# Patient Record
Sex: MALE | Race: WHITE | NOT HISPANIC OR LATINO | Employment: UNEMPLOYED | URBAN - METROPOLITAN AREA
[De-identification: names, ages, dates, MRNs, and addresses within clinical notes are randomized per-mention and may not be internally consistent; named-entity substitution may affect disease eponyms.]

---

## 2017-01-25 ENCOUNTER — HOSPITAL ENCOUNTER (OUTPATIENT)
Dept: ONCOLOGY | Facility: CLINIC | Age: 82
Discharge: HOME | End: 2017-01-25
Attending: INTERNAL MEDICINE | Admitting: INTERNAL MEDICINE

## 2017-02-01 ENCOUNTER — HOSPITAL ENCOUNTER (OUTPATIENT)
Dept: LAB | Facility: HOSPITAL | Age: 82
Setting detail: SPECIMEN
Discharge: HOME | End: 2017-02-01
Attending: INTERNAL MEDICINE | Admitting: INTERNAL MEDICINE

## 2017-02-01 ENCOUNTER — HOSPITAL ENCOUNTER (OUTPATIENT)
Dept: MRI IMAGING | Facility: HOSPITAL | Age: 82
Discharge: HOME | End: 2017-02-01
Attending: OTOLARYNGOLOGY | Admitting: OTOLARYNGOLOGY

## 2017-02-01 LAB
ALBUMIN SERPL-MCNC: 3.7 G/DL (ref 3.5–4.8)
ALBUMIN/GLOB SERPL: 1.4 {RATIO} (ref 1–1.7)
ALP SERPL-CCNC: 49 IU/L (ref 32–91)
ALT SERPL-CCNC: 20 IU/L (ref 17–63)
ANION GAP SERPL CALC-SCNC: 13.1 MMOL/L (ref 10–20)
AST SERPL-CCNC: 27 IU/L (ref 15–41)
BILIRUB SERPL-MCNC: 0.5 MG/DL (ref 0.3–1.2)
BUN SERPL-MCNC: 18 MG/DL (ref 8–20)
BUN/CREAT SERPL: 18 (ref 6.2–20.3)
CALCIUM SERPL-MCNC: 9 MG/DL (ref 8.9–10.3)
CHLORIDE SERPL-SCNC: 105 MMOL/L (ref 101–111)
CONV CO2: 28 MMOL/L (ref 22–32)
CONV MICROALBUM.,U,RANDOM: 7 MG/L
CONV TOTAL PROTEIN: 6.3 G/DL (ref 6.1–7.9)
CREAT 24H UR-MCNC: 78.2 MG/DL
CREAT UR-MCNC: 1 MG/DL (ref 0.7–1.2)
GLOBULIN UR ELPH-MCNC: 2.6 G/DL (ref 2.5–3.8)
GLUCOSE SERPL-MCNC: 102 MG/DL (ref 65–99)
MICROALBUMIN/CREAT UR: 9 UG/MG
POTASSIUM SERPL-SCNC: 4.1 MMOL/L (ref 3.6–5.1)
SODIUM SERPL-SCNC: 142 MMOL/L (ref 136–144)
TSH SERPL-ACNC: 1.87 UIU/ML (ref 0.34–5.6)

## 2017-02-10 ENCOUNTER — HOSPITAL ENCOUNTER (OUTPATIENT)
Dept: MRI IMAGING | Facility: HOSPITAL | Age: 82
Discharge: HOME | End: 2017-02-10
Attending: OTOLARYNGOLOGY | Admitting: OTOLARYNGOLOGY

## 2017-02-27 ENCOUNTER — HOSPITAL ENCOUNTER (OUTPATIENT)
Dept: ONCOLOGY | Facility: CLINIC | Age: 82
Discharge: HOME | End: 2017-02-27
Attending: INTERNAL MEDICINE | Admitting: INTERNAL MEDICINE

## 2017-03-13 ENCOUNTER — HOSPITAL ENCOUNTER (OUTPATIENT)
Dept: ONCOLOGY | Facility: CLINIC | Age: 82
Discharge: HOME | End: 2017-03-13
Attending: INTERNAL MEDICINE | Admitting: INTERNAL MEDICINE

## 2017-03-20 ENCOUNTER — HOSPITAL ENCOUNTER (OUTPATIENT)
Dept: ONCOLOGY | Facility: CLINIC | Age: 82
Discharge: HOME | End: 2017-03-20
Attending: INTERNAL MEDICINE | Admitting: INTERNAL MEDICINE

## 2017-03-20 LAB
ALBUMIN SERPL-MCNC: 3.8 G/DL (ref 3.5–4.8)
ALBUMIN/GLOB SERPL: 1.3 {RATIO} (ref 1–1.7)
ALP SERPL-CCNC: 42 IU/L (ref 32–91)
ALT SERPL-CCNC: 15 IU/L (ref 17–63)
ANION GAP SERPL CALC-SCNC: 17.9 MMOL/L (ref 10–20)
AST SERPL-CCNC: 20 IU/L (ref 15–41)
BILIRUB SERPL-MCNC: 0.5 MG/DL (ref 0.3–1.2)
BUN SERPL-MCNC: 25 MG/DL (ref 8–20)
BUN/CREAT SERPL: 22.7 (ref 6.2–20.3)
CALCIUM SERPL-MCNC: 9.6 MG/DL (ref 8.9–10.3)
CHLORIDE SERPL-SCNC: 102 MMOL/L (ref 101–111)
CONV CO2: 25 MMOL/L (ref 22–32)
CONV TOTAL PROTEIN: 6.7 G/DL (ref 6.1–7.9)
CREAT UR-MCNC: 1.1 MG/DL (ref 0.7–1.2)
GLOBULIN UR ELPH-MCNC: 2.9 G/DL (ref 2.5–3.8)
GLUCOSE SERPL-MCNC: 198 MG/DL (ref 65–99)
POTASSIUM SERPL-SCNC: 3.9 MMOL/L (ref 3.6–5.1)
SODIUM SERPL-SCNC: 141 MMOL/L (ref 136–144)

## 2017-03-24 ENCOUNTER — HOSPITAL ENCOUNTER (OUTPATIENT)
Dept: PAIN MEDICINE | Facility: HOSPITAL | Age: 82
Discharge: HOME | End: 2017-03-24
Attending: PAIN MEDICINE | Admitting: PAIN MEDICINE

## 2017-04-05 ENCOUNTER — HOSPITAL ENCOUNTER (OUTPATIENT)
Dept: PHYSICAL THERAPY | Facility: HOSPITAL | Age: 82
Setting detail: RECURRING SERIES
Discharge: HOME | End: 2017-05-31
Attending: ORTHOPAEDIC SURGERY | Admitting: ORTHOPAEDIC SURGERY

## 2017-04-07 ENCOUNTER — HOSPITAL ENCOUNTER (OUTPATIENT)
Dept: PAIN MEDICINE | Facility: HOSPITAL | Age: 82
Discharge: HOME | End: 2017-04-07
Attending: PAIN MEDICINE | Admitting: PAIN MEDICINE

## 2017-04-10 ENCOUNTER — HOSPITAL ENCOUNTER (OUTPATIENT)
Dept: ONCOLOGY | Facility: CLINIC | Age: 82
Discharge: HOME | End: 2017-04-10
Attending: INTERNAL MEDICINE | Admitting: INTERNAL MEDICINE

## 2017-04-10 LAB
ALBUMIN SERPL-MCNC: 3.6 G/DL (ref 3.5–4.8)
ALBUMIN/GLOB SERPL: 1.2 {RATIO} (ref 1–1.7)
ALP SERPL-CCNC: 41 IU/L (ref 32–91)
ALT SERPL-CCNC: 20 IU/L (ref 17–63)
ANION GAP SERPL CALC-SCNC: 13.8 MMOL/L (ref 10–20)
AST SERPL-CCNC: 23 IU/L (ref 15–41)
BILIRUB SERPL-MCNC: 0.6 MG/DL (ref 0.3–1.2)
BUN SERPL-MCNC: 28 MG/DL (ref 8–20)
BUN/CREAT SERPL: 23.3 (ref 6.2–20.3)
CALCIUM SERPL-MCNC: 8.9 MG/DL (ref 8.9–10.3)
CHLORIDE SERPL-SCNC: 108 MMOL/L (ref 101–111)
CONV CO2: 26 MMOL/L (ref 22–32)
CONV TOTAL PROTEIN: 6.5 G/DL (ref 6.1–7.9)
CREAT UR-MCNC: 1.2 MG/DL (ref 0.7–1.2)
GLOBULIN UR ELPH-MCNC: 2.9 G/DL (ref 2.5–3.8)
GLUCOSE SERPL-MCNC: 315 MG/DL (ref 65–99)
POTASSIUM SERPL-SCNC: 4.8 MMOL/L (ref 3.6–5.1)
SODIUM SERPL-SCNC: 143 MMOL/L (ref 136–144)

## 2017-04-24 ENCOUNTER — HOSPITAL ENCOUNTER (OUTPATIENT)
Dept: PAIN MEDICINE | Facility: HOSPITAL | Age: 82
Discharge: HOME | End: 2017-04-24
Attending: PAIN MEDICINE | Admitting: PAIN MEDICINE

## 2017-05-01 ENCOUNTER — HOSPITAL ENCOUNTER (OUTPATIENT)
Dept: ONCOLOGY | Facility: CLINIC | Age: 82
Discharge: HOME | End: 2017-05-01
Attending: INTERNAL MEDICINE | Admitting: INTERNAL MEDICINE

## 2017-05-10 ENCOUNTER — HOSPITAL ENCOUNTER (OUTPATIENT)
Dept: ONCOLOGY | Facility: CLINIC | Age: 82
Discharge: HOME | End: 2017-05-10
Attending: INTERNAL MEDICINE | Admitting: INTERNAL MEDICINE

## 2017-05-15 ENCOUNTER — HOSPITAL ENCOUNTER (OUTPATIENT)
Dept: ONCOLOGY | Facility: CLINIC | Age: 82
Discharge: HOME | End: 2017-05-15
Attending: INTERNAL MEDICINE | Admitting: INTERNAL MEDICINE

## 2017-05-18 ENCOUNTER — HOSPITAL ENCOUNTER (OUTPATIENT)
Dept: INTERVENTIONAL RADIOLOGY/VASCULAR | Facility: HOSPITAL | Age: 82
Discharge: HOME | End: 2017-05-18
Attending: INTERNAL MEDICINE | Admitting: INTERNAL MEDICINE

## 2017-05-22 ENCOUNTER — HOSPITAL ENCOUNTER (OUTPATIENT)
Dept: ONCOLOGY | Facility: CLINIC | Age: 82
Discharge: HOME | End: 2017-05-22
Attending: INTERNAL MEDICINE | Admitting: INTERNAL MEDICINE

## 2017-05-22 LAB
ALBUMIN SERPL-MCNC: 3.7 G/DL (ref 3.5–4.8)
ALBUMIN/GLOB SERPL: 1.4 {RATIO} (ref 1–1.7)
ALP SERPL-CCNC: 51 IU/L (ref 32–91)
ALT SERPL-CCNC: 14 IU/L (ref 17–63)
ANION GAP SERPL CALC-SCNC: 14.2 MMOL/L (ref 10–20)
AST SERPL-CCNC: 19 IU/L (ref 15–41)
BILIRUB SERPL-MCNC: 0.7 MG/DL (ref 0.3–1.2)
BUN SERPL-MCNC: 18 MG/DL (ref 8–20)
BUN/CREAT SERPL: 18 (ref 6.2–20.3)
CALCIUM SERPL-MCNC: 8.6 MG/DL (ref 8.9–10.3)
CHLORIDE SERPL-SCNC: 105 MMOL/L (ref 101–111)
CONV CO2: 24 MMOL/L (ref 22–32)
CONV TOTAL PROTEIN: 6.4 G/DL (ref 6.1–7.9)
CREAT UR-MCNC: 1 MG/DL (ref 0.7–1.2)
GLOBULIN UR ELPH-MCNC: 2.7 G/DL (ref 2.5–3.8)
GLUCOSE SERPL-MCNC: 287 MG/DL (ref 65–99)
MAGNESIUM SERPL-MCNC: 1.9 MG/DL (ref 1.8–2.5)
PHOSPHATE SERPL-MCNC: 3.6 MG/DL (ref 2.4–4.7)
POTASSIUM SERPL-SCNC: 4.2 MMOL/L (ref 3.6–5.1)
SODIUM SERPL-SCNC: 139 MMOL/L (ref 136–144)

## 2017-06-09 ENCOUNTER — HOSPITAL ENCOUNTER (OUTPATIENT)
Dept: LAB | Facility: HOSPITAL | Age: 82
Setting detail: SPECIMEN
Discharge: HOME | End: 2017-06-09
Attending: INTERNAL MEDICINE | Admitting: INTERNAL MEDICINE

## 2017-06-09 LAB
ALBUMIN SERPL-MCNC: 3.5 G/DL (ref 3.5–4.8)
ALBUMIN/GLOB SERPL: 1.2 {RATIO} (ref 1–1.7)
ALP SERPL-CCNC: 53 IU/L (ref 32–91)
ALT SERPL-CCNC: 17 IU/L (ref 17–63)
ANION GAP SERPL CALC-SCNC: 12 MMOL/L (ref 10–20)
AST SERPL-CCNC: 16 IU/L (ref 15–41)
BILIRUB SERPL-MCNC: 0.6 MG/DL (ref 0.3–1.2)
BUN SERPL-MCNC: 16 MG/DL (ref 8–20)
BUN/CREAT SERPL: 16 (ref 6.2–20.3)
CALCIUM SERPL-MCNC: 8.8 MG/DL (ref 8.9–10.3)
CHLORIDE SERPL-SCNC: 104 MMOL/L (ref 101–111)
CHOLEST SERPL-MCNC: 124 MG/DL
CHOLEST/HDLC SERPL: 3.3 {RATIO}
CONV CO2: 29 MMOL/L (ref 22–32)
CONV LDL CHOLESTEROL DIRECT: 66 MG/DL (ref 0–100)
CONV TOTAL PROTEIN: 6.4 G/DL (ref 6.1–7.9)
CREAT UR-MCNC: 1 MG/DL (ref 0.7–1.2)
GLOBULIN UR ELPH-MCNC: 2.9 G/DL (ref 2.5–3.8)
GLUCOSE SERPL-MCNC: 223 MG/DL (ref 65–99)
HDLC SERPL-MCNC: 38 MG/DL
LDLC/HDLC SERPL: 1.8 {RATIO}
LIPID INTERPRETATION: ABNORMAL
POTASSIUM SERPL-SCNC: 4 MMOL/L (ref 3.6–5.1)
SODIUM SERPL-SCNC: 141 MMOL/L (ref 136–144)
TRIGL SERPL-MCNC: 103 MG/DL
VLDLC SERPL CALC-MCNC: 20.2 MG/DL

## 2017-06-12 ENCOUNTER — HOSPITAL ENCOUNTER (OUTPATIENT)
Dept: PAIN MEDICINE | Facility: HOSPITAL | Age: 82
Discharge: HOME | End: 2017-06-12
Attending: PAIN MEDICINE | Admitting: PAIN MEDICINE

## 2017-06-12 ENCOUNTER — HOSPITAL ENCOUNTER (OUTPATIENT)
Dept: ONCOLOGY | Facility: CLINIC | Age: 82
Discharge: HOME | End: 2017-06-12
Attending: INTERNAL MEDICINE | Admitting: INTERNAL MEDICINE

## 2017-06-12 LAB
ALBUMIN SERPL-MCNC: 3.8 G/DL (ref 3.5–4.8)
ALBUMIN/GLOB SERPL: 1.3 {RATIO} (ref 1–1.7)
ALP SERPL-CCNC: 52 IU/L (ref 32–91)
ALT SERPL-CCNC: 16 IU/L (ref 17–63)
ANION GAP SERPL CALC-SCNC: 17.5 MMOL/L (ref 10–20)
AST SERPL-CCNC: 18 IU/L (ref 15–41)
BILIRUB SERPL-MCNC: 0.6 MG/DL (ref 0.3–1.2)
BUN SERPL-MCNC: 14 MG/DL (ref 8–20)
BUN/CREAT SERPL: 14 (ref 6.2–20.3)
CALCIUM SERPL-MCNC: 9.3 MG/DL (ref 8.9–10.3)
CHLORIDE SERPL-SCNC: 105 MMOL/L (ref 101–111)
CONV CO2: 24 MMOL/L (ref 22–32)
CONV TOTAL PROTEIN: 6.7 G/DL (ref 6.1–7.9)
CREAT UR-MCNC: 1 MG/DL (ref 0.7–1.2)
GLOBULIN UR ELPH-MCNC: 2.9 G/DL (ref 2.5–3.8)
GLUCOSE SERPL-MCNC: 159 MG/DL (ref 65–99)
POTASSIUM SERPL-SCNC: 4.5 MMOL/L (ref 3.6–5.1)
SODIUM SERPL-SCNC: 142 MMOL/L (ref 136–144)

## 2017-06-26 ENCOUNTER — HOSPITAL ENCOUNTER (OUTPATIENT)
Dept: PAIN MEDICINE | Facility: HOSPITAL | Age: 82
Discharge: HOME | End: 2017-06-26
Attending: PAIN MEDICINE | Admitting: PAIN MEDICINE

## 2017-07-10 ENCOUNTER — HOSPITAL ENCOUNTER (OUTPATIENT)
Dept: ONCOLOGY | Facility: CLINIC | Age: 82
Discharge: HOME | End: 2017-07-10
Attending: INTERNAL MEDICINE | Admitting: INTERNAL MEDICINE

## 2017-07-10 LAB
ALBUMIN SERPL-MCNC: 4 G/DL (ref 3.5–4.8)
ALBUMIN/GLOB SERPL: 1.4 {RATIO} (ref 1–1.7)
ALP SERPL-CCNC: 51 IU/L (ref 32–91)
ALT SERPL-CCNC: 15 IU/L (ref 17–63)
ANION GAP SERPL CALC-SCNC: 17 MMOL/L (ref 10–20)
AST SERPL-CCNC: 21 IU/L (ref 15–41)
BILIRUB SERPL-MCNC: 0.6 MG/DL (ref 0.3–1.2)
BUN SERPL-MCNC: 18 MG/DL (ref 8–20)
BUN/CREAT SERPL: 15 (ref 6.2–20.3)
CALCIUM SERPL-MCNC: 9.8 MG/DL (ref 8.9–10.3)
CHLORIDE SERPL-SCNC: 100 MMOL/L (ref 101–111)
CONV CO2: 27 MMOL/L (ref 22–32)
CONV TOTAL PROTEIN: 6.9 G/DL (ref 6.1–7.9)
CREAT UR-MCNC: 1.2 MG/DL (ref 0.7–1.2)
GLOBULIN UR ELPH-MCNC: 2.9 G/DL (ref 2.5–3.8)
GLUCOSE SERPL-MCNC: 302 MG/DL (ref 65–99)
POTASSIUM SERPL-SCNC: 5 MMOL/L (ref 3.6–5.1)
SODIUM SERPL-SCNC: 139 MMOL/L (ref 136–144)

## 2017-07-11 ENCOUNTER — HOSPITAL ENCOUNTER (OUTPATIENT)
Dept: ONCOLOGY | Facility: CLINIC | Age: 82
Discharge: HOME | End: 2017-07-11
Attending: INTERNAL MEDICINE | Admitting: INTERNAL MEDICINE

## 2017-07-25 ENCOUNTER — HOSPITAL ENCOUNTER (OUTPATIENT)
Dept: ONCOLOGY | Facility: CLINIC | Age: 82
Discharge: HOME | End: 2017-07-25
Attending: INTERNAL MEDICINE | Admitting: INTERNAL MEDICINE

## 2017-08-01 ENCOUNTER — HOSPITAL ENCOUNTER (OUTPATIENT)
Dept: ONCOLOGY | Facility: CLINIC | Age: 82
Discharge: HOME | End: 2017-08-01
Attending: INTERNAL MEDICINE | Admitting: INTERNAL MEDICINE

## 2017-08-01 LAB
ANION GAP SERPL CALC-SCNC: 16.3 MMOL/L (ref 10–20)
BUN SERPL-MCNC: 17 MG/DL (ref 8–20)
BUN/CREAT SERPL: 14.2 (ref 6.2–20.3)
CALCIUM SERPL-MCNC: 8.5 MG/DL (ref 8.9–10.3)
CHLORIDE SERPL-SCNC: 105 MMOL/L (ref 101–111)
CONV CO2: 25 MMOL/L (ref 22–32)
CREAT UR-MCNC: 1.2 MG/DL (ref 0.7–1.2)
GLUCOSE SERPL-MCNC: 282 MG/DL (ref 65–99)
MAGNESIUM SERPL-MCNC: 2 MG/DL (ref 1.8–2.5)
POTASSIUM SERPL-SCNC: 4.3 MMOL/L (ref 3.6–5.1)
SODIUM SERPL-SCNC: 142 MMOL/L (ref 136–144)

## 2017-08-21 ENCOUNTER — HOSPITAL ENCOUNTER (OUTPATIENT)
Dept: ONCOLOGY | Facility: CLINIC | Age: 82
Discharge: HOME | End: 2017-08-21
Attending: INTERNAL MEDICINE | Admitting: INTERNAL MEDICINE

## 2017-08-21 LAB
ALBUMIN SERPL-MCNC: 3.9 G/DL (ref 3.5–4.8)
ALBUMIN/GLOB SERPL: 1.3 {RATIO} (ref 1–1.7)
ALP SERPL-CCNC: 54 IU/L (ref 32–91)
ALT SERPL-CCNC: 16 IU/L (ref 17–63)
ANION GAP SERPL CALC-SCNC: 14.7 MMOL/L (ref 10–20)
AST SERPL-CCNC: 19 IU/L (ref 15–41)
BILIRUB SERPL-MCNC: 0.7 MG/DL (ref 0.3–1.2)
BUN SERPL-MCNC: 17 MG/DL (ref 8–20)
BUN/CREAT SERPL: 15.5 (ref 6.2–20.3)
CALCIUM SERPL-MCNC: 9.1 MG/DL (ref 8.9–10.3)
CHLORIDE SERPL-SCNC: 106 MMOL/L (ref 101–111)
CONV CO2: 27 MMOL/L (ref 22–32)
CONV TOTAL PROTEIN: 6.9 G/DL (ref 6.1–7.9)
CREAT UR-MCNC: 1.1 MG/DL (ref 0.7–1.2)
GLOBULIN UR ELPH-MCNC: 3 G/DL (ref 2.5–3.8)
GLUCOSE SERPL-MCNC: 215 MG/DL (ref 65–99)
POTASSIUM SERPL-SCNC: 4.7 MMOL/L (ref 3.6–5.1)
SODIUM SERPL-SCNC: 143 MMOL/L (ref 136–144)

## 2017-08-22 ENCOUNTER — HOSPITAL ENCOUNTER (OUTPATIENT)
Dept: ONCOLOGY | Facility: CLINIC | Age: 82
Discharge: HOME | End: 2017-08-22
Attending: INTERNAL MEDICINE | Admitting: INTERNAL MEDICINE

## 2017-09-05 ENCOUNTER — HOSPITAL ENCOUNTER (OUTPATIENT)
Dept: ONCOLOGY | Facility: HOSPITAL | Age: 82
Discharge: HOME | End: 2017-09-05
Attending: INTERNAL MEDICINE | Admitting: INTERNAL MEDICINE

## 2017-09-05 ENCOUNTER — CLINICAL SUPPORT (OUTPATIENT)
Dept: ONCOLOGY | Facility: HOSPITAL | Age: 82
End: 2017-09-05

## 2017-09-05 ENCOUNTER — HOSPITAL ENCOUNTER (OUTPATIENT)
Dept: ONCOLOGY | Facility: CLINIC | Age: 82
Setting detail: INFUSION SERIES
Discharge: HOME | End: 2017-09-05
Attending: INTERNAL MEDICINE | Admitting: INTERNAL MEDICINE

## 2017-09-07 ENCOUNTER — HOSPITAL ENCOUNTER (OUTPATIENT)
Dept: LAB | Facility: HOSPITAL | Age: 82
Setting detail: SPECIMEN
Discharge: HOME | End: 2017-09-07
Attending: INTERNAL MEDICINE | Admitting: INTERNAL MEDICINE

## 2017-09-12 ENCOUNTER — HOSPITAL ENCOUNTER (OUTPATIENT)
Dept: ONCOLOGY | Facility: CLINIC | Age: 82
Setting detail: INFUSION SERIES
Discharge: HOME | End: 2017-09-12
Attending: INTERNAL MEDICINE | Admitting: INTERNAL MEDICINE

## 2017-09-12 ENCOUNTER — CLINICAL SUPPORT (OUTPATIENT)
Dept: ONCOLOGY | Facility: HOSPITAL | Age: 82
End: 2017-09-12

## 2017-09-12 NOTE — PROGRESS NOTES
PATIENTS ONCOLOGY RECORD LOCATED IN [facility]      Subjective     Name:  MARTINA PAEZ     Date:  2017  Address:  [address] [place] [address]  Home: [phone]  :  1927 AGE:  89 y.o.        RECORDS OBTAINED:  Patients Oncology Record is located in [facility]

## 2017-09-18 ENCOUNTER — HOSPITAL ENCOUNTER (OUTPATIENT)
Dept: ONCOLOGY | Facility: CLINIC | Age: 82
Setting detail: INFUSION SERIES
Discharge: HOME | End: 2017-09-18
Attending: INTERNAL MEDICINE | Admitting: INTERNAL MEDICINE

## 2017-09-18 ENCOUNTER — HOSPITAL ENCOUNTER (OUTPATIENT)
Dept: ONCOLOGY | Facility: HOSPITAL | Age: 82
Discharge: HOME | End: 2017-09-18
Attending: INTERNAL MEDICINE | Admitting: INTERNAL MEDICINE

## 2017-09-18 ENCOUNTER — CLINICAL SUPPORT (OUTPATIENT)
Dept: ONCOLOGY | Facility: HOSPITAL | Age: 82
End: 2017-09-18

## 2017-09-18 NOTE — PROGRESS NOTES
PATIENTS ONCOLOGY RECORD LOCATED IN [facility]      Subjective     Name:  MATRINA PAEZ     Date:  2017  Address:  [address] [place] [address]  Home: [phone]  :  1927 AGE:  89 y.o.        RECORDS OBTAINED:  Patients Oncology Record is located in [facility]

## 2017-10-03 ENCOUNTER — HOSPITAL ENCOUNTER (OUTPATIENT)
Dept: ONCOLOGY | Facility: CLINIC | Age: 82
Setting detail: INFUSION SERIES
Discharge: HOME | End: 2017-10-03
Attending: INTERNAL MEDICINE | Admitting: INTERNAL MEDICINE

## 2017-10-03 ENCOUNTER — HOSPITAL ENCOUNTER (OUTPATIENT)
Dept: ONCOLOGY | Facility: HOSPITAL | Age: 82
Discharge: HOME | End: 2017-10-03
Attending: INTERNAL MEDICINE | Admitting: INTERNAL MEDICINE

## 2017-10-03 ENCOUNTER — CLINICAL SUPPORT (OUTPATIENT)
Dept: ONCOLOGY | Facility: HOSPITAL | Age: 82
End: 2017-10-03

## 2017-10-03 LAB
ALBUMIN SERPL-MCNC: 3.5 G/DL (ref 3.5–4.8)
ALBUMIN/GLOB SERPL: 1.3 {RATIO} (ref 1–1.7)
ALP SERPL-CCNC: 55 IU/L (ref 32–91)
ALT SERPL-CCNC: 12 IU/L (ref 17–63)
ANION GAP SERPL CALC-SCNC: 12 MMOL/L (ref 10–20)
AST SERPL-CCNC: 17 IU/L (ref 15–41)
BILIRUB SERPL-MCNC: 0.6 MG/DL (ref 0.3–1.2)
BUN SERPL-MCNC: 18 MG/DL (ref 8–20)
BUN/CREAT SERPL: 16.4 (ref 6.2–20.3)
CALCIUM SERPL-MCNC: 9 MG/DL (ref 8.9–10.3)
CHLORIDE SERPL-SCNC: 106 MMOL/L (ref 101–111)
CONV CO2: 29 MMOL/L (ref 22–32)
CONV TOTAL PROTEIN: 6.2 G/DL (ref 6.1–7.9)
CREAT UR-MCNC: 1.1 MG/DL (ref 0.7–1.2)
GLOBULIN UR ELPH-MCNC: 2.7 G/DL (ref 2.5–3.8)
GLUCOSE SERPL-MCNC: 229 MG/DL (ref 65–99)
POTASSIUM SERPL-SCNC: 4 MMOL/L (ref 3.6–5.1)
SODIUM SERPL-SCNC: 143 MMOL/L (ref 136–144)

## 2017-10-03 NOTE — PROGRESS NOTES
PATIENTS ONCOLOGY RECORD LOCATED IN [facility]      Subjective     Name:  MARTINA PAEZ     Date:  10/03/2017  Address:  [address] [place] [address]  Home: [phone]  :  1927 AGE:  89 y.o.        RECORDS OBTAINED:  Patients Oncology Record is located in [facility]

## 2017-10-10 ENCOUNTER — CLINICAL SUPPORT (OUTPATIENT)
Dept: ONCOLOGY | Facility: HOSPITAL | Age: 82
End: 2017-10-10

## 2017-10-10 ENCOUNTER — HOSPITAL ENCOUNTER (OUTPATIENT)
Dept: ONCOLOGY | Facility: HOSPITAL | Age: 82
Discharge: HOME | End: 2017-10-10
Attending: INTERNAL MEDICINE | Admitting: INTERNAL MEDICINE

## 2017-10-10 ENCOUNTER — HOSPITAL ENCOUNTER (OUTPATIENT)
Dept: ONCOLOGY | Facility: CLINIC | Age: 82
Setting detail: INFUSION SERIES
Discharge: HOME | End: 2017-10-10
Attending: INTERNAL MEDICINE | Admitting: INTERNAL MEDICINE

## 2017-10-10 NOTE — PROGRESS NOTES
PATIENTS ONCOLOGY RECORD LOCATED IN [facility]      Subjective     Name:  MARTINA PAEZ     Date:  10/10/2017  Address:  [address] [place] [address]  Home: [phone]  :  1927 AGE:  89 y.o.        RECORDS OBTAINED:  Patients Oncology Record is located in [facility]

## 2017-10-16 ENCOUNTER — HOSPITAL ENCOUNTER (OUTPATIENT)
Dept: ONCOLOGY | Facility: CLINIC | Age: 82
Setting detail: INFUSION SERIES
Discharge: HOME | End: 2017-10-16
Attending: INTERNAL MEDICINE | Admitting: INTERNAL MEDICINE

## 2017-10-16 ENCOUNTER — CLINICAL SUPPORT (OUTPATIENT)
Dept: ONCOLOGY | Facility: HOSPITAL | Age: 82
End: 2017-10-16

## 2017-10-16 ENCOUNTER — HOSPITAL ENCOUNTER (OUTPATIENT)
Dept: ONCOLOGY | Facility: HOSPITAL | Age: 82
Discharge: HOME | End: 2017-10-16
Attending: INTERNAL MEDICINE | Admitting: INTERNAL MEDICINE

## 2017-10-16 LAB
ALBUMIN SERPL-MCNC: 3.9 G/DL (ref 3.5–4.8)
ALBUMIN/GLOB SERPL: 1.4 {RATIO} (ref 1–1.7)
ALP SERPL-CCNC: 62 IU/L (ref 32–91)
ALT SERPL-CCNC: 15 IU/L (ref 17–63)
ANION GAP SERPL CALC-SCNC: 14.6 MMOL/L (ref 10–20)
AST SERPL-CCNC: 25 IU/L (ref 15–41)
BILIRUB SERPL-MCNC: 0.6 MG/DL (ref 0.3–1.2)
BUN SERPL-MCNC: 22 MG/DL (ref 8–20)
BUN/CREAT SERPL: 18.3 (ref 6.2–20.3)
CALCIUM SERPL-MCNC: 9.6 MG/DL (ref 8.9–10.3)
CHLORIDE SERPL-SCNC: 102 MMOL/L (ref 101–111)
CONV CO2: 29 MMOL/L (ref 22–32)
CONV TOTAL PROTEIN: 6.6 G/DL (ref 6.1–7.9)
CREAT UR-MCNC: 1.2 MG/DL (ref 0.7–1.2)
GLOBULIN UR ELPH-MCNC: 2.7 G/DL (ref 2.5–3.8)
GLUCOSE SERPL-MCNC: 135 MG/DL (ref 65–99)
POTASSIUM SERPL-SCNC: 3.6 MMOL/L (ref 3.6–5.1)
SODIUM SERPL-SCNC: 142 MMOL/L (ref 136–144)

## 2017-10-16 NOTE — PROGRESS NOTES
PATIENTS ONCOLOGY RECORD LOCATED IN [facility]      Subjective     Name:  MARTINA PAEZ     Date:  10/16/2017  Address:  [address] [place] [address]  Home: [phone]  :  1927 AGE:  89 y.o.        RECORDS OBTAINED:  Patients Oncology Record is located in [facility]

## 2017-10-24 ENCOUNTER — HOSPITAL ENCOUNTER (OUTPATIENT)
Dept: ONCOLOGY | Facility: CLINIC | Age: 82
Setting detail: INFUSION SERIES
Discharge: HOME | End: 2017-10-24
Attending: INTERNAL MEDICINE | Admitting: INTERNAL MEDICINE

## 2017-10-24 ENCOUNTER — HOSPITAL ENCOUNTER (OUTPATIENT)
Dept: ONCOLOGY | Facility: HOSPITAL | Age: 82
Discharge: HOME | End: 2017-10-24
Attending: INTERNAL MEDICINE | Admitting: INTERNAL MEDICINE

## 2017-10-24 ENCOUNTER — CLINICAL SUPPORT (OUTPATIENT)
Dept: ONCOLOGY | Facility: HOSPITAL | Age: 82
End: 2017-10-24

## 2017-10-24 LAB
ALBUMIN SERPL-MCNC: 3.6 G/DL (ref 3.5–4.8)
ALBUMIN/GLOB SERPL: 1.3 {RATIO} (ref 1–1.7)
ALP SERPL-CCNC: 58 IU/L (ref 32–91)
ALT SERPL-CCNC: 14 IU/L (ref 17–63)
ANION GAP SERPL CALC-SCNC: 13.9 MMOL/L (ref 10–20)
AST SERPL-CCNC: 27 IU/L (ref 15–41)
BILIRUB SERPL-MCNC: 0.6 MG/DL (ref 0.3–1.2)
BUN SERPL-MCNC: 16 MG/DL (ref 8–20)
BUN/CREAT SERPL: 14.5 (ref 6.2–20.3)
CALCIUM SERPL-MCNC: 9 MG/DL (ref 8.9–10.3)
CHLORIDE SERPL-SCNC: 106 MMOL/L (ref 101–111)
CONV CO2: 27 MMOL/L (ref 22–32)
CONV TOTAL PROTEIN: 6.3 G/DL (ref 6.1–7.9)
CREAT UR-MCNC: 1.1 MG/DL (ref 0.7–1.2)
GLOBULIN UR ELPH-MCNC: 2.7 G/DL (ref 2.5–3.8)
GLUCOSE SERPL-MCNC: 197 MG/DL (ref 65–99)
POTASSIUM SERPL-SCNC: 3.9 MMOL/L (ref 3.6–5.1)
SODIUM SERPL-SCNC: 143 MMOL/L (ref 136–144)

## 2017-10-24 NOTE — PROGRESS NOTES
PATIENTS ONCOLOGY RECORD LOCATED IN [facility]      Subjective     Name:  MARTINA PAEZ     Date:  10/24/2017  Address:  [address] [place] [address]  Home: [phone]  :  1927 AGE:  89 y.o.        RECORDS OBTAINED:  Patients Oncology Record is located in [facility]

## 2017-11-13 ENCOUNTER — CLINICAL SUPPORT (OUTPATIENT)
Dept: ONCOLOGY | Facility: HOSPITAL | Age: 82
End: 2017-11-13

## 2017-11-13 ENCOUNTER — HOSPITAL ENCOUNTER (OUTPATIENT)
Dept: ONCOLOGY | Facility: HOSPITAL | Age: 82
Discharge: HOME | End: 2017-11-13
Attending: INTERNAL MEDICINE | Admitting: INTERNAL MEDICINE

## 2017-11-13 ENCOUNTER — HOSPITAL ENCOUNTER (OUTPATIENT)
Dept: ONCOLOGY | Facility: CLINIC | Age: 82
Setting detail: INFUSION SERIES
Discharge: HOME | End: 2017-11-13
Attending: INTERNAL MEDICINE | Admitting: INTERNAL MEDICINE

## 2017-11-13 LAB
ALBUMIN SERPL-MCNC: 3.7 G/DL (ref 3.5–4.8)
ALBUMIN/GLOB SERPL: 1.2 {RATIO} (ref 1–1.7)
ALP SERPL-CCNC: 62 IU/L (ref 32–91)
ALT SERPL-CCNC: 14 IU/L (ref 17–63)
ANION GAP SERPL CALC-SCNC: 12.1 MMOL/L (ref 10–20)
AST SERPL-CCNC: 23 IU/L (ref 15–41)
BILIRUB SERPL-MCNC: 1.1 MG/DL (ref 0.3–1.2)
BUN SERPL-MCNC: 14 MG/DL (ref 8–20)
BUN/CREAT SERPL: 11.7 (ref 6.2–20.3)
CALCIUM SERPL-MCNC: 8.7 MG/DL (ref 8.9–10.3)
CHLORIDE SERPL-SCNC: 101 MMOL/L (ref 101–111)
CONV CO2: 28 MMOL/L (ref 22–32)
CONV TOTAL PROTEIN: 6.7 G/DL (ref 6.1–7.9)
CREAT UR-MCNC: 1.2 MG/DL (ref 0.7–1.2)
GLOBULIN UR ELPH-MCNC: 3 G/DL (ref 2.5–3.8)
GLUCOSE SERPL-MCNC: 302 MG/DL (ref 65–99)
POTASSIUM SERPL-SCNC: 4.1 MMOL/L (ref 3.6–5.1)
SODIUM SERPL-SCNC: 137 MMOL/L (ref 136–144)

## 2017-11-13 NOTE — PROGRESS NOTES
PATIENTS ONCOLOGY RECORD LOCATED IN [facility]      Subjective     Name:  MARTINA PAEZ     Date:  2017  Address:  [address] [place] [address]  Home: [phone]  :  1927 AGE:  89 y.o.        RECORDS OBTAINED:  Patients Oncology Record is located in [facility]

## 2017-11-17 ENCOUNTER — HOSPITAL ENCOUNTER (OUTPATIENT)
Dept: ONCOLOGY | Facility: HOSPITAL | Age: 82
Discharge: HOME | End: 2017-11-17
Attending: INTERNAL MEDICINE | Admitting: INTERNAL MEDICINE

## 2017-11-17 ENCOUNTER — CLINICAL SUPPORT (OUTPATIENT)
Dept: ONCOLOGY | Facility: HOSPITAL | Age: 82
End: 2017-11-17

## 2017-11-17 ENCOUNTER — HOSPITAL ENCOUNTER (OUTPATIENT)
Dept: ONCOLOGY | Facility: CLINIC | Age: 82
Setting detail: INFUSION SERIES
Discharge: HOME | End: 2017-11-17
Attending: INTERNAL MEDICINE | Admitting: INTERNAL MEDICINE

## 2017-11-17 LAB
ALBUMIN SERPL-MCNC: 3.4 G/DL (ref 3.5–4.8)
ALBUMIN/GLOB SERPL: 1.2 {RATIO} (ref 1–1.7)
ALP SERPL-CCNC: 63 IU/L (ref 32–91)
ALT SERPL-CCNC: 14 IU/L (ref 17–63)
ANION GAP SERPL CALC-SCNC: 13.1 MMOL/L (ref 10–20)
AST SERPL-CCNC: 23 IU/L (ref 15–41)
BILIRUB SERPL-MCNC: 0.8 MG/DL (ref 0.3–1.2)
BUN SERPL-MCNC: 11 MG/DL (ref 8–20)
BUN/CREAT SERPL: 9.2 (ref 6.2–20.3)
CALCIUM SERPL-MCNC: 8.9 MG/DL (ref 8.9–10.3)
CHLORIDE SERPL-SCNC: 96 MMOL/L (ref 101–111)
CONV CO2: 28 MMOL/L (ref 22–32)
CONV TOTAL PROTEIN: 6.2 G/DL (ref 6.1–7.9)
CREAT UR-MCNC: 1.2 MG/DL (ref 0.7–1.2)
GLOBULIN UR ELPH-MCNC: 2.8 G/DL (ref 2.5–3.8)
GLUCOSE SERPL-MCNC: 298 MG/DL (ref 65–99)
POTASSIUM SERPL-SCNC: 4.1 MMOL/L (ref 3.6–5.1)
SODIUM SERPL-SCNC: 133 MMOL/L (ref 136–144)

## 2017-11-17 NOTE — PROGRESS NOTES
PATIENTS ONCOLOGY RECORD LOCATED IN [facility]      Subjective     Name:  MARTINA PAEZ     Date:  2017  Address:  [address] [place] [address]  Home: [phone]  :  1927 AGE:  89 y.o.        RECORDS OBTAINED:  Patients Oncology Record is located in [facility]

## 2017-12-04 ENCOUNTER — HOSPITAL ENCOUNTER (OUTPATIENT)
Dept: ONCOLOGY | Facility: HOSPITAL | Age: 82
Discharge: HOME | End: 2017-12-04
Attending: INTERNAL MEDICINE | Admitting: INTERNAL MEDICINE

## 2017-12-04 ENCOUNTER — CLINICAL SUPPORT (OUTPATIENT)
Dept: ONCOLOGY | Facility: HOSPITAL | Age: 82
End: 2017-12-04

## 2017-12-04 ENCOUNTER — HOSPITAL ENCOUNTER (OUTPATIENT)
Dept: ONCOLOGY | Facility: CLINIC | Age: 82
Setting detail: INFUSION SERIES
Discharge: HOME | End: 2017-12-04
Attending: INTERNAL MEDICINE | Admitting: INTERNAL MEDICINE

## 2017-12-06 ENCOUNTER — HOSPITAL ENCOUNTER (OUTPATIENT)
Dept: LAB | Facility: HOSPITAL | Age: 82
Setting detail: SPECIMEN
Discharge: HOME | End: 2017-12-06
Attending: INTERNAL MEDICINE | Admitting: INTERNAL MEDICINE

## 2017-12-06 LAB
ALBUMIN SERPL-MCNC: 3.6 G/DL (ref 3.5–4.8)
ALBUMIN/GLOB SERPL: 1.2 {RATIO} (ref 1–1.7)
ALP SERPL-CCNC: 63 IU/L (ref 32–91)
ALT SERPL-CCNC: 14 IU/L (ref 17–63)
ANION GAP SERPL CALC-SCNC: 13.2 MMOL/L (ref 10–20)
AST SERPL-CCNC: 24 IU/L (ref 15–41)
BILIRUB SERPL-MCNC: 0.6 MG/DL (ref 0.3–1.2)
BUN SERPL-MCNC: 16 MG/DL (ref 8–20)
BUN/CREAT SERPL: 14.5 (ref 6.2–20.3)
CALCIUM SERPL-MCNC: 9.6 MG/DL (ref 8.9–10.3)
CHLORIDE SERPL-SCNC: 102 MMOL/L (ref 101–111)
CONV CO2: 30 MMOL/L (ref 22–32)
CONV TOTAL PROTEIN: 6.5 G/DL (ref 6.1–7.9)
CREAT UR-MCNC: 1.1 MG/DL (ref 0.7–1.2)
GLOBULIN UR ELPH-MCNC: 2.9 G/DL (ref 2.5–3.8)
GLUCOSE SERPL-MCNC: 138 MG/DL (ref 65–99)
POTASSIUM SERPL-SCNC: 4.2 MMOL/L (ref 3.6–5.1)
SODIUM SERPL-SCNC: 141 MMOL/L (ref 136–144)

## 2017-12-08 ENCOUNTER — HOSPITAL ENCOUNTER (OUTPATIENT)
Dept: ONCOLOGY | Facility: HOSPITAL | Age: 82
Discharge: HOME | End: 2017-12-08
Attending: INTERNAL MEDICINE | Admitting: INTERNAL MEDICINE

## 2017-12-08 ENCOUNTER — HOSPITAL ENCOUNTER (OUTPATIENT)
Dept: ONCOLOGY | Facility: CLINIC | Age: 82
Setting detail: INFUSION SERIES
Discharge: HOME | End: 2017-12-08
Attending: INTERNAL MEDICINE | Admitting: INTERNAL MEDICINE

## 2017-12-08 ENCOUNTER — CLINICAL SUPPORT (OUTPATIENT)
Dept: ONCOLOGY | Facility: HOSPITAL | Age: 82
End: 2017-12-08

## 2017-12-08 NOTE — PROGRESS NOTES
PATIENTS ONCOLOGY RECORD LOCATED IN [facility] [[email or web address]      Subjective     Name:  MARTINA PAEZ     Date:  2017  Address:  [address] [place] [address]  Home: [phone]  :  1927 AGE:  89 y.o.        RECORDS OBTAINED:  Patients Oncology Record is located in [facility]

## 2017-12-11 ENCOUNTER — HOSPITAL ENCOUNTER (OUTPATIENT)
Dept: ONCOLOGY | Facility: HOSPITAL | Age: 82
Discharge: HOME | End: 2017-12-11
Attending: INTERNAL MEDICINE | Admitting: INTERNAL MEDICINE

## 2017-12-11 LAB — GLUCOSE BLD-MCNC: 176 MG/DL (ref 70–105)

## 2017-12-26 ENCOUNTER — CLINICAL SUPPORT (OUTPATIENT)
Dept: ONCOLOGY | Facility: HOSPITAL | Age: 82
End: 2017-12-26

## 2017-12-26 ENCOUNTER — HOSPITAL ENCOUNTER (OUTPATIENT)
Dept: ONCOLOGY | Facility: CLINIC | Age: 82
Setting detail: INFUSION SERIES
Discharge: HOME | End: 2017-12-26
Attending: INTERNAL MEDICINE | Admitting: INTERNAL MEDICINE

## 2017-12-26 ENCOUNTER — HOSPITAL ENCOUNTER (OUTPATIENT)
Dept: ONCOLOGY | Facility: HOSPITAL | Age: 82
Discharge: HOME | End: 2017-12-26
Attending: INTERNAL MEDICINE | Admitting: INTERNAL MEDICINE

## 2017-12-26 LAB
ALBUMIN SERPL-MCNC: 4 G/DL (ref 3.5–4.8)
ALBUMIN/GLOB SERPL: 1.4 {RATIO} (ref 1–1.7)
ALP SERPL-CCNC: 56 IU/L (ref 32–91)
ALT SERPL-CCNC: 17 IU/L (ref 17–63)
ANION GAP SERPL CALC-SCNC: 14 MMOL/L (ref 10–20)
AST SERPL-CCNC: 26 IU/L (ref 15–41)
BILIRUB SERPL-MCNC: 0.3 MG/DL (ref 0.3–1.2)
BUN SERPL-MCNC: 15 MG/DL (ref 8–20)
BUN/CREAT SERPL: 15 (ref 6.2–20.3)
CALCIUM SERPL-MCNC: 9.8 MG/DL (ref 8.9–10.3)
CHLORIDE SERPL-SCNC: 104 MMOL/L (ref 101–111)
CONV CO2: 30 MMOL/L (ref 22–32)
CONV TOTAL PROTEIN: 6.9 G/DL (ref 6.1–7.9)
CREAT UR-MCNC: 1 MG/DL (ref 0.7–1.2)
GLOBULIN UR ELPH-MCNC: 2.9 G/DL (ref 2.5–3.8)
GLUCOSE SERPL-MCNC: ABNORMAL MG/DL (ref 65–99)
POTASSIUM SERPL-SCNC: 4 MMOL/L (ref 3.6–5.1)
SODIUM SERPL-SCNC: 144 MMOL/L (ref 136–144)

## 2017-12-26 NOTE — PROGRESS NOTES
PATIENTS ONCOLOGY RECORD LOCATED IN [facility]      Subjective     Name:  MARTINA PAEZ     Date:  2017  Address:  [address] [place] [address]  Home: [phone]  :  1927 AGE:  89 y.o.        RECORDS OBTAINED:  Patients Oncology Record is located in [facility]

## 2017-12-29 ENCOUNTER — CLINICAL SUPPORT (OUTPATIENT)
Dept: ONCOLOGY | Facility: HOSPITAL | Age: 82
End: 2017-12-29

## 2017-12-29 ENCOUNTER — HOSPITAL ENCOUNTER (OUTPATIENT)
Dept: ONCOLOGY | Facility: HOSPITAL | Age: 82
Discharge: HOME | End: 2017-12-29
Attending: INTERNAL MEDICINE | Admitting: INTERNAL MEDICINE

## 2017-12-29 ENCOUNTER — HOSPITAL ENCOUNTER (OUTPATIENT)
Dept: ONCOLOGY | Facility: CLINIC | Age: 82
Setting detail: INFUSION SERIES
Discharge: HOME | End: 2017-12-29
Attending: INTERNAL MEDICINE | Admitting: INTERNAL MEDICINE

## 2017-12-29 LAB
ALBUMIN SERPL-MCNC: 3.5 G/DL (ref 3.5–4.8)
ALBUMIN/GLOB SERPL: 1.3 {RATIO} (ref 1–1.7)
ALP SERPL-CCNC: 53 IU/L (ref 32–91)
ALT SERPL-CCNC: 14 IU/L (ref 17–63)
ANION GAP SERPL CALC-SCNC: 10.2 MMOL/L (ref 10–20)
AST SERPL-CCNC: 22 IU/L (ref 15–41)
BILIRUB SERPL-MCNC: 0.6 MG/DL (ref 0.3–1.2)
BUN SERPL-MCNC: 15 MG/DL (ref 8–20)
BUN/CREAT SERPL: 16.7 (ref 6.2–20.3)
CALCIUM SERPL-MCNC: 8.8 MG/DL (ref 8.9–10.3)
CHLORIDE SERPL-SCNC: 104 MMOL/L (ref 101–111)
CONV CO2: 27 MMOL/L (ref 22–32)
CONV TOTAL PROTEIN: 6.3 G/DL (ref 6.1–7.9)
CREAT UR-MCNC: 0.9 MG/DL (ref 0.7–1.2)
GLOBULIN UR ELPH-MCNC: 2.8 G/DL (ref 2.5–3.8)
GLUCOSE SERPL-MCNC: 245 MG/DL (ref 65–99)
POTASSIUM SERPL-SCNC: 4.2 MMOL/L (ref 3.6–5.1)
SODIUM SERPL-SCNC: 137 MMOL/L (ref 136–144)

## 2017-12-29 NOTE — PROGRESS NOTES
PATIENTS ONCOLOGY RECORD LOCATED IN [facility]      Subjective     Name:  MARTINA PAEZ     Date:  2017  Address:  [address] [address]  Home: [phone]  :  1927 AGE:  89 y.o.        RECORDS OBTAINED:  Patients Oncology Record is located in [facility]

## 2018-01-05 ENCOUNTER — CLINICAL SUPPORT (OUTPATIENT)
Dept: ONCOLOGY | Facility: HOSPITAL | Age: 83
End: 2018-01-05

## 2018-01-05 ENCOUNTER — HOSPITAL ENCOUNTER (OUTPATIENT)
Dept: ONCOLOGY | Facility: CLINIC | Age: 83
Setting detail: INFUSION SERIES
Discharge: HOME | End: 2018-01-05
Attending: INTERNAL MEDICINE | Admitting: INTERNAL MEDICINE

## 2018-01-05 NOTE — PROGRESS NOTES
PATIENTS ONCOLOGY RECORD LOCATED IN [facility]      Subjective     Name:  MARTINA PAEZ     Date:  2018  Address:  [address] [place] [address]  Home: [phone]  :  1927 AGE:  89 y.o.        RECORDS OBTAINED:  Patients Oncology Record is located in [facility]

## 2018-02-13 ENCOUNTER — HOSPITAL ENCOUNTER (OUTPATIENT)
Dept: GENERAL RADIOLOGY | Facility: HOSPITAL | Age: 83
Discharge: HOME | End: 2018-02-13

## 2018-02-13 ENCOUNTER — CLINICAL SUPPORT (OUTPATIENT)
Dept: ONCOLOGY | Facility: HOSPITAL | Age: 83
End: 2018-02-13

## 2018-02-13 ENCOUNTER — HOSPITAL ENCOUNTER (OUTPATIENT)
Dept: ONCOLOGY | Facility: CLINIC | Age: 83
Setting detail: INFUSION SERIES
Discharge: HOME | End: 2018-02-13
Attending: INTERNAL MEDICINE | Admitting: INTERNAL MEDICINE

## 2018-02-13 ENCOUNTER — HOSPITAL ENCOUNTER (OUTPATIENT)
Dept: ONCOLOGY | Facility: HOSPITAL | Age: 83
Discharge: HOME | End: 2018-02-13
Attending: INTERNAL MEDICINE | Admitting: INTERNAL MEDICINE

## 2018-02-13 LAB
ALBUMIN SERPL-MCNC: 3.6 G/DL (ref 3.5–4.8)
ALBUMIN/GLOB SERPL: 1.4 {RATIO} (ref 1–1.7)
ALP SERPL-CCNC: 56 IU/L (ref 32–91)
ALT SERPL-CCNC: 12 IU/L (ref 17–63)
ANION GAP SERPL CALC-SCNC: 13.6 MMOL/L (ref 10–20)
AST SERPL-CCNC: 21 IU/L (ref 15–41)
BILIRUB SERPL-MCNC: 0.5 MG/DL (ref 0.3–1.2)
BUN SERPL-MCNC: 9 MG/DL (ref 8–20)
BUN/CREAT SERPL: 8.2 (ref 6.2–20.3)
CALCIUM SERPL-MCNC: 8.7 MG/DL (ref 8.9–10.3)
CHLORIDE SERPL-SCNC: 103 MMOL/L (ref 101–111)
CONV CO2: 27 MMOL/L (ref 22–32)
CONV TOTAL PROTEIN: 6.1 G/DL (ref 6.1–7.9)
CREAT UR-MCNC: 1.1 MG/DL (ref 0.7–1.2)
GLOBULIN UR ELPH-MCNC: 2.5 G/DL (ref 2.5–3.8)
GLUCOSE SERPL-MCNC: 123 MG/DL (ref 65–99)
POTASSIUM SERPL-SCNC: 3.6 MMOL/L (ref 3.6–5.1)
SODIUM SERPL-SCNC: 140 MMOL/L (ref 136–144)

## 2018-02-13 NOTE — PROGRESS NOTES
PATIENTS ONCOLOGY RECORD LOCATED IN [facility]      Subjective     Name:  MARTINA PAEZ     Date:  2018  Address:  [address] [place] [address]  Home: [phone]  :  1927 AGE:  89 y.o.        RECORDS OBTAINED:  Patients Oncology Record is located in [facility]

## 2018-02-23 ENCOUNTER — CLINICAL SUPPORT (OUTPATIENT)
Dept: ONCOLOGY | Facility: HOSPITAL | Age: 83
End: 2018-02-23

## 2018-02-23 ENCOUNTER — HOSPITAL ENCOUNTER (OUTPATIENT)
Dept: ONCOLOGY | Facility: HOSPITAL | Age: 83
Discharge: HOME | End: 2018-02-23
Attending: INTERNAL MEDICINE | Admitting: INTERNAL MEDICINE

## 2018-02-23 ENCOUNTER — HOSPITAL ENCOUNTER (OUTPATIENT)
Dept: ONCOLOGY | Facility: CLINIC | Age: 83
Setting detail: INFUSION SERIES
Discharge: HOME | End: 2018-02-23
Attending: INTERNAL MEDICINE | Admitting: INTERNAL MEDICINE

## 2018-02-23 LAB
ANION GAP SERPL CALC-SCNC: 12.4 MMOL/L (ref 10–20)
BUN SERPL-MCNC: 9 MG/DL (ref 8–20)
BUN/CREAT SERPL: 11.3 (ref 6.2–20.3)
CALCIUM SERPL-MCNC: 9 MG/DL (ref 8.9–10.3)
CHLORIDE SERPL-SCNC: 102 MMOL/L (ref 101–111)
CONV CO2: 29 MMOL/L (ref 22–32)
CREAT UR-MCNC: 0.8 MG/DL (ref 0.7–1.2)
GLUCOSE SERPL-MCNC: 128 MG/DL (ref 65–99)
POTASSIUM SERPL-SCNC: 3.4 MMOL/L (ref 3.6–5.1)
SODIUM SERPL-SCNC: 140 MMOL/L (ref 136–144)

## 2018-02-26 LAB
IGA1 MFR SER: 141 MG/DL (ref 50–400)
IGG1 SER-MCNC: 646 MG/DL (ref 600–1500)
IGM SERPL-MCNC: 41 MG/DL (ref 40–300)

## 2018-03-09 ENCOUNTER — HOSPITAL ENCOUNTER (OUTPATIENT)
Dept: ONCOLOGY | Facility: CLINIC | Age: 83
Setting detail: INFUSION SERIES
Discharge: HOME | End: 2018-03-09
Attending: INTERNAL MEDICINE | Admitting: INTERNAL MEDICINE

## 2018-03-09 ENCOUNTER — CLINICAL SUPPORT (OUTPATIENT)
Dept: ONCOLOGY | Facility: HOSPITAL | Age: 83
End: 2018-03-09

## 2018-03-09 ENCOUNTER — TRANSCRIBE ORDERS (OUTPATIENT)
Dept: ADMINISTRATIVE | Facility: HOSPITAL | Age: 83
End: 2018-03-09

## 2018-03-09 DIAGNOSIS — R13.10 DYSPHAGIA, UNSPECIFIED TYPE: Primary | ICD-10-CM

## 2018-03-09 NOTE — PROGRESS NOTES
PATIENTS ONCOLOGY RECORD LOCATED IN [facility]      Subjective     Name:  MARTINA PAEZ     Date:  2018  Address:  [address] [place] [address]  Home: [phone]  :  1927 AGE:  89 y.o.        RECORDS OBTAINED:  Patients Oncology Record is located in [facility]

## 2018-03-13 ENCOUNTER — CLINICAL SUPPORT (OUTPATIENT)
Dept: ONCOLOGY | Facility: HOSPITAL | Age: 83
End: 2018-03-13

## 2018-03-13 ENCOUNTER — HOSPITAL ENCOUNTER (OUTPATIENT)
Dept: ONCOLOGY | Facility: CLINIC | Age: 83
Setting detail: INFUSION SERIES
Discharge: HOME | End: 2018-03-13
Attending: INTERNAL MEDICINE | Admitting: INTERNAL MEDICINE

## 2018-03-13 ENCOUNTER — HOSPITAL ENCOUNTER (OUTPATIENT)
Dept: ONCOLOGY | Facility: HOSPITAL | Age: 83
Discharge: HOME | End: 2018-03-13
Attending: INTERNAL MEDICINE | Admitting: INTERNAL MEDICINE

## 2018-03-13 LAB
ALBUMIN SERPL-MCNC: 3.1 G/DL (ref 3.5–4.8)
ALBUMIN/GLOB SERPL: 1.1 {RATIO} (ref 1–1.7)
ALP SERPL-CCNC: 40 IU/L (ref 32–91)
ALT SERPL-CCNC: 11 IU/L (ref 17–63)
ANION GAP SERPL CALC-SCNC: 12.2 MMOL/L (ref 10–20)
AST SERPL-CCNC: 19 IU/L (ref 15–41)
BILIRUB SERPL-MCNC: 0.3 MG/DL (ref 0.3–1.2)
BUN SERPL-MCNC: 15 MG/DL (ref 8–20)
BUN/CREAT SERPL: 13.6 (ref 6.2–20.3)
CALCIUM SERPL-MCNC: 8.2 MG/DL (ref 8.9–10.3)
CHLORIDE SERPL-SCNC: 101 MMOL/L (ref 101–111)
CONV CO2: 28 MMOL/L (ref 22–32)
CONV TOTAL PROTEIN: 5.8 G/DL (ref 6.1–7.9)
CREAT UR-MCNC: 1.1 MG/DL (ref 0.7–1.2)
GLOBULIN UR ELPH-MCNC: 2.7 G/DL (ref 2.5–3.8)
GLUCOSE SERPL-MCNC: 135 MG/DL (ref 65–99)
POTASSIUM SERPL-SCNC: 3.2 MMOL/L (ref 3.6–5.1)
SODIUM SERPL-SCNC: 138 MMOL/L (ref 136–144)

## 2018-03-13 NOTE — PROGRESS NOTES
PATIENTS ONCOLOGY RECORD LOCATED IN [facility]      Subjective     Name:  MARTINA PAEZ     Date:  2018  Address:  [address] [place] [address]  Home: [phone]  :  1927 AGE:  89 y.o.        RECORDS OBTAINED:  Patients Oncology Record is located in [facility]

## 2018-03-14 ENCOUNTER — HOSPITAL ENCOUNTER (OUTPATIENT)
Dept: GENERAL RADIOLOGY | Facility: HOSPITAL | Age: 83
Discharge: HOME | End: 2018-03-14
Admitting: NURSE PRACTITIONER

## 2018-03-14 DIAGNOSIS — R13.10 DYSPHAGIA, UNSPECIFIED TYPE: ICD-10-CM

## 2018-03-14 PROCEDURE — 74230 X-RAY XM SWLNG FUNCJ C+: CPT

## 2018-03-14 PROCEDURE — G8998 SWALLOW D/C STATUS: HCPCS

## 2018-03-14 PROCEDURE — 63710000001 BARIUM SULFATE 98 % RECONSTITUTED SUSPENSION: Performed by: NURSE PRACTITIONER

## 2018-03-14 PROCEDURE — 63710000001 BARIUM SULFATE 40 % SUSPENSION: Performed by: NURSE PRACTITIONER

## 2018-03-14 PROCEDURE — A9270 NON-COVERED ITEM OR SERVICE: HCPCS | Performed by: NURSE PRACTITIONER

## 2018-03-14 PROCEDURE — 63710000001 BARIUM SULFATE 96 % RECONSTITUTED SUSPENSION: Performed by: NURSE PRACTITIONER

## 2018-03-14 PROCEDURE — 92611 MOTION FLUOROSCOPY/SWALLOW: CPT

## 2018-03-14 PROCEDURE — G8997 SWALLOW GOAL STATUS: HCPCS

## 2018-03-14 PROCEDURE — G8996 SWALLOW CURRENT STATUS: HCPCS

## 2018-03-14 RX ADMIN — BARIUM SULFATE 50 ML: 400 SUSPENSION ORAL at 10:25

## 2018-03-14 RX ADMIN — BARIUM SULFATE 65 ML: 960 POWDER, FOR SUSPENSION ORAL at 10:25

## 2018-03-14 RX ADMIN — BARIUM SULFATE 4 ML: 980 POWDER, FOR SUSPENSION ORAL at 10:25

## 2018-03-14 NOTE — MBS/VFSS/FEES
Outpatient Speech Language Pathology   Adult Swallow Initial Evaluation VFSS  [facility]     Patient Name: Se Dang  : 1927  MRN: [id number]  Today's Date: 3/14/2018         Visit Date: 2018     SPEECH-LANGUAGE PATHOLOGY EVALUTION - VFSS  Subjective: The patient was seen on this date for a VFSS(Videofluoroscopic Swallowing Study).  Patient was alert and cooperative.    Significant history: Pt referred by GI for Dysphagia. Pt reports difficulty swallowing for several years mostly with liquids. Recently hospitalized with Influenza and PNA, but no information in charts. Pt reported hx of reflux, takes OTC medications.   Objective: Risks/benefits were reviewed with the patient, and consent was obtained. The study was completed with SLP present and Radiologist review. The patient was seen in lateral view(s). Textures given included thin liquid, nectar thick liquid, honey thick liquid, puree consistency and mechanical soft consistency.  Assessment: Difficulties were noted with all consistencies trialed. Pt with generalized weakness, weak/reduced laryngeal elevation and anterior hyoid movement. Decreased tongue base function resulted in premature spillage to vallecula/pyriforms with all liquid consistencies. Prolonged/weak mastication noted with mechanical soft resulting in fatigue and delayed second swallow. Cricopharyngeal dysfunction noted with mild impact on swallow.     The patient demonstrated silent penetration and aspiration Noted with, thin liquid, nectar thick liquid, honey thick liquid and puree consistency. Aspiration with thin, nectar and puree noted after swallow from residue. Aspiration during swallow with honey and puree consistencies. Deep silent penetration noted with mixed consistency, but aspiration was not witnessed. Cued cough after aspiration was not productive in expelling materials.    Residue was mild-mod with thin, nectar, and honey thick liquids. Severe residue throughout  pharynx noted with pudding and mechanical soft consistency, with limited ability to clear or reduce. Chin tuck attempted with mixed which only mildly reduced residue, but also caused deep penetration.     Esophageal screen was performed and demonstrated reduced esophageal motility, retrograde movement and delayed esophageal clearance. Recommend follow up with GI.     SLP Findings: Patient presents with severe oropharyngeal dysphagia with esophageal component.   Comments: Limited pt history available at this time. Per pt, recently hospitalized with pna and has not has follow up CXR.     Recommendations: Diet Textures: NPO with alternate nutrition SAFEST; Safest PO diet: Fork mashed, no mixed consistencies, and Nectar thick liquids. Medications should be taken crushed with puree or by alternate means. May have water and Ice between meals after oral care,   Recommended Strategies: Upright for PO, small bites and sips, double swallow with all PO, no straw and alternate liquids and solids. Oral care before breakfast, after all meals and PRN. Spoon feed liquids.  Other Recommended Evaluations: VFSS and Referral to GI, outpatient/ Speech therapy    Dysphagia therapy is recommended. Rationale: improve swallowing strength/safety and continue education regarding diet modifications/strategies for safe swallowing.          There is no problem list on file for this patient.       Past Medical History:   Diagnosis Date   • GERD (gastroesophageal reflux disease)     per patient   • Influenza 2017   • Pneumonia 2017    per patient        History reviewed. No pertinent surgical history.      Visit Dx:     ICD-10-CM ICD-9-CM   1. Dysphagia, unspecified type R13.10 787.20                 SLP Adult Swallow Evaluation - 03/14/18 1100        Rehab Evaluation    Document Type evaluation  -JT    Subjective Information complains of   difficulty swallowing  -JT    Patient Observations alert;cooperative  -JT    Patient/Family Observations  pt drove himself to exam, no family present  -JT    Patient Effort good  -JT    Symptoms Noted During/After Treatment fatigue  -JT       General Information    Patient Profile Reviewed yes  -JT    Pertinent History Of Current Problem Pt reports difficulty swallow with liquids for several years. Recent hospitalization at [facility] for Flu and Pna. HX of reflux. Take OTC meds. Recently saw GI who referred to this eval.   -JT    Current Method of Nutrition soft textures;thin liquids  -JT    Precautions/Limitations, Vision WFL with corrective lenses  -JT    Precautions/Limitations, Hearing hearing impairment, bilaterally;for purposes of eval  -JT    Prior Level of Function-Communication WFL  -JT    Prior Level of Function-Swallowing mechanical soft textures;concerns regarding dehydration;concerns regarding malnutrition;other (see comments)   pt eats soft due to missing dentition  -JT    Plans/Goals Discussed with patient and family;agreed upon   daughter via phone  -JT    Barriers to Rehab hearing deficit  -JT    Patient's Goals for Discharge eat/drink without coughing/choking  -JT    Family Goals for Discharge patient able to return to PO diet  -JT       Pain Assessment    Additional Documentation Pain Scale: Numbers Pre/Post-Treatment (Group)  -JT       Pain Scale: Numbers Pre/Post-Treatment    Pain Scale: Numbers, Pretreatment 0/10 - no pain  -JT    Pain Scale: Numbers, Post-Treatment 0/10 - no pain  -JT       Oral Motor and Function    Dentition Assessment missing teeth;teeth are in poor condition   pt states in process of having teeth pulled, getting denture  -JT    Secretion Management WNL/WFL  -JT    Mucosal Quality moist, healthy  -JT    Volitional Swallow delayed  -JT    Volitional Cough weak;non-productive  -JT       Oral Musculature and Cranial Nerve Assessment    Oral Motor General Assessment generalized oral motor weakness  -JT    Mandibular Impairment Detail, Cranial Nerve V (Trigeminal) reduced strength  bilaterally  -JT    Oral Labial or Buccal Impairment, Detail, Cranial Nerve VII (Facial): reduced strength bilaterally  -JT    Vocal Impairment, Detail. Cranial Nerve X (Vagus) impaired throat clear/cough (see comments)  -JT       Respiratory    Respiratory Status room air;WFL  -JT       MBS/VFSS    Utensils Used spoon;cup;straw  -JT    Consistencies Trialed soft textures;chopped;pureed;thin liquids;nectar/syrup-thick liquids;honey-thick liquids  -JT       MBS/VFSS Interpretation    Oral Prep Phase impaired oral phase of swallowing  -JT    Oral Transit Phase impaired  -JT    Oral Residue impaired  -JT       Oral Preparatory Phase    Oral Preparatory Phase prolonged manipulation;inadequate manipulation;poor rotary chew  -JT    Prolonged Manipulation mechanical soft  -JT    Inadequate Manipulation mixed consistency  -JT    Poor Rotary Chew mixed consistency  -JT       Oral Transit Phase    Impaired Oral Transit Phase piecemeal oral transit;premature spillage of liquids into pharynx  -JT    Piecemeal Oral Transit mixed consistency;discoordination of lingual movement;secondary to reduced lingual control  -JT    Premature Spillage of Liquids into Pharynx thin liquids;nectar-thick liquids;honey-thick liquids;pudding/puree;mixed consistency;secondary to reduced lingual control;discoordination of lingual movement  -JT       Oral Residue    Impaired Oral Residue diffuse residue throughout oral cavity  -JT    Diffuse Residue throughout Oral Cavity pudding/puree;secondary to reduced lingual strength;secondary to reduced lingual range of motion  -JT    Response to Oral Residue cleared residue;with cued swallow  -JT       Initiation of Pharyngeal Swallow    Initiation of Pharyngeal Swallow bolus in valleculae;bolus in pyriform sinuses  -JT    Pharyngeal Phase impaired pharyngeal phase of swallowing  -JT    Penetration During the Swallow thin liquids;nectar-thick liquids;pudding/puree;honey-thick liquids;mixed  consistency;secondary to reduced laryngeal elevation;secondary to reduced vestibular closure;secondary to delayed swallow initiation or mistiming  -JT    Aspiration During the Swallow pudding/puree;mixed consistency;secondary to reduced laryngeal elevation;secondary to reduced vestibular closure;secondary to reduced laryngeal (VF) closure  -JT    Aspiration After the Swallow thin liquids;nectar-thick liquids;honey-thick liquids;pudding/puree;secondary to residue;in valleculae;in pyriform sinuses;in laryngeal vestibule  -JT    Response to Penetration no response;response with cue only;other (see comments)   nonproductive cued cough  -JT    Response to Aspiration no response, silent aspiration;response with cue only;could not clear subglottic material  -JT    Pharyngeal Residue thin liquids;nectar-thick liquids;honey-thick liquids;pudding/puree;mechanical soft;mixed consistency;base of tongue;valleculae;pyriform sinuses;posterior pharyngeal wall;laryngeal vestibule;diffuse within pharynx;secondary to reduced base of tongue retraction;secondary to reduced posterior pharyngeal wall stripping;secondary to reduced laryngeal elevation;secondary to reduced hyolaryngeal excursion  -JT    Response to Residue unable to clear residue;other (see comments)   residue reduced with chin tuck, but also caused penetration  -JT    Attempted Compensatory Maneuvers chin tuck;bolus size;multiple swallows;breath hold  -JT    Response to Attempted Compensatory Maneuvers did not prevent penetration;reduced residue;did not reduce residue  -JT       Esophageal Phase    Esophageal Phase esophageal retention;esophageal retention with retrograde flow below PES;see radiology report for further details  -JT       SLP Communication to Radiology    Severity Level of Dysphagia severe dysphagia;oral dysfunction;pharyngeal dysfunction;suspected esophageal dysfunction  -JT    Consistencies Aspirated/Penetrated penetrated and aspirated;thin  liquids;nectar-thick liquids;honey-thick liquids;pudding/puree;penetrated;mixed consistency  -JT    Summary Statement Pt w/severe oropharyngeal dysphagia characterized by generalized weakness, incoordination, mistiming, reduced airway protection, reduced esophageal clearance, cricopharyngeal dysunction, and silent aspiration of most consistencies. Pt at high risk of aspiration with all consistencies.  -JT       Health Promotion    Psychosocial Eating History food important aspect of social life  -JT       Recommendations    Therapy Frequency (SLP) evaluation only;other (see comments)   HOME HEALTH RECOMMENDED  -JT    Predicted Duration Therapy Intervention (Days) other (see comments)   per  ST  -JT    SLP Diet Recommendation NPO;temporary alternate methods of nutrition/hydration;long term alternate methods of nutrition/hydration;water between meals after oral care, with supervision;ice chips between meals after oral care, with supervision;puree with some mashed;nectar thick liquids   safest PO diet fork mashed, NTL no straw  -JT    Recommended Diagnostics reassess via VFSS (MBS)   when appropriate  -JT    Recommended Precautions and Strategies upright posture during/after eating;small bites of food and sips of liquid;no straw;multiple swallows per bite of food;multiple swallows per sip of liquid;alternate between small bites of food and sips of liquid  -JT    SLP Rec. for Method of Medication Administration meds crushed;with pudding or applesauce  -JT    Monitor for Signs of Aspiration none - silent aspiration present  -JT    Anticipated Dischage Disposition home with home health  -JT    Demonstrates Need for Referral to Another Service gastroenterology  -JT       Swallow Goals (SLP)    Pharyngeal Strengthening Exercise Goal Selection (SLP) --  -JT    Additional Documentation --  -JT       Dysphagia Treatment Objectives and Progress    Dysphagia Treatment Objectives Improve oral skills;Improve timing of pharyngeal  response;Improve laryngeal closure;Improve closure at entrance to airway;Improve laryngeal elevation;Improve hyolaryngeal excursion;Improve tongue base & pharyngeal wall squeeze;Other 1  -JT       Improve oral skills    To Improve Oral Skills, patient will: Increase tongue lateralization;Increase tongue A-P movement;Increase back of tongue control;80%;without cues  -JT       Improve timing of pharyngeal response    To improve timing of pharyngeal response, patient will: Swallow in timely way using super-supraglottic swallow;Swallow in timely way using Mendelsohn maneuver;Swallow in timely way using three second prep;80%;without cues  -JT       Improve laryngeal closure    To improve laryngeal closure, patient will: Complete supraglottic swallow;Complete Valsalva/breath hold;90%;without cues  -JT       Improve closure at entrance to airway    To improve closure at entrance to airway, patient will: Complete super-supraglottic swallow;80%;without cues  -JT       Improve laryngeal elevation    To improve laryngeal elevation, patient will: Complete Mendelsohn maneuver;Complete pitch-glide;80%;Complete super-supraglottic swallow;without cues  -JT       Improve hyolaryngeal excursion    To improve hyolaryngeal excursion, patient will: Complete head lift sustained (comment number of seconds);Complete chin tuck against resistance (comment number of repetitions);Complete head lift repetitive (comment number of lifts);80%;without cues  -JT       Improve tongue base & pharyngeal wall squeeze    To improve tongue base & pharyngeal wall squeeze, patient will: Complete gargle/hold retraction;Complete yawn/hold retractions;Complete tongue base retraction;Complete effortful swallow;Complete tongue hold swallow;80%;without cues  -JT       Dysphagia Other 1    Dysphagia Other 1 Objective Tolerate safest PO diet w/no overt s/s aspiration.  -JT      User Key  (r) = Recorded By, (t) = Taken By, (c) = Cosigned By    Initials Name Provider  Type    JT Jaye Moss Jim Speech and Language Pathologist                              OP SLP Education     Row Name 03/14/18 1300       Education    Barriers to Learning Hearing deficit  -JT    Action Taken to Address Barriers Educated daughter via telephone, written info for patient.  -JT    Education Provided Described results of evaluation;Patient expressed understanding of evaluation;Family/caregivers expressed understanding of evaluation;Patient requires further education on strategies, risks;Family/caregivers require further education on strategies, risks  -JT    Assessed Learning needs;Learning motivation;Learning readiness;Learning preferences  -JT    Learning Motivation Strong  -JT    Learning Method Explanation;Demonstration;Teach back;Written materials  -JT    Teaching Response Verbalized understanding;Demonstrated understanding;Reinforcement needed  -JT      User Key  (r) = Recorded By, (t) = Taken By, (c) = Cosigned By    Initials Name Effective Dates    JT Jaye Moss Jim 03/07/18 -                     OP SLP Assessment/Plan - 03/14/18 1258        SLP Assessment    Functional Problems Swallowing  -JT    Impact on Function: Swallowing Risk of malnourishment;Risk of dehydration;Risk of aspiration;Risk of pneumonia;Impact on social aspects of eating  -JT    Clinical Impression: Swallowing Severe:;oropharyngeal phase dysphagia;esophageal dysphagia  -JT    Functional Problems Comment generalized weakness, incoordination, poor airway protection. Aspiration with most consistencies. Severe residue increasing risk of aspiration with solids.  -JT    Prognosis Fair (comment)  -JT    Patient/caregiver participated in establishment of treatment plan and goals Yes  -JT    Patient would benefit from skilled therapy intervention Yes  -JT       SLP Plan    Frequency TBD  -JT    Duration TBD  -JT    Plan Comments per Home Health ST  -JT      User Key  (r) = Recorded By, (t) = Taken By, (c) = Cosigned By     Initials Name Provider Type    JT Jaye Ajay Fernandez Speech and Language Pathologist              SLP Outcome Measures (last 72 hours)      SLP Outcome Measures     Row Name 03/14/18 1300             SLP Outcome Measures    Outcome Measure Used? Adult NOMS  -JT         FCM Scores    FCM Chosen Swallowing  -JT      Swallowing FCM Score 1  -JT        User Key  (r) = Recorded By, (t) = Taken By, (c) = Cosigned By    Initials Name Effective Dates    JT Jaye Moss Jim 03/07/18 -                Time Calculation:   SLP Start Time: 1015  SLP Stop Time: 1115  SLP Time Calculation (min): 60 min    Therapy Charges for Today     Code Description Service Date Service Provider Modifiers Qty    03449228939 HC ST SWALLOWING CURRENT STATUS 3/14/2018 Jaye Fernandez , CN 1    34350058754 HC ST SWALLOWING PROJECTED 3/14/2018 Jaye Fernandez , CN 1    00124499244 HC ST SWALLOWING DISCHARGE 3/14/2018 Jaye Fernandez , CN 1    23781604185 HC ST MOTION FLUORO EVAL SWALLOW 4 3/14/2018 Jaye Fernandez  1          SLP G-Codes  SLP NOMS Used?: Yes  Functional Limitations: Swallowing  Swallow Current Status (): 100 percent impaired, limited or restricted  Swallow Goal Status (): 100 percent impaired, limited or restricted  Swallow Discharge Status (): 100 percent impaired, limited or restricted        Jaye Fernandez  3/14/2018

## 2018-03-23 ENCOUNTER — CLINICAL SUPPORT (OUTPATIENT)
Dept: ONCOLOGY | Facility: HOSPITAL | Age: 83
End: 2018-03-23

## 2018-03-23 ENCOUNTER — HOSPITAL ENCOUNTER (OUTPATIENT)
Dept: ONCOLOGY | Facility: CLINIC | Age: 83
Setting detail: INFUSION SERIES
Discharge: HOME | End: 2018-03-23
Attending: INTERNAL MEDICINE | Admitting: INTERNAL MEDICINE

## 2018-03-23 NOTE — PROGRESS NOTES
PATIENTS ONCOLOGY RECORD LOCATED IN [facility]      Subjective     Name:  MARTINA PAEZ     Date:  2018  Address:  [address] [place] [address]  Home: [phone]  :  1927 AGE:  89 y.o.        RECORDS OBTAINED:  Patients Oncology Record is located in [facility]

## 2018-03-27 ENCOUNTER — HOSPITAL ENCOUNTER (OUTPATIENT)
Dept: LAB | Facility: HOSPITAL | Age: 83
Setting detail: SPECIMEN
Discharge: HOME | End: 2018-03-27
Attending: INTERNAL MEDICINE | Admitting: INTERNAL MEDICINE

## 2018-03-30 ENCOUNTER — CLINICAL SUPPORT (OUTPATIENT)
Dept: ONCOLOGY | Facility: HOSPITAL | Age: 83
End: 2018-03-30

## 2018-03-30 ENCOUNTER — HOSPITAL ENCOUNTER (OUTPATIENT)
Dept: ONCOLOGY | Facility: CLINIC | Age: 83
Setting detail: INFUSION SERIES
Discharge: HOME | End: 2018-03-30
Attending: INTERNAL MEDICINE | Admitting: INTERNAL MEDICINE

## 2018-03-30 ENCOUNTER — HOSPITAL ENCOUNTER (OUTPATIENT)
Dept: ONCOLOGY | Facility: HOSPITAL | Age: 83
Discharge: HOME | End: 2018-03-30
Attending: INTERNAL MEDICINE | Admitting: INTERNAL MEDICINE

## 2018-03-30 LAB
ALBUMIN SERPL-MCNC: 3.4 G/DL (ref 3.5–4.8)
ALBUMIN/GLOB SERPL: 1.2 {RATIO} (ref 1–1.7)
ALP SERPL-CCNC: 43 IU/L (ref 32–91)
ALT SERPL-CCNC: 13 IU/L (ref 17–63)
ANION GAP SERPL CALC-SCNC: 14.5 MMOL/L (ref 10–20)
AST SERPL-CCNC: 22 IU/L (ref 15–41)
BILIRUB SERPL-MCNC: 0.6 MG/DL (ref 0.3–1.2)
BUN SERPL-MCNC: 13 MG/DL (ref 8–20)
BUN/CREAT SERPL: 14.4 (ref 6.2–20.3)
CALCIUM SERPL-MCNC: 8.8 MG/DL (ref 8.9–10.3)
CHLORIDE SERPL-SCNC: 104 MMOL/L (ref 101–111)
CONV CO2: 24 MMOL/L (ref 22–32)
CONV TOTAL PROTEIN: 6.3 G/DL (ref 6.1–7.9)
CREAT UR-MCNC: 0.9 MG/DL (ref 0.7–1.2)
GLOBULIN UR ELPH-MCNC: 2.9 G/DL (ref 2.5–3.8)
GLUCOSE SERPL-MCNC: 212 MG/DL (ref 65–99)
MAGNESIUM SERPL-MCNC: 2 MG/DL (ref 1.8–2.5)
PHOSPHATE SERPL-MCNC: 3.4 MG/DL (ref 2.4–4.7)
POTASSIUM SERPL-SCNC: 4.5 MMOL/L (ref 3.6–5.1)
SODIUM SERPL-SCNC: 138 MMOL/L (ref 136–144)

## 2018-03-30 NOTE — PROGRESS NOTES
PATIENTS ONCOLOGY RECORD LOCATED IN [facility]      Subjective     Name:  MARTINA PAEZ     Date:  2018  Address:  [address] [place] [address]  Home: [phone]  :  1927 AGE:  89 y.o.        RECORDS OBTAINED:  Patients Oncology Record is located in [facility]

## 2018-04-11 ENCOUNTER — TRANSCRIBE ORDERS (OUTPATIENT)
Dept: SPEECH THERAPY | Facility: HOSPITAL | Age: 83
End: 2018-04-11

## 2018-04-11 DIAGNOSIS — R13.10 DYSPHAGIA, UNSPECIFIED TYPE: Primary | ICD-10-CM

## 2018-04-13 ENCOUNTER — APPOINTMENT (OUTPATIENT)
Dept: SPEECH THERAPY | Facility: HOSPITAL | Age: 83
End: 2018-04-13

## 2018-04-16 ENCOUNTER — HOSPITAL ENCOUNTER (OUTPATIENT)
Dept: ONCOLOGY | Facility: CLINIC | Age: 83
Setting detail: INFUSION SERIES
Discharge: HOME | End: 2018-04-16
Attending: INTERNAL MEDICINE | Admitting: INTERNAL MEDICINE

## 2018-04-16 ENCOUNTER — HOSPITAL ENCOUNTER (OUTPATIENT)
Dept: ONCOLOGY | Facility: HOSPITAL | Age: 83
Discharge: HOME | End: 2018-04-16
Attending: INTERNAL MEDICINE | Admitting: INTERNAL MEDICINE

## 2018-04-16 ENCOUNTER — CLINICAL SUPPORT (OUTPATIENT)
Dept: ONCOLOGY | Facility: HOSPITAL | Age: 83
End: 2018-04-16

## 2018-04-16 LAB
ALBUMIN SERPL-MCNC: 3.9 G/DL (ref 3.5–4.8)
ALBUMIN/GLOB SERPL: 1.4 {RATIO} (ref 1–1.7)
ALP SERPL-CCNC: 55 IU/L (ref 32–91)
ALT SERPL-CCNC: 14 IU/L (ref 17–63)
ANION GAP SERPL CALC-SCNC: 13.2 MMOL/L (ref 10–20)
AST SERPL-CCNC: 21 IU/L (ref 15–41)
BILIRUB SERPL-MCNC: 0.5 MG/DL (ref 0.3–1.2)
BUN SERPL-MCNC: 12 MG/DL (ref 8–20)
BUN/CREAT SERPL: 12 (ref 6.2–20.3)
CALCIUM SERPL-MCNC: 9.9 MG/DL (ref 8.9–10.3)
CHLORIDE SERPL-SCNC: 100 MMOL/L (ref 101–111)
CONV CO2: 29 MMOL/L (ref 22–32)
CONV TOTAL PROTEIN: 6.7 G/DL (ref 6.1–7.9)
CREAT UR-MCNC: 1 MG/DL (ref 0.7–1.2)
GLOBULIN UR ELPH-MCNC: 2.8 G/DL (ref 2.5–3.8)
GLUCOSE SERPL-MCNC: 304 MG/DL (ref 65–99)
POTASSIUM SERPL-SCNC: 4.2 MMOL/L (ref 3.6–5.1)
SODIUM SERPL-SCNC: 138 MMOL/L (ref 136–144)

## 2018-04-16 NOTE — PROGRESS NOTES
PATIENTS ONCOLOGY RECORD LOCATED IN [facility]      Subjective     Name:  MARTINA PAEZ     Date:  2018  Address:  [address] [place] [address]  Home: [phone]  :  1927 AGE:  89 y.o.        RECORDS OBTAINED:  Patients Oncology Record is located in [facility]

## 2018-04-19 ENCOUNTER — HOSPITAL ENCOUNTER (OUTPATIENT)
Dept: LAB | Facility: HOSPITAL | Age: 83
Setting detail: SPECIMEN
Discharge: HOME | End: 2018-04-19
Attending: INTERNAL MEDICINE | Admitting: INTERNAL MEDICINE

## 2018-04-19 LAB
ALBUMIN SERPL-MCNC: 3.6 G/DL (ref 3.5–4.8)
ALBUMIN/GLOB SERPL: 1.3 {RATIO} (ref 1–1.7)
ALP SERPL-CCNC: 49 IU/L (ref 32–91)
ALT SERPL-CCNC: 11 IU/L (ref 17–63)
ANION GAP SERPL CALC-SCNC: 12.8 MMOL/L (ref 10–20)
AST SERPL-CCNC: 19 IU/L (ref 15–41)
BILIRUB SERPL-MCNC: 0.5 MG/DL (ref 0.3–1.2)
BUN SERPL-MCNC: 16 MG/DL (ref 8–20)
BUN/CREAT SERPL: 16 (ref 6.2–20.3)
CALCIUM SERPL-MCNC: 8.7 MG/DL (ref 8.9–10.3)
CHLORIDE SERPL-SCNC: 103 MMOL/L (ref 101–111)
CHOLEST SERPL-MCNC: 120 MG/DL
CHOLEST/HDLC SERPL: 3.4 {RATIO}
CONV CO2: 30 MMOL/L (ref 22–32)
CONV LDL CHOLESTEROL DIRECT: 58 MG/DL (ref 0–100)
CONV MICROALBUM.,U,RANDOM: 11 MG/L
CONV TOTAL PROTEIN: 6.4 G/DL (ref 6.1–7.9)
CREAT 24H UR-MCNC: 122.8 MG/DL
CREAT UR-MCNC: 1 MG/DL (ref 0.7–1.2)
GLOBULIN UR ELPH-MCNC: 2.8 G/DL (ref 2.5–3.8)
GLUCOSE SERPL-MCNC: 101 MG/DL (ref 65–99)
HDLC SERPL-MCNC: 36 MG/DL
LDLC/HDLC SERPL: 1.6 {RATIO}
LIPID INTERPRETATION: ABNORMAL
MICROALBUMIN/CREAT UR: 9 UG/MG
POTASSIUM SERPL-SCNC: 3.8 MMOL/L (ref 3.6–5.1)
SODIUM SERPL-SCNC: 142 MMOL/L (ref 136–144)
TRIGL SERPL-MCNC: 117 MG/DL
VLDLC SERPL CALC-MCNC: 26.4 MG/DL

## 2018-04-20 ENCOUNTER — APPOINTMENT (OUTPATIENT)
Dept: SPEECH THERAPY | Facility: HOSPITAL | Age: 83
End: 2018-04-20

## 2018-04-24 ENCOUNTER — HOSPITAL ENCOUNTER (OUTPATIENT)
Dept: FAMILY MEDICINE CLINIC | Facility: CLINIC | Age: 83
Setting detail: SPECIMEN
Discharge: HOME | End: 2018-04-24

## 2018-04-24 LAB
ANION GAP SERPL CALC-SCNC: 12.8 MMOL/L (ref 10–20)
BUN SERPL-MCNC: 17 MG/DL (ref 8–20)
BUN/CREAT SERPL: 13.1 (ref 6.2–20.3)
CALCIUM SERPL-MCNC: 7.2 MG/DL (ref 8.9–10.3)
CHLORIDE SERPL-SCNC: 103 MMOL/L (ref 101–111)
CONV ABS BANDS: 0.7 10*3/UL
CONV ABS IMM GRAN: 0.1 10*3/UL
CONV CO2: 24 MMOL/L (ref 22–32)
CONV MICROCYTES IN BLOOD BY LIGHT MICROSCOPY: SLIGHT
CREAT UR-MCNC: 1.3 MG/DL (ref 0.7–1.2)
DIFFERENTIAL METHOD BLD: (no result)
ERYTHROCYTE [DISTWIDTH] IN BLOOD BY AUTOMATED COUNT: 16.9 % (ref 11.5–14.5)
GLUCOSE SERPL-MCNC: 187 MG/DL (ref 65–99)
HCT VFR BLD AUTO: 32.3 % (ref 40–54)
HGB BLD-MCNC: 10.3 G/DL (ref 14–18)
LYMPHOCYTES # BLD AUTO: 1.4 10*3/UL (ref 0.8–4.8)
LYMPHOCYTES NFR BLD AUTO: 14 % (ref 18–42)
MCH RBC QN AUTO: 25.8 PG (ref 26–32)
MCHC RBC AUTO-ENTMCNC: 31.9 G/DL (ref 32–36)
MCV RBC AUTO: 81 FL (ref 80–94)
METAMYELOCYTES NFR BLD: 1 %
MONOCYTES # BLD AUTO: 1 10*3/UL (ref 0.1–1.3)
MONOCYTES NFR BLD AUTO: 10 % (ref 2–11)
NEUTROPHILS # BLD AUTO: 6.8 10*3/UL (ref 2.3–8.6)
NEUTROPHILS NFR BLD AUTO: 68 % (ref 50–75)
NEUTS BAND NFR BLD MANUAL: 7 % (ref 0–5)
PLATELET # BLD AUTO: 151 10*3/UL (ref 150–450)
PMV BLD AUTO: 10.2 FL (ref 7.4–10.4)
POTASSIUM SERPL-SCNC: 2.8 MMOL/L (ref 3.6–5.1)
RBC # BLD AUTO: 3.99 10*6/UL (ref 4.6–6)
SODIUM SERPL-SCNC: 137 MMOL/L (ref 136–144)
WBC # BLD AUTO: 10 10*3/UL (ref 4.5–11.5)
WBC COMMENT: (no result)

## 2018-04-27 ENCOUNTER — APPOINTMENT (OUTPATIENT)
Dept: SPEECH THERAPY | Facility: HOSPITAL | Age: 83
End: 2018-04-27

## 2018-05-04 ENCOUNTER — APPOINTMENT (OUTPATIENT)
Dept: SPEECH THERAPY | Facility: HOSPITAL | Age: 83
End: 2018-05-04

## 2018-05-11 ENCOUNTER — APPOINTMENT (OUTPATIENT)
Dept: SPEECH THERAPY | Facility: HOSPITAL | Age: 83
End: 2018-05-11

## 2018-05-18 ENCOUNTER — APPOINTMENT (OUTPATIENT)
Dept: SPEECH THERAPY | Facility: HOSPITAL | Age: 83
End: 2018-05-18

## 2018-05-25 ENCOUNTER — APPOINTMENT (OUTPATIENT)
Dept: SPEECH THERAPY | Facility: HOSPITAL | Age: 83
End: 2018-05-25

## 2018-05-29 ENCOUNTER — HOSPITAL ENCOUNTER (OUTPATIENT)
Dept: ONCOLOGY | Facility: HOSPITAL | Age: 83
Discharge: HOME | End: 2018-05-29
Attending: INTERNAL MEDICINE | Admitting: INTERNAL MEDICINE

## 2018-05-29 ENCOUNTER — HOSPITAL ENCOUNTER (OUTPATIENT)
Dept: ONCOLOGY | Facility: CLINIC | Age: 83
Setting detail: INFUSION SERIES
Discharge: HOME | End: 2018-05-29
Attending: INTERNAL MEDICINE | Admitting: INTERNAL MEDICINE

## 2018-05-29 ENCOUNTER — CLINICAL SUPPORT (OUTPATIENT)
Dept: ONCOLOGY | Facility: HOSPITAL | Age: 83
End: 2018-05-29

## 2018-05-29 LAB
ALBUMIN SERPL-MCNC: 3.7 G/DL (ref 3.5–4.8)
ALBUMIN/GLOB SERPL: 1.3 {RATIO} (ref 1–1.7)
ALP SERPL-CCNC: 60 IU/L (ref 32–91)
ALT SERPL-CCNC: 17 IU/L (ref 17–63)
ANION GAP SERPL CALC-SCNC: 13.6 MMOL/L (ref 10–20)
AST SERPL-CCNC: 24 IU/L (ref 15–41)
BILIRUB SERPL-MCNC: 0.4 MG/DL (ref 0.3–1.2)
BUN SERPL-MCNC: 12 MG/DL (ref 8–20)
BUN/CREAT SERPL: 12 (ref 6.2–20.3)
CALCIUM SERPL-MCNC: 9.1 MG/DL (ref 8.9–10.3)
CHLORIDE SERPL-SCNC: 100 MMOL/L (ref 101–111)
CONV CO2: 28 MMOL/L (ref 22–32)
CONV TOTAL PROTEIN: 6.6 G/DL (ref 6.1–7.9)
CREAT UR-MCNC: 1 MG/DL (ref 0.7–1.2)
GLOBULIN UR ELPH-MCNC: 2.9 G/DL (ref 2.5–3.8)
GLUCOSE SERPL-MCNC: 287 MG/DL (ref 65–99)
POTASSIUM SERPL-SCNC: 4.6 MMOL/L (ref 3.6–5.1)
SODIUM SERPL-SCNC: 137 MMOL/L (ref 136–144)

## 2018-05-29 NOTE — PROGRESS NOTES
PATIENTS ONCOLOGY RECORD LOCATED IN [facility]      Subjective     Name:  MARTINA PAEZ     Date:  2018  Address:  [address] [place] [address] [place] [facility]  Home: [phone]  :  1927 AGE:  89 y.o.        RECORDS OBTAINED:  Patients Oncology Record is located in [facility]

## 2018-06-01 ENCOUNTER — APPOINTMENT (OUTPATIENT)
Dept: SPEECH THERAPY | Facility: HOSPITAL | Age: 83
End: 2018-06-01

## 2018-06-11 ENCOUNTER — CLINICAL SUPPORT (OUTPATIENT)
Dept: ONCOLOGY | Facility: HOSPITAL | Age: 83
End: 2018-06-11

## 2018-06-11 ENCOUNTER — HOSPITAL ENCOUNTER (OUTPATIENT)
Dept: ONCOLOGY | Facility: CLINIC | Age: 83
Setting detail: INFUSION SERIES
Discharge: HOME | End: 2018-06-11
Attending: INTERNAL MEDICINE | Admitting: INTERNAL MEDICINE

## 2018-06-11 NOTE — PROGRESS NOTES
PATIENTS ONCOLOGY RECORD LOCATED IN [facility]      Subjective     Name:  MARTINA PAEZ     Date:  2018  Address:  [address] [place] [address] [place] [facility]  Home: [phone]  :  1927 AGE:  89 y.o.        RECORDS OBTAINED:  Patients Oncology Record is located in [facility]

## 2018-06-18 ENCOUNTER — CLINICAL SUPPORT (OUTPATIENT)
Dept: ONCOLOGY | Facility: HOSPITAL | Age: 83
End: 2018-06-18

## 2018-06-18 ENCOUNTER — HOSPITAL ENCOUNTER (OUTPATIENT)
Dept: ONCOLOGY | Facility: CLINIC | Age: 83
Setting detail: INFUSION SERIES
Discharge: HOME | End: 2018-06-18
Attending: INTERNAL MEDICINE | Admitting: INTERNAL MEDICINE

## 2018-06-18 NOTE — PROGRESS NOTES
PATIENTS ONCOLOGY RECORD LOCATED IN [facility]      Subjective     Name:  MARTINA PEAZ     Date:  2018  Address:  [address] [place], [place] [address]  Home: [phone]  :  1927 AGE:  89 y.o.        RECORDS OBTAINED:  Patients Oncology Record is located in [facility]

## 2018-06-25 ENCOUNTER — HOSPITAL ENCOUNTER (OUTPATIENT)
Dept: ONCOLOGY | Facility: CLINIC | Age: 83
Setting detail: INFUSION SERIES
Discharge: HOME | End: 2018-06-25
Attending: INTERNAL MEDICINE | Admitting: INTERNAL MEDICINE

## 2018-06-25 ENCOUNTER — CLINICAL SUPPORT (OUTPATIENT)
Dept: ONCOLOGY | Facility: HOSPITAL | Age: 83
End: 2018-06-25

## 2018-06-25 NOTE — PROGRESS NOTES
PATIENTS ONCOLOGY RECORD LOCATED IN [facility]      Subjective     Name:  MARTINA PAEZ     Date:  2018  Address:  [address] [place], [place] [address]  Home: [phone]  :  1927 AGE:  89 y.o.        RECORDS OBTAINED:  Patients Oncology Record is located in [facility]

## 2018-07-11 ENCOUNTER — HOSPITAL ENCOUNTER (OUTPATIENT)
Dept: FAMILY MEDICINE CLINIC | Facility: CLINIC | Age: 83
Setting detail: SPECIMEN
Discharge: HOME | End: 2018-07-11
Attending: FAMILY MEDICINE | Admitting: FAMILY MEDICINE

## 2018-07-11 LAB
ALBUMIN SERPL-MCNC: 3.8 G/DL (ref 3.5–4.8)
ALBUMIN/GLOB SERPL: 1.4 {RATIO} (ref 1–1.7)
ALP SERPL-CCNC: 63 IU/L (ref 32–91)
ALT SERPL-CCNC: 15 IU/L (ref 17–63)
ANION GAP SERPL CALC-SCNC: 12 MMOL/L (ref 10–20)
AST SERPL-CCNC: 25 IU/L (ref 15–41)
BILIRUB SERPL-MCNC: 0.5 MG/DL (ref 0.3–1.2)
BUN SERPL-MCNC: 18 MG/DL (ref 8–20)
BUN/CREAT SERPL: 18 (ref 6.2–20.3)
CALCIUM SERPL-MCNC: 10 MG/DL (ref 8.9–10.3)
CHLORIDE SERPL-SCNC: 102 MMOL/L (ref 101–111)
CONV ABS BANDS: 0.5 10*3/UL
CONV ABS IMM GRAN: 0.4 10*3/UL
CONV ANISOCYTES: SLIGHT
CONV CO2: 30 MMOL/L (ref 22–32)
CONV POLYCHROMASIA IN BLOOD BY LIGHT MICROSCOPY: SLIGHT
CONV TOTAL PROTEIN: 6.6 G/DL (ref 6.1–7.9)
CONV TOXIC GRANULES IN BLOOD BY LIGHT MICROSCOPY: SLIGHT
CREAT UR-MCNC: 1 MG/DL (ref 0.7–1.2)
DIFFERENTIAL METHOD BLD: (no result)
EOSINOPHIL # BLD AUTO: 0.1 10*3/UL (ref 0–0.3)
EOSINOPHIL # BLD AUTO: 1 % (ref 0–3)
ERYTHROCYTE [DISTWIDTH] IN BLOOD BY AUTOMATED COUNT: 18.7 % (ref 11.5–14.5)
GLOBULIN UR ELPH-MCNC: 2.8 G/DL (ref 2.5–3.8)
GLUCOSE SERPL-MCNC: ABNORMAL MG/DL
GLUCOSE SERPL-MCNC: ABNORMAL MG/DL (ref 65–99)
HCT VFR BLD AUTO: 36.9 % (ref 40–54)
HGB BLD-MCNC: 11.4 G/DL (ref 14–18)
LYMPHOCYTES # BLD AUTO: 3.3 10*3/UL (ref 0.8–4.8)
LYMPHOCYTES NFR BLD AUTO: 25 % (ref 18–42)
MCH RBC QN AUTO: 25.5 PG (ref 26–32)
MCHC RBC AUTO-ENTMCNC: 30.9 G/DL (ref 32–36)
MCV RBC AUTO: 82.6 FL (ref 80–94)
METAMYELOCYTES NFR BLD: 2 %
MONOCYTES # BLD AUTO: 0.1 10*3/UL (ref 0.1–1.3)
MONOCYTES NFR BLD AUTO: 1 % (ref 2–11)
MYELOCYTES NFR BLD MANUAL: 1 %
NEUTROPHILS # BLD AUTO: 8.9 10*3/UL (ref 2.3–8.6)
NEUTROPHILS NFR BLD AUTO: 66 % (ref 50–75)
NEUTS BAND NFR BLD MANUAL: 4 % (ref 0–5)
PATHOLOGIST REVIEW: (no result)
PLATELET # BLD AUTO: 227 10*3/UL (ref 150–450)
PMV BLD AUTO: 9.8 FL (ref 7.4–10.4)
POTASSIUM SERPL-SCNC: 4 MMOL/L (ref 3.6–5.1)
RBC # BLD AUTO: 4.46 10*6/UL (ref 4.6–6)
SODIUM SERPL-SCNC: 140 MMOL/L (ref 136–144)
WBC # BLD AUTO: 13.3 10*3/UL (ref 4.5–11.5)

## 2018-11-05 ENCOUNTER — CLINICAL SUPPORT (OUTPATIENT)
Dept: ONCOLOGY | Facility: HOSPITAL | Age: 83
End: 2018-11-05

## 2018-11-05 ENCOUNTER — HOSPITAL ENCOUNTER (OUTPATIENT)
Dept: ONCOLOGY | Facility: CLINIC | Age: 83
Setting detail: INFUSION SERIES
Discharge: HOME | End: 2018-11-05
Attending: INTERNAL MEDICINE | Admitting: INTERNAL MEDICINE

## 2018-11-05 NOTE — PROGRESS NOTES
PATIENTS ONCOLOGY RECORD LOCATED IN [facility]      Subjective     Name:  MARTINA PAEZ     Date:  2018  Address:  [address] [place], [place] [address]  Home: [phone]  :  1927 AGE:  89 y.o.        RECORDS OBTAINED:  Patients Oncology Record is located in [facility]

## 2018-11-10 ENCOUNTER — HOSPITAL ENCOUNTER (INPATIENT)
Age: 83
LOS: 2 days | Discharge: HOME | DRG: 392 | End: 2018-11-12
Attending: EMERGENCY MEDICINE | Admitting: INTERNAL MEDICINE
Payer: MEDICARE

## 2018-11-10 DIAGNOSIS — C85.90 NON-HODGKIN'S LYMPHOMA, UNSPECIFIED BODY REGION, UNSPECIFIED NON-HODGKIN LYMPHOMA TYPE (HCC): ICD-10-CM

## 2018-11-10 DIAGNOSIS — E87.20 LACTIC ACIDOSIS: ICD-10-CM

## 2018-11-10 DIAGNOSIS — R19.7 DIARRHEA, UNSPECIFIED TYPE: Primary | ICD-10-CM

## 2018-11-10 PROBLEM — E11.9 DM (DIABETES MELLITUS) (HCC): Status: ACTIVE | Noted: 2018-11-10

## 2018-11-10 PROBLEM — D72.825 BANDEMIA: Status: ACTIVE | Noted: 2018-11-10

## 2018-11-10 PROBLEM — Z85.79 H/O LYMPHOMA: Status: ACTIVE | Noted: 2018-11-10

## 2018-11-10 PROBLEM — I48.91 ATRIAL FIBRILLATION (HCC): Status: ACTIVE | Noted: 2018-11-10

## 2018-11-10 PROBLEM — Z86.19 HISTORY OF CLOSTRIDIUM DIFFICILE COLITIS: Status: ACTIVE | Noted: 2018-11-10

## 2018-11-10 LAB
ALBUMIN SERPL-MCNC: 3.2 G/DL (ref 3.5–5)
ALBUMIN/GLOB SERPL: 0.9 {RATIO} (ref 1.1–2.2)
ALP SERPL-CCNC: 69 U/L (ref 45–117)
ALT SERPL-CCNC: 16 U/L (ref 12–78)
ANION GAP SERPL CALC-SCNC: 11 MMOL/L (ref 5–15)
APPEARANCE UR: CLEAR
AST SERPL-CCNC: 16 U/L (ref 15–37)
BACTERIA URNS QL MICRO: NEGATIVE /HPF
BASOPHILS # BLD: 0 K/UL (ref 0–0.1)
BASOPHILS # BLD: 0 K/UL (ref 0–0.1)
BASOPHILS NFR BLD: 0 % (ref 0–1)
BASOPHILS NFR BLD: 0 % (ref 0–1)
BILIRUB SERPL-MCNC: 0.3 MG/DL (ref 0.2–1)
BILIRUB UR QL: NEGATIVE
BUN SERPL-MCNC: 23 MG/DL (ref 6–20)
BUN/CREAT SERPL: 20 (ref 12–20)
CALCIUM SERPL-MCNC: 8.8 MG/DL (ref 8.5–10.1)
CHLORIDE SERPL-SCNC: 103 MMOL/L (ref 97–108)
CO2 SERPL-SCNC: 26 MMOL/L (ref 21–32)
COLOR UR: NORMAL
COMMENT, HOLDF: NORMAL
CREAT SERPL-MCNC: 1.16 MG/DL (ref 0.7–1.3)
DIFFERENTIAL METHOD BLD: ABNORMAL
DIFFERENTIAL METHOD BLD: ABNORMAL
EOSINOPHIL # BLD: 0 K/UL (ref 0–0.4)
EOSINOPHIL # BLD: 0 K/UL (ref 0–0.4)
EOSINOPHIL NFR BLD: 0 % (ref 0–7)
EOSINOPHIL NFR BLD: 0 % (ref 0–7)
EPITH CASTS URNS QL MICRO: NORMAL /LPF
ERYTHROCYTE [DISTWIDTH] IN BLOOD BY AUTOMATED COUNT: 18.3 % (ref 11.5–14.5)
ERYTHROCYTE [DISTWIDTH] IN BLOOD BY AUTOMATED COUNT: 18.4 % (ref 11.5–14.5)
GLOBULIN SER CALC-MCNC: 3.6 G/DL (ref 2–4)
GLUCOSE SERPL-MCNC: 187 MG/DL (ref 65–100)
GLUCOSE UR STRIP.AUTO-MCNC: NEGATIVE MG/DL
HCT VFR BLD AUTO: 33.1 % (ref 36.6–50.3)
HCT VFR BLD AUTO: 35 % (ref 36.6–50.3)
HGB BLD-MCNC: 10.4 G/DL (ref 12.1–17)
HGB BLD-MCNC: 9.8 G/DL (ref 12.1–17)
HGB UR QL STRIP: NEGATIVE
HYALINE CASTS URNS QL MICRO: NORMAL /LPF (ref 0–5)
IMM GRANULOCYTES # BLD: 0 K/UL (ref 0–0.04)
IMM GRANULOCYTES # BLD: 0 K/UL (ref 0–0.04)
IMM GRANULOCYTES NFR BLD AUTO: 0 % (ref 0–0.5)
IMM GRANULOCYTES NFR BLD AUTO: 0 % (ref 0–0.5)
INR PPP: 1.1 (ref 0.9–1.1)
KETONES UR QL STRIP.AUTO: NEGATIVE MG/DL
LACTATE BLD-SCNC: 2.52 MMOL/L (ref 0.4–2)
LACTATE BLD-SCNC: 3.23 MMOL/L (ref 0.4–2)
LEUKOCYTE ESTERASE UR QL STRIP.AUTO: NEGATIVE
LYMPHOCYTES # BLD: 0.8 K/UL (ref 0.8–3.5)
LYMPHOCYTES # BLD: 1.1 K/UL (ref 0.8–3.5)
LYMPHOCYTES NFR BLD: 15 % (ref 12–49)
LYMPHOCYTES NFR BLD: 19 % (ref 12–49)
MCH RBC QN AUTO: 22.8 PG (ref 26–34)
MCH RBC QN AUTO: 22.9 PG (ref 26–34)
MCHC RBC AUTO-ENTMCNC: 29.6 G/DL (ref 30–36.5)
MCHC RBC AUTO-ENTMCNC: 29.7 G/DL (ref 30–36.5)
MCV RBC AUTO: 76.9 FL (ref 80–99)
MCV RBC AUTO: 77 FL (ref 80–99)
METAMYELOCYTES NFR BLD MANUAL: 2 %
METAMYELOCYTES NFR BLD MANUAL: 4 %
MONOCYTES # BLD: 0.5 K/UL (ref 0–1)
MONOCYTES # BLD: 0.8 K/UL (ref 0–1)
MONOCYTES NFR BLD: 14 % (ref 5–13)
MONOCYTES NFR BLD: 8 % (ref 5–13)
NEUTS BAND NFR BLD MANUAL: 19 %
NEUTS BAND NFR BLD MANUAL: 29 %
NEUTS SEG # BLD: 3.7 K/UL (ref 1.8–8)
NEUTS SEG # BLD: 4.2 K/UL (ref 1.8–8)
NEUTS SEG NFR BLD: 42 % (ref 32–75)
NEUTS SEG NFR BLD: 48 % (ref 32–75)
NITRITE UR QL STRIP.AUTO: NEGATIVE
PH UR STRIP: 5 [PH] (ref 5–8)
PLATELET # BLD AUTO: 148 K/UL (ref 150–400)
PLATELET # BLD AUTO: 153 K/UL (ref 150–400)
PMV BLD AUTO: 12.3 FL (ref 8.9–12.9)
PMV BLD AUTO: 12.4 FL (ref 8.9–12.9)
POTASSIUM SERPL-SCNC: 5 MMOL/L (ref 3.5–5.1)
PROT SERPL-MCNC: 6.8 G/DL (ref 6.4–8.2)
PROT UR STRIP-MCNC: NEGATIVE MG/DL
PROTHROMBIN TIME: 10.7 SEC (ref 9–11.1)
RBC # BLD AUTO: 4.3 M/UL (ref 4.1–5.7)
RBC # BLD AUTO: 4.55 M/UL (ref 4.1–5.7)
RBC #/AREA URNS HPF: NORMAL /HPF (ref 0–5)
RBC MORPH BLD: ABNORMAL
SAMPLES BEING HELD,HOLD: NORMAL
SODIUM SERPL-SCNC: 140 MMOL/L (ref 136–145)
SP GR UR REFRACTOMETRY: 1.01 (ref 1–1.03)
UA: UC IF INDICATED,UAUC: NORMAL
UROBILINOGEN UR QL STRIP.AUTO: 0.2 EU/DL (ref 0.2–1)
WBC # BLD AUTO: 5.5 K/UL (ref 4.1–11.1)
WBC # BLD AUTO: 5.9 K/UL (ref 4.1–11.1)
WBC URNS QL MICRO: NORMAL /HPF (ref 0–4)
XXWBCSUS: 1
XXWBCSUS: 1

## 2018-11-10 PROCEDURE — 74011250636 HC RX REV CODE- 250/636: Performed by: INTERNAL MEDICINE

## 2018-11-10 PROCEDURE — 74011250637 HC RX REV CODE- 250/637: Performed by: EMERGENCY MEDICINE

## 2018-11-10 PROCEDURE — 77030032490 HC SLV COMPR SCD KNE COVD -B

## 2018-11-10 PROCEDURE — 85025 COMPLETE CBC W/AUTO DIFF WBC: CPT

## 2018-11-10 PROCEDURE — 36415 COLL VENOUS BLD VENIPUNCTURE: CPT

## 2018-11-10 PROCEDURE — 65660000000 HC RM CCU STEPDOWN

## 2018-11-10 PROCEDURE — 96374 THER/PROPH/DIAG INJ IV PUSH: CPT

## 2018-11-10 PROCEDURE — 87449 NOS EACH ORGANISM AG IA: CPT

## 2018-11-10 PROCEDURE — 80053 COMPREHEN METABOLIC PANEL: CPT

## 2018-11-10 PROCEDURE — 83605 ASSAY OF LACTIC ACID: CPT

## 2018-11-10 PROCEDURE — 74011250637 HC RX REV CODE- 250/637: Performed by: INTERNAL MEDICINE

## 2018-11-10 PROCEDURE — 74011250636 HC RX REV CODE- 250/636: Performed by: EMERGENCY MEDICINE

## 2018-11-10 PROCEDURE — 83036 HEMOGLOBIN GLYCOSYLATED A1C: CPT

## 2018-11-10 PROCEDURE — 99283 EMERGENCY DEPT VISIT LOW MDM: CPT

## 2018-11-10 PROCEDURE — 74011636637 HC RX REV CODE- 636/637: Performed by: INTERNAL MEDICINE

## 2018-11-10 PROCEDURE — 87040 BLOOD CULTURE FOR BACTERIA: CPT

## 2018-11-10 PROCEDURE — 81001 URINALYSIS AUTO W/SCOPE: CPT

## 2018-11-10 PROCEDURE — 85610 PROTHROMBIN TIME: CPT

## 2018-11-10 RX ORDER — INSULIN DEGLUDEC 100 U/ML
15 INJECTION, SOLUTION SUBCUTANEOUS
Status: ON HOLD | COMMUNITY
End: 2018-11-12 | Stop reason: SDUPTHER

## 2018-11-10 RX ORDER — CHLORHEXIDINE GLUCONATE 1.2 MG/ML
15 RINSE ORAL 3 TIMES DAILY
Status: DISCONTINUED | OUTPATIENT
Start: 2018-11-10 | End: 2018-11-12 | Stop reason: HOSPADM

## 2018-11-10 RX ORDER — MELATONIN
1000 DAILY
Status: DISCONTINUED | OUTPATIENT
Start: 2018-11-11 | End: 2018-11-12 | Stop reason: HOSPADM

## 2018-11-10 RX ORDER — PSEUDOEPH/DM/GUAIFEN/ACETAMIN 30-10-324
20 EXPECTORANT ORAL DAILY
COMMUNITY
End: 2020-01-01

## 2018-11-10 RX ORDER — SODIUM CHLORIDE 0.9 % (FLUSH) 0.9 %
5-10 SYRINGE (ML) INJECTION AS NEEDED
Status: DISCONTINUED | OUTPATIENT
Start: 2018-11-10 | End: 2018-11-12 | Stop reason: HOSPADM

## 2018-11-10 RX ORDER — WARFARIN 1 MG/1
0.5 TABLET ORAL DAILY
COMMUNITY
End: 2018-11-26

## 2018-11-10 RX ORDER — METOPROLOL TARTRATE 25 MG/1
25 TABLET, FILM COATED ORAL 2 TIMES DAILY
Status: ON HOLD | COMMUNITY
End: 2018-11-10

## 2018-11-10 RX ORDER — SIMVASTATIN 20 MG/1
40 TABLET, FILM COATED ORAL
Status: DISCONTINUED | OUTPATIENT
Start: 2018-11-10 | End: 2018-11-12 | Stop reason: HOSPADM

## 2018-11-10 RX ORDER — METRONIDAZOLE 500 MG/100ML
500 INJECTION, SOLUTION INTRAVENOUS
Status: COMPLETED | OUTPATIENT
Start: 2018-11-10 | End: 2018-11-10

## 2018-11-10 RX ORDER — CHLORHEXIDINE GLUCONATE 1.2 MG/ML
15 RINSE ORAL 3 TIMES DAILY
COMMUNITY
End: 2018-11-26 | Stop reason: SDUPTHER

## 2018-11-10 RX ORDER — SODIUM CHLORIDE 9 MG/ML
75 INJECTION, SOLUTION INTRAVENOUS CONTINUOUS
Status: DISPENSED | OUTPATIENT
Start: 2018-11-10 | End: 2018-11-11

## 2018-11-10 RX ORDER — MELATONIN
1000 DAILY
COMMUNITY
End: 2020-01-01

## 2018-11-10 RX ORDER — L. ACIDOPHILUS/L.BULGARICUS 100MM CELL
1 GRANULES IN PACKET (EA) ORAL 2 TIMES DAILY
Status: DISCONTINUED | OUTPATIENT
Start: 2018-11-10 | End: 2018-11-12 | Stop reason: HOSPADM

## 2018-11-10 RX ORDER — INSULIN GLARGINE 100 [IU]/ML
5 INJECTION, SOLUTION SUBCUTANEOUS
Status: DISCONTINUED | OUTPATIENT
Start: 2018-11-10 | End: 2018-11-12 | Stop reason: HOSPADM

## 2018-11-10 RX ORDER — SIMVASTATIN 80 MG/1
40 TABLET, FILM COATED ORAL
COMMUNITY
End: 2019-04-12 | Stop reason: ALTCHOICE

## 2018-11-10 RX ORDER — SODIUM CHLORIDE 0.9 % (FLUSH) 0.9 %
5-10 SYRINGE (ML) INJECTION EVERY 8 HOURS
Status: DISCONTINUED | OUTPATIENT
Start: 2018-11-10 | End: 2018-11-12 | Stop reason: HOSPADM

## 2018-11-10 RX ORDER — VANCOMYCIN HYDROCHLORIDE 250 MG/1
250 CAPSULE ORAL 4 TIMES DAILY
Qty: 40 CAP | Refills: 0 | Status: SHIPPED | OUTPATIENT
Start: 2018-11-10 | End: 2018-11-12

## 2018-11-10 RX ORDER — OMEPRAZOLE 20 MG/1
20 CAPSULE, DELAYED RELEASE ORAL DAILY
Status: ON HOLD | COMMUNITY
End: 2018-11-10

## 2018-11-10 RX ORDER — METOPROLOL TARTRATE 50 MG/1
25 TABLET ORAL 2 TIMES DAILY
COMMUNITY
End: 2018-12-22

## 2018-11-10 RX ORDER — MULTIVITAMIN WITH IRON
1 TABLET ORAL DAILY
Status: DISCONTINUED | OUTPATIENT
Start: 2018-11-11 | End: 2018-11-12 | Stop reason: HOSPADM

## 2018-11-10 RX ORDER — VANCOMYCIN HYDROCHLORIDE 250 MG/5ML
500 POWDER, FOR SOLUTION ORAL EVERY 6 HOURS
Status: DISCONTINUED | OUTPATIENT
Start: 2018-11-10 | End: 2018-11-12 | Stop reason: HOSPADM

## 2018-11-10 RX ORDER — ACETAMINOPHEN 500 MG
1000 TABLET ORAL
Status: ON HOLD | COMMUNITY
End: 2018-11-10

## 2018-11-10 RX ORDER — ACETAMINOPHEN 325 MG/1
650 TABLET ORAL
Status: DISCONTINUED | OUTPATIENT
Start: 2018-11-10 | End: 2018-11-12 | Stop reason: HOSPADM

## 2018-11-10 RX ORDER — MAGNESIUM SULFATE 100 %
4 CRYSTALS MISCELLANEOUS AS NEEDED
Status: DISCONTINUED | OUTPATIENT
Start: 2018-11-10 | End: 2018-11-12 | Stop reason: HOSPADM

## 2018-11-10 RX ORDER — METOPROLOL TARTRATE 25 MG/1
25 TABLET, FILM COATED ORAL 2 TIMES DAILY
Status: DISCONTINUED | OUTPATIENT
Start: 2018-11-10 | End: 2018-11-12 | Stop reason: HOSPADM

## 2018-11-10 RX ORDER — GUAIFENESIN 100 MG/5ML
81 LIQUID (ML) ORAL DAILY
Status: ON HOLD | COMMUNITY
End: 2018-11-10

## 2018-11-10 RX ORDER — FUROSEMIDE 20 MG/1
20 TABLET ORAL DAILY
COMMUNITY
End: 2018-11-12

## 2018-11-10 RX ORDER — METFORMIN HYDROCHLORIDE 500 MG/1
1000 TABLET, FILM COATED, EXTENDED RELEASE ORAL 2 TIMES DAILY
COMMUNITY
End: 2018-12-12

## 2018-11-10 RX ORDER — DEXTROSE 50 % IN WATER (D50W) INTRAVENOUS SYRINGE
25-50 AS NEEDED
Status: DISCONTINUED | OUTPATIENT
Start: 2018-11-10 | End: 2018-11-12 | Stop reason: HOSPADM

## 2018-11-10 RX ORDER — NALOXONE HYDROCHLORIDE 0.4 MG/ML
0.4 INJECTION, SOLUTION INTRAMUSCULAR; INTRAVENOUS; SUBCUTANEOUS AS NEEDED
Status: DISCONTINUED | OUTPATIENT
Start: 2018-11-10 | End: 2018-11-12 | Stop reason: HOSPADM

## 2018-11-10 RX ORDER — ONDANSETRON 2 MG/ML
4 INJECTION INTRAMUSCULAR; INTRAVENOUS
Status: DISCONTINUED | OUTPATIENT
Start: 2018-11-10 | End: 2018-11-12 | Stop reason: HOSPADM

## 2018-11-10 RX ORDER — SIMVASTATIN 40 MG/1
40 TABLET, FILM COATED ORAL
Status: ON HOLD | COMMUNITY
End: 2018-11-10

## 2018-11-10 RX ORDER — INSULIN LISPRO 100 [IU]/ML
INJECTION, SOLUTION INTRAVENOUS; SUBCUTANEOUS
Status: DISCONTINUED | OUTPATIENT
Start: 2018-11-10 | End: 2018-11-12 | Stop reason: HOSPADM

## 2018-11-10 RX ADMIN — LACTOBACILLUS ACIDOPHILUS / LACTOBACILLUS BULGARICUS 1 PACKET: 100 MILLION CFU STRENGTH GRANULES at 20:57

## 2018-11-10 RX ADMIN — VANCOMYCIN HYDROCHLORIDE 500 MG: KIT at 22:11

## 2018-11-10 RX ADMIN — INSULIN GLARGINE 5 UNITS: 100 INJECTION, SOLUTION SUBCUTANEOUS at 21:56

## 2018-11-10 RX ADMIN — SODIUM CHLORIDE 75 ML/HR: 900 INJECTION, SOLUTION INTRAVENOUS at 15:38

## 2018-11-10 RX ADMIN — SODIUM CHLORIDE 1000 ML: 900 INJECTION, SOLUTION INTRAVENOUS at 14:19

## 2018-11-10 RX ADMIN — SODIUM CHLORIDE 1000 ML: 900 INJECTION, SOLUTION INTRAVENOUS at 13:13

## 2018-11-10 RX ADMIN — SIMVASTATIN 40 MG: 20 TABLET, FILM COATED ORAL at 22:01

## 2018-11-10 RX ADMIN — ACETAMINOPHEN 650 MG: 325 TABLET, FILM COATED ORAL at 22:00

## 2018-11-10 RX ADMIN — VANCOMYCIN HYDROCHLORIDE 500 MG: KIT at 18:00

## 2018-11-10 RX ADMIN — CHLORHEXIDINE GLUCONATE 15 ML: 1.2 RINSE ORAL at 21:59

## 2018-11-10 RX ADMIN — METOPROLOL TARTRATE 25 MG: 25 TABLET ORAL at 22:01

## 2018-11-10 RX ADMIN — METRONIDAZOLE 500 MG: 500 INJECTION, SOLUTION INTRAVENOUS at 13:23

## 2018-11-10 NOTE — H&P
[facility] [address] [facility], [facility] 19 
[phone] Admission History and Physical 
 
 
NAME:  Stephen Morton :   1927 MRN:  [id number] PCP:  Seng Isaacs, Not On File  
 
Date/Time:  11/10/2018 Subjective: CHIEF COMPLAINT: \"I think I have c.diff again\" HISTORY OF PRESENT ILLNESS:    
Mr. Heri Perkins is a 80 y.o.  male with PMH of DM, HTN, h/o NHL and recurrent C.diff (has has two episodes now most recently in May for which he completed 6 weeks of PO vanco and fidoxamicin) admitted for bandemia, lactic acidosis and recurrent loose BM's. Per pt's family, was recently on Amoxicillin for a severe dental infection. He has had 2-3 loose BM's today. No ab pain. Past Medical History:  
Diagnosis Date  Arthritis  Cancer ([facility]  Diabetes ([facility]  Gastrointestinal disorder  Hypertension Past Surgical History:  
Procedure Laterality Date  HX ORTHOPAEDIC    
 HX PROSTATECTOMY Social History Tobacco Use  Smoking status: Former Smoker  Smokeless tobacco: Never Used Substance Use Topics  Alcohol use: No  
  Frequency: Never FH:  
HTN No Known Allergies Prior to Admission medications Medication Sig Start Date End Date Taking? Authorizing Provider  
[facility] ER) 500 mg TG24 24 hour tablet Take 1,000 mg by mouth two (2) times a day. Yes Valerie, MD Garrison  
lutein 20 mg cap Take 20 mg by mouth daily. Yes Garrison Padilla MD  
warfarin (COUMADIN) 1 mg tablet Take 0.5 mg by mouth daily. Yes Garrison Padilla MD  
cholecalciferol (VITAMIN D3) 1,000 unit tablet Take 1,000 Units by mouth daily. Yes Valerie, MD Garrison  
furosemide (LASIX) 20 mg tablet Take 20 mg by mouth daily. Yes Garrison Padilla MD  
mirabegron ER (MYRBETRIQ) 50 mg ER tablet Take 50 mg by mouth every fourty-eight (48) hours.    Yes Valerie, MD Garrison  
insulin degludec (TRESIBA FLEXTOUCH U-100) 100 unit/mL (3 mL) inpn 15 Units by SubCUTAneous route nightly. Yes Other, MD Garrison  
vancomycin (VANCOCIN) 250 mg capsule Take 1 Cap by mouth four (4) times daily for 10 days. 11/10/18 11/20/18 Yes Elsa Pascal MD  
metoprolol tartrate (LOPRESSOR) 50 mg tablet Take 25 mg by mouth two (2) times a day. Yes Provider, Historical  
simvastatin (ZOCOR) 80 mg tablet Take 40 mg by mouth nightly. Yes Provider, Historical  
ferrous fumarate/vit Bcomp,C (SUPER B COMPLEX PO) Take 1 Cap by mouth daily. Yes Provider, Historical  
chlorhexidine (PERIDEX) 0.12 % solution 15 mL by Swish and Spit route three (3) times daily. Yes Provider, Historical  
lactobacillus combo no.6 (PROBIOTIC COMPLEX PO) Take 1 Cap by mouth daily. Yes Provider, Historical  
OTHER,NON-FORMULARY, Unknown OTC antacid: take one by mouth every morning   Yes Provider, Historical  
 
 
 
Review of Systems: 
(bold if positive, if negative) Gen:  Eyes:  ENT:  CVS:  Pulm:  GI:   
:   
MS:  Skin:  Psych:  Endo:   
Hem:  Renal:   
Neuro:    
Diarrhea Objective: VITALS:   
Vital signs reviewed; most recent are: 
 
Visit Vitals /66 (BP 1 Location: Right arm, BP Patient Position: At rest) Pulse 83 Temp 98.2 °F (36.8 °C) Resp 16 Ht 5' 8\" (1.727 m) Wt 63.9 kg (140 lb 14 oz) SpO2 96% BMI 21.42 kg/m² SpO2 Readings from Last 6 Encounters:  
11/10/18 96% Intake/Output Summary (Last 24 hours) at 11/10/2018 1752 Last data filed at 11/10/2018 9335 Gross per 24 hour Intake 950 ml Output  Net 950 ml Exam:  
 
Physical Exam: 
 
Gen:  Thin male in NAD HEENT:  Pink conjunctivae, PERRL, hard of hearing, moist mucous membranes Neck:  Supple, without masses, thyroid non-tender Resp:  No accessory muscle use, clear breath sounds without wheezes rales or rhonchi 
Card:  No murmurs, normal S1, S2 without thrills, bruits or peripheral edema Abd:  Soft, non-tender, non-distended, normoactive bowel sounds are present, no palpable organomegaly Lymph:  No cervical adenopathy Musc:  No cyanosis or clubbing Skin:  No rashes or ulcers, skin turgor is good Neuro:  Cranial nerves 3-12 are grossly intact. Diffusely weak Psych:  Alert with good insight. Oriented to person, place, and time Labs: 
 
Recent Labs 11/10/18 
1240 WBC 5.5 HGB 9.8* HCT 33.1*  
 Recent Labs 11/10/18 [address] [facility]   
K 5.0  
 CO2 26 * BUN 23* CREA 1.16  
CA 8.8 ALB 3.2* SGOT 16 ALT 16 No components found for: [place] No results for input(s): PH, PCO2, PO2, HCO3, FIO2 in the last 72 hours. Recent Labs 11/10/18 [address] [facility] INR 1.1 Assessment/Plan:   
 Lactic acidosis (11/10/2018) - possibly 2/2 metformin v. Mild IVVD  
-improving with IVF's  
-continue to trend until normalizes  
-hold metformin Bandemia (11/10/2018) - fairly impressive at 29%. ?2/2 C.diff  
-monitor with antibiotics 
-check UA History of Clostridium difficile colitis (11/10/2018) - with possible recurrence. No ab pain.  
-C.diff pending  
-continue Probiotic  
-IVF's  
-start PO vanco; if (+) will likely need ID eval as this is his third recurrence and a more aggressive regimen DM (diabetes mellitus) ([address] (11/10/2018) -check A1c 
-ISS  
-reduce Lantus as has been having hypoglycemia at home; may be able to stop 
-hold metformin due to lactic acidosis Atrial fibrillation ([address] (11/10/2018)  
-per family, they believe he is on coumadin for this. INR subtherapeutic 
-continue metoprolol for now  
-holding coumadin for now as INR subtherapeutic and will need to further discuss risks of coumadin with family. CHADS2 vasc score => 3 
 
  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 
-will need outpt follow-up; will need an oncologist in this area Surrogate decision maker: Daughter Total time spent with patient: 70 Minutes Care Plan discussed with: Patient, Family, Nursing Staff and >50% of time spent in counseling and coordination of care Discussed:  Code Status, Care Plan and D/C Planning Prophylaxis:  Lovenox Probable Disposition:  Home w/Family 
        
___________________________________________________ Attending Physician: Shaista Cuenca MD

## 2018-11-10 NOTE — ED NOTES
Clarified with lab that patient will only have one set of blood cultures. Dr. Margaret Ford is aware.

## 2018-11-10 NOTE — ED NOTES
Report received from GARRY, RN for shift relief. Waiting for [facility] to transport to [facility].

## 2018-11-10 NOTE — ED NOTES
Resting quietly in NAD awaiting room assignment. NSR per monitor. IV fluids infusing w/o difficulty.

## 2018-11-10 NOTE — PROGRESS NOTES
SHIFT REPORT: 
1900-Bedside shift change report given to Lg Carver RN (oncoming nurse) by Maricarmen Judge RN (offgoing nurse). Report included the following information SBAR, Kardex, Procedure Summary, Intake/Output, Recent Results and Cardiac Rhythm. SHIFT SUMMARY: 
2000-STAND done, pt able to [facility]; on [facility] soft diet, nector fluids; has been on dyphagia diet \"since last year\" when at hospital in [place]; daughter does not know why. .. Pt does not use straw; pt able to take po med whole rather than \"crushed\" 2200-Tylenol po for mild gen discomfort; accepting po med whole with nector \"tea\"; small amt brown smear from rectum, barrier cream to sacrum 
2200-pt placed HA in clear cup, placed on dressor; daughter remains at bedside 2230-Dr. Natalie Sales notified of urine lab results 0130-venous blood drawn for AM labs. END OF SHIFT REPORT: 
0700-Bedside shift change report given to Todd Garcia RN (oncoming nurse) by Lg Carver RN (offgoing nurse). Report included the following information SBAR, Kardex, Procedure Summary, Intake/Output, Recent Results and Cardiac Rhythm .

## 2018-11-10 NOTE — ED PROVIDER NOTES
HPI The patient has a history of several episodes of C. difficile, and now has had 2 episodes of diarrhea since last night, nonbloody, and he is concerned it is a recurrence. He recently was treated with amoxicillin for an intraoral infection. He denies fever, vomiting, abdominal pain or other complaints. No past medical history on file. No past surgical history on file. No family history on file. Social History Socioeconomic History  Marital status: Not on file Spouse name: Not on file  Number of children: Not on file  Years of education: Not on file  Highest education level: Not on file Social Needs  Financial resource strain: Not on file  Food insecurity - worry: Not on file  Food insecurity - inability: Not on file  Transportation needs - medical: Not on file  Transportation needs - non-medical: Not on file Occupational History  Not on file Tobacco Use  Smoking status: Not on file Substance and Sexual Activity  Alcohol use: Not on file  Drug use: Not on file  Sexual activity: Not on file Other Topics Concern  Not on file Social History Narrative  Not on file ALLERGIES: Patient has no known allergies. Review of Systems Constitutional: Negative for fever. Eyes: Negative for visual disturbance. Respiratory: Negative for shortness of breath. Cardiovascular: Negative for chest pain. Gastrointestinal: Positive for diarrhea. Negative for abdominal pain, blood in stool, nausea and vomiting. Genitourinary: Negative for dysuria. Musculoskeletal: Negative. Negative for back pain and neck stiffness. Skin: Negative for rash. Neurological: Negative. Psychiatric/Behavioral: Negative for confusion. All other systems reviewed and are negative. Vitals:  
 11/10/18 1051 BP: 98/55 Pulse: 93 Resp: 16 Temp: 97.9 °F (36.6 °C) SpO2: 96% Weight: 63.9 kg (140 lb 14 oz) Height: 5' 8\" (1.727 m) Physical Exam  
Constitutional: He appears well-developed and well-nourished. No distress. HENT:  
Head: Normocephalic. Neck: Normal range of motion. Cardiovascular: Normal rate. No murmur heard. Pulmonary/Chest: Effort normal and breath sounds normal.  
Abdominal: Soft. There is no tenderness. Musculoskeletal: Normal range of motion. Neurological: He is alert. Skin: Skin is warm and dry. Capillary refill takes less than 2 seconds. Psychiatric: He has a normal mood and affect. His behavior is normal.  
  
 
MDM Procedures Spoke c dr. Nataliya Pyle - she will adm.

## 2018-11-10 NOTE — ED NOTES
Discharge or Transfer Assessment: Patient A&O x4 and in no distress. Physical re-examination reveals some improvement in pts condition with reassessment of vital signs completed at the time of admission transfer and/or discharge. Calm and no c/o pain at this time. IV NS running at maintenance rate now. Family will accompany to the hospital too.

## 2018-11-10 NOTE — PROGRESS NOTES
[facility]: MED RECONCILIATION Comments/Recommendations: ? Medication reconciliation completed by family who had updated medication list from November 2018 to review. Patient has been in [place] until this week so family unclear about some details of medications/history. ? Patient took AM medications today. ? Family unsure why patient is on warfarin or if he has had his INR checked recently. Patient states he saw a cardiologist due to rapid heart rate, possible indication of afib. Unsure when warfarin was started but family states that when patient started warfarin he was told to stop ASA. ? Patient had oral surgery ~3 weeks ago and is on Peridex three times daily. ? Patient has a port for chemo per family but has not received any treatment recently. ? Patient prescribed Tresiba 20 units nightly but has only been using 15 units nightly for the past 4 days due to low BG in the mornings (some in the 40s). Patient recently started metformin in November and his insulin regimen was adjusted at that time. Medications added: · Probiotic · Super B vitamin · Peridex Medications removed: · ASA · APAP 
· Humalog Star Alexandra · Prilosec Medications adjusted: · Tresiba 15 units QHS adjusted from 20 units QHS-see above Information obtained from: family, patient, medication list  
 
Allergies: Patient has no known allergies. Prior to Admission Medications:  
 
Medication Documentation Review Audit Reviewed by Fabienne Richardson (Pharmacist) on 11/10/18 at 9148 Medication Sig Documenting Provider Last Dose Status Taking?  
chlorhexidine (PERIDEX) 0.12 % solution 15 mL by Swish and Spit route three (3) times daily. Provider, Miko 11/10/2018 AM Active Yes  
cholecalciferol (VITAMIN D3) 1,000 unit tablet Take 1,000 Units by mouth daily.  Other, MD Garrison 11/10/2018 AM Active Yes  
ferrous fumarate/vit Bcomp,C (SUPER B COMPLEX PO) Take 1 Cap by mouth daily. Provider, Historical 11/10/2018 AM Active Yes  
furosemide (LASIX) 20 mg tablet Take 20 mg by mouth daily. Garrison Padilla MD 11/10/2018 AM Active Yes  
insulin degludec (TRESIBA FLEXTOUCH U-100) 100 unit/mL (3 mL) inpn 15 Units by SubCUTAneous route nightly. Garrison Padilla MD 11/9/2018 PM Active Yes Med Note (ZHANNA VIRGEN   Sat Nov 10, 2018  5:22 PM) Prescribed 20 units every night but due to low BGs patient has only been using 15 units at night. lactobacillus combo no.6 (PROBIOTIC COMPLEX PO) Take 1 Cap by mouth daily. Provider, Historical 11/10/2018 AM Active Yes  
lutein 20 mg cap Take 20 mg by mouth daily. Garrison Padilla MD 11/10/2018 AM Active Yes  
metFORMIN (GLUMETZA ER) 500 mg TG24 24 hour tablet Take 1,000 mg by mouth two (2) times a day. Garrison Padilla MD 11/10/2018 AM Active Yes  
metoprolol tartrate (LOPRESSOR) 50 mg tablet Take 25 mg by mouth two (2) times a day. Provider, Historical 11/10/2018 AM Active Yes  
mirabegron ER (MYRBETRIQ) 50 mg ER tablet Take 50 mg by mouth every fourty-eight (48) hours. Garrison Padilla MD 11/9/2018 PM Active Yes OTHER,NON-FORMULARY, Unknown OTC antacid: take one by mouth every morning Provider, Historical 11/10/2018 AM Active Yes  
simvastatin (ZOCOR) 80 mg tablet Take 40 mg by mouth nightly. Provider, Historical 11/9/2018 PM Active Yes  
warfarin (COUMADIN) 1 mg tablet Take 0.5 mg by mouth daily. Garrison Padilla MD 11/9/2018 PM Active Yes Thank Trevor Nation, PharmD   Contact: [phone]

## 2018-11-10 NOTE — PROGRESS NOTES
Advance Care Planning Note Name: Zara Castro YOB: 1927 MRN: [id number] Admission Date: 11/10/2018 10:40 AM  
 
Date of discussion:  11/10/2018 Active Diagnoses:  
 
Principal Problem: 
  Lactic acidosis (11/10/2018) Active Problems: 
  Bandemia (11/10/2018) History of Clostridium difficile colitis (11/10/2018) DM (diabetes mellitus) ([facility] (11/10/2018) Atrial fibrillation ([facility] (11/10/2018) H/O lymphoma (11/10/2018) Overview: B-cell NHL These active diagnoses are of sufficient risk that focused discussion on advance care planning is indicated in order to allow the patient to thoughtfully consider personal goals of care; and, if situations arise that prevent the ability to personally give input, to ensure appropriate representation of their personal desires for different levels and aggressiveness of care. Discussion:  
 
Persons present and participating in discussion: Pt's daughter, son in law, patient and RN Discussion: Spoke with patient. His daughter is POA. She states he has a living will and is DNR Time Spent:  
 
Total time spent face-to-face in education and discussion: 16 minutes.   
  
 
  
 
Cristiane Barrett MD

## 2018-11-10 NOTE — ED NOTES
TRANSFER - OUT REPORT: 
 
Verbal report given to TAWANA Paramedic (name) on Marlise Erb  being transferred to [address] [place] [address] (unit) for routine progression of care Report consisted of patients Situation, Background, Assessment and  
Recommendations(SBAR). Information from the following report(s) SBAR was reviewed with the receiving nurse. Lines:  
Peripheral IV 11/10/18 Left Antecubital (Active) Site Assessment Clean, dry, & intact 11/10/2018 12:34 PM  
Phlebitis Assessment 0 11/10/2018 12:34 PM  
Infiltration Assessment 0 11/10/2018 12:34 PM  
Dressing Status Clean, dry, & intact 11/10/2018 12:34 PM  
Dressing Type Tape;Transparent 11/10/2018 12:34 PM  
Hub Color/Line Status Pink;Flushed 11/10/2018 12:34 PM  
Action Taken Blood drawn 11/10/2018 12:34 PM  
  
 
Opportunity for questions and clarification was provided. Patient transported with: 
 Monitor

## 2018-11-10 NOTE — ED NOTES
TRANSFER - OUT REPORT: 
 
Verbal report given to Cheryl Hernandez on Naga Garcia  being transferred to [facility] room 326(unit) for routine progression of care Report consisted of patients Situation, Background, Assessment and  
Recommendations(SBAR). Information from the following report(s) SBAR, ED Summary, MAR, Recent Results and Cardiac Rhythm NSR was reviewed with the receiving nurse. Lines:  
Peripheral IV 11/10/18 Left Antecubital (Active) Site Assessment Clean, dry, & intact 11/10/2018 12:34 PM  
Phlebitis Assessment 0 11/10/2018 12:34 PM  
Infiltration Assessment 0 11/10/2018 12:34 PM  
Dressing Status Clean, dry, & intact 11/10/2018 12:34 PM  
Dressing Type Tape;Transparent 11/10/2018 12:34 PM  
Hub Color/Line Status Pink;Flushed 11/10/2018 12:34 PM  
Action Taken Blood drawn 11/10/2018 12:34 PM  
  
 
Opportunity for questions and clarification was provided. Patient transported with: 
 Monitor

## 2018-11-11 LAB
ANION GAP SERPL CALC-SCNC: 8 MMOL/L (ref 5–15)
BASOPHILS # BLD: 0 K/UL (ref 0–0.1)
BASOPHILS NFR BLD: 0 % (ref 0–1)
BUN SERPL-MCNC: 19 MG/DL (ref 6–20)
BUN/CREAT SERPL: 19 (ref 12–20)
C DIFF GDH STL QL: NEGATIVE
C DIFF TOX A+B STL QL IA: NEGATIVE
CALCIUM SERPL-MCNC: 7.6 MG/DL (ref 8.5–10.1)
CHLORIDE SERPL-SCNC: 108 MMOL/L (ref 97–108)
CO2 SERPL-SCNC: 26 MMOL/L (ref 21–32)
COMMENT, HOLDF: NORMAL
CREAT SERPL-MCNC: 1.01 MG/DL (ref 0.7–1.3)
DIFFERENTIAL METHOD BLD: ABNORMAL
EOSINOPHIL # BLD: 0 K/UL (ref 0–0.4)
EOSINOPHIL NFR BLD: 1 % (ref 0–7)
ERYTHROCYTE [DISTWIDTH] IN BLOOD BY AUTOMATED COUNT: 18.2 % (ref 11.5–14.5)
EST. AVERAGE GLUCOSE BLD GHB EST-MCNC: 229 MG/DL
FERRITIN SERPL-MCNC: 19 NG/ML (ref 26–388)
GLUCOSE SERPL-MCNC: 129 MG/DL (ref 65–100)
HBA1C MFR BLD: 9.6 % (ref 4.2–6.3)
HCT VFR BLD AUTO: 28.1 % (ref 36.6–50.3)
HGB BLD-MCNC: 8.1 G/DL (ref 12.1–17)
IMM GRANULOCYTES # BLD: 0 K/UL
IMM GRANULOCYTES NFR BLD AUTO: 0 %
INTERPRETATION: NORMAL
IRON SATN MFR SERPL: 6 % (ref 20–50)
IRON SERPL-MCNC: 17 UG/DL (ref 35–150)
LACTATE SERPL-SCNC: 1.3 MMOL/L (ref 0.4–2)
LYMPHOCYTES # BLD: 1.2 K/UL (ref 0.8–3.5)
LYMPHOCYTES NFR BLD: 29 % (ref 12–49)
MAGNESIUM SERPL-MCNC: 1 MG/DL (ref 1.6–2.4)
MCH RBC QN AUTO: 22.1 PG (ref 26–34)
MCHC RBC AUTO-ENTMCNC: 28.8 G/DL (ref 30–36.5)
MCV RBC AUTO: 76.6 FL (ref 80–99)
MONOCYTES # BLD: 0.4 K/UL (ref 0–1)
MONOCYTES NFR BLD: 9 % (ref 5–13)
MYELOCYTES NFR BLD MANUAL: 1 %
NEUTS BAND NFR BLD MANUAL: 5 % (ref 0–6)
NEUTS SEG # BLD: 2.6 K/UL (ref 1.8–8)
NEUTS SEG NFR BLD: 55 % (ref 32–75)
NRBC # BLD: 0 K/UL (ref 0–0.01)
NRBC BLD-RTO: 0 PER 100 WBC
PLATELET # BLD AUTO: 115 K/UL (ref 150–400)
PMV BLD AUTO: 12 FL (ref 8.9–12.9)
POTASSIUM SERPL-SCNC: 3.8 MMOL/L (ref 3.5–5.1)
RBC # BLD AUTO: 3.67 M/UL (ref 4.1–5.7)
RBC MORPH BLD: ABNORMAL
SAMPLES BEING HELD,HOLD: NORMAL
SODIUM SERPL-SCNC: 142 MMOL/L (ref 136–145)
TIBC SERPL-MCNC: 281 UG/DL (ref 250–450)
WBC # BLD AUTO: 4.3 K/UL (ref 4.1–11.1)

## 2018-11-11 PROCEDURE — 74011636637 HC RX REV CODE- 636/637: Performed by: INTERNAL MEDICINE

## 2018-11-11 PROCEDURE — 83735 ASSAY OF MAGNESIUM: CPT

## 2018-11-11 PROCEDURE — 74011250637 HC RX REV CODE- 250/637: Performed by: INTERNAL MEDICINE

## 2018-11-11 PROCEDURE — 83540 ASSAY OF IRON: CPT

## 2018-11-11 PROCEDURE — 80048 BASIC METABOLIC PNL TOTAL CA: CPT

## 2018-11-11 PROCEDURE — 36415 COLL VENOUS BLD VENIPUNCTURE: CPT

## 2018-11-11 PROCEDURE — 82728 ASSAY OF FERRITIN: CPT

## 2018-11-11 PROCEDURE — 85025 COMPLETE CBC W/AUTO DIFF WBC: CPT

## 2018-11-11 PROCEDURE — 83605 ASSAY OF LACTIC ACID: CPT

## 2018-11-11 PROCEDURE — 74011250636 HC RX REV CODE- 250/636: Performed by: INTERNAL MEDICINE

## 2018-11-11 PROCEDURE — 65660000000 HC RM CCU STEPDOWN

## 2018-11-11 PROCEDURE — 74011250637 HC RX REV CODE- 250/637: Performed by: EMERGENCY MEDICINE

## 2018-11-11 RX ORDER — WARFARIN 1 MG/1
0.5 TABLET ORAL EVERY EVENING
Status: COMPLETED | OUTPATIENT
Start: 2018-11-11 | End: 2018-11-11

## 2018-11-11 RX ADMIN — CHLORHEXIDINE GLUCONATE 15 ML: 1.2 RINSE ORAL at 15:21

## 2018-11-11 RX ADMIN — VANCOMYCIN HYDROCHLORIDE 500 MG: KIT at 09:52

## 2018-11-11 RX ADMIN — Medication 1 TABLET: at 08:59

## 2018-11-11 RX ADMIN — METOPROLOL TARTRATE 25 MG: 25 TABLET ORAL at 08:59

## 2018-11-11 RX ADMIN — SODIUM CHLORIDE 75 ML/HR: 900 INJECTION, SOLUTION INTRAVENOUS at 01:40

## 2018-11-11 RX ADMIN — Medication 10 ML: at 22:10

## 2018-11-11 RX ADMIN — ACETAMINOPHEN 650 MG: 325 TABLET, FILM COATED ORAL at 22:16

## 2018-11-11 RX ADMIN — VITAMIN D, TAB 1000IU (100/BT) 1000 UNITS: 25 TAB at 08:59

## 2018-11-11 RX ADMIN — INSULIN GLARGINE 5 UNITS: 100 INJECTION, SOLUTION SUBCUTANEOUS at 22:09

## 2018-11-11 RX ADMIN — VANCOMYCIN HYDROCHLORIDE 500 MG: KIT at 16:50

## 2018-11-11 RX ADMIN — MIRABEGRON 50 MG: 25 TABLET, FILM COATED, EXTENDED RELEASE ORAL at 17:38

## 2018-11-11 RX ADMIN — LACTOBACILLUS ACIDOPHILUS / LACTOBACILLUS BULGARICUS 1 PACKET: 100 MILLION CFU STRENGTH GRANULES at 11:13

## 2018-11-11 RX ADMIN — LACTOBACILLUS ACIDOPHILUS / LACTOBACILLUS BULGARICUS 1 PACKET: 100 MILLION CFU STRENGTH GRANULES at 17:43

## 2018-11-11 RX ADMIN — SIMVASTATIN 40 MG: 20 TABLET, FILM COATED ORAL at 22:10

## 2018-11-11 RX ADMIN — CHLORHEXIDINE GLUCONATE 15 ML: 1.2 RINSE ORAL at 09:52

## 2018-11-11 RX ADMIN — WARFARIN SODIUM 0.5 MG: 1 TABLET ORAL at 17:38

## 2018-11-11 RX ADMIN — CHLORHEXIDINE GLUCONATE 15 ML: 1.2 RINSE ORAL at 22:10

## 2018-11-11 RX ADMIN — Medication 10 ML: at 15:21

## 2018-11-11 RX ADMIN — INSULIN LISPRO 2 UNITS: 100 INJECTION, SOLUTION INTRAVENOUS; SUBCUTANEOUS at 17:35

## 2018-11-11 RX ADMIN — VANCOMYCIN HYDROCHLORIDE 500 MG: KIT at 04:11

## 2018-11-11 RX ADMIN — VANCOMYCIN HYDROCHLORIDE 500 MG: KIT at 22:09

## 2018-11-11 NOTE — PROGRESS NOTES
[facility] Dosing Services: 11/11/18Consult for Warfarin Dosing by Pharmacy by Dr. Neha Johnson Consult provided for this 80 y.o. male for indication of Atrial Fibrillation. Day of Therapy: resumed from home (PTA: Warfarin 0.5 mg po dialy per med rec) Dose to achieve an INR goal of 2-3 Past Medical History:  
Diagnosis Date  Arthritis  Cancer ([facility]  Diabetes ([facility]  Gastrointestinal disorder  Hypertension Order entered for  Warfarin  0.5 (mg) ordered to be given today at 18:00. Significant drug interactions: Flagyl, Vancomycin, Cdiff Previous dose given 0.5 mg last taken at home on 11/9 PT/INR Lab Results Component Value Date/Time INR 1.1 11/10/2018 11:19 AM  
  
Platelets Lab Results Component Value Date/Time PLATELET 548 (L) 37/66/3767 01:33 AM  
  
H/H Lab Results Component Value Date/Time HGB 8.1 (L) 11/11/2018 01:33 AM  
  
 
Pharmacy to follow daily and will provide subsequent Warfarin dosing based on clinical status. Ysabel Gil  Contact information [phone]

## 2018-11-11 NOTE — PROGRESS NOTES
Problem: Falls - Risk of 
Goal: *Absence of Falls Document Colleen Garcia Fall Risk and appropriate interventions in the flowsheet. Outcome: Progressing Towards Goal 
Fall Risk Interventions: 
Mobility Interventions: Bed/chair exit alarm, Patient to call before getting OOB Medication Interventions: Bed/chair exit alarm, Patient to call before getting OOB Elimination Interventions: Call light in reach, Bed/chair exit alarm, Patient to call for help with toileting needs, Urinal in reach History of Falls Interventions: Bed/chair exit alarm

## 2018-11-11 NOTE — PROGRESS NOTES
Primary Nurse Michaela Hurley RN and Candice Avery RN performed a dual skin assessment on this patient Impairment noted- see wound doc flow sheet Bryan score is 19

## 2018-11-11 NOTE — PROGRESS NOTES
Aleksandar Araya Acton 79 
380 [facility], [address] [place] [address] 
[phone] Medical Progress Note NAME:         Chad Priest :        1927 MRM:        [id number] Date:          2018 Subjective: Patient has been seen and examined as a follow up for diarrhea. Chart, labs, diagnostics reviewed. Has much less diarrhea now. Denies any chest discomfort, SOB, nausea or vomiting Objective: 
 
Vital Signs: 
 
Visit Vitals BP 99/52 (BP 1 Location: Right arm, BP Patient Position: At rest) Pulse 62 Temp 98.3 °F (36.8 °C) Resp 16 Ht 5' 8\" (1.727 m) Wt 63.9 kg (140 lb 14 oz) SpO2 92% BMI 21.42 kg/m² Intake/Output Summary (Last 24 hours) at 2018 1052 Last data filed at 2018 4661 Gross per 24 hour Intake 2175 ml Output 451 ml Net 1724 ml Physical Examination: 
General:   Weak looking elderly male, not in any acute distress Eyes:   pink conjunctivae, PERRLA with no discharge. ENT:   no ottorrhea or rhinorrhea with moist mucous membranes Neck: no masses, thyroid non-tender and trachea central. 
Pulm:  no accessory muscle use, clear breath sounds without crackles or wheezes Card:  no JVD or murmurs, has regular and normal S1, S2 without thrills, bruits or peripheral edema Abd:  Soft, non-tender, non-distended, normoactive bowel sounds Musc:  No cyanosis, clubbing, atrophy or deformities. Skin:  No rashes, bruising or ulcers. Neuro: Awake and alert. Generally a non focal exam.  
Psych:  Has a good insight and is oriented x 3 Current Facility-Administered Medications Medication Dose Route Frequency  vancomycin (FIRVANQ) 50 mg/mL oral solution 500 mg  500 mg Oral Q6H  
 sodium chloride (NS) flush 5-10 mL  5-10 mL IntraVENous Q8H  
 sodium chloride (NS) flush 5-10 mL  5-10 mL IntraVENous PRN  
  acetaminophen (TYLENOL) tablet 650 mg  650 mg Oral Q4H PRN  
 naloxone (NARCAN) injection 0.4 mg  0.4 mg IntraVENous PRN  
 ondansetron (ZOFRAN) injection 4 mg  4 mg IntraVENous Q4H PRN  
 0.9% sodium chloride infusion  75 mL/hr IntraVENous CONTINUOUS  
 glucose chewable tablet 16 g  4 Tab Oral PRN  
 dextrose (D50W) injection syrg 12.5-25 g  25-50 mL IntraVENous PRN  
 glucagon (GLUCAGEN) injection 1 mg  1 mg IntraMUSCular PRN  
 insulin lispro (HUMALOG) injection   SubCUTAneous AC&HS  chlorhexidine (PERIDEX) 0.12 % mouthwash 15 mL  15 mL Swish and Spit TID  cholecalciferol (VITAMIN D3) tablet 1,000 Units  1,000 Units Oral DAILY  multivitamin with iron tablet 1 Tab  1 Tab Oral DAILY  lactobacillus-acidophilus (LACTINEX) 1 Packet  1 Packet Oral BID  metoprolol tartrate (LOPRESSOR) tablet 25 mg  25 mg Oral BID  mirabegron ER (MYRBETRIQ) tablet 50 mg  50 mg Oral Q48H  simvastatin (ZOCOR) tablet 40 mg  40 mg Oral QHS  insulin glargine (LANTUS) injection 5 Units  5 Units SubCUTAneous QHS Laboratory data and review: 
 
Recent Labs 11/11/18 
0133 11/10/18 
1240 11/10/18 [address] [facility] WBC 4.3 5.5 5.9 HGB 8.1* 9.8* 10.4* HCT 28.1* 33.1* 35.0*  
* 153 148* Recent Labs 11/11/18 
0133 11/10/18 [address] [facility]  140  
K 3.8 5.0  
 103 CO2 26 26 * 187* BUN 19 23* CREA 1.01 1.16  
CA 7.6* 8.8 MG 1.0*  --   
ALB  --  3.2* SGOT  --  16 ALT  --  16 INR  --  1.1 No components found for: [place] Assessment and Plan: 
 
Diarrhea/  History of Clostridium difficile colitis (11/10/2018): suspected recurrence. Much less diarrhea now. Continue empiric Vancomycin, Lactinex and follow pending C diff studies . DM (diabetes mellitus) ([facility] (11/10/2018): type 2. A1c 9.6. BG stable. Continue Lantus, SSI per protocol, DM diet. Holding Metformin for now. Atrial fibrillation ([facility] (11/10/2018): chronic. Rate controlled.  Continue metoprolol. Family OK with resuming Warfarin. Hx lymphoma (11/10/2018): Overview: B-cell NHL. Will need a hematology review. Family state he had a likely clot on his Port. Will resume Coumadin and follow clinically. He just moved here from [place] and was following up with Dr Marco Antonio Gaspar on tel [phone] at [facility] in [place], [place] Lactic acidosis (11/10/2018): this has now resolved. Was likely from volume depletion vs metformin. Continue gentle hydration. Bandemia (11/10/2018): this has resolved. Presumed to be from diarrhea. Continue empiric Vancomycin and follow Total time spent for the patient's care: 35 Minutes Care Plan discussed with: Patient, Family and Nursing Staff Discussed:  Care Plan Prophylaxis:  Coumadin Anticipated Disposition:  Home w/Family 
        
___________________________________________________ Attending Physician:   Essence Leung MD

## 2018-11-11 NOTE — PROGRESS NOTES
Blood glucose at approximately 0800 measure with personal unit was 64mg/dl. After 240ml of orange juice and breakfast blood glucose was 151mg/dl.

## 2018-11-11 NOTE — PROGRESS NOTES
Problem: Pressure Injury - Risk of 
Goal: *Prevention of pressure injury Document Bryan Scale and appropriate interventions in the flowsheet. Outcome: Progressing Towards Goal 
Pressure Injury Interventions: 
  
 
Moisture Interventions: Absorbent underpads, Apply protective barrier, creams and emollients Activity Interventions: Increase time out of bed, Pressure redistribution bed/mattress(bed type), PT/OT evaluation Mobility Interventions: PT/OT evaluation, Pressure redistribution bed/mattress (bed type), HOB 30 degrees or less Nutrition Interventions: Document food/fluid/supplement intake

## 2018-11-12 VITALS
WEIGHT: 140.87 LBS | RESPIRATION RATE: 18 BRPM | BODY MASS INDEX: 21.35 KG/M2 | OXYGEN SATURATION: 96 % | HEART RATE: 64 BPM | TEMPERATURE: 97.1 F | DIASTOLIC BLOOD PRESSURE: 52 MMHG | SYSTOLIC BLOOD PRESSURE: 120 MMHG | HEIGHT: 68 IN

## 2018-11-12 PROBLEM — D72.825 BANDEMIA: Status: RESOLVED | Noted: 2018-11-10 | Resolved: 2018-11-12

## 2018-11-12 PROBLEM — E87.20 LACTIC ACIDOSIS: Status: RESOLVED | Noted: 2018-11-10 | Resolved: 2018-11-12

## 2018-11-12 PROBLEM — C85.90 NON HODGKIN'S LYMPHOMA (HCC): Status: ACTIVE | Noted: 2018-11-12

## 2018-11-12 LAB
ERYTHROCYTE [DISTWIDTH] IN BLOOD BY AUTOMATED COUNT: 18.2 % (ref 11.5–14.5)
HCT VFR BLD AUTO: 28 % (ref 36.6–50.3)
HGB BLD-MCNC: 8.2 G/DL (ref 12.1–17)
INR PPP: 1.2 (ref 0.9–1.1)
LACTATE SERPL-SCNC: 0.8 MMOL/L (ref 0.4–2)
MCH RBC QN AUTO: 22.5 PG (ref 26–34)
MCHC RBC AUTO-ENTMCNC: 29.3 G/DL (ref 30–36.5)
MCV RBC AUTO: 76.7 FL (ref 80–99)
NRBC # BLD: 0 K/UL (ref 0–0.01)
NRBC BLD-RTO: 0 PER 100 WBC
PLATELET # BLD AUTO: 129 K/UL (ref 150–400)
PROTHROMBIN TIME: 11.6 SEC (ref 9–11.1)
RBC # BLD AUTO: 3.65 M/UL (ref 4.1–5.7)
WBC # BLD AUTO: 4.2 K/UL (ref 4.1–11.1)

## 2018-11-12 PROCEDURE — 97161 PT EVAL LOW COMPLEX 20 MIN: CPT

## 2018-11-12 PROCEDURE — 85027 COMPLETE CBC AUTOMATED: CPT

## 2018-11-12 PROCEDURE — 74011250637 HC RX REV CODE- 250/637: Performed by: INTERNAL MEDICINE

## 2018-11-12 PROCEDURE — 36415 COLL VENOUS BLD VENIPUNCTURE: CPT

## 2018-11-12 PROCEDURE — 85610 PROTHROMBIN TIME: CPT

## 2018-11-12 PROCEDURE — 83605 ASSAY OF LACTIC ACID: CPT

## 2018-11-12 PROCEDURE — 97165 OT EVAL LOW COMPLEX 30 MIN: CPT

## 2018-11-12 PROCEDURE — 97116 GAIT TRAINING THERAPY: CPT

## 2018-11-12 PROCEDURE — 97535 SELF CARE MNGMENT TRAINING: CPT

## 2018-11-12 PROCEDURE — 74011250637 HC RX REV CODE- 250/637: Performed by: EMERGENCY MEDICINE

## 2018-11-12 RX ORDER — WARFARIN 1 MG/1
0.5 TABLET ORAL EVERY EVENING
Status: DISCONTINUED | OUTPATIENT
Start: 2018-11-12 | End: 2018-11-12 | Stop reason: HOSPADM

## 2018-11-12 RX ORDER — INSULIN DEGLUDEC 100 U/ML
10 INJECTION, SOLUTION SUBCUTANEOUS
Qty: 1 PEN | Refills: 0 | Status: SHIPPED
Start: 2018-11-12 | End: 2019-03-19 | Stop reason: ALTCHOICE

## 2018-11-12 RX ADMIN — Medication 1 TABLET: at 08:40

## 2018-11-12 RX ADMIN — LACTOBACILLUS ACIDOPHILUS / LACTOBACILLUS BULGARICUS 1 PACKET: 100 MILLION CFU STRENGTH GRANULES at 09:00

## 2018-11-12 RX ADMIN — VITAMIN D, TAB 1000IU (100/BT) 1000 UNITS: 25 TAB at 08:40

## 2018-11-12 RX ADMIN — VANCOMYCIN HYDROCHLORIDE 500 MG: KIT at 04:34

## 2018-11-12 RX ADMIN — CHLORHEXIDINE GLUCONATE 15 ML: 1.2 RINSE ORAL at 08:42

## 2018-11-12 RX ADMIN — METOPROLOL TARTRATE 25 MG: 25 TABLET ORAL at 08:40

## 2018-11-12 RX ADMIN — VANCOMYCIN HYDROCHLORIDE 500 MG: KIT at 10:00

## 2018-11-12 RX ADMIN — Medication 10 ML: at 04:34

## 2018-11-12 NOTE — PROGRESS NOTES
Pt did not receive diabetic lunch tray; now 1341. Pt is expecting discharge. Notified Dietary and amended order to expedite food tray.

## 2018-11-12 NOTE — CONSULTS
[facility] [place] [facility] [place] [address] [address] [facility], [address] [facility] 1900 [facility] W: [phone]  F: [phone] Reason for Visit:  
Barrie Roca is a 80 y.o. male who is seen in consultation at the request of Dr. Orlando Clayton  for evaluation of NHL, ? Port a cath thrombus. Hematology / Oncology Treatment History:  
Followed with Oncologist in [place]; Dr Marco Antonio Gaspar on tel [phone] at [facility] in [place], [place] History of Present Illness: Mr Travis Pinto was admitted on 11/10/2018 from the ED with c/o concerns for recurrent c-diff. Recent dental work with abx following. Having diarrhea 2-3 loose BMs. Admitted for further eval and management. Hx of DM, HTN, NHL Mr May Ferguson reports feeling better this am; last stool yesterday; no as watery as it had been at home. Tolerating diet. Denies N/V. Recently located here from [place] [address]. Currently living with daughter until he can get stronger; would like to find a place so he could live independently. 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 Saw oncologist in last week prior to leaving [place]. 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 Has follow up established at [place] with Dr Sammy Bynum on 11/27. Daughter reports someone had mentioned possible clot around the port here but she had not heard that previously. Past Medical History:  
Diagnosis Date  Arthritis  Cancer ([address]  Diabetes ([address]  Gastrointestinal disorder  Hypertension Past Surgical History:  
Procedure Laterality Date  HX ORTHOPAEDIC    
 HX PROSTATECTOMY Social History Tobacco Use  
  Smoking status: Former Smoker  Smokeless tobacco: Never Used Substance Use Topics  Alcohol use: No  
  Frequency: Never History reviewed. No pertinent family history. Current Facility-Administered Medications Medication Dose Route Frequency  Warfarin pharmacy to dose daily   Other Rx Dosing/Monitoring  vancomycin (FIRVANQ) 50 mg/mL oral solution 500 mg  500 mg Oral Q6H  
 sodium chloride (NS) flush 5-10 mL  5-10 mL IntraVENous Q8H  
 sodium chloride (NS) flush 5-10 mL  5-10 mL IntraVENous PRN  
 acetaminophen (TYLENOL) tablet 650 mg  650 mg Oral Q4H PRN  
 naloxone (NARCAN) injection 0.4 mg  0.4 mg IntraVENous PRN  
 ondansetron (ZOFRAN) injection 4 mg  4 mg IntraVENous Q4H PRN  
 glucose chewable tablet 16 g  4 Tab Oral PRN  
 dextrose (D50W) injection syrg 12.5-25 g  25-50 mL IntraVENous PRN  
 glucagon (GLUCAGEN) injection 1 mg  1 mg IntraMUSCular PRN  
 insulin lispro (HUMALOG) injection   SubCUTAneous AC&HS  chlorhexidine (PERIDEX) 0.12 % mouthwash 15 mL  15 mL Swish and Spit TID  cholecalciferol (VITAMIN D3) tablet 1,000 Units  1,000 Units Oral DAILY  multivitamin with iron tablet 1 Tab  1 Tab Oral DAILY  lactobacillus-acidophilus (LACTINEX) 1 Packet  1 Packet Oral BID  metoprolol tartrate (LOPRESSOR) tablet 25 mg  25 mg Oral BID  mirabegron ER (MYRBETRIQ) tablet 50 mg  50 mg Oral Q48H  simvastatin (ZOCOR) tablet 40 mg  40 mg Oral QHS  insulin glargine (LANTUS) injection 5 Units  5 Units SubCUTAneous QHS No Known Allergies Review of Systems: A complete review of systems was obtained, negative except as described above. Physical Exam:  
 
Visit Vitals /63 (BP 1 Location: Right arm, BP Patient Position: At rest) Pulse 92 Temp 97.9 °F (36.6 °C) Resp 18 Ht 5' 8\" (1.727 m) Wt 63.9 kg (140 lb 14 oz) SpO2 95% BMI 21.42 kg/m² ECOG PS: 2 General: No distress Eyes: PERRLA, anicteric sclerae HENT: Atraumatic with normal appearance of ears and nose; OP clear Neck: Supple; no thyromegaly Lymphatic: No cervical, supraclavicular, or axillary adenopathy Respiratory: CTAB, normal respiratory effort CV: Normal rate, regular rhythm, no murmurs, no peripheral edema GI: Soft, nontender, nondistended, no masses, no hepatomegaly, no splenomegaly MS: Normal gait and station. Digits without clubbing or cyanosis. Skin: No rashes, ecchymoses, or petechiae. Normal temperature, turgor, and texture. Neuro/Psych: Alert, oriented, appropriate affect, normal judgment/insight Results:  
 
Lab Results Component Value Date/Time WBC 4.2 11/12/2018 01:22 AM  
 HGB 8.2 (L) 11/12/2018 01:22 AM  
 HCT 28.0 (L) 11/12/2018 01:22 AM  
 PLATELET 388 (L) 37/77/4361 01:22 AM  
 MCV 76.7 (L) 11/12/2018 01:22 AM  
 ABS. NEUTROPHILS 2.6 11/11/2018 01:33 AM  
 
Lab Results Component Value Date/Time Sodium 142 11/11/2018 01:33 AM  
 Potassium 3.8 11/11/2018 01:33 AM  
 Chloride 108 11/11/2018 01:33 AM  
 CO2 26 11/11/2018 01:33 AM  
 Glucose 129 (H) 11/11/2018 01:33 AM  
 BUN 19 11/11/2018 01:33 AM  
 Creatinine 1.01 11/11/2018 01:33 AM  
 GFR est AA >60 11/11/2018 01:33 AM  
 GFR est non-AA >60 11/11/2018 01:33 AM  
 Calcium 7.6 (L) 11/11/2018 01:33 AM  
 
Lab Results Component Value Date/Time Bilirubin, total 0.3 11/10/2018 11:19 AM  
 ALT (SGPT) 16 11/10/2018 11:19 AM  
 AST (SGOT) 16 11/10/2018 11:19 AM  
 Alk. phosphatase 69 11/10/2018 11:19 AM  
 Protein, total 6.8 11/10/2018 11:19 AM  
 Albumin 3.2 (L) 11/10/2018 11:19 AM  
 Globulin 3.6 11/10/2018 11:19 AM  
 
Lab Results Component Value Date/Time Iron % saturation 6 (L) 11/11/2018 01:33 AM  
 TIBC 281 11/11/2018 01:33 AM  
 Ferritin 19 (L) 11/11/2018 01:33 AM  
 
Lab Results Component Value Date/Time INR 1.2 (H) 11/12/2018 01:22 AM  
 
No imaging noted on this encounter for review Assessment and Recommendations:  
1. NHL B-cell Recently relocated to [place] from [place]; followed with Dr Kodak Kim on tel [phone] at [facility] in [place], [place] Will obtain records from previous hematologist; have called for records Follow up established with Dr Jocy oMntes for 2 weeks. Yony Donald Empiric Vanc initiated C-diff studies negative 3. Afb Rate controlled with metoprolol Resuming coumadin; ? If on for Afib; pt reports on to thin the blood for port. 4. DM Management per hospitalist. 
 
5. Debility PT/OT eval 
 
 
The above exam and treatment plan were reviewed with Dr Jocy Montes.  
 
Signed By: Helen Lenz NP

## 2018-11-12 NOTE — PROGRESS NOTES
Nutrition Assessment: 
 
RECOMMENDATIONS/INTERVENTION(S):  
Continue with diet order, Ensure TID. Monitor PO intakes, weight. ASSESSMENT:  
79yo male admitted for bandemia, diarrhea. RD assessment secondary to consult. PMhx: recurrent cdiff, DM, HTN, NHL. Underweight per BMI per advanced age. Pt states he has lost 35lbs since Jan 2018 secondary to decreased appetite/PO intakes during episodes of cdiff. Appetite does improve when cdiff resolved but has been unable to gain back any of the weight he has lost.  Does drink 1-2 Ensures/day at home. Family member present at time of visit. Discussed high calorie/high protein foods for him to include at home to promote weight gain, Verbalized understanding. Has mechanical soft diet ordered with nectar thick liquids - this is the same diet he follows at home. Taking in about 25-50% meals per pt. States he is not a big eater. Ensure ordered which he is drinking. BM noted 11/11. Sacral wound noted. Labs: hgba1c 9.6. Meds: vit D, lantus, humalog, probiotic, MVI, statin, coumadin. Diet Order: Mechanical soft 
% Eaten:   
Patient Vitals for the past 72 hrs: 
 % Diet Eaten 11/12/18 1028 75 % Pertinent Medications: [x] Reviewed Labs: [x] Reviewed Anthropometrics: Height: 5' 8\" (172.7 cm) Weight: 63.9 kg (140 lb 14 oz) IBW (%IBW):   ( ) UBW (%UBW): 79.8 kg (175 lb 14.8 oz) (80.08 %) BMI: Body mass index is 21.42 kg/m². This BMI is indicative of: 
 [x] Underweight    [] Normal    [] Overweight    []  Obesity    []  Extreme Obesity (BMI>40) Estimated Nutrition Needs (Based on): 2376 Kcals/day(BMR 1268 x AF 1.3) , 77 g(ABW x 1.2-1.4gm/day (77-90gm)) Protein Carbohydrate: At Least 130 g/day  Fluids: 1650 mL/day (1 ml/kcal) Last BM:    [x]Active - 11/11     []Hyperactive  []Hypoactive       [] Absent   BS Skin:    [] Intact   [] Incision  [x] Breakdown - sacral wound 
  [] DTI   [] Tears/Excoriation/Abrasion  []Edema [] Other: Wt Readings from Last 30 Encounters:  
11/12/18 63.9 kg (140 lb 14 oz) NUTRITION DIAGNOSES:  
Problem:  Inadequate energy intake Etiology: related to decreased ability to consume sufficient energy secondary to diarrhea Signs/Symptoms: as evidenced by PO intakes < EEN's NUTRITION INTERVENTIONS: 
Meals/Snacks: General/healthful diet   Supplements: Commercial supplement GOAL:  
Consume PO > 50% meals, > 75% supplements within next 1-3 days; gain 0.5-1lb/week Cultural, Nondenominational, or Ethnic Dietary Needs: None EDUCATION & DISCHARGE NEEDS:  
 [x] None Identified 
 [x] Identified and Education Provided/Documented - high calorie foods to eat at home 
 [] Identified and Pt declined/was not appropriate [x] Interdisciplinary Care Plan Reviewed/Documented  
 [x] Discharge Needs:  Increase kcals at home to promote weight gain 
 [] No Nutrition Related Discharge Needs NUTRITION RISK:  
Pt Is At Nutrition Risk  [x] No Nutrition Risk Identified  [] PT SEEN FOR:  
 [x]  MD Consult: []Calorie Count []Diabetic Diet Education []Diet Education []Electrolyte Management 
   [x]General Nutrition Management and Supplements []Management of Tube Feeding []TPN Recommendations []  RN Referral:  []MST score >=2 
   []Enteral/Parenteral Nutrition PTA []Pregnant: Gestational DM or Multigestation  
              [] Pressure Ulcer 
 
[]  Low BMI      []  Length of Stay       [] Dysphagia Diet         [] Ventilator 
[]  Follow-up Previous Recommendations: 
 [] Implemented          [] Not Implemented          [x] Not Applicable Previous Goal: 
 [] Met              [] Progressing Towards Goal              [] Not Progressing Towards Goal   [x] Not Applicable Debbie Hart RD Pager [phone] Phone [phone]

## 2018-11-12 NOTE — PROGRESS NOTES
PCP [facility] appt scheduled with Dr. Isabell oStelo on 11/13/2018 at 3:00pm Appt added to AVS. CARMELA Hopper CM Specialist

## 2018-11-12 NOTE — DISCHARGE INSTRUCTIONS
HOSPITALIST DISCHARGE INSTRUCTIONS    NAME:             Garry Agrawal   :  1927   MRN:  [id number]     Date:     2018    ADMIT DATE: 11/10/2018     DISCHARGE DATE: 2018     PRINCIPAL ADMITTING DIAGNOSIS:  Bandemia    DISCHARGE DIAGNOSES:  Principal Problem:    Diarrhea    History of Clostridium difficile colitis (11/10/2018)    [facility] mellitus) [facility] (11/10/2018)    Atrial fibrillation ([facility] (11/10/2018)    H/O lymphoma (11/10/2018): Overview: B-cell NHL    MEDICATIONS:    · It is important that medications are taken exactly as they are prescribed on the discharge medication instructions and keep them your  in the bottles provided by the pharmacist.   · Keep a list of the medication names, dosages, and times to be taken at all times. · Do not take other medications without consulting your doctor. Recommended diet:  Regular Diet    Recommended activity: Activity as tolerated    Post discharge care:    Notify follow up health care provider or return to the emergency department if you cannot get hold of your doctor if you feel worse or experience symptoms similar to those that brought you to hospital    Follow-up Information     Follow up With Specialties Details Why Contact Info    [facility], Not On File  In 2 days  Not On File (62) Patient has a PCP but that physician is not listed in [address] [place] [address]. Mikey Amaya MD Hematology and Oncology Go on 2018 appt at 2:15pm for follow up with hematologist/oncologist [address]  [phone]      Majo Garcia DO Cardiology On 12/3/2018 1030AM [address] [place] [address]  [phone]      Unknown, Provider    Patient not available to ask            Information obtained by :  I understand that if any problems occur once I am at home I am to contact my physician and I understand and acknowledge receipt of the instructions indicated above. Physician's or R.N.'s Signature                                                                  Date/Time                                                                                                                                              Patient or Representative Signature                                                          Date/Time             Diarrhea: Care Instructions  Your Care Instructions    Diarrhea is loose, watery stools (bowel movements). The exact cause is often hard to find. Sometimes diarrhea is your body's way of getting rid of what caused an upset stomach. Viruses, food poisoning, and many medicines can cause diarrhea. Some people get diarrhea in response to emotional stress, anxiety, or certain foods. Almost everyone has diarrhea now and then. It usually isn't serious, and your stools will return to normal soon. The important thing to do is replace the fluids you have lost, so you can prevent dehydration. The doctor has checked you carefully, but problems can develop later. If you notice any problems or new symptoms, get medical treatment right away. Follow-up care is a key part of your treatment and safety. Be sure to make and go to all appointments, and call your doctor if you are having problems. It's also a good idea to know your test results and keep a list of the medicines you take. How can you care for yourself at home? · Watch for signs of dehydration, which means your body has lost too much water. Dehydration is a serious condition and should be treated right away. Signs of dehydration are:  ? Increasing thirst and dry eyes and mouth. ? Feeling faint or lightheaded. ? Darker urine, and a smaller amount of urine than normal.  · To prevent dehydration, drink plenty of fluids, enough so that your urine is light yellow or clear like water.  Choose water and other caffeine-free clear liquids until you feel better. If you have kidney, heart, or liver disease and have to limit fluids, talk with your doctor before you increase the amount of fluids you drink. · Begin eating small amounts of mild foods the next day, if you feel like it. ? Try yogurt that has live cultures of Lactobacillus. (Check the label.)  ? Avoid spicy foods, fruits, alcohol, and caffeine until 48 hours after all symptoms are gone. ? Avoid chewing gum that contains sorbitol. ? Avoid dairy products (except for yogurt with Lactobacillus) while you have diarrhea and for 3 days after symptoms are gone. · The doctor may recommend that you take over-the-counter medicine, such as loperamide (Imodium), if you still have diarrhea after 6 hours. Read and follow all instructions on the label. Do not use this medicine if you have bloody diarrhea, a high fever, or other signs of serious illness. Call your doctor if you think you are having a problem with your medicine. When should you call for help? Call 911 anytime you think you may need emergency care. For example, call if:    · You passed out (lost consciousness).     · Your stools are maroon or very bloody.    Call your doctor now or seek immediate medical care if:    · You are dizzy or lightheaded, or you feel like you may faint.     · Your stools are black and look like tar, or they have streaks of blood.     · You have new or worse belly pain.     · You have symptoms of dehydration, such as:  ? Dry eyes and a dry mouth. ? Passing only a little dark urine. ? Feeling thirstier than usual.     · You have a new or higher fever.    Watch closely for changes in your health, and be sure to contact your doctor if:    · Your diarrhea is getting worse.     · You see pus in the diarrhea.     · You are not getting better after 2 days (48 hours). Where can you learn more? Go to http://chuy-karlee.info/.   Enter A861 in the search box to learn more about \"Diarrhea: Care Instructions. \"  Current as of: November 20, 2017  Content Version: 11.8  © 6846-7683 [employer], [employer]. Care instructions adapted under license by [employer] (which disclaims liability or warranty for this information). If you have questions about a medical condition or this instruction, always ask your healthcare professional. [address] any warranty or liability for your use of this information.

## 2018-11-12 NOTE — DIABETES MGMT
[facility] Elevated A1C Visit Note Recommendations/ Comments: If appropriate, please consider discharging pt home on reduced Lantus dose of 5 units. Pt reported that when taking Tresiba 15 units at bedtime, he was still waking with fasting BG of 60-70mg/dL. Current hospital medications: 
-Lantus 5 units at bedtime 
-Humalog high sensitivity correction Patient is a 80 y.o. male with known history of Type 2 Diabetes on Metformin 1,000mg bid and Tresiba 15 units at bedtime at home who recently moved here from [place]. Met with pt and daughter for consult. Pt reported his appetite is poor and he plans to increase Ensure consumption at home to 3 times per day. Reviewed hypo treatment with pt as he reported the glucose gel he purchases from the pharmacy is too expensive. Discussed option of using 1 tablespoon of honey for treatment of low BG as cheaper alternative. Pt plans to establish with new endo in the area. However, encouraged pt to f/u with endo in [place] if he continues with hypoglycemia. Provided education materials and DTC contact info. A1c:  
Lab Results Component Value Date/Time Hemoglobin A1c 9.6 (H) 11/10/2018 12:40 PM  
 
 
Recent Glucose Results: No results found for: GLU, [address], [facility] Lab Results Component Value Date/Time Creatinine 1.01 11/11/2018 01:33 AM  
 
 
Active Orders Diet DIET MECHANICAL SOFT 1 NECTAR Assessed and instructed patient on the following:  
·  interpretation of lab results, blood sugar goals, hypoglycemia prevention and treatment, SMBG skills, nutrition and referred to Diabetes Educator Provided patient with the following: [x]          Diabetes Self-Care Guide 
             []          Insulin education materials 
             []          Diabetes survival skills handout []          BG guidelines for post-op patients              []          \"Decreasing  the Cost of Diabetes Care\" [x]          Outpatient DTC contact number Patient to follow up with new endo after discharge, provided DTC contact info for any future needs. Will continue to follow as needed. Thank you. Sahra Arriaga RD, CDE [facility] Office: [phone] Pager: [phone]

## 2018-11-12 NOTE — PROGRESS NOTES
Working on patient's discharge. Dona Murphy specialist is working on PCP appointment. Will continue to follow.

## 2018-11-12 NOTE — CDMP QUERY
1.  
Please clarify if this patient is (was) being treated/managed for:  
 
=> Type 2 diabetes mellitus with hyperglycemia as evidenced by A1C this admission of 9.6 with glucose of 229 
=> Other explanation of clinical findings  
=> Clinically Undetermined (no explanation for clinical findings) The medical record reflects the following clinical findings, treatment, and risk factors. Risk Factors:  elderly male, recently relocated to be closer to family assistance, A1C 9.6 Clinical Indicators:  admitted for diarrhea, on admission A1C is 9.6 with average glucose of 229 Treatment: continue Lantus, SSI per protocol, holding Metformin due to acidosis, diabetic diet Please clarify and document your clinical opinion in the progress notes and discharge summary including the definitive and/or presumptive diagnosis, (suspected or probable), related to the above clinical findings. Please include clinical findings supporting your diagnosis. Thanks for your time. Mo Regan RN, BSN 
[phone]

## 2018-11-12 NOTE — PROGRESS NOTES
11- Reason for Admission:   bANDEMIA 
               
RRAT Score:  14 Do you (patient/family) have any concerns for transition/discharge? No  
           
Plan for utilizing home health:    No  
 
Likelihood of readmission? Low Transition of Care Plan:  Home with family I met with the pt's daughter, Forest Zhu ([phone]), as the pt was sleeping to determine potential discharge needs. The pt is currently living with his daughter and son-in-law in their one story house with 3 entry steps. They just moved him here (on 11-7-2018) from [place] where he lived with his ailing wife. He has been independent with his ADL's and just stopped driving. He has a cane, rolling walker and rollator. He has his first appointment with Dr. Carolina Garber on 11-. He has prescription drug coverage and will be using [facility] in [place]. A referral was received for home health and they were agreeable to [facility]. I sent the referral to [facility] thru [employer] and they have accepted. No further discharge needs identified. Care Management Interventions PCP Verified by CM: Yes(Will have his first appointment with Dr. Carolina Garber on 11-) Transition of Care Consult (CM Consult): (Needs a PCP) Discharge Durable Medical Equipment: No(Has a cane, rolling walker and rollator) Physical Therapy Consult: Yes Occupational Therapy Consult: Yes Current Support Network: (Lives with daughter and son-in-law in their one story house) Confirm Follow Up Transport: Family Plan discussed with Pt/Family/Caregiver: Yes Discharge Location Discharge Placement: (Home with family) ADRIANA Gaviria, CM

## 2018-11-12 NOTE — DISCHARGE SUMMARY
[address], [address] [facility] Tel: [phone] Physician Discharge Summary Patient ID:    Fatuma Yu Age:              80 y.o.    : 1927 MRN:             [id number] PCP: Unknown, Provider Admit date: 11/10/2018 Discharge date: 2018 Principal admission Diagnosis: 
 Bandemia Discharge Diagnoses: Active Problems: 
  Diarrhea History of Clostridium difficile colitis (11/10/2018) DM (diabetes mellitus) ([address] (11/10/2018) Atrial fibrillation ([address] (11/10/2018) Non Hodgkin's lymphoma ([facility] (2018) Consults: Cardiology and Hematology/Oncology Hospital Course: Mr. Eva Atkins is a 80 y.o. admitted to [facility] and treated for the following: 
 
Diarrhea/  History of Clostridium difficile colitis (11/10/2018): his diarrhea has resolved. C diff neg. Likely related to the recent antibiotic use. No need for any antibiotics. Diet as tolerated DM (diabetes mellitus) ([address] (11/10/2018): type 2. Uncontrolled with hyperglycemia. A1c 9.6. BG stable. Continue [employer], Metformin and diabetic diet  
  
Atrial fibrillation ([address] (11/10/2018): chronic. Rate controlled. Continue metoprolol. Will continue coumadin. Out patient follow up with cardiology.  
  
Hx lymphoma (11/10/2018): Overview: B-cell NHL. Stable. Seen by Oncology who will arrange outpatient follow up. Family state he had a likely clot on his Port. Will resume Coumadin and follow clinically. He just moved here from [place] and was following up with Dr Elisabeth Shipman on tel [phone] at [facility] [place], [place] 
  
Lactic acidosis (11/10/2018): this has now resolved. Was likely from volume depletion vs metformin. Now well hydrated  
  
Bandemia (11/10/2018): this has resolved. Presumed to be from diarrhea. No need for further antibiotic Discharge Exam:   
Visit Vitals /63 (BP 1 Location: Right arm, BP Patient Position: At rest) Pulse 92 Temp 97.9 °F (36.6 °C) Resp 18 Ht 5' 8\" (1.727 m) Wt 63.9 kg (140 lb 14 oz) SpO2 95% BMI 21.42 kg/m² General: well looking and stable patient in no acute distress Pulm: clear breath sounds without wheezes Card: no murmurs, normal S1, S2 without thrills, bruits Abd:    soft, non-tender, normoactive bowel sounds Skin: no rashes or ulcers, skin turgor is good Neuro: awake, alert and has a non focal  
 
Activity: Activity as tolerated Diet: Regular Diet Current Discharge Medication List  
  
CONTINUE these medications which have CHANGED Details  
insulin degludec (TRESIBA FLEXTOUCH U-100) 100 unit/mL (3 mL) inpn 10 Units by SubCUTAneous route nightly. Qty: 1 Pen, Refills: 0 CONTINUE these medications which have NOT CHANGED Details  
metFORMIN (GLUMETZA ER) 500 mg TG24 24 hour tablet Take 1,000 mg by mouth two (2) times a day. lutein 20 mg cap Take 20 mg by mouth daily. warfarin (COUMADIN) 1 mg tablet Take 0.5 mg by mouth daily. cholecalciferol (VITAMIN D3) 1,000 unit tablet Take 1,000 Units by mouth daily. mirabegron ER (MYRBETRIQ) 50 mg ER tablet Take 50 mg by mouth every fourty-eight (48) hours. metoprolol tartrate (LOPRESSOR) 50 mg tablet Take 25 mg by mouth two (2) times a day. simvastatin (ZOCOR) 80 mg tablet Take 40 mg by mouth nightly. ferrous fumarate/vit Bcomp,C (SUPER B COMPLEX PO) Take 1 Cap by mouth daily. chlorhexidine (PERIDEX) 0.12 % solution 15 mL by Swish and Spit route three (3) times daily. lactobacillus combo no.6 (PROBIOTIC COMPLEX PO) Take 1 Cap by mouth daily. OTHER,NON-FORMULARY, Unknown OTC antacid: take one by mouth every morning STOP taking these medications  
  
 furosemide (LASIX) 20 mg tablet Comments:  
Reason for Stopping: Follow-up Information Follow up With Specialties Details Why Contact Info Bsi, Not On File  In 2 days  Not On File (62) Patient has a PCP but that physician is not listed in [address]. Ginna Kirkland MD Hematology and Oncology Go on 11/26/2018 appt at 2:15pm for follow up with hematologist/oncologist [address] [facility] [phone] [address] [facility] 
[phone] Luis Cons, DO Cardiology On 12/3/2018 1030AM [address] [facility] 600 [address] [facility] 
[phone] Unknown, Provider    Patient not available to ask Follow-up tests or labs: None Discharge Condition: Stable Disposition: home Time taken to arrange discharge:  35 minutes. Signed: 
Essence Leung MD     [facility] 11/12/2018   11:34 AM

## 2018-11-12 NOTE — PROGRESS NOTES
Problem: Mobility Impaired (Adult and Pediatric) Goal: *Acute Goals and Plan of Care (Insert Text) Physical Therapy Goals Initiated 11/12/2018 1. Patient will move from supine to sit and sit to supine , scoot up and down and roll side to side in bed with independence within 7 day(s). 2.  Patient will transfer from bed to chair and chair to bed with independence using the least restrictive device within 7 day(s). 3.  Patient will perform sit to stand with modified independence within 7 day(s). 4.  Patient will ambulate with modified independence for 200 feet with the least restrictive device within 7 day(s). 5.  Patient will ascend/descend 3 stairs with  handrail(s) with supervision/set-up within 7 day(s). physical Therapy EVALUATION Patient: Jennifer Dalton (88 y.o. male) Date: 11/12/2018 Primary Diagnosis: Bandemia Precautions: fall ASSESSMENT : 
Based on the objective data described below, the patient presents with generalized weakness and debility. Sit on the edge of bed supervision, sit to stand supervision, ambulate with single point cane out on the [facility] hallway SBA, patient from [facility] and planning to move here to be close with daughter who will be the care giver. Patient use single point cane but has rolling walker at home if needed. Sitting balance good ,standing balance fair. OOB to chair as tolerated performed some active on both LE all planes. Daughter in the room and agreed with all goals set for the patient. Recommend HHPT when medically stable for discharge. Notified nurse who agreed to monitor patient. Patient will benefit from skilled intervention to address the above impairments. Patients rehabilitation potential is considered to be Excellent Factors which may influence rehabilitation potential include:  
[]         None noted 
[]         Mental ability/status []         Medical condition 
[]         Home/family situation and support systems [x]         Safety awareness 
[]         Pain tolerance/management 
[]         Other: PLAN : 
Recommendations and Planned Interventions: 
[x]           Bed Mobility Training             []    Neuromuscular Re-Education 
[x]           Transfer Training                   []    Orthotic/Prosthetic Training 
[x]           Gait Training                         []    Modalities [x]           Therapeutic Exercises           []    Edema Management/Control 
[x]           Therapeutic Activities            []    Patient and Family Training/Education 
[]           Other (comment): Frequency/Duration: Patient will be followed by physical therapy  5 times a week to address goals. Discharge Recommendations: Home Health Further Equipment Recommendations for Discharge: already has DME's SUBJECTIVE:  
Patient stated ok.  OBJECTIVE DATA SUMMARY:  
HISTORY:   
Past Medical History:  
Diagnosis Date  Arthritis  Cancer ([facility]  Diabetes ([facility]  Gastrointestinal disorder  Hypertension Past Surgical History:  
Procedure Laterality Date  HX ORTHOPAEDIC    
 HX PROSTATECTOMY Prior Level of Function/Home Situation: will be relocating here in [facility] to be close with daughter who will be the care giver. Personal factors and/or comorbidities impacting plan of care:  
 
Home Situation Home Environment: Private residence One/Two Story Residence: One story Living Alone: No 
Support Systems: Family member(s) Patient Expects to be Discharged to[de-identified] Private residence Current DME Used/Available at Home: Cane, straight EXAMINATION/PRESENTATION/DECISION MAKING: Critical Behavior: 
Neurologic State: Alert Orientation Level: Oriented X4 Cognition: Appropriate decision making Hearing: Auditory Auditory Impairment: Hard of hearing, bilateral, Hearing aid(s) Hearing Aids/Status: With patient Range Of Motion: 
AROM: Within functional limits PROM: Within functional limits Strength:   
Strength: Generally decreased, functional 
  
  
  
  
  
  
Tone & Sensation:  
  
  
  
  
  
  
  
  
  
   
Coordination: 
Coordination: Within functional limits Vision:  
  
Functional Mobility: 
Bed Mobility: 
Rolling: Supervision Supine to Sit: Supervision Sit to Supine: Supervision Scooting: Supervision Transfers: 
Sit to Stand: Supervision Stand to Sit: Supervision Stand Pivot Transfers: Supervision Bed to Chair: Supervision Balance:  
Sitting: Intact Standing: Impaired; With support Standing - Static: Fair Standing - Dynamic : FairAmbulation/Gait Training:Distance (ft): 100 Feet (ft) Assistive Device: Cane, straight Ambulation - Level of Assistance: Stand-by assistance Gait Description (WDL): Exceptions to [facility] Gait Abnormalities: Path deviations Base of Support: Widened Speed/Shannan: Fluctuations; Pace decreased (<100 feet/min) Step Length: Right shortened;Left shortened Therapeutic Exercises:  
 Instructed patient to continue active range of motion exercise on both legs while up on chair or on bed. Functional Measure: 
Barthel Index: 
 
Bathin Bladder: 10 Bowels: 10 
Groomin Dressin Feeding: 10 Mobility: 10 Stairs: 0 Toilet Use: 5 Transfer (Bed to Chair and Back): 10 Total: 65 Barthel and G-code impairment scale: 
Percentage of impairment CH 
0% CI 
1-19% CJ 
20-39% CK 
40-59% CL 
60-79% CM 
80-99% CN 
100% Barthel Score 0-100 100 99-80 79-60 59-40 20-39 1-19 
 0 Barthel Score 0-20 20 17-19 13-16 9-12 5-8 1-4 0 The Barthel ADL Index: Guidelines 1. The index should be used as a record of what a patient does, not as a record of what a patient could do. 2. The main aim is to establish degree of independence from any help, physical or verbal, however minor and for whatever reason. 3. The need for supervision renders the patient not independent. 4. A patient's performance should be established using the best available evidence. Asking the patient, friends/relatives and nurses are the usual sources, but direct observation and common sense are also important. However direct testing is not needed. 5. Usually the patient's performance over the preceding 24-48 hours is important, but occasionally longer periods will be relevant. 6. Middle categories imply that the patient supplies over 50 per cent of the effort. 7. Use of aids to be independent is allowed. Gloria Anand., Barthel, DLucíaW. ([id number]). Functional evaluation: the Barthel Index. [address] [facility] (14)2. Nela Heath brigitte SINDY KatF, Krista Crawford., Faustina Colón., [place], [address] [place] [address] Ave (1999). Measuring the change indisability after inpatient rehabilitation; comparison of the responsiveness of the Barthel Index and Functional Forrest Measure. Journal of Neurology, Neurosurgery, and Psychiatry, 66(4), 786-026. Krishan Rubin, N.J.A, YAMILE Sanderson, & Cristofer Pickens, M.A. (2004.) Assessment of post-stroke quality of life in cost-effectiveness studies: The usefulness of the Barthel Index and the EuroQoL-5D. [facility], 13, 573-31 G codes: In compliance with CMSs Claims Based Outcome Reporting, the following G-code set was chosen for this patient based on their primary functional limitation being treated: The outcome measure chosen to determine the severity of the functional limitation was the barthel with a score of 65/100 which was correlated with the impairment scale. ? Mobility - Walking and Moving Around:  
  - CURRENT STATUS: CJ - 20%-39% impaired, limited or restricted  - GOAL STATUS: CI - 1%-19% impaired, limited or restricted  - D/C STATUS:  ---------------To be determined--------------- Physical Therapy Evaluation Charge Determination History Examination Presentation Decision-Making LOW Complexity : Zero comorbidities / personal factors that will impact the outcome / POC LOW Complexity : 1-2 Standardized tests and measures addressing body structure, function, activity limitation and / or participation in recreation  LOW Complexity : Stable, uncomplicated  Other outcome measures barthel  LOW Based on the above components, the patient evaluation is determined to be of the following complexity level: LOW Pain: 
Pain Scale 1: Numeric (0 - 10) Pain Intensity 1: 0 Activity Tolerance:  
Good. Please refer to the flowsheet for vital signs taken during this treatment. After treatment:  
[x]         Patient left in no apparent distress sitting up in chair 
[]         Patient left in no apparent distress in bed 
[x]         Call bell left within reach [x]         Nursing notified 
[x]         Caregiver present 
[]         Bed alarm activated COMMUNICATION/EDUCATION:  
The patients plan of care was discussed with: Occupational Therapist, Registered Nurse,  and patient. [x]         Fall prevention education was provided and the patient/caregiver indicated understanding. [x]         Patient/family have participated as able in goal setting and plan of care. [x]         Patient/family agree to work toward stated goals and plan of care. []         Patient understands intent and goals of therapy, but is neutral about his/her participation. []         Patient is unable to participate in goal setting and plan of care. Thank you for this referral. 
Thang Brooks, PT,WCC. Time Calculation: 26 mins

## 2018-11-12 NOTE — PROGRESS NOTES
[facility] Dosing Services: 11/12/18 Consult for Warfarin Dosing by Pharmacy by Dr. Mike Neal Consult provided for this 80 y.o. male for indication of Atrial Fibrillation. Day of Therapy: resumed from home (PTA: Warfarin 0.5 mg po daily per med rec) Dose to achieve an INR goal of 2-3 Past Medical History:  
Diagnosis Date  Arthritis  Cancer ([facility]  Diabetes ([facility]  Gastrointestinal disorder  Hypertension Order entered for  Warfarin  0.5 (mg) ordered to be given today at 18:00. Significant drug interactions: Flagyl, Vancomycin, Cdiff Previous dose given 11/11 warfarin 0.5 mg  
PT/INR Lab Results Component Value Date/Time INR 1.2 (H) 11/12/2018 01:22 AM  
  
Platelets Lab Results Component Value Date/Time PLATELET 775 (L) 54/64/6231 01:22 AM  
  
H/H Lab Results Component Value Date/Time HGB 8.2 (L) 11/12/2018 01:22 AM  
  
 
Pharmacy to follow daily and will provide subsequent Warfarin dosing based on clinical status. Thank you for the consult, 
Humberto Schneider D, [address]

## 2018-11-12 NOTE — PROGRESS NOTES
Clovis Rashid OT said that she is recommending home health for this patient. Her note is to follow. Dr. Radha Ledezma notified and order placed for home health PT and OT. Case Management notified.

## 2018-11-12 NOTE — PROGRESS NOTES
Occupational Therapy EVALUATION/discharge Patient: Enoch Herman (55 y.o. male) Date: 11/12/2018 Primary Diagnosis: Bandemia Precautions: fall ASSESSMENT:  
Based on the objective data described below, the patient presents near baseline in regards to functional abilities following admission on 11/10 for diarrhea. Patient has recently moved to [place] to live with his daughter. She was present today and confirms patient will have near 24/7 assistance at home. Patient educated on adaptive ADL techniques, safe transfer techniques, and energy conservation techniques. Patient required supervision for both bed mobility and OOB transfers using SPC. Patient was independent/mod I level for completion of UB ADLs and required min A for LB ADLs. Patient is expected to discharge home today, will have close supervision/assistance provided by daughter, and home health recommended at discharge. Discharge Recommendations: Home Health Further Equipment Recommendations for Discharge: none at this time SUBJECTIVE:  
Patient stated I am hoping to go home today.  OBJECTIVE DATA SUMMARY:  
HISTORY:  
Past Medical History:  
Diagnosis Date  Arthritis  Cancer ([facility] [address]  Diabetes ([facility] [address]  Gastrointestinal disorder  Hypertension Past Surgical History:  
Procedure Laterality Date  HX ORTHOPAEDIC    
 HX PROSTATECTOMY Prior Level of Function/Environment/Context: Patient lives with his daughter. Patient requires some assistance with LB ADLs, unable to degree of assist needed, and uses a [facility] for transfers/mobility PTA. Home Situation Home Environment: Private residence One/Two Story Residence: One story Living Alone: No 
Support Systems: Family member(s) Patient Expects to be Discharged to[de-identified] Private residence Current DME Used/Available at Home: Cane, straight Hand dominance: Right EXAMINATION OF PERFORMANCE DEFICITS: 
Cognitive/Behavioral Status: Neurologic State: Alert Orientation Level: Oriented X4 Cognition: Appropriate for age attention/concentration; Appropriate decision making Perception: Appears intact Perseveration: No perseveration noted Safety/Judgement: Awareness of environment Skin: Intact in the uppers Edema: None noted in the uppers Hearing: Auditory Auditory Impairment: Hard of hearing, bilateral, Hearing aid(s) Hearing Aids/Status: With patient Vision/Perceptual:   
Tracking: Able to track stimulus in all quadrants w/o difficulty Diplopia: No   
Acuity: Within Defined Limits Range of Motion: 
AROM: Within functional limits in the uppers PROM: Within functional limits in the uppers Strength: 
Decreased but functional in the uppers Coordination: 
Fine Motor Skills-Upper: Left Intact; Right Intact Gross Motor Skills-Upper: Left Intact; Right Intact Tone & Sensation: 
Tone: normal 
Sensation: Intact Balance: 
Sitting: Intact Standing: Impaired; With support Standing - Static: Fair Standing - Dynamic : Fair Functional Mobility and Transfers for ADLs:Bed Mobility: 
Rolling: Supervision Supine to Sit: Supervision Sit to Supine: Supervision Scooting: Supervision Transfers: 
Sit to Stand: Supervision Stand to Sit: Supervision Bed to Chair: Supervision Toilet Transfer : Supervision ADL Assessment: 
Feeding: Independent Oral Facial Hygiene/Grooming: Modified Independent Bathing: Minimum assistance Upper Body Dressing: Modified independent Lower Body Dressing: Minimum assistance Toileting: Minimum assistance Cognitive Retraining Safety/Judgement: Awareness of environment Functional Measure: 
Barthel Index: 
 
Bathin Bladder: 5 Bowels: 5 Groomin Dressin Feeding: 10 Mobility: 10 Stairs: 0 Toilet Use: 5 Transfer (Bed to Chair and Back): 10 Total: 55 Barthel and G-code impairment scale: 
Percentage of impairment CH 
0% CI 
1-19% CJ 
20-39% CK 
40-59% CL 
60-79% CM 
 80-99% CN 
100% Barthel Score 0-100 100 99-80 79-60 59-40 20-39 1-19 
 0 Barthel Score 0-20 20 17-19 13-16 9-12 5-8 1-4 0 The Barthel ADL Index: Guidelines 1. The index should be used as a record of what a patient does, not as a record of what a patient could do. 2. The main aim is to establish degree of independence from any help, physical or verbal, however minor and for whatever reason. 3. The need for supervision renders the patient not independent. 4. A patient's performance should be established using the best available evidence. Asking the patient, friends/relatives and nurses are the usual sources, but direct observation and common sense are also important. However direct testing is not needed. 5. Usually the patient's performance over the preceding 24-48 hours is important, but occasionally longer periods will be relevant. 6. Middle categories imply that the patient supplies over 50 per cent of the effort. 7. Use of aids to be independent is allowed. Gloria Anand., Barthel, D.W. ([id number]). Functional evaluation: the Barthel Index. [facility] (14)2. Nela Heath brigitte Shey, ELISABETH.F, Krista Crawford., Faustina Hernandez., [place], [address] [place] [address] (1999). Measuring the change indisability after inpatient rehabilitation; comparison of the responsiveness of the Barthel Index and Functional [place] Measure. Journal of Neurology, Neurosurgery, and Psychiatry, 66(4), 316-869. Krishan Rubin, N.J.A, YAMILE Sanderson, & Cristofer Pickens, M.A. (2004.) Assessment of post-stroke quality of life in cost-effectiveness studies: The usefulness of the Barthel Index and the EuroQoL-5D. [facility], 13, 232-53 G codes: In compliance with CMSs Claims Based Outcome Reporting, the following G-code set was chosen for this patient based on their primary functional limitation being treated:  
 
The outcome measure chosen to determine the severity of the functional limitation was the Barthel Index with a score of 55/100 which was correlated with the impairment scale. ? Self Care:  
  - CURRENT STATUS: CK - 40%-59% impaired, limited or restricted  - GOAL STATUS: CK - 40%-59% impaired, limited or restricted  - D/C STATUS:  CK - 40%-59% impaired, limited or restricted Occupational Therapy Evaluation Charge Determination History Examination Decision-Making LOW Complexity : Brief history review  LOW Complexity : 1-3 performance deficits relating to physical, cognitive , or psychosocial skils that result in activity limitations and / or participation restrictions  LOW Complexity : No comorbidities that affect functional and no verbal or physical assistance needed to complete eval tasks Based on the above components, the patient evaluation is determined to be of the following complexity level: LOW Pain: 
Pain Scale 1: Numeric (0 - 10) Pain Intensity 1: 0 Activity Tolerance:  
Patient tolerated eval well. Please refer to the flowsheet for vital signs taken during this treatment. After treatment:  
[x]  Patient left in no apparent distress sitting up in chair 
[]  Patient left in no apparent distress in bed 
[x]  Call bell left within reach [x]  Nursing notified 
[x]  Caregiver present 
[]  Bed alarm activated COMMUNICATION/EDUCATION:  
Communication/Collaboration: 
[x]      Home safety education was provided and the patient/caregiver indicated understanding. [x]      Patient/family have participated as able and agree with findings and recommendations. []      Patient is unable to participate in plan of care at this time. Findings and recommendations were discussed with: Physical Therapist, Registered Nurse and patient. Anuradha Alexandra, OTR/L Time Calculation: 39 mins

## 2018-11-12 NOTE — PROGRESS NOTES
Discharge instructions were reviewed with patient and his daughter. All questions were answered. IV and heart monitor were removed. Patient received a copy of his discharge papers and will be discharged home with his daughter.

## 2018-11-12 NOTE — PROGRESS NOTES
Problem: Falls - Risk of 
Goal: *Absence of Falls Document Joanna Berumen Fall Risk and appropriate interventions in the flowsheet. Outcome: Progressing Towards Goal 
Fall Risk Interventions: 
Mobility Interventions: Bed/chair exit alarm Medication Interventions: Bed/chair exit alarm Elimination Interventions: Call light in reach History of Falls Interventions: Door open when patient unattended Problem: Risk for Spread of Infection Goal: Prevent transmission of infectious organism to others Prevent the transmission of infectious organisms to other patients, staff members, and visitors. Outcome: Progressing Towards Goal 
Pt and daughter advised of PPE and proper handwashing with enteric precautions.

## 2018-11-13 ENCOUNTER — OFFICE VISIT (OUTPATIENT)
Dept: FAMILY MEDICINE CLINIC | Age: 83
End: 2018-11-13

## 2018-11-13 ENCOUNTER — HOSPITAL ENCOUNTER (OUTPATIENT)
Dept: LAB | Age: 83
Discharge: HOME | End: 2018-11-13
Payer: MEDICARE

## 2018-11-13 VITALS
BODY MASS INDEX: 21.31 KG/M2 | WEIGHT: 140.6 LBS | SYSTOLIC BLOOD PRESSURE: 90 MMHG | TEMPERATURE: 97.8 F | HEIGHT: 68 IN | HEART RATE: 70 BPM | DIASTOLIC BLOOD PRESSURE: 46 MMHG | RESPIRATION RATE: 18 BRPM

## 2018-11-13 DIAGNOSIS — R19.7 DIARRHEA OF PRESUMED INFECTIOUS ORIGIN: Primary | ICD-10-CM

## 2018-11-13 PROCEDURE — 87209 SMEAR COMPLEX STAIN: CPT

## 2018-11-13 PROCEDURE — 87427 SHIGA-LIKE TOXIN AG IA: CPT

## 2018-11-13 RX ORDER — MELATONIN
DAILY
COMMUNITY
End: 2018-11-26 | Stop reason: SDUPTHER

## 2018-11-13 RX ORDER — METOPROLOL TARTRATE 50 MG/1
TABLET ORAL 2 TIMES DAILY
COMMUNITY
End: 2018-11-26 | Stop reason: ALTCHOICE

## 2018-11-13 RX ORDER — WARFARIN 1 MG/1
1 TABLET ORAL DAILY
COMMUNITY
End: 2018-11-26 | Stop reason: SDUPTHER

## 2018-11-13 RX ORDER — SIMVASTATIN 40 MG/1
TABLET, FILM COATED ORAL
COMMUNITY
End: 2018-11-26 | Stop reason: DRUGHIGH

## 2018-11-13 RX ORDER — METFORMIN HYDROCHLORIDE 500 MG/1
1000 TABLET ORAL 2 TIMES DAILY WITH MEALS
COMMUNITY
End: 2018-11-26 | Stop reason: SDUPTHER

## 2018-11-13 RX ORDER — CHLORHEXIDINE GLUCONATE 1.2 MG/ML
15 RINSE ORAL 2 TIMES DAILY
COMMUNITY
End: 2020-01-01

## 2018-11-13 RX ORDER — INSULIN GLARGINE 100 [IU]/ML
10 INJECTION, SOLUTION SUBCUTANEOUS
COMMUNITY
End: 2018-11-26

## 2018-11-13 RX ORDER — PSEUDOEPH/DM/GUAIFEN/ACETAMIN 30-10-324
1 EXPECTORANT ORAL DAILY
COMMUNITY
End: 2018-11-26 | Stop reason: SDUPTHER

## 2018-11-13 NOTE — PROGRESS NOTES
Identified pt with two pt identifiers(name and ). Chief Complaint Patient presents with  
[facility] Follow Up S/P hospital stay at [facility]. Discharge 18. Diarrhea, Bandemia  New Patient Establish new PCP Health Maintenance Due Topic  DTaP/Tdap/Td series (1 - Tdap)  Shingrix Vaccine Age 50> (1 of 2)  GLAUCOMA SCREENING Q2Y  Pneumococcal 65+ Low/Medium Risk (1 of 2 - PCV13)  Influenza Age 5 to Adult  MEDICARE YEARLY EXAM   
 
 
Wt Readings from Last 3 Encounters:  
No data found for [employer] Temp Readings from Last 3 Encounters:  
No data found for Temp BP Readings from Last 3 Encounters:  
No data found for BP Pulse Readings from Last 3 Encounters:  
No data found for Pulse Learning Assessment: 
:  
 
No flowsheet data found. Depression Screening: 
:  
 
PHQ over the last two weeks 2018 Little interest or pleasure in doing things Not at all Feeling down, depressed, irritable, or hopeless Not at all Total Score PHQ 2 0 Fall Risk Assessment: 
:  
 
Fall Risk Assessment, last 12 mths 2018 Able to walk? Yes Fall in past 12 months? Yes Fall with injury? No  
Number of falls in past 12 months 4 Fall Risk Score 4 Abuse Screening: 
:  
 
No flowsheet data found. Coordination of Care Questionnaire: 
:  
 
1) Have you been to an emergency room, urgent care clinic since your last visit? yes Hospitalized since your last visit? yes 2) Have you seen or consulted any other health care providers outside of [address] [place] [address] since your last visit? no  (Include any pap smears or colon screenings in this section.) 3) Do you have an Advance Directive on file? no 
Are you interested in receiving information about Advance Directives? no 
 
Patient is accompanied by daughter I have received verbal consent from Javier Booth to discuss any/all medical information while they are present in the room. Reviewed record in preparation for visit and have obtained necessary documentation. Medication reconciliation up to date and corrected with patient at this time.

## 2018-11-13 NOTE — PROGRESS NOTES
CHIEF COMPLAINT:  
Chief Complaint Patient presents with  
[facility] Follow Up S/P hospital stay at [facility]. Discharge 11/12/18. Diarrhea, Bandemia  New Patient Establish new PCP HISTORY OF PRESENT ILLNESS:  
89[facility] He presents today with his daughter. He recently was admitted to [facility] for onset of diarrhea, Lactic acidosis and Bandemia. He also has a baseline h/o Type II DM (uncontrolled), A. Fib, Lymphoma (B-cell NHL) and has a pending appointment with Oncology. He also was noted to have a bandemia that resolved prior to his discharge. He is not on any current antibiotics. HOSPITAL COURSE: 
Mr. Norbert Pickett is a 80 y.o. admitted to [facility] and treated for the following: 
  
Diarrhea/  History of Clostridium difficile colitis (11/10/2018): his diarrhea has resolved. C diff neg. Likely related to the recent antibiotic use. No need for any antibiotics. Diet as tolerated  
  
DM (diabetes mellitus) ([address] (11/10/2018): type 2. Uncontrolled with hyperglycemia. A1c 9.6. BG stable. Continue Maria E Bone, Metformin and diabetic diet  
  
Atrial fibrillation ([address] (11/10/2018): chronic. Rate controlled. Continue metoprolol. Will continue coumadin. Out patient follow up with cardiology.  
  
Hx lymphoma (11/10/2018): Overview: B-cell NHL. Stable. Seen by Oncology who will arrange outpatient follow up. Family state he had a likely clot on his Port. Will resume Coumadin and follow clinically. He just moved here from [place] and was following up with Dr Kelli Britt on tel [phone] at [facility] in [place], [place] 
  
Lactic acidosis (11/10/2018): this has now resolved. Was likely from volume depletion vs metformin. Now well hydrated  
  
Bandemia (11/10/2018): this has resolved. Presumed to be from diarrhea. No need for further antibiotic 
  
 
 
PAST MEDICAL HISTORY: 
Past Medical History:  
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 
Past Surgical History:  
Procedure Laterality Date  HX HEENT    
 HX ORTHOPAEDIC    
 HX UROLOGICAL    
 NEUROLOGICAL PROCEDURE UNLISTED MEDICATIONS: 
Current Outpatient Medications Medication Sig Dispense Refill  insulin glargine (LANTUS SOLOSTAR U-100 INSULIN) 100 unit/mL (3 mL) inpn 10 Units by SubCUTAneous route nightly.  cholecalciferol (VITAMIN D3) 1,000 unit tablet Take  by mouth daily.  warfarin (COUMADIN) 1 mg tablet Take 1 mg by mouth daily.  lutein 20 mg cap Take 1 Tab by mouth daily.  metFORMIN (GLUCOPHAGE) 500 mg tablet Take 1,000 mg by mouth two (2) times daily (with meals).  metoprolol tartrate (LOPRESSOR) 50 mg tablet Take  by mouth two (2) times a day.  mirabegron ER (MYRBETRIQ) 50 mg ER tablet Take 50 mg by mouth every fourty-eight (48) hours.  chlorhexidine (PERIDEX) 0.12 % solution 15 mL by Swish and Spit route three (3) times daily.  lactobacillus combo no.6 (PROBIOTIC COMPLEX PO) Take 1 Cap by mouth daily.  simvastatin (ZOCOR) 40 mg tablet Take  by mouth nightly.  ferrous fumarate/vit Bcomp,C (SUPER B COMPLEX PO) Take 1 Cap by mouth daily. ALLERGIES: 
Not on File 
 
  
SOCIAL HISTORY:  
Social History Socioeconomic History  Marital status:  Spouse name: Not on file  Number of children: Not on file  Years of education: Not on file  Highest education level: Not on file Social Needs  Financial resource strain: Not on file  Food insecurity - worry: Not on file  Food insecurity - inability: Not on file  Transportation needs - medical: Not on file  Transportation needs - non-medical: Not on file Occupational History  Not on file Tobacco Use  Smoking status: Former Smoker  Smokeless tobacco: Never Used Substance and Sexual Activity  Alcohol use: No  
  Frequency: Never  Drug use: No  
 Sexual activity: No  
 
 
FAMILY HISTORY:  
History reviewed. No pertinent family history. REVIEW OF SYSTEMS: 
Review of Systems - Negative except for documented in the HPI. PHYSICAL EXAMINATION: 
BP 90/46 (BP 1 Location: Right arm, BP Patient Position: Sitting)   Pulse 70   Temp 97.8 °F (36.6 °C) (Oral)   Resp 18   Ht 5' 8\" (1.727 m)   Wt 140 lb 9.6 oz (63.8 kg)   BMI 21.38 kg/m² General:  Alert, cooperative, no distress. Chronically ill appearing. Head:  Normocephalic, without obvious abnormality, atraumatic. Eyes:  Conjunctivae/corneas clear. Pupils equal, round, reactive to light. Extraocular movements intact. Lungs:   Clear to auscultation bilaterally. Chest wall:  No tenderness or deformity. Heart:  Regular rate and rhythm, S1, S2 normal, no murmur, click, rub, or gallop. Abdomen:   Soft, non-tender. Bowel sounds normal. No masses. No organomegaly. Extremities: Extremities normal, atraumatic, no cyanosis or edema. Pulses: 2+ and symmetric all extremities. Skin: Poor skin turgor. Lymph nodes: Cervical, supraclavicular, and axillary nodes normal.  
Neurologic: CNII-XII intact. decreasedl strength, sensation, and reflexes throughout. LABORATORY DATA: 
None IMPRESSION AND PLAN:  
  ICD-10-CM ICD-9-CM 1. Diarrhea of presumed infectious origin R19.7 009. 3 CULTURE, STOOL  
   OVA & PARASITES, STOOL  
 
89[facility] He has been recently released from the hospital for onset of weakness and concern about C. Diff. However, that was negative.  He started to show improvement before his discharge, but his diarrhea has returned per his daughter. I have asked them to avoid certain foods for the time being as reintroduction of usual diet may be a culprit. The patient also will be seeing oncology for evaluation as well. I have discussed the diagnosis with the patient and the intended treatment plan as seen in the above orders. The patient has received an after-visit summary and questions were answered concerning future plans. Asked to return should symptoms worsen or not improve with treatment. Any pending labs and studies will be relayed to patient when they become available. Pt verbalizes understanding of plan of care and denies further questions or concerns at this time. Follow-up Disposition: 
Return in about 4 weeks (around 12/10/2018), or if symptoms worsen or fail to improve. Patient verbalized understanding the plan.

## 2018-11-13 NOTE — PATIENT INSTRUCTIONS
GASTROENTERITIS 1. Avoid the following foods/drinks for the next 72 hours - Milk - Apple Juice - Spicey sauces and Tomato Sauces 
- Orange Juice - Caffeinated beverages 2. Fluids - Take small amounts (1/2 cup or less) every 20-30 minutes of WHITE GRAPE JUICE mixed with WATER in a 1:1 concentration (i.e. 1 cup water to 1 cup juice). 3. Advanced diet as tolerated - First, clear liquids (as above) - Jello - Broth's and teas - Thickened Liquids 4. Advance to these solids (so called BRAT diet) Merryl Browner - Rice - Apple SAUCE (not juice) - Toast and crackers Lalyciro Powell of [place] 5. Note that milk may increase lactose intolerance type symptoms after a person has had a gastroenteritis. This should subside in about 1-2 weeks. 6. Imodium OTC Preventing Falls: Care Instructions Your Care Instructions Getting around your home safely can be a challenge if you have injuries or health problems that make it easy for you to fall. Loose rugs and furniture in walkways are among the dangers for many older people who have problems walking or who have poor eyesight. People who have conditions such as arthritis, osteoporosis, or dementia also have to be careful not to fall. You can make your home safer with a few simple measures. Follow-up care is a key part of your treatment and safety. Be sure to make and go to all appointments, and call your doctor if you are having problems. It's also a good idea to know your test results and keep a list of the medicines you take. How can you care for yourself at home? Taking care of yourself · You may get dizzy if you do not drink enough water. To prevent dehydration, drink plenty of fluids, enough so that your urine is light yellow or clear like water. Choose water and other caffeine-free clear liquids. If you have kidney, heart, or liver disease and have to limit fluids, talk with your doctor before you increase the amount of fluids you drink. · Exercise regularly to improve your strength, muscle tone, and balance. Walk if you can. Swimming may be a good choice if you cannot walk easily. · Have your vision and hearing checked each year or any time you notice a change. If you have trouble seeing and hearing, you might not be able to avoid objects and could lose your balance. · Know the side effects of the medicines you take. Ask your doctor or pharmacist whether the medicines you take can affect your balance. Sleeping pills or sedatives can affect your balance. · Limit the amount of alcohol you drink. Alcohol can impair your balance and other senses. · Ask your doctor whether calluses or corns on your feet need to be removed. If you wear loose-fitting shoes because of calluses or corns, you can lose your balance and fall. · Talk to your doctor if you have numbness in your feet. Preventing falls at home · Remove raised doorway thresholds, throw rugs, and clutter. Repair loose carpet or raised areas in the floor. · Move furniture and electrical cords to keep them out of walking paths. · Use nonskid floor wax, and wipe up spills right away, especially on ceramic tile floors. · If you use a walker or cane, put rubber tips on it. If you use crutches, clean the bottoms of them regularly with an abrasive pad, such as steel wool. · Keep your house well lit, especially [place], and outside walkways. Use night-lights in areas such as hallways and bathrooms. Add extra light switches or use remote switches (such as switches that go on or off when you clap your hands) to make it easier to turn lights on if you have to get up during the night. · Install sturdy handrails on stairways. · Move items in your cabinets so that the things you use a lot are on the lower shelves (about waist level). · Keep a cordless phone and a flashlight with new batteries by your bed.  If possible, put a phone in each of the main rooms of your house, or carry a cell phone in case you fall and cannot reach a phone. Or, you can wear a device around your neck or wrist. You push a button that sends a signal for help. · Wear low-heeled shoes that fit well and give your feet good support. Use footwear with nonskid soles. Check the heels and soles of your shoes for wear. Repair or replace worn heels or soles. · Do not wear socks without shoes on wood floors. · Walk on the grass when the sidewalks are slippery. If you live in an area that gets snow and ice in the winter, sprinkle salt on slippery steps and sidewalks. Preventing falls in the bath · Install grab bars and nonskid mats inside and outside your shower or tub and near the toilet and sinks. · Use shower chairs and bath benches. · Use a hand-held shower head that will allow you to sit while showering. · Get into a tub or shower by putting the weaker leg in first. Get out of a tub or shower with your strong side first. 
· Repair loose toilet seats and consider installing a raised toilet seat to make getting on and off the toilet easier. · Keep your bathroom door unlocked while you are in the shower. Where can you learn more? Go to http://chuy-karlee.info/. Enter 0476 [id number] 69 [id number] in the search box to learn more about \"Preventing Falls: Care Instructions. \" Current as of: March 16, 2018 Content Version: 11.8 © 3734-3557 [employer]. Care instructions adapted under license by [employer] (which disclaims liability or warranty for this information). If you have questions about a medical condition or this instruction, always ask your healthcare professional. [place] [address] any warranty or liability for your use of this information. How to Get Up Safely After a Fall: Care Instructions Your Care Instructions If you have injuries, health problems, or other reasons that may make it easy for you to fall at home, it is a good idea to learn how to get up safely after a fall. Learning how to get up correctly can help you avoid making an injury worse. Also, knowing what to do if you cannot get up can help you stay safe until help arrives. Follow-up care is a key part of your treatment and safety. Be sure to make and go to all appointments, and call your doctor if you are having problems. It's also a good idea to know your test results and keep a list of the medicines you take. How can you care for yourself after a fall? If you think you can get up First lie still for a few minutes and think about how you feel. If your body feels okay and you think you can get up safely, follow the rest of the steps below: 1. Look for a chair or other piece of furniture that is close to you. 2. Roll onto your side and rest. Roll by turning your head in the direction you want to roll, move your shoulder and arm, then hip and leg in the same direction. 3. Lie still for a moment to let your blood pressure adjust. 
4. Slowly push your upper body up, lift your head, and take a moment to rest. 
5. Slowly get up on your hands and knees, and crawl to the chair or other stable piece of furniture. 6. Put your hands on the chair. 7. Move one foot forward, and place it flat on the floor. Your other leg should be bent with the knee on the floor. 8. Rise slowly, turn your body, and sit in the chair. Stay seated for a bit and think about how you feel. Call for help. Even if you feel okay, let someone know what happened to you. You might not know that you have a serious injury. If you cannot get up 1. If you think you are injured after a fall or you cannot get up, try not to panic. 2. Call out for help. 3. If you have a phone within reach or you have an emergency call device, use it to call for help. 4. If you do not have a phone within reach, try to slide yourself toward it.  If you cannot get to the phone, try to slide toward a door or window or a place where you think you can be heard. 5. [place] or use an object to make noise so someone might hear you. 6. If you can reach something that you can use for a pillow, place it under your head. Try to stay warm by covering yourself with a blanket or clothing while you wait for help. When should you call for help? Call 911 anytime you think you may need emergency care. For example, call if: 
  · You passed out (lost consciousness).  
  · You cannot get up after a fall.  
  · You have severe pain.  
 Call your doctor now or seek immediate medical care if: 
  · You have new or worse pain.  
  · You are dizzy or lightheaded.  
  · You hit your head.  
 Watch closely for changes in your health, and be sure to contact your doctor if: 
  · You do not get better as expected. Where can you learn more? Go to http://chuy-karlee.info/. Enter F046 in the search box to learn more about \"How to Get Up Safely After a Fall: Care Instructions. \" Current as of: March 16, 2018 Content Version: 11.8 © 1325-1008 [employer]. Care instructions adapted under license by [employer] (which disclaims liability or warranty for this information). If you have questions about a medical condition or this instruction, always ask your healthcare professional. [address] any warranty or liability for your use of this information.

## 2018-11-14 ENCOUNTER — TELEPHONE (OUTPATIENT)
Dept: FAMILY MEDICINE CLINIC | Age: 83
End: 2018-11-14

## 2018-11-14 NOTE — TELEPHONE ENCOUNTER
Call patient   Message Contents   [place], [address] Office Pool             Moshe Bruceman from [address] [place] [address], stated [facility] order pt Physical and Occupation therapy. Ms. Juaquin Linton would like a call back if the provider agrees to follow up with patients orders. Juaquin Linton stated the [address] [place] [address] is seeing Mr. Aniceto Cheney as a fall patient and helping with balance.  Ms. Mosley Proud: [phone]

## 2018-11-15 NOTE — TELEPHONE ENCOUNTER
Spoke with Kel Viera, [facility] Nurse would like to start monitoring his PT/INR's if agreeable to Dr. Gustabo Castellanos. Order will be coming over fax for physician's signature.

## 2018-11-16 LAB
BACTERIA SPEC CULT: NORMAL
SERVICE CMNT-IMP: NORMAL

## 2018-11-20 ENCOUNTER — TELEPHONE (OUTPATIENT)
Dept: FAMILY MEDICINE CLINIC | Age: 83
End: 2018-11-20

## 2018-11-20 NOTE — TELEPHONE ENCOUNTER
Michelle with [facility] is calling and wanted Dr. Robert Trevino to know that patient was being admitted to home health with them.   If there are any questions please call her at [phone]

## 2018-11-26 ENCOUNTER — OFFICE VISIT (OUTPATIENT)
Dept: ONCOLOGY | Age: 83
End: 2018-11-26

## 2018-11-26 VITALS
DIASTOLIC BLOOD PRESSURE: 58 MMHG | HEART RATE: 79 BPM | HEIGHT: 68 IN | RESPIRATION RATE: 18 BRPM | BODY MASS INDEX: 21.67 KG/M2 | TEMPERATURE: 98 F | WEIGHT: 143 LBS | OXYGEN SATURATION: 97 % | SYSTOLIC BLOOD PRESSURE: 145 MMHG

## 2018-11-26 DIAGNOSIS — D50.9 IRON DEFICIENCY ANEMIA, UNSPECIFIED IRON DEFICIENCY ANEMIA TYPE: ICD-10-CM

## 2018-11-26 DIAGNOSIS — E11.9 TYPE 2 DIABETES MELLITUS WITHOUT COMPLICATION, WITH LONG-TERM CURRENT USE OF INSULIN (HCC): ICD-10-CM

## 2018-11-26 DIAGNOSIS — Z79.4 TYPE 2 DIABETES MELLITUS WITHOUT COMPLICATION, WITH LONG-TERM CURRENT USE OF INSULIN (HCC): ICD-10-CM

## 2018-11-26 DIAGNOSIS — M87.10 OSTEONECROSIS DUE TO DRUG (HCC): ICD-10-CM

## 2018-11-26 DIAGNOSIS — D69.6 THROMBOCYTOPENIA (HCC): ICD-10-CM

## 2018-11-26 DIAGNOSIS — C83.39 DIFFUSE LARGE B-CELL LYMPHOMA OF EXTRANODAL SITE EXCLUDING SPLEEN AND OTHER SOLID ORGANS (HCC): Primary | ICD-10-CM

## 2018-11-26 RX ORDER — SODIUM CHLORIDE 9 MG/ML
10 INJECTION INTRAMUSCULAR; INTRAVENOUS; SUBCUTANEOUS AS NEEDED
Status: CANCELLED | OUTPATIENT
Start: 2019-03-19

## 2018-11-26 RX ORDER — SODIUM CHLORIDE 0.9 % (FLUSH) 0.9 %
5-10 SYRINGE (ML) INJECTION AS NEEDED
Status: CANCELLED | OUTPATIENT
Start: 2018-12-10

## 2018-11-26 RX ORDER — HEPARIN 100 UNIT/ML
500 SYRINGE INTRAVENOUS AS NEEDED
Status: CANCELLED | OUTPATIENT
Start: 2019-03-19

## 2018-11-26 RX ORDER — SODIUM CHLORIDE 0.9 % (FLUSH) 0.9 %
5-10 SYRINGE (ML) INJECTION AS NEEDED
Status: CANCELLED | OUTPATIENT
Start: 2019-03-19

## 2018-11-26 RX ORDER — SODIUM CHLORIDE 9 MG/ML
10 INJECTION INTRAMUSCULAR; INTRAVENOUS; SUBCUTANEOUS AS NEEDED
Status: CANCELLED | OUTPATIENT
Start: 2018-12-10

## 2018-11-26 RX ORDER — HEPARIN 100 UNIT/ML
500 SYRINGE INTRAVENOUS AS NEEDED
Status: CANCELLED | OUTPATIENT
Start: 2018-12-10

## 2018-11-26 RX ORDER — MULTIVIT WITH MINERALS/HERBS
1 TABLET ORAL DAILY
COMMUNITY
End: 2020-01-01

## 2018-11-26 NOTE — PROGRESS NOTES
[facility] at [address], [address] [facility] [address] [facility]  W: [phone]  F: [phone]      Reason for Visit:   Yazmin Llamas is a 80 y.o. 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  Rituximab, gemcitabine, oxaliplatin for 3 cycles, mixed response on PET  · 6/17/16 right neck mass excision:  Diffuse large B-cell lymphoma, with a germinal center phenotype  · Bendamustine and rituximab 7/18/16-12/7/16  · PET CR 11/18/16  · xgeva/zometa for bone mets in the past  · Single agent maintenance rituximab 225 mg/m2 q 3 weeks 12/7/16, last dose 5/2018, unclear how many doses in between, had 2 hospitalizations in 2018 (5 total)  · Admitted 9/26/18-9/29/18 for sepsis due to dental abscess, related to xgeva/zometa    History of Present Illness:   Mr Christelle Amaro was admitted on 11/10/2018-11/12/18 from the ED with c/o concerns for recurrent c-diff. Recent dental work with abx following. Having diarrhea 2-3 loose BMs.      Hx of DM, HTN, NHL    See records reviewed above.   No complaints today        Past Medical History:   Diagnosis Date    Anemia     Arthritis     Cancer ([address]     Constipation     Diabetes ([address]     Gastrointestinal disorder     History of neoplasms, uncertain behavior     Of soft tissue and skin    Hypercholesterolemia     Hyperkalemia     Hypertension     Low back pain     Melanoma in situ of other parts of face ([address]     Lower lip; removed    Neuropathy     Right bundle-branch block     Spinal stenosis     Type II diabetes mellitus (HCC)     Venous insufficiency       Past Surgical History:   Procedure Laterality Date    HX HEENT      HX ORTHOPAEDIC      HX PROSTATECTOMY      HX UROLOGICAL      NEUROLOGICAL PROCEDURE UNLISTED        Social History     Tobacco Use    Smoking status: Former Smoker    Smokeless tobacco: Never Used   Substance Use Topics    Alcohol use: No     Frequency: Never      No family history on file. Current Outpatient Medications   Medication Sig    b complex vitamins (B COMPLEX 1) tablet Take 1 Tab by mouth daily.  chlorhexidine (PERIDEX) 0.12 % solution 15 mL by Swish and Spit route three (3) times daily.  lactobacillus combo no.6 (PROBIOTIC COMPLEX PO) Take 1 Cap by mouth daily.  insulin degludec (TRESIBA FLEXTOUCH U-100) 100 unit/mL (3 mL) inpn 10 Units by SubCUTAneous route nightly. (Patient taking differently: 20 Units by SubCUTAneous route nightly.)    metFORMIN (GLUMETZA ER) 500 mg TG24 24 hour tablet Take 1,000 mg by mouth two (2) times a day.  lutein 20 mg cap Take 20 mg by mouth daily.  cholecalciferol (VITAMIN D3) 1,000 unit tablet Take 1,000 Units by mouth daily.  mirabegron ER (MYRBETRIQ) 50 mg ER tablet Take 50 mg by mouth every fourty-eight (48) hours.  metoprolol tartrate (LOPRESSOR) 50 mg tablet Take 25 mg by mouth two (2) times a day.  simvastatin (ZOCOR) 80 mg tablet Take 40 mg by mouth nightly.  ferrous fumarate/vit Bcomp,C (SUPER B COMPLEX PO) Take 1 Cap by mouth daily.  OTHER,NON-FORMULARY, Unknown OTC antacid: take one by mouth every morning     No current facility-administered medications for this visit. No Known Allergies     Review of Systems: A complete review of systems was obtained, negative except as described above.     Physical Exam:     Visit Vitals  /58   Pulse 79   Temp 98 °F (36.7 °C) (Temporal)   Resp 18   Ht 5' 8\" (1.727 m)   Wt 143 lb (64.9 kg)   SpO2 97%   BMI 21.74 kg/m²     ECOG PS: 0  General: No distress  Eyes: PERRLA, anicteric sclerae  HENT: Atraumatic, OP clear  Neck: Supple  Lymphatic: No cervical, supraclavicular adenopathy  Respiratory: CTAB, normal respiratory effort  CV: Normal rate, regular rhythm, no murmurs, no peripheral edema  GI: Soft, nontender, nondistended, no masses, no hepatomegaly, no splenomegaly; + BS  MS:  Digits without clubbing or cyanosis. Skin: No rashes, ecchymoses, or petechiae. Normal temperature, turgor, and texture. Psych: Alert, oriented, appropriate affect, normal judgment/insight    Results:     Lab Results   Component Value Date/Time    WBC 4.2 11/12/2018 01:22 AM    HGB 8.2 (L) 11/12/2018 01:22 AM    HCT 28.0 (L) 11/12/2018 01:22 AM    PLATELET 124 (L) 40/66/7359 01:22 AM    MCV 76.7 (L) 11/12/2018 01:22 AM    ABS. NEUTROPHILS 2.6 11/11/2018 01:33 AM     Lab Results   Component Value Date/Time    Sodium 142 11/11/2018 01:33 AM    Potassium 3.8 11/11/2018 01:33 AM    Chloride 108 11/11/2018 01:33 AM    CO2 26 11/11/2018 01:33 AM    Glucose 129 (H) 11/11/2018 01:33 AM    BUN 19 11/11/2018 01:33 AM    Creatinine 1.01 11/11/2018 01:33 AM    GFR est AA >60 11/11/2018 01:33 AM    GFR est non-AA >60 11/11/2018 01:33 AM    Calcium 7.6 (L) 11/11/2018 01:33 AM     Lab Results   Component Value Date/Time    Bilirubin, total 0.3 11/10/2018 11:19 AM    ALT (SGPT) 16 11/10/2018 11:19 AM    AST (SGOT) 16 11/10/2018 11:19 AM    Alk. phosphatase 69 11/10/2018 11:19 AM    Protein, total 6.8 11/10/2018 11:19 AM    Albumin 3.2 (L) 11/10/2018 11:19 AM    Globulin 3.6 11/10/2018 11:19 AM     Lab Results   Component Value Date/Time    Iron % saturation 6 (L) 11/11/2018 01:33 AM    TIBC 281 11/11/2018 01:33 AM    Ferritin 19 (L) 11/11/2018 01:33 AM     Lab Results   Component Value Date/Time    INR 1.2 (H) 11/12/2018 01:22 AM         Records reviewed and summarized above. Pathology report(s) reviewed above. Radiology report(s) reviewed above. Assessment/plan:   1.  Diffuse large B-cell lymphoma: In remission. He has had off/on rituximab maintenance for 2 years, unclear of the benefit in relapsed DLBCL, and unclear of benefit after 2 years. will discontinue    Our approach to patient surveillance is to schedule patient visits every three months during the first year, every three to six months during the second year, and every six months starting two years after complete response. At these visits, we perform a history and physical examination, complete blood count, chemistries, and LDH. There is no role for routine PET or PET/CT imaging in the longitudinal follow-up of asymptomatic patients after response assessment. There are limited data to support performing CT scans at a given interval, and the decision regarding frequency of CT imaging should be individualized to the patient. For most patients, we do not routinely perform surveillance imaging. 2. Emotional well being:  She has excellent support and is coping well with her disease    3. Port maintenance: Will stop low dose coumadin; port flush q 8 weeks with cbc, cmp, ferritin, iron profile, ldh    4. Osteonecrosis of jaw:  Sees [facility] tomorrow; no more bone agents, not clear of evidence in the first place for lymphoma    5. Anemia:  Maybe due to therapy, though is iron deficient; taking PO iron bid; may need to investigate further if continues    6. Thrombocytopenia:  Likely due to therapy, will follow    7. DM2:  On insulin    > 40 minutes were spent with this patient with > 50% of that time spent in face to face counseling. I appreciate the opportunity to participate in Mr. Zachariah Flores's care. Signed By: Dinorah Awan MD      No orders of the defined types were placed in this encounter.

## 2018-12-05 ENCOUNTER — OFFICE VISIT (OUTPATIENT)
Dept: CARDIOLOGY CLINIC | Age: 83
End: 2018-12-05

## 2018-12-05 VITALS
SYSTOLIC BLOOD PRESSURE: 110 MMHG | RESPIRATION RATE: 12 BRPM | HEART RATE: 54 BPM | BODY MASS INDEX: 21.82 KG/M2 | WEIGHT: 144 LBS | DIASTOLIC BLOOD PRESSURE: 72 MMHG | HEIGHT: 68 IN | OXYGEN SATURATION: 96 %

## 2018-12-05 DIAGNOSIS — I49.9 IRREGULAR HEART BEAT: Primary | ICD-10-CM

## 2018-12-05 PROBLEM — I49.1 PREMATURE ATRIAL COMPLEXES: Status: ACTIVE | Noted: 2018-11-10

## 2018-12-05 LAB
BACTERIA SPEC CULT: NORMAL
BACTERIA SPEC CULT: NORMAL
CAMPYLOBACTER STL CULT: NORMAL
E COLI SXT STL QL IA: NEGATIVE
O+P SPEC MICRO: NORMAL
O+P STL CONC: NORMAL
SALM + SHIG STL CULT: NORMAL

## 2018-12-05 NOTE — PROGRESS NOTES
1. Have you been to the ER, urgent care clinic since your last visit? Hospitalized since your last visit? Yes When: 11/2018 Where: [facility] Reason for visit: C. Diff 2. Have you seen or consulted any other health care providers outside of the [address] [place] [address] since your last visit? Include any pap smears or colon screening. No  
 
Chief Complaint Patient presents with  Irregular Heart Beat  New Patient Pt reports Med Rec. Completed. Visit Vitals /72 (BP 1 Location: Right arm, BP Patient Position: Sitting) Pulse (!) 54 Resp 12 Ht 5' 8\" (1.727 m) Wt 144 lb (65.3 kg) SpO2 96% BMI 21.90 kg/m²

## 2018-12-05 NOTE — PROGRESS NOTES
[facility] [address], [address] [place], [address] [facility] Office [phone] Abena Wilson is a 80 y.o. male establish cardiovascular care in [place]. Assessment/Recommendations: 
 
Premature atrial contractions-patient's chart has been documented atrial fibrillation. He does not have a history of atrial fibrillation. He was on warfarin therapy for Port-A-Cath and not A. Fib. He does have an irregular heartbeat, however is related to premature atrial contractions. Reviewed his telemetry strips in his chart and electrocardiograms which also sinus rhythm with PACs no atrial fibrillation. 
-Recommend continuing his beta-blocker therapy for his premature atrial contractions Right bundle branch block-chronic, stable History of lymphoma-appears to be in remission followed by oncology Type 2 diabetes Hyperlipidemia Primary Care Physician- Raymon Hines MD 
 
Follow-up one year sooner as needed Subjective: 
 
70-year-old  who recently moved to [facility] from [place]. He has moved here with his daughter and son-in-law. He presents to clinic today with his son-in-law. He presents clinic today to establish cardiovascular care. He was followed previously by Sofi Flynn in [place]. Further notes he has a history of premature atrial contractions right bundle branch block. He apparently had a stress Myoview in 11/2/2012 that was negative for ischemia with normal LV function. He was recently admitted to the hospital due to recurrent diarrhea with a history of Clostridium difficile. Hospital notes that time state atrial fibrillation. Patient has not previously been treated for atrial fibrillation nor has he ever heard the diagnosis previously. He was on warfarin in [place] due to chronic clotting of his Port-A-Cath.   He was not on warfarin therapy for any abnormal heart rhythms. He currently is without any chest pain or shortness of breath. No orthopnea no PND. No prior history of syncope. He is asymptomatic in relation to his premature atrial contractions Past Medical History:  
Diagnosis Date  Anemia  Arthritis  Cancer ([facility] [address]  Constipation  Diabetes ([facility]  Gastrointestinal disorder  History of neoplasms, uncertain behavior Of soft tissue and skin  Hypercholesterolemia  Hyperkalemia  Hypertension  Low back pain  Melanoma in situ of other parts of face ([facility] [address] [facility] Lower lip; removed  Neuropathy  Right bundle-branch block  Spinal stenosis  Type II diabetes mellitus ([facility] [address] [facility]  Venous insufficiency Past Surgical History:  
Procedure Laterality Date  HX HEENT    
 HX ORTHOPAEDIC    
 HX PROSTATECTOMY  HX UROLOGICAL    
 NEUROLOGICAL PROCEDURE UNLISTED Current Outpatient Medications:  
  b complex vitamins (B COMPLEX 1) tablet, Take 1 Tab by mouth daily. , Disp: , Rfl:  
  chlorhexidine (PERIDEX) 0.12 % solution, 15 mL by Swish and Spit route three (3) times daily. , Disp: , Rfl:  
  lactobacillus combo no.6 (PROBIOTIC COMPLEX PO), Take 1 Cap by mouth daily. , Disp: , Rfl:  
  insulin degludec (TRESIBA FLEXTOUCH U-100) 100 unit/mL (3 mL) inpn, 10 Units by SubCUTAneous route nightly. (Patient taking differently: 20 Units by SubCUTAneous route nightly.), Disp: 1 Pen, Rfl: 0 
  metFORMIN (GLUMETZA ER) 500 mg TG24 24 hour tablet, Take 1,000 mg by mouth two (2) times a day., Disp: , Rfl:  
  lutein 20 mg cap, Take 20 mg by mouth daily. , Disp: , Rfl:  
  cholecalciferol (VITAMIN D3) 1,000 unit tablet, Take 1,000 Units by mouth daily. , Disp: , Rfl:  
  mirabegron ER (MYRBETRIQ) 50 mg ER tablet, Take 50 mg by mouth every fourty-eight (48) hours. , Disp: , Rfl:  
  metoprolol tartrate (LOPRESSOR) 50 mg tablet, Take 25 mg by mouth two (2) times a day., Disp: , Rfl:  
  simvastatin (ZOCOR) 80 mg tablet, Take 40 mg by mouth nightly., Disp: , Rfl:  
  ferrous fumarate/vit Bcomp,C (SUPER B COMPLEX PO), Take 1 Cap by mouth daily. , Disp: , Rfl:  
  OTHER,NON-FORMULARY,, Unknown OTC antacid: take one by mouth every morning, Disp: , Rfl:  
 
No Known Allergies History noncontributory due to advanced age Social History Tobacco Use  Smoking status: Former Smoker  Smokeless tobacco: Never Used Substance Use Topics  Alcohol use: No  
  Frequency: Never  Drug use: No  
 
 
Review of Symptoms: 
Pertinent Positive: Negative Pertinent Negative: Chest pain shortness of breath orthopnea PND All Other systems reviewed and are negative for a Comprehensive ROS (10+) Physical Exam 
 
Blood pressure 110/72, pulse (!) 54, resp. rate 12, height 5' 8\" (1.727 m), weight 144 lb (65.3 kg), SpO2 96 %. Constitutional:  well-developed and well-nourished. No distress. HENT: Normocephalic. Eyes: No scleral icterus. Neck:  Neck supple. No JVD present. Pulmonary/Chest: Effort normal and breath sounds normal. No respiratory distress, wheezes or rales. Cardiovascular: Normal rate, irregular rhythm, S1 S2 . Exam reveals no gallop and no friction rub. No murmur heard. No edema. Extremities:  Normal muscle tone Abdominal:   No abnormal distension. Neurological:  Moving all extremities, cranial nerves appear grossly intact. Skin: Skin is not cold. Not diaphoretic. No erythema. Psychiatric:  Grossly normal mood and affect. Intact insight. Objective Data: 
 
ECG: personally reviewed and  intrepreted 12/5/2018 shows sinus rhythm right bundle branch block PACs Rahul Flores DO

## 2018-12-12 ENCOUNTER — HOSPITAL ENCOUNTER (OUTPATIENT)
Dept: LAB | Age: 83
Discharge: HOME | End: 2018-12-12
Payer: MEDICARE

## 2018-12-12 ENCOUNTER — OFFICE VISIT (OUTPATIENT)
Dept: FAMILY MEDICINE CLINIC | Age: 83
End: 2018-12-12

## 2018-12-12 VITALS
BODY MASS INDEX: 21.6 KG/M2 | RESPIRATION RATE: 18 BRPM | SYSTOLIC BLOOD PRESSURE: 96 MMHG | TEMPERATURE: 98.5 F | OXYGEN SATURATION: 93 % | HEIGHT: 68 IN | WEIGHT: 142.5 LBS | HEART RATE: 72 BPM | DIASTOLIC BLOOD PRESSURE: 56 MMHG

## 2018-12-12 DIAGNOSIS — R53.83 OTHER FATIGUE: ICD-10-CM

## 2018-12-12 DIAGNOSIS — R53.83 LOW ENERGY: ICD-10-CM

## 2018-12-12 DIAGNOSIS — Z85.46 HISTORY OF PROSTATE CANCER: ICD-10-CM

## 2018-12-12 DIAGNOSIS — R19.5 LOOSE STOOLS: Primary | ICD-10-CM

## 2018-12-12 PROCEDURE — 36415 COLL VENOUS BLD VENIPUNCTURE: CPT

## 2018-12-12 PROCEDURE — 83735 ASSAY OF MAGNESIUM: CPT

## 2018-12-12 PROCEDURE — 80053 COMPREHEN METABOLIC PANEL: CPT

## 2018-12-12 PROCEDURE — 84153 ASSAY OF PSA TOTAL: CPT

## 2018-12-12 PROCEDURE — 85027 COMPLETE CBC AUTOMATED: CPT

## 2018-12-12 PROCEDURE — 84443 ASSAY THYROID STIM HORMONE: CPT

## 2018-12-12 NOTE — PATIENT INSTRUCTIONS

## 2018-12-12 NOTE — PROGRESS NOTES
Patient's Oxygen level rechecked and only after having patient hold his hand down and taking deep breaths,  obtained a level of 93.

## 2018-12-12 NOTE — PROGRESS NOTES
Identified pt with two pt identifiers(name and ). Chief Complaint   Patient presents with    Diarrhea     Complains of having loose to watery stools 3-4 x day. Health Maintenance Due   Topic    LIPID PANEL Q1     FOOT EXAM Q1     MICROALBUMIN Q1     EYE EXAM RETINAL OR DILATED     DTaP/Tdap/Td series (1 - Tdap)    Shingrix Vaccine Age 50> (1 of 2)    GLAUCOMA SCREENING Q2Y     Pneumococcal 65+ High/Highest Risk (1 of 2 - PCV13)    Influenza Age 5 to Adult     MEDICARE YEARLY EXAM        Wt Readings from Last 3 Encounters:   18 144 lb (65.3 kg)   18 143 lb (64.9 kg)   18 140 lb 9.6 oz (63.8 kg)     Temp Readings from Last 3 Encounters:   18 98 °F (36.7 °C) (Temporal)   18 97.8 °F (36.6 °C) (Oral)   18 97.1 °F (36.2 °C)     BP Readings from Last 3 Encounters:   18 110/72   18 145/58   18 90/46     Pulse Readings from Last 3 Encounters:   18 (!) 54   18 79   18 70         Learning Assessment:  :     No flowsheet data found. Depression Screening:  :     PHQ over the last two weeks 2018   Little interest or pleasure in doing things Several days   Feeling down, depressed, irritable, or hopeless Not at all   Total Score PHQ 2 1       Fall Risk Assessment:  :     Fall Risk Assessment, last 12 mths 2018   Able to walk? Yes   Fall in past 12 months? No   Fall with injury? -   Number of falls in past 12 months -   Fall Risk Score -       Abuse Screening:  :     No flowsheet data found. Coordination of Care Questionnaire:  :     1) Have you been to an emergency room, urgent care clinic since your last visit? no   Hospitalized since your last visit? no             2) Have you seen or consulted any other health care providers outside of [address] [place] [address] since your last visit? yes  [facility] (Include any pap smears or colon screenings in this section.)    3) Do you have an Advance Directive on file? no  Are you interested in receiving information about Advance Directives? no    Patient is accompanied by daughter I have received verbal consent from Foodzie to discuss any/all medical information while they are present in the room. Patient presents for follow up on diarrhea stools. Reports having 3-4 loose to watery stools per day. Patient also sleeping a lot per daughter and son-in-law. Question if possibly getting depressed since his move from [place]. Also noted to have low O2 Sats. Reviewed record in preparation for visit and have obtained necessary documentation. Medication reconciliation up to date and corrected with patient at this time.

## 2018-12-13 ENCOUNTER — TELEPHONE (OUTPATIENT)
Dept: FAMILY MEDICINE CLINIC | Age: 83
End: 2018-12-13

## 2018-12-13 LAB
ALBUMIN SERPL-MCNC: 3.2 G/DL (ref 3.2–4.6)
ALBUMIN/GLOB SERPL: 1.4 {RATIO} (ref 1.2–2.2)
ALP SERPL-CCNC: 61 IU/L (ref 39–117)
ALT SERPL-CCNC: 9 IU/L (ref 0–44)
AST SERPL-CCNC: 15 IU/L (ref 0–40)
BILIRUB SERPL-MCNC: 0.2 MG/DL (ref 0–1.2)
BUN SERPL-MCNC: 19 MG/DL (ref 10–36)
BUN/CREAT SERPL: 21 (ref 10–24)
CALCIUM SERPL-MCNC: 8.5 MG/DL (ref 8.6–10.2)
CHLORIDE SERPL-SCNC: 103 MMOL/L (ref 96–106)
CO2 SERPL-SCNC: 25 MMOL/L (ref 20–29)
CREAT SERPL-MCNC: 0.89 MG/DL (ref 0.76–1.27)
ERYTHROCYTE [DISTWIDTH] IN BLOOD BY AUTOMATED COUNT: 18.9 % (ref 12.3–15.4)
GLOBULIN SER CALC-MCNC: 2.3 G/DL (ref 1.5–4.5)
GLUCOSE SERPL-MCNC: 174 MG/DL (ref 65–99)
HCT VFR BLD AUTO: 28.6 % (ref 37.5–51)
HGB BLD-MCNC: 8.4 G/DL (ref 13–17.7)
MAGNESIUM SERPL-MCNC: 1.7 MG/DL (ref 1.6–2.3)
MCH RBC QN AUTO: 21.6 PG (ref 26.6–33)
MCHC RBC AUTO-ENTMCNC: 29.4 G/DL (ref 31.5–35.7)
MCV RBC AUTO: 74 FL (ref 79–97)
PLATELET # BLD AUTO: 251 X10E3/UL (ref 150–379)
POTASSIUM SERPL-SCNC: 4.8 MMOL/L (ref 3.5–5.2)
PROT SERPL-MCNC: 5.5 G/DL (ref 6–8.5)
PSA SERPL-MCNC: <0.1 NG/ML (ref 0–4)
RBC # BLD AUTO: 3.89 X10E6/UL (ref 4.14–5.8)
SODIUM SERPL-SCNC: 142 MMOL/L (ref 134–144)
TSH SERPL DL<=0.005 MIU/L-ACNC: 2.06 UIU/ML (ref 0.45–4.5)
WBC # BLD AUTO: 7.6 X10E3/UL (ref 3.4–10.8)

## 2018-12-13 NOTE — TELEPHONE ENCOUNTER
----- Message from Katty Ruff MD sent at 12/13/2018  3:04 PM EST -----  Labs are stable and some are slightly better than last month. Still lower hemoglobin and improved platelets. Thyroid, PSA and magnesium are all normal.   At this point, his blood sugars are a little higher, but not outrageously so. I do think he should follow up with the gastroenterologist as we discussed as soon as possible for further evaluation.

## 2018-12-13 NOTE — TELEPHONE ENCOUNTER
Relayed information to patient's daughter (on Hippa) and she verbalized understanding. She also stated that Patient has an appointment with GI next week.

## 2018-12-13 NOTE — PROGRESS NOTES
Labs are stable and some are slightly better than last month. Still lower hemoglobin and improved platelets. Thyroid, PSA and magnesium are all normal.   At this point, his blood sugars are a little higher, but not outrageously so. I do think he should follow up with the gastroenterologist as we discussed as soon as possible for further evaluation.

## 2018-12-14 ENCOUNTER — TELEPHONE (OUTPATIENT)
Dept: CARDIOLOGY CLINIC | Age: 83
End: 2018-12-14

## 2018-12-14 NOTE — TELEPHONE ENCOUNTER
Good morning,    Patient's son in law(Ciaran) calling to discuss the patient's recent visit at the [facility].    Can be reached at [phone]. Thanks!

## 2018-12-15 ENCOUNTER — APPOINTMENT (OUTPATIENT)
Dept: GENERAL RADIOLOGY | Age: 83
DRG: 871 | End: 2018-12-15
Attending: EMERGENCY MEDICINE
Payer: MEDICARE

## 2018-12-15 ENCOUNTER — HOSPITAL ENCOUNTER (INPATIENT)
Age: 83
LOS: 7 days | Discharge: NURSING, REHAB OR LONG-TERM CARE | DRG: 871 | End: 2018-12-22
Attending: EMERGENCY MEDICINE | Admitting: FAMILY MEDICINE
Payer: MEDICARE

## 2018-12-15 DIAGNOSIS — Z71.89 DNR (DO NOT RESUSCITATE) DISCUSSION: ICD-10-CM

## 2018-12-15 DIAGNOSIS — Z71.89 GOALS OF CARE, COUNSELING/DISCUSSION: ICD-10-CM

## 2018-12-15 DIAGNOSIS — I95.9 HYPOTENSION, UNSPECIFIED HYPOTENSION TYPE: Primary | ICD-10-CM

## 2018-12-15 DIAGNOSIS — R53.81 DEBILITY: ICD-10-CM

## 2018-12-15 DIAGNOSIS — E86.0 DEHYDRATION: ICD-10-CM

## 2018-12-15 DIAGNOSIS — Z71.89 ADVANCED CARE PLANNING/COUNSELING DISCUSSION: ICD-10-CM

## 2018-12-15 LAB
ALBUMIN SERPL-MCNC: 2.5 G/DL (ref 3.5–5)
ALBUMIN/GLOB SERPL: 0.6 {RATIO} (ref 1.1–2.2)
ALP SERPL-CCNC: 66 U/L (ref 45–117)
ALT SERPL-CCNC: 13 U/L (ref 12–78)
AMORPH CRY URNS QL MICRO: ABNORMAL
ANION GAP SERPL CALC-SCNC: 9 MMOL/L (ref 5–15)
APPEARANCE UR: ABNORMAL
AST SERPL-CCNC: 26 U/L (ref 15–37)
BACTERIA URNS QL MICRO: ABNORMAL /HPF
BASOPHILS # BLD: 0 K/UL (ref 0–0.1)
BASOPHILS NFR BLD: 0 % (ref 0–1)
BILIRUB SERPL-MCNC: 0.5 MG/DL (ref 0.2–1)
BILIRUB UR QL: NEGATIVE
BUN SERPL-MCNC: 19 MG/DL (ref 6–20)
BUN/CREAT SERPL: 14 (ref 12–20)
CALCIUM SERPL-MCNC: 8.7 MG/DL (ref 8.5–10.1)
CHLORIDE SERPL-SCNC: 106 MMOL/L (ref 97–108)
CO2 SERPL-SCNC: 27 MMOL/L (ref 21–32)
COLOR UR: ABNORMAL
CREAT SERPL-MCNC: 1.33 MG/DL (ref 0.7–1.3)
DIFFERENTIAL METHOD BLD: ABNORMAL
EOSINOPHIL # BLD: 0 K/UL (ref 0–0.4)
EOSINOPHIL NFR BLD: 0 % (ref 0–7)
EPITH CASTS URNS QL MICRO: ABNORMAL /LPF
ERYTHROCYTE [DISTWIDTH] IN BLOOD BY AUTOMATED COUNT: 18.7 % (ref 11.5–14.5)
GLOBULIN SER CALC-MCNC: 4.1 G/DL (ref 2–4)
GLUCOSE BLD STRIP.AUTO-MCNC: 187 MG/DL (ref 65–100)
GLUCOSE SERPL-MCNC: 165 MG/DL (ref 65–100)
GLUCOSE UR STRIP.AUTO-MCNC: NEGATIVE MG/DL
HCT VFR BLD AUTO: 29.4 % (ref 36.6–50.3)
HGB BLD-MCNC: 8.6 G/DL (ref 12.1–17)
HGB UR QL STRIP: NEGATIVE
KETONES UR QL STRIP.AUTO: ABNORMAL MG/DL
LACTATE BLD-SCNC: 1.1 MMOL/L (ref 0.4–2)
LEUKOCYTE ESTERASE UR QL STRIP.AUTO: NEGATIVE
LYMPHOCYTES # BLD: 1.2 K/UL
LYMPHOCYTES NFR BLD: 10 % (ref 12–49)
MCH RBC QN AUTO: 21.7 PG (ref 26–34)
MCHC RBC AUTO-ENTMCNC: 29.3 G/DL (ref 30–36.5)
MCV RBC AUTO: 74.1 FL (ref 80–99)
MONOCYTES # BLD: 2.1 K/UL (ref 0–1)
MONOCYTES NFR BLD: 17 % (ref 5–13)
NEUTS SEG # BLD: 9.1 K/UL (ref 1.8–8)
NEUTS SEG NFR BLD: 73 % (ref 32–75)
NITRITE UR QL STRIP.AUTO: NEGATIVE
PH UR STRIP: 5.5 [PH] (ref 5–8)
PLATELET # BLD AUTO: 263 K/UL (ref 150–400)
PMV BLD AUTO: 11.6 FL (ref 8.9–12.9)
POTASSIUM SERPL-SCNC: 5.2 MMOL/L (ref 3.5–5.1)
PROT SERPL-MCNC: 6.6 G/DL (ref 6.4–8.2)
PROT UR STRIP-MCNC: 30 MG/DL
RBC # BLD AUTO: 3.97 M/UL (ref 4.1–5.7)
RBC #/AREA URNS HPF: ABNORMAL /HPF (ref 0–5)
RBC MORPH BLD: ABNORMAL
SERVICE CMNT-IMP: ABNORMAL
SODIUM SERPL-SCNC: 142 MMOL/L (ref 136–145)
SP GR UR REFRACTOMETRY: 1.02 (ref 1–1.03)
UROBILINOGEN UR QL STRIP.AUTO: 0.2 EU/DL (ref 0.2–1)
WBC # BLD AUTO: 12.4 K/UL (ref 4.1–11.1)
WBC URNS QL MICRO: ABNORMAL /HPF (ref 0–4)
XXWBCSUS: 1

## 2018-12-15 PROCEDURE — 74011250636 HC RX REV CODE- 250/636: Performed by: EMERGENCY MEDICINE

## 2018-12-15 PROCEDURE — 87086 URINE CULTURE/COLONY COUNT: CPT

## 2018-12-15 PROCEDURE — 96360 HYDRATION IV INFUSION INIT: CPT

## 2018-12-15 PROCEDURE — 80053 COMPREHEN METABOLIC PANEL: CPT

## 2018-12-15 PROCEDURE — 83605 ASSAY OF LACTIC ACID: CPT

## 2018-12-15 PROCEDURE — 74011250637 HC RX REV CODE- 250/637: Performed by: EMERGENCY MEDICINE

## 2018-12-15 PROCEDURE — 77030003560 HC NDL HUBR BARD -A

## 2018-12-15 PROCEDURE — 82962 GLUCOSE BLOOD TEST: CPT

## 2018-12-15 PROCEDURE — 74011000250 HC RX REV CODE- 250: Performed by: EMERGENCY MEDICINE

## 2018-12-15 PROCEDURE — 51701 INSERT BLADDER CATHETER: CPT

## 2018-12-15 PROCEDURE — 36415 COLL VENOUS BLD VENIPUNCTURE: CPT

## 2018-12-15 PROCEDURE — 81001 URINALYSIS AUTO W/SCOPE: CPT

## 2018-12-15 PROCEDURE — 65270000029 HC RM PRIVATE

## 2018-12-15 PROCEDURE — 71046 X-RAY EXAM CHEST 2 VIEWS: CPT

## 2018-12-15 PROCEDURE — 99285 EMERGENCY DEPT VISIT HI MDM: CPT

## 2018-12-15 PROCEDURE — 85025 COMPLETE CBC W/AUTO DIFF WBC: CPT

## 2018-12-15 PROCEDURE — 87040 BLOOD CULTURE FOR BACTERIA: CPT

## 2018-12-15 PROCEDURE — 71045 X-RAY EXAM CHEST 1 VIEW: CPT

## 2018-12-15 PROCEDURE — 96361 HYDRATE IV INFUSION ADD-ON: CPT

## 2018-12-15 PROCEDURE — 74011000258 HC RX REV CODE- 258: Performed by: EMERGENCY MEDICINE

## 2018-12-15 PROCEDURE — 77030011943

## 2018-12-15 RX ORDER — DEXTROSE 50 % IN WATER (D50W) INTRAVENOUS SYRINGE
12.5-25 AS NEEDED
Status: DISCONTINUED | OUTPATIENT
Start: 2018-12-15 | End: 2018-12-22 | Stop reason: HOSPADM

## 2018-12-15 RX ORDER — SODIUM CHLORIDE 0.9 % (FLUSH) 0.9 %
5-10 SYRINGE (ML) INJECTION EVERY 8 HOURS
Status: DISCONTINUED | OUTPATIENT
Start: 2018-12-15 | End: 2018-12-22 | Stop reason: HOSPADM

## 2018-12-15 RX ORDER — VANCOMYCIN HYDROCHLORIDE 250 MG/5ML
125 POWDER, FOR SOLUTION ORAL EVERY 6 HOURS
Status: DISCONTINUED | OUTPATIENT
Start: 2018-12-16 | End: 2018-12-17

## 2018-12-15 RX ORDER — ACETAMINOPHEN 325 MG/1
650 TABLET ORAL
Status: COMPLETED | OUTPATIENT
Start: 2018-12-15 | End: 2018-12-15

## 2018-12-15 RX ORDER — VANCOMYCIN/0.9 % SOD CHLORIDE 1.5G/250ML
1500 PLASTIC BAG, INJECTION (ML) INTRAVENOUS
Status: COMPLETED | OUTPATIENT
Start: 2018-12-15 | End: 2018-12-15

## 2018-12-15 RX ORDER — SODIUM CHLORIDE 0.9 % (FLUSH) 0.9 %
5-10 SYRINGE (ML) INJECTION AS NEEDED
Status: DISCONTINUED | OUTPATIENT
Start: 2018-12-15 | End: 2018-12-22 | Stop reason: HOSPADM

## 2018-12-15 RX ORDER — LANOLIN ALCOHOL/MO/W.PET/CERES
325 CREAM (GRAM) TOPICAL 2 TIMES DAILY
COMMUNITY
End: 2020-01-01

## 2018-12-15 RX ORDER — ACETAMINOPHEN 650 MG/1
650 SUPPOSITORY RECTAL
Status: DISCONTINUED | OUTPATIENT
Start: 2018-12-15 | End: 2018-12-22 | Stop reason: HOSPADM

## 2018-12-15 RX ORDER — SODIUM CHLORIDE 9 MG/ML
105 INJECTION, SOLUTION INTRAVENOUS CONTINUOUS
Status: DISCONTINUED | OUTPATIENT
Start: 2018-12-15 | End: 2018-12-17

## 2018-12-15 RX ORDER — PHENOL/SODIUM PHENOLATE
20 AEROSOL, SPRAY (ML) MUCOUS MEMBRANE DAILY
COMMUNITY
End: 2018-12-22

## 2018-12-15 RX ORDER — MAGNESIUM SULFATE 100 %
4 CRYSTALS MISCELLANEOUS AS NEEDED
Status: DISCONTINUED | OUTPATIENT
Start: 2018-12-15 | End: 2018-12-22 | Stop reason: HOSPADM

## 2018-12-15 RX ORDER — LOPERAMIDE HYDROCHLORIDE 2 MG/1
4 CAPSULE ORAL
Status: COMPLETED | OUTPATIENT
Start: 2018-12-15 | End: 2018-12-15

## 2018-12-15 RX ORDER — LEVOFLOXACIN 750 MG/1
750 TABLET ORAL
Status: COMPLETED | OUTPATIENT
Start: 2018-12-15 | End: 2018-12-15

## 2018-12-15 RX ORDER — LEVOFLOXACIN 5 MG/ML
750 INJECTION, SOLUTION INTRAVENOUS
Status: DISCONTINUED | OUTPATIENT
Start: 2018-12-17 | End: 2018-12-17

## 2018-12-15 RX ORDER — INSULIN LISPRO 100 [IU]/ML
INJECTION, SOLUTION INTRAVENOUS; SUBCUTANEOUS EVERY 6 HOURS
Status: DISCONTINUED | OUTPATIENT
Start: 2018-12-16 | End: 2018-12-16

## 2018-12-15 RX ORDER — HEPARIN SODIUM 5000 [USP'U]/ML
5000 INJECTION, SOLUTION INTRAVENOUS; SUBCUTANEOUS EVERY 8 HOURS
Status: DISCONTINUED | OUTPATIENT
Start: 2018-12-16 | End: 2018-12-22 | Stop reason: HOSPADM

## 2018-12-15 RX ADMIN — ACETAMINOPHEN 650 MG: 325 TABLET, FILM COATED ORAL at 17:40

## 2018-12-15 RX ADMIN — NOREPINEPHRINE BITARTRATE 5.01 MCG/MIN: 1 INJECTION INTRAVENOUS at 20:18

## 2018-12-15 RX ADMIN — SODIUM CHLORIDE 1000 ML: 900 INJECTION, SOLUTION INTRAVENOUS at 18:32

## 2018-12-15 RX ADMIN — NOREPINEPHRINE BITARTRATE 0 MG/MIN: 1 INJECTION, SOLUTION, CONCENTRATE INTRAVENOUS at 20:07

## 2018-12-15 RX ADMIN — LEVOFLOXACIN 750 MG: 750 TABLET, FILM COATED ORAL at 20:39

## 2018-12-15 RX ADMIN — VANCOMYCIN HYDROCHLORIDE 1500 MG: 10 INJECTION, POWDER, LYOPHILIZED, FOR SOLUTION INTRAVENOUS at 21:16

## 2018-12-15 RX ADMIN — LOPERAMIDE HYDROCHLORIDE 4 MG: 2 CAPSULE ORAL at 17:18

## 2018-12-15 RX ADMIN — SODIUM CHLORIDE 1000 ML: 900 INJECTION, SOLUTION INTRAVENOUS at 15:46

## 2018-12-15 RX ADMIN — SODIUM CHLORIDE 1000 ML: 900 INJECTION, SOLUTION INTRAVENOUS at 17:37

## 2018-12-15 RX ADMIN — NOREPINEPHRINE BITARTRATE 7 MCG/MIN: 1 INJECTION INTRAVENOUS at 22:21

## 2018-12-15 RX ADMIN — PIPERACILLIN SODIUM,TAZOBACTAM SODIUM 3.38 G: 3; .375 INJECTION, POWDER, FOR SOLUTION INTRAVENOUS at 20:39

## 2018-12-15 NOTE — ED TRIAGE NOTES
Patient ambulatory to ED treatment area with steady gait, accompanied by family, for complaint of \"he has had diarrhea for the past three weeks. For the past few days, he is not eating and he has not yet peed today. \" Per family, he has been seen by many doctors the past few weeks.

## 2018-12-15 NOTE — ED PROVIDER NOTES
The history is provided by the patient and a relative. Dehydration   This is a new problem. The current episode started more than 2 days ago. The problem occurs constantly. The problem has been gradually worsening. Pertinent negatives include no chest pain, no abdominal pain and no shortness of breath.   patient with diarrhea for last 3 weeks (GI appointment in 5 days) and decreased po intake over the last 2-3 days. Per family patient has not passed urine today. Past Medical History:   Diagnosis Date    Anemia     Arthritis     Cancer ([address]     Constipation     Diabetes ([address]     Gastrointestinal disorder     History of neoplasms, uncertain behavior     Of soft tissue and skin    Hypercholesterolemia     Hyperkalemia     Hypertension     Low back pain     Melanoma in situ of other parts of face ([address]     Lower lip; removed    Neuropathy     Right bundle-branch block     Spinal stenosis     Type II diabetes mellitus ([address]     Venous insufficiency        Past Surgical History:   Procedure Laterality Date    HX HEENT      HX ORTHOPAEDIC      HX PROSTATECTOMY      HX UROLOGICAL      NEUROLOGICAL PROCEDURE UNLISTED           History reviewed. No pertinent family history.     Social History     Socioeconomic History    Marital status:      Spouse name: Not on file    Number of children: Not on file    Years of education: Not on file    Highest education level: Not on file   Social Needs    Financial resource strain: Not on file    Food insecurity - worry: Not on file    Food insecurity - inability: Not on file    Transportation needs - medical: Not on file   [employer] needs - non-medical: Not on file   Occupational History    Not on file   Tobacco Use    Smoking status: Former Smoker    Smokeless tobacco: Never Used   Substance and Sexual Activity    Alcohol use: No     Frequency: Never    Drug use: No    Sexual activity: No   Other Topics Concern    Not on file Social History Narrative    ** Merged History Encounter **              ALLERGIES: Patient has no known allergies. Review of Systems   Constitutional: Positive for appetite change and fatigue. Negative for chills and fever. HENT:        Hard of hearing, wears hearing aids   Respiratory: Negative for chest tightness and shortness of breath. Cardiovascular: Negative for chest pain and leg swelling. Gastrointestinal: Positive for diarrhea. Negative for abdominal pain, nausea and vomiting. Genitourinary: Positive for decreased urine volume. Negative for flank pain. Musculoskeletal: Negative for back pain and neck pain. All other systems reviewed and are negative. There were no vitals filed for this visit. Physical Exam   Constitutional: He is oriented to person, place, and time. He appears well-developed and well-nourished. HENT:   Head: Normocephalic and atraumatic. Eyes: Conjunctivae and EOM are normal.   Cardiovascular: Normal heart sounds and intact distal pulses. Exam reveals no gallop and no friction rub. No murmur heard. Pulmonary/Chest: Effort normal and breath sounds normal. No stridor. No respiratory distress. Abdominal: Soft. Bowel sounds are normal. He exhibits no distension. There is no tenderness. Musculoskeletal: Normal range of motion. He exhibits no edema, tenderness or deformity. Neurological: He is alert and oriented to person, place, and time. Psychiatric: He has a normal mood and affect. Nursing note and vitals reviewed. MDM  Number of Diagnoses or Management Options  Dehydration:   Hypotension, unspecified hypotension type:   Diagnosis management comments: Patient with decreased po intake, reported ongoing diarrhea. Family concerned that the patient is becoming dehydrated. Check labs and give IVF and re-eval.  Patient due to see GI in 5 days for the diarrhea.       1730 - patient improved, still awaiting urine, will give more IVF     1825 - patient BP went down, urine dark and cloudy, will admit for further care due to hypotension. Spoke to [facility] resident for Dr. Benny Smith - they will accept and put in for step-down bed. Still no source for fever - previous notes show concern for infectious diarrhea - only a small amount of stool here in the ED reported by nursing. With lactic acid normal - I don't believe the hypotension is due to sepsis, I believe it's due to volume depletion from gi losses and poor po intake.        Amount and/or Complexity of Data Reviewed  Clinical lab tests: ordered and reviewed  Tests in the radiology section of CPT®: reviewed and ordered  Obtain history from someone other than the patient: yes (Family members)  Discuss the patient with other providers: yes  Independent visualization of images, tracings, or specimens: yes (CXR - no infiltrate seen)    Risk of Complications, Morbidity, and/or Mortality  Presenting problems: high  Diagnostic procedures: high  Management options: high    Critical Care  Total time providing critical care: 30-74 minutes (Total critical care time spent exclusive of procedures:  30 minutes, hypotension, fluid resuscitation, discussion with patient, family and other providers)         Procedures           VITALS:   Patient Vitals for the past 8 hrs:   Temp Pulse Resp BP SpO2   12/15/18 1715  (!) 108 21 95/53 93 %   12/15/18 1700  (!) 112 22 (!) 88/57 92 %   12/15/18 1645  (!) 109 24 102/56 93 %   12/15/18 1630  (!) 101 18 95/58 96 %   12/15/18 1626  (!) 103   91 %   12/15/18 1601 (!) 101.5 °F (38.6 °C) (!) 105   91 %   12/15/18 1600    (!) 87/49 (!) 89 %   12/15/18 1540    90/51 92 %   12/15/18 1532 100.3 °F (37.9 °C) (!) 102 18 94/77 98 %                  Recent Results (from the past 24 hour(s))   CBC WITH AUTOMATED DIFF    Collection Time: 12/15/18  3:47 PM   Result Value Ref Range    WBC 12.4 (H) 4.1 - 11.1 K/uL    RBC 3.97 (L) 4.10 - 5.70 M/uL    HGB 8.6 (L) 12.1 - 17.0 g/dL    HCT 29.4 (L) 36.6 - 50.3 %    MCV 74.1 (L) 80.0 - 99.0 FL    MCH 21.7 (L) 26.0 - 34.0 PG    MCHC 29.3 (L) 30.0 - 36.5 g/dL    RDW 18.7 (H) 11.5 - 14.5 %    PLATELET 322 447 - 154 K/uL    MPV 11.6 8.9 - 12.9 FL    NEUTROPHILS 73 32 - 75 %    LYMPHOCYTES 10 (L) 12 - 49 %    MONOCYTES 17 (H) 5 - 13 %    EOSINOPHILS 0 0 - 7 %    BASOPHILS 0 0 - 1 %    ABS. NEUTROPHILS 9.1 (H) 1.8 - 8.0 K/UL    ABS. LYMPHOCYTES 1.2 K/UL    ABS. MONOCYTES 2.1 (H) 0.0 - 1.0 K/UL    ABS. EOSINOPHILS 0.0 0.0 - 0.4 K/UL    ABS. BASOPHILS 0.0 0.0 - 0.1 K/UL    DF SMEAR SCANNED      RBC COMMENTS POIKILOCYTOSIS  1+        RBC COMMENTS OVALOCYTES  1+        RBC COMMENTS HYPOCHROMIA  1+        XXWBCSUS 1     METABOLIC PANEL, COMPREHENSIVE    Collection Time: 12/15/18  3:47 PM   Result Value Ref Range    Sodium 142 136 - 145 mmol/L    Potassium 5.2 (H) 3.5 - 5.1 mmol/L    Chloride 106 97 - 108 mmol/L    CO2 27 21 - 32 mmol/L    Anion gap 9 5 - 15 mmol/L    Glucose 165 (H) 65 - 100 mg/dL    BUN 19 6 - 20 MG/DL    Creatinine 1.33 (H) 0.70 - 1.30 MG/DL    BUN/Creatinine ratio 14 12 - 20      GFR est AA >60 >60 ml/min/1.73m2    GFR est non-AA 50 (L) >60 ml/min/1.73m2    Calcium 8.7 8.5 - 10.1 MG/DL    Bilirubin, total 0.5 0.2 - 1.0 MG/DL    ALT (SGPT) 13 12 - 78 U/L    AST (SGOT) 26 15 - 37 U/L    Alk.  phosphatase 66 45 - 117 U/L    Protein, total 6.6 6.4 - 8.2 g/dL    Albumin 2.5 (L) 3.5 - 5.0 g/dL    Globulin 4.1 (H) 2.0 - 4.0 g/dL    A-G Ratio 0.6 (L) 1.1 - 2.2     POC LACTIC ACID    Collection Time: 12/15/18  4:13 PM   Result Value Ref Range    Lactic Acid (POC) 1.10 0.40 - 2.00 mmol/L   URINALYSIS W/ RFLX MICROSCOPIC    Collection Time: 12/15/18  6:10 PM   Result Value Ref Range    Color YELLOW/STRAW      Appearance HAZY (A) CLEAR      Specific gravity 1.025 1.003 - 1.030      pH (UA) 5.5 5.0 - 8.0      Protein 30 (A) NEG mg/dL    Glucose NEGATIVE  NEG mg/dL    Ketone TRACE (A) NEG mg/dL    Bilirubin NEGATIVE  NEG      Blood NEGATIVE  NEG      Urobilinogen 0.2 0.2 - 1.0 EU/dL    Nitrites NEGATIVE  NEG      Leukocyte Esterase NEGATIVE  NEG         Xr Chest Port    Result Date: 12/15/2018  INDICATION: . fever Additional history: Dehydration. Hypotension. Fever. COMPARISON: None. LIMITATIONS: Portable technique. Rotation. Tor Pettit FINDINGS: Single frontal view of the chest. . Lines/tubes/surgical: A port in the right chest is a catheter which projects to terminate in the right atrium. Heart/mediastinum: Calcifications in the aortic arch. Lungs/pleura: Low lung volumes without definite focal consolidation. No visualized pleural effusion or pneumothorax. Additional Comments: Degenerative changes in the spine. .    IMPRESSION: 1. No radiographic evidence of acute cardiopulmonary disease.

## 2018-12-15 NOTE — ED NOTES
Notified Dr. Calderon Minium of pt's BP and MAP of 53. Pt receiving IVF. No new orders at this time.

## 2018-12-15 NOTE — ED NOTES
Dx lymphoma 2014. Last chemo was May/June 2018 (last time port was used). Due to have port flushed in January.

## 2018-12-15 NOTE — ED NOTES
Dr. Gabino Bullard at bedside. Pt drinking thickened water. Pt requests to drink more fluids before straight cath.  Dr. Gabino Bullard aware

## 2018-12-15 NOTE — ED NOTES
Pt had incontinent loose BM. JOSE Nassar and this nurse cleaned [place] area and bottom. Slight redness to coccyx, but no open areas. Applied clean depends and repositioned pt in bed for comfort.

## 2018-12-15 NOTE — ED NOTES
Pt's BP running low and running a temperature. Dr. Frankie Scott aware and poc lactic acid ordered. Pt receiving IVF without difficulty. Lung sounds clear. Pt resting with eyes closed, even and unlabored respirations. Family at bedside. Call bell within reach, will continue to monitor.

## 2018-12-15 NOTE — ED NOTES
Straight cath urine with 15 fr. Cath shannan [facility], RN. Followed sterile procedure per protocol. 75 cc of dark yellow urine drained. Pt tolerated well. Specimen sent to lab as ordered.

## 2018-12-16 LAB
ALBUMIN SERPL-MCNC: 1.8 G/DL (ref 3.5–5)
ALBUMIN/GLOB SERPL: 0.5 {RATIO} (ref 1.1–2.2)
ALP SERPL-CCNC: 56 U/L (ref 45–117)
ALT SERPL-CCNC: 10 U/L (ref 12–78)
ANION GAP SERPL CALC-SCNC: 8 MMOL/L (ref 5–15)
AST SERPL-CCNC: 15 U/L (ref 15–37)
B PERT DNA SPEC QL NAA+PROBE: NOT DETECTED
BASOPHILS # BLD: 0 K/UL (ref 0–0.1)
BASOPHILS NFR BLD: 0 % (ref 0–1)
BILIRUB SERPL-MCNC: 0.5 MG/DL (ref 0.2–1)
BUN SERPL-MCNC: 16 MG/DL (ref 6–20)
BUN/CREAT SERPL: 15 (ref 12–20)
C PNEUM DNA SPEC QL NAA+PROBE: NOT DETECTED
CALCIUM SERPL-MCNC: 7.3 MG/DL (ref 8.5–10.1)
CHLORIDE SERPL-SCNC: 112 MMOL/L (ref 97–108)
CO2 SERPL-SCNC: 23 MMOL/L (ref 21–32)
CREAT SERPL-MCNC: 1.05 MG/DL (ref 0.7–1.3)
DIFFERENTIAL METHOD BLD: ABNORMAL
EOSINOPHIL # BLD: 0 K/UL (ref 0–0.4)
EOSINOPHIL NFR BLD: 0 % (ref 0–7)
ERYTHROCYTE [DISTWIDTH] IN BLOOD BY AUTOMATED COUNT: 18.7 % (ref 11.5–14.5)
FLUAV H1 2009 PAND RNA SPEC QL NAA+PROBE: NOT DETECTED
FLUAV H1 RNA SPEC QL NAA+PROBE: NOT DETECTED
FLUAV H3 RNA SPEC QL NAA+PROBE: NOT DETECTED
FLUAV SUBTYP SPEC NAA+PROBE: NOT DETECTED
FLUBV RNA SPEC QL NAA+PROBE: NOT DETECTED
FOLATE SERPL-MCNC: 55 NG/ML (ref 5–21)
GLOBULIN SER CALC-MCNC: 3.3 G/DL (ref 2–4)
GLUCOSE BLD STRIP.AUTO-MCNC: 124 MG/DL (ref 65–100)
GLUCOSE BLD STRIP.AUTO-MCNC: 133 MG/DL (ref 65–100)
GLUCOSE BLD STRIP.AUTO-MCNC: 151 MG/DL (ref 65–100)
GLUCOSE BLD STRIP.AUTO-MCNC: 171 MG/DL (ref 65–100)
GLUCOSE SERPL-MCNC: 182 MG/DL (ref 65–100)
HADV DNA SPEC QL NAA+PROBE: NOT DETECTED
HCOV 229E RNA SPEC QL NAA+PROBE: NOT DETECTED
HCOV HKU1 RNA SPEC QL NAA+PROBE: NOT DETECTED
HCOV NL63 RNA SPEC QL NAA+PROBE: NOT DETECTED
HCOV OC43 RNA SPEC QL NAA+PROBE: NOT DETECTED
HCT VFR BLD AUTO: 25.1 % (ref 36.6–50.3)
HCT VFR BLD AUTO: 27.7 % (ref 36.6–50.3)
HGB BLD-MCNC: 7 G/DL (ref 12.1–17)
HGB BLD-MCNC: 7.8 G/DL (ref 12.1–17)
HMPV RNA SPEC QL NAA+PROBE: NOT DETECTED
HPIV1 RNA SPEC QL NAA+PROBE: NOT DETECTED
HPIV2 RNA SPEC QL NAA+PROBE: NOT DETECTED
HPIV3 RNA SPEC QL NAA+PROBE: NOT DETECTED
HPIV4 RNA SPEC QL NAA+PROBE: NOT DETECTED
IMM GRANULOCYTES # BLD: 0 K/UL
IMM GRANULOCYTES NFR BLD AUTO: 0 %
LYMPHOCYTES # BLD: 0.6 K/UL (ref 0.8–3.5)
LYMPHOCYTES NFR BLD: 6 % (ref 12–49)
M PNEUMO DNA SPEC QL NAA+PROBE: NOT DETECTED
MCH RBC QN AUTO: 21.2 PG (ref 26–34)
MCHC RBC AUTO-ENTMCNC: 27.9 G/DL (ref 30–36.5)
MCV RBC AUTO: 76.1 FL (ref 80–99)
METAMYELOCYTES NFR BLD MANUAL: 3 %
MONOCYTES # BLD: 1.4 K/UL (ref 0–1)
MONOCYTES NFR BLD: 14 % (ref 5–13)
NEUTS BAND NFR BLD MANUAL: 14 % (ref 0–6)
NEUTS SEG # BLD: 7.5 K/UL (ref 1.8–8)
NEUTS SEG NFR BLD: 63 % (ref 32–75)
NRBC # BLD: 0 K/UL (ref 0–0.01)
NRBC BLD-RTO: 0 PER 100 WBC
PLATELET # BLD AUTO: 193 K/UL (ref 150–400)
PMV BLD AUTO: 10.4 FL (ref 8.9–12.9)
POTASSIUM SERPL-SCNC: 4 MMOL/L (ref 3.5–5.1)
PROT SERPL-MCNC: 5.1 G/DL (ref 6.4–8.2)
RBC # BLD AUTO: 3.3 M/UL (ref 4.1–5.7)
RBC MORPH BLD: ABNORMAL
RETICS # AUTO: 0.01 M/UL (ref 0.03–0.1)
RETICS/RBC NFR AUTO: 0.4 % (ref 0.7–2.1)
RSV RNA SPEC QL NAA+PROBE: NOT DETECTED
RV+EV RNA SPEC QL NAA+PROBE: NOT DETECTED
SERVICE CMNT-IMP: ABNORMAL
SODIUM SERPL-SCNC: 143 MMOL/L (ref 136–145)
VIT B12 SERPL-MCNC: >2000 PG/ML (ref 193–986)
WBC # BLD AUTO: 9.8 K/UL (ref 4.1–11.1)
WBC MORPH BLD: ABNORMAL

## 2018-12-16 PROCEDURE — 83735 ASSAY OF MAGNESIUM: CPT

## 2018-12-16 PROCEDURE — 74011000258 HC RX REV CODE- 258: Performed by: STUDENT IN AN ORGANIZED HEALTH CARE EDUCATION/TRAINING PROGRAM

## 2018-12-16 PROCEDURE — 87449 NOS EACH ORGANISM AG IA: CPT

## 2018-12-16 PROCEDURE — 74011000250 HC RX REV CODE- 250: Performed by: EMERGENCY MEDICINE

## 2018-12-16 PROCEDURE — C1758 CATHETER, URETERAL: HCPCS

## 2018-12-16 PROCEDURE — 87633 RESP VIRUS 12-25 TARGETS: CPT

## 2018-12-16 PROCEDURE — 82962 GLUCOSE BLOOD TEST: CPT

## 2018-12-16 PROCEDURE — 74011250636 HC RX REV CODE- 250/636: Performed by: STUDENT IN AN ORGANIZED HEALTH CARE EDUCATION/TRAINING PROGRAM

## 2018-12-16 PROCEDURE — 82607 VITAMIN B-12: CPT

## 2018-12-16 PROCEDURE — 87899 AGENT NOS ASSAY W/OPTIC: CPT

## 2018-12-16 PROCEDURE — 86738 MYCOPLASMA ANTIBODY: CPT

## 2018-12-16 PROCEDURE — 74011636637 HC RX REV CODE- 636/637: Performed by: STUDENT IN AN ORGANIZED HEALTH CARE EDUCATION/TRAINING PROGRAM

## 2018-12-16 PROCEDURE — 87045 FECES CULTURE AEROBIC BACT: CPT

## 2018-12-16 PROCEDURE — 86923 COMPATIBILITY TEST ELECTRIC: CPT

## 2018-12-16 PROCEDURE — 87147 CULTURE TYPE IMMUNOLOGIC: CPT

## 2018-12-16 PROCEDURE — 85045 AUTOMATED RETICULOCYTE COUNT: CPT

## 2018-12-16 PROCEDURE — 93005 ELECTROCARDIOGRAM TRACING: CPT

## 2018-12-16 PROCEDURE — 82746 ASSAY OF FOLIC ACID SERUM: CPT

## 2018-12-16 PROCEDURE — 87177 OVA AND PARASITES SMEARS: CPT

## 2018-12-16 PROCEDURE — C9113 INJ PANTOPRAZOLE SODIUM, VIA: HCPCS | Performed by: STUDENT IN AN ORGANIZED HEALTH CARE EDUCATION/TRAINING PROGRAM

## 2018-12-16 PROCEDURE — 77030032490 HC SLV COMPR SCD KNE COVD -B

## 2018-12-16 PROCEDURE — 74011000258 HC RX REV CODE- 258: Performed by: EMERGENCY MEDICINE

## 2018-12-16 PROCEDURE — 65610000006 HC RM INTENSIVE CARE

## 2018-12-16 PROCEDURE — 86900 BLOOD TYPING SEROLOGIC ABO: CPT

## 2018-12-16 PROCEDURE — 77030020186 HC BOOT HL PROTCT SAGE -B

## 2018-12-16 PROCEDURE — 74011250637 HC RX REV CODE- 250/637: Performed by: EMERGENCY MEDICINE

## 2018-12-16 PROCEDURE — 74011250637 HC RX REV CODE- 250/637: Performed by: STUDENT IN AN ORGANIZED HEALTH CARE EDUCATION/TRAINING PROGRAM

## 2018-12-16 PROCEDURE — 85018 HEMOGLOBIN: CPT

## 2018-12-16 PROCEDURE — 87070 CULTURE OTHR SPECIMN AEROBIC: CPT

## 2018-12-16 PROCEDURE — 80053 COMPREHEN METABOLIC PANEL: CPT

## 2018-12-16 PROCEDURE — 84443 ASSAY THYROID STIM HORMONE: CPT

## 2018-12-16 RX ORDER — INSULIN LISPRO 100 [IU]/ML
INJECTION, SOLUTION INTRAVENOUS; SUBCUTANEOUS
Status: DISCONTINUED | OUTPATIENT
Start: 2018-12-16 | End: 2018-12-22 | Stop reason: HOSPADM

## 2018-12-16 RX ORDER — NYSTATIN 100000 [USP'U]/ML
500000 SUSPENSION ORAL 4 TIMES DAILY
Status: DISCONTINUED | OUTPATIENT
Start: 2018-12-16 | End: 2018-12-22 | Stop reason: HOSPADM

## 2018-12-16 RX ORDER — SODIUM CHLORIDE 9 MG/ML
250 INJECTION, SOLUTION INTRAVENOUS AS NEEDED
Status: DISCONTINUED | OUTPATIENT
Start: 2018-12-16 | End: 2018-12-22 | Stop reason: HOSPADM

## 2018-12-16 RX ADMIN — Medication 10 ML: at 06:02

## 2018-12-16 RX ADMIN — NYSTATIN 500000 UNITS: 500000 SUSPENSION ORAL at 10:18

## 2018-12-16 RX ADMIN — PANTOPRAZOLE SODIUM 40 MG: 40 INJECTION, POWDER, FOR SOLUTION INTRAVENOUS at 10:18

## 2018-12-16 RX ADMIN — PHENYLEPHRINE HYDROCHLORIDE 30 MCG/MIN: 10 INJECTION INTRAVENOUS at 23:57

## 2018-12-16 RX ADMIN — VANCOMYCIN HYDROCHLORIDE 1000 MG: 1 INJECTION, POWDER, LYOPHILIZED, FOR SOLUTION INTRAVENOUS at 21:58

## 2018-12-16 RX ADMIN — VANCOMYCIN HYDROCHLORIDE 125 MG: KIT at 17:06

## 2018-12-16 RX ADMIN — SODIUM CHLORIDE 105 ML/HR: 900 INJECTION, SOLUTION INTRAVENOUS at 00:05

## 2018-12-16 RX ADMIN — INSULIN LISPRO 2 UNITS: 100 INJECTION, SOLUTION INTRAVENOUS; SUBCUTANEOUS at 06:18

## 2018-12-16 RX ADMIN — Medication 10 ML: at 15:06

## 2018-12-16 RX ADMIN — VANCOMYCIN HYDROCHLORIDE 125 MG: KIT at 23:16

## 2018-12-16 RX ADMIN — ACETAMINOPHEN 650 MG: 650 SUPPOSITORY RECTAL at 16:34

## 2018-12-16 RX ADMIN — HEPARIN SODIUM 5000 UNITS: 5000 INJECTION INTRAVENOUS; SUBCUTANEOUS at 06:02

## 2018-12-16 RX ADMIN — SODIUM CHLORIDE 105 ML/HR: 900 INJECTION, SOLUTION INTRAVENOUS at 10:29

## 2018-12-16 RX ADMIN — VANCOMYCIN HYDROCHLORIDE 125 MG: KIT at 13:07

## 2018-12-16 RX ADMIN — ACETAMINOPHEN 650 MG: 650 SUPPOSITORY RECTAL at 03:08

## 2018-12-16 RX ADMIN — NYSTATIN 500000 UNITS: 500000 SUSPENSION ORAL at 17:06

## 2018-12-16 RX ADMIN — NYSTATIN 500000 UNITS: 500000 SUSPENSION ORAL at 21:58

## 2018-12-16 RX ADMIN — VANCOMYCIN HYDROCHLORIDE 125 MG: KIT at 00:05

## 2018-12-16 RX ADMIN — SODIUM CHLORIDE 500 ML: 900 INJECTION, SOLUTION INTRAVENOUS at 23:58

## 2018-12-16 RX ADMIN — HEPARIN SODIUM 5000 UNITS: 5000 INJECTION INTRAVENOUS; SUBCUTANEOUS at 21:59

## 2018-12-16 RX ADMIN — PIPERACILLIN SODIUM,TAZOBACTAM SODIUM 3.38 G: 3; .375 INJECTION, POWDER, FOR SOLUTION INTRAVENOUS at 06:02

## 2018-12-16 RX ADMIN — ALTEPLASE 1 MG: 2.2 INJECTION, POWDER, LYOPHILIZED, FOR SOLUTION INTRAVENOUS at 21:07

## 2018-12-16 RX ADMIN — NOREPINEPHRINE BITARTRATE 4 MCG/MIN: 1 INJECTION INTRAVENOUS at 22:06

## 2018-12-16 RX ADMIN — Medication 10 ML: at 00:06

## 2018-12-16 RX ADMIN — NYSTATIN 500000 UNITS: 500000 SUSPENSION ORAL at 14:00

## 2018-12-16 RX ADMIN — Medication 10 ML: at 00:11

## 2018-12-16 RX ADMIN — HEPARIN SODIUM 5000 UNITS: 5000 INJECTION INTRAVENOUS; SUBCUTANEOUS at 15:06

## 2018-12-16 RX ADMIN — VANCOMYCIN HYDROCHLORIDE 125 MG: KIT at 06:02

## 2018-12-16 NOTE — PROGRESS NOTES
Pharmacy Dosing Note:    Patient is on Vancomycin, Zosyn, and Levaquin - All have the potential to cause OSVALDO and in combination, Zosyn and Vancomycin have a higher potential to worsen renal function. Please consider Cefepime/Flagyl combination instead of Zosyn if renal function does not continue to improve.     Thank you,  Shasta Leon, PharmD BCPS

## 2018-12-16 NOTE — CONSULTS
[facility]  INTENSIVIST DAILY PROGRESS NOTE  Name: Rissa Ray   : 1927   MRN: [id number]   Date: 2018 10:27 AM   I have reviewed the flowsheet and previous days notes. The patient is a 79 yo male who presented to the hospital from the [facility] THE [facility] ER for diarrhea and weakness. Diarrhea has been present for months. Noted poor po intake. He swallowing difficulty with thin liquids. He done well with nectar thick liquids per his daughter. He was hypotensive in the ER and give over 3 liters of fluid and started on levophed. Lactate has been normal at 1.3 and 0.8. Creatinine has improved from 1.3 to 1.05 since admission. He continues to have diarrhea this morning. He denies SOB, cough or chest pain. The patient is hungry and wishes to eat. He says he cannot do anything without having a BM. PMH: prostate cancer  B-cell lymphoma  Hx of c.diff  Dysphagia  Htn   Diabetes    Social: nonsmoker  FH: noncontributory  ROS:No SOB, chest pain, or abd pain. Unsteady gait. Further available 12 pt ros negative except as per the HPI    Vital Signs:    Visit Vitals  /67   Pulse 100   Temp 99.7 °F (37.6 °C)   Resp 18   Ht 5' 8\" (1.727 m)   Wt 65.3 kg (144 lb)   SpO2 94%   BMI 21.90 kg/m²       O2 Device: Nasal cannula   O2 Flow Rate (L/min): 2 l/min   Temp (24hrs), Av.9 °F (37.7 °C), Min:98.3 °F (36.8 °C), Max:101.5 °F (38.6 °C)       Intake/Output:   Last shift:       07 - 1900  In: 1068.6 [I.V.:1068.6]  Out: -   Last 3 shifts: 1901 -  0700  In: 3100 [I.V.:3100]  Out: 400 [Urine:400]    Intake/Output Summary (Last 24 hours) at 2018 1027  Last data filed at 2018 0800  Gross per 24 hour   Intake 4168.6 ml   Output 400 ml   Net 3768.6 ml       Physical Exam:  General:  Alert, cooperative, no distress, frail   Head:  Normocephalic   Eyes:  EOMs intact.    Nose: Nares normal.   O2 off currently   Throat: Lips normal.   Neck: Supple       Lungs: Decrease in bases   Chest wall:  No tenderness    Heart:  Regular rate and rhythm   Abdomen:   Soft, non-tender. Extremities: Trace edema.        Skin: Warm, dry       Neurologic: Alert, hard of hearing, no focal changes       DATA:   Current Facility-Administered Medications   Medication Dose Route Frequency    nystatin (MYCOSTATIN) 100,000 unit/mL oral suspension 500,000 Units  500,000 Units Oral QID    sodium chloride (NS) flush 5-10 mL  5-10 mL IntraVENous Q8H    sodium chloride (NS) flush 5-10 mL  5-10 mL IntraVENous PRN    vancomycin (FIRVANQ) 50 mg/mL oral solution 125 mg  125 mg Oral Q6H    NOREPINephrine (LEVOPHED) 8 mg in dextrose 5% 250 mL infusion  2-16 mcg/min IntraVENous TITRATE    sodium chloride (NS) flush 5-10 mL  5-10 mL IntraVENous Q8H    sodium chloride (NS) flush 5-10 mL  5-10 mL IntraVENous PRN    acetaminophen (TYLENOL) suppository 650 mg  650 mg Rectal Q4H PRN    heparin (porcine) injection 5,000 Units  5,000 Units SubCUTAneous Q8H    0.9% sodium chloride infusion  105 mL/hr IntraVENous CONTINUOUS    insulin lispro (HUMALOG) injection   SubCUTAneous Q6H    glucose chewable tablet 16 g  4 Tab Oral PRN    dextrose (D50W) injection syrg 12.5-25 g  12.5-25 g IntraVENous PRN    glucagon (GLUCAGEN) injection 1 mg  1 mg IntraMUSCular PRN    pantoprazole (PROTONIX) 40 mg in sodium chloride 0.9% 10 mL injection  40 mg IntraVENous DAILY    piperacillin-tazobactam (ZOSYN) 3.375 g in 0.9% sodium chloride (MBP/ADV) 100 mL  3.375 g IntraVENous Q8H    [START ON 12/17/2018] levoFLOXacin (LEVAQUIN) 750 mg in D5W IVPB  750 mg IntraVENous Q48H    Vancomycin: Rx to dose based on levels   Other Rx Dosing/Monitoring       Telemetry: sinus rhythm          Labs:  Recent Labs     12/16/18  0256 12/15/18  1547   WBC 9.8 12.4*   HGB 7.0* 8.6*   HCT 25.1* 29.4*    263     Recent Labs     12/16/18  0256 12/15/18  1547    142   K 4.0 5.2*   * 106   CO2 23 27   * 165*   BUN 16 19   CREA 1.05 1.33*   CA 7.3* 8.7   ALB 1.8* 2.5*   SGOT 15 26   ALT 10* 13     No results for input(s): PH, PCO2, PO2, HCO3, FIO2 in the last 72 hours. Imaging:  I have personally reviewed the patients radiographs and reports. PA/lateral film suggests bibasilar airspace disease       IMPRESSION:   · Hypotension: appears primarily related to volume depletion from chronic diarrhea  · Hx of C.diff, concern for recurrence  · Dysphagia with reported good response to nectar thick liquids  · ? Pneumonia: history not suggestive as he has minimal resp symptoms   PLAN:   · Agree with checking stool for studies including c.diff and continuing po vancomycin  · Suggest stopping zosyn to limit other abx as able  · Will continue Levaquin for now as it will cover other infectious diarhea and possible pulm infection. Stop if c.diff returns positive  · Clinically monitor for changes in pulm status  · If stool studies unrevealing suggest GI eval  · Discussed with daughter and will resume dysphagia diet with aspiration precautions at their request.  Potential risks reviewed. · Continue IV fluids and wean pressors for MAP>65  · PT/OT when GI symptoms controlled   GLOBAL ISSUES:   · Head of bed elevated  · DVT Prophylaxis: heparin SQ     The pt is critically ill.     See my orders for details    My assessment/plan was discussed with: nurse, daughter        Charley Reagan MD

## 2018-12-16 NOTE — ED NOTES
Time out: verbal report to [facility]. PT, [facility], and family aware that pt is being transferred to [facility] ER. Transfer Assessment: Patient A&O x 3 and in no distress. Physical re-examination reveals slight improvement in pts condition with reassessment of vital signs completed at the time of admission transfer. Pt speaking with staff and interacting well. Family has pt's belongings. EMTALA sent with pt.  Pt transferred with Levophed and Vancomycin infusing per [facility] ([facility] placed on their IV pumps)

## 2018-12-16 NOTE — PROGRESS NOTES
@0700 Bedside and Verbal shift change report given to Samm Obrien RN (oncoming nurse) by Mimi Engel RN (offgoing nurse). Report included the following information SBAR, Kardex, ED Summary, Procedure Summary, Intake/Output, MAR, Accordion, Recent Results, Med Rec Status, Cardiac Rhythm Sinus and Alarm Parameters . Overnight events reviewed. Received 3 liters of fluid, Levophed 7 mcg/min, and on board antibiotics for coverage. PBC and urine culture sent. Noted plan of care is to obtain stool specimen, urine studies, and some serum blood work. Lactic 0.8 no longer trending. Also on aspiration precautions secondary to aspiration pneumonia per MDs. Will need to see speech in am to be cleared for diet. @0830 Sputum and Stool specimens obtain and sent to lab. Noted some blanchable redness to sacrum and right hip. Mobility consulted for Q2H turns will add boots to protect heels. @6529 Dr. Bryant Arcos arrives and exams the patient. Discontinues Zosyn and orders dysphagia diet nectar thick after meeting with family. @1300 Hgb and Type and screen obtain. [place] catheter placed as patient spilled urine twice in bed. @1400 Hgb 7.8 will continue to monitor and await orders. @9956 Patient fever 100.1 with some chills. Gave tylenol 650 mg suppository. Condom cath no longer intact and replaced. @1700 BP 82/48 (55), Levophed increase to 4 mcg/min with effect. @1900 Bedside and Verbal shift change report given to Jo Singleton RN (oncoming nurse) by Samm Obrien RN (offgoing nurse). Report included the following information SBAR, Kardex, ED Summary, Procedure Summary, Intake/Output, MAR, Accordion, Recent Results, Med Rec Status, Cardiac Rhythm Sinus and Alarm Parameters .

## 2018-12-16 NOTE — H&P
[address] [facility] [address] [place] [address]   Office [phone]  Fax [phone]       Admission H&P     Name: Franki Palma MRN: [id number]  Sex: Male   YOB: 1927  Age: 80 y.o. PCP: Sharee Kim MD     Source of Information: patient, medical records, patient's daughter    Chief complaint: Diarrhea, hypotension    History of Present Illness  Franki Palma is a 80 y.o. male with known hx recurrent C. Diff, large B-cell lymphoma, DM II, who presents to the [facility] ED complaining of a 3 week hx of diarrhea, associated w/ fatigue/lethargy. He has also had decreased PO intake for the past 2-3 days due to swallowing/choking issues. The pt drinks thickened liquids at baseline. Per family, pt has not voided urine today. The pt reports first episode of C. diff in January '18, w/ recurrent episodes in February and March. He was hospitalized last month (11/10-11/12), found to have diarrhea, but not given Abx. He had a negative C. diff last month. Of note, he had recent Abx use for dental procedure last month - per chart review of Dr. Flako Beasley (H/O). The pt denies any blood in urine/stool. No dysuria. No n/v/hematemesis. Denies abdominal pain. No chest pain/SOB/cough. Pt recently moved to [place] from out of [facility] and established medical care. He has a h/o prostate cancer + large B-cell lymphoma (currently in remission), and recently established care w/ H/O (Dr. Miguelina Dawn) in November. Has [employer]. Pt also has upcoming appointment w/ GI for evaluation of diarrhea in setting of recurrent C. Diff. In the ER, vital signs were remarkable for BP 70/40, . Labs were remarkable for WBC 12.4, Hgb 8.6, Cr 1.33, LA 1.10, UA: neg nitrites/LE, 3+ bacteria, 2+ amorphous crystals. CXR showed possible L basilar consolidation. Pt was treated with 3L IV NS bolus, 1x PO vanc. Pt was started on levophed for MAP <65.      Home Medications   Prior to Admission medications    Medication Sig Start Date End Date Taking? Authorizing Provider   sAXagliptin (ONGLYZA) 5 mg tab tablet Take 5 mg by mouth daily. Yes Other, MD Garrison   Omeprazole delayed release (PRILOSEC D/R) 20 mg tablet Take 20 mg by mouth daily. Yes Other, MD Grarison   ferrous sulfate 325 mg (65 mg iron) tablet Take 325 mg by mouth two (2) times a day. Yes Other, MD Garrison   b complex vitamins (B COMPLEX 1) tablet Take 1 Tab by mouth daily. Yes Provider, Historical   chlorhexidine (PERIDEX) 0.12 % solution 15 mL by Swish and Spit route three (3) times daily. Yes Provider, Historical   lactobacillus combo no.6 (PROBIOTIC COMPLEX PO) Take 1 Cap by mouth daily. Yes Provider, Historical   insulin degludec (TRESIBA FLEXTOUCH U-100) 100 unit/mL (3 mL) inpn 10 Units by SubCUTAneous route nightly. Patient taking differently: 6 Units by SubCUTAneous route nightly. 11/12/18  Yes Angelica Soernsen MD   lutein 20 mg cap Take 20 mg by mouth daily. Yes Other, MD Garrison   cholecalciferol (VITAMIN D3) 1,000 unit tablet Take 1,000 Units by mouth daily. Yes Other, MD Garrison   mirabegron ER (MYRBETRIQ) 50 mg ER tablet Take 50 mg by mouth every fourty-eight (48) hours. Yes Other, MD Garrison   metoprolol tartrate (LOPRESSOR) 50 mg tablet Take 25 mg by mouth two (2) times a day. Yes Provider, Historical   simvastatin (ZOCOR) 80 mg tablet Take 40 mg by mouth nightly. Yes Provider, Historical   ferrous fumarate/vit Bcomp,C (SUPER B COMPLEX PO) Take 1 Cap by mouth daily.    Yes Provider, Historical       Allergies  No Known Allergies    Past Medical History:   Diagnosis Date    Anemia     Arthritis     Cancer ([facility]     Constipation     Diabetes ([facility]     Gastrointestinal disorder     History of neoplasms, uncertain behavior     Of soft tissue and skin    Hypercholesterolemia     Hyperkalemia     Hypertension     Low back pain     Melanoma in situ of other parts of face ([facility]     Lower lip; removed    Neuropathy     Right bundle-branch block     Spinal stenosis     Type II diabetes mellitus ([facility]     Venous insufficiency        Previous Hospitalization(s)  11/10/18: Lactic acidosis likely 2/2 volume depletion vs metformin use    Past Surgical History:   Procedure Laterality Date    HX HEENT      HX ORTHOPAEDIC      HX PROSTATECTOMY      HX UROLOGICAL      NEUROLOGICAL PROCEDURE UNLISTED         History reviewed. No pertinent family history. Social History  Alcohol history: Not at all  Smoking history: Former smoker  Illicit drug history: Not at all    Living arrangement: patient lives w/ family, recently moved to the area    Review of Systems  Review of Systems   Constitutional: Positive for activity change and fatigue. HENT: Positive for trouble swallowing. Negative for congestion. Respiratory: Positive for choking. Negative for cough and shortness of breath. Cardiovascular: Negative for chest pain and leg swelling. Gastrointestinal: Positive for diarrhea. Negative for abdominal pain, blood in stool, constipation, nausea and vomiting. Genitourinary: Negative for dysuria. Skin: Negative for rash. Neurological: Negative for dizziness and speech difficulty. Psychiatric/Behavioral: Negative for agitation and confusion. Physical Exam  Objective:  General Appearance: In no acute distress. Vital signs: (most recent): Blood pressure 136/62, pulse (!) 104, temperature 99.9 °F (37.7 °C), resp. rate 24, height 5' 8\" (1.727 m), weight 144 lb (65.3 kg), SpO2 100 %. Lungs:  Normal effort and normal respiratory rate. He is not in respiratory distress. (Decreased breath sounds in L mid axilary line (only anterior lung fields asculatated))  Heart: Normal rate. Regular rhythm. S1 normal and S2 normal.    Chest: Symmetric chest wall expansion. No chest wall tenderness. Abdomen: Abdomen is soft. Bowel sounds are normal.   There is no abdominal tenderness. Extremities: There is no dependent edema.     Pulses: Distal pulses are intact. Neurological: Patient is alert and oriented to person, place and time. Skin:  Dry and cool. O2 Flow Rate (L/min): 2 l/min O2 Device: Nasal cannula     Laboratory Data  As reviewed in HPI    Imaging  CXR Results  (Last 48 hours)               12/15/18 1952  XR CHEST PA LAT Final result    Impression:  IMPRESSION: Left basilar consolidation is suggested. Narrative:  EXAM: XR CHEST PA LAT       INDICATION: sepsis, unknown source       COMPARISON: 12/15/2018 from 1619 hours. FINDINGS: PA and lateral radiographs of the chest demonstrate an unchanged right   IJ portacatheter terminating in the superior vena cava. . Ill-defined diffuse   bilateral pulmonary opacification is shown with left lower lobe consolidation. There is no pneumothorax or substantial pleural effusion. Cardiac, mediastinal   and hilar contours are stable. 12/15/18 1634  XR CHEST PORT Final result    Impression:  IMPRESSION:   1. No radiographic evidence of acute cardiopulmonary disease. Narrative:  INDICATION: . fever   Additional history: Dehydration. Hypotension. Fever. COMPARISON: None. LIMITATIONS: Portable technique. Rotation. Eura Fabricio FINDINGS: Single frontal view of the chest.    .   Lines/tubes/surgical: A port in the right chest is a catheter which projects to   terminate in the right atrium. Heart/mediastinum: Calcifications in the aortic arch. Lungs/pleura: Low lung volumes without definite focal consolidation. No   visualized pleural effusion or pneumothorax. Additional Comments: Degenerative changes in the spine. .               Assessment and Plan     Afia Nickerson is a 80 y.o. male who is admitted for hypotension in the setting of sepsis of unknown etiology (C. Diff vs HAP/aspiration PNA vs UTI). Sepsis of Unknown Etiology   3/4 SIRS (WBC 12.4, T 101.5, ). LA 1.10. C. diff vs HAP/aspiration PNA vs UTI. CXR: possible L basilar consolidation.  UA: neg nitrites/LE, 3+ bacteria, 2+ amorphous crystals. - Admit to ICU  - Broad spectrum Abx (IV zosyn, IV levaquin, IV vanc, PO vanc)  - BCx, UCx  - Stool studies: C. diff, O&P, Stool Cx  - PNA labs: S. pneumo, legionella, mycoplasma, RVP  - NPO until passed speech eval  - Strict I/O  - Daily CBC, CMP    Hypotension  70/40 in [facility] ED, started on levophed. Now 88/60. Possible hypovolemic shock 2/2 diarrhea x 2 weeks, decreased PO intake. S/p 3L NS in ED. Currently on levophed (7mcg/min). - Continue levophed (goal MAP >65)  - MIVF  - Strict I/O     Diarrhea in the Setting of h/o Recurrent C.diff   First episode in January '18, w/ multiple recurrences. Had negative C-diff on 11/10/18. No BM since transfer from [facility] ED (s/p immodium). Takes probiotic at home. - Stool studies: Cx, C. Diff, O&P  - Enteric precautions  - PO Vanc    DM II   HgbA1c 9.6 (11/2018). Gluc on admission 165.   - Holding home saxagliptin  - SSI for now, consider adding long-acting insulin based on SSI patient requirement.   - POC glucose q6h as patient NPO at this time     Chronic Anemia  Hgb 8.6 (at [facility]); likely of chronic disease, iron studies 11/2018 also show MINNIE (Iron 17,TIBC 281, ferritin 19). - Reorder home iron once passed speech eval  - Retic count, B12, folate  - Daily CBC     Elevated Cr  Cr 1.33, (baseline 1.0). Likely 2/2 volume depletion. - MIVF  - Strict I/Os  - Daily CMP     Hx Recurrent Large B-cell Lymphoma  In remission. Established care w Dr. Molly Marshall 11/26/18. Rey Asif in place.      PACs   A. Fib on documentation during last month admission (11/10/2018), however, per cardiology note (Dr. Piper Tolliver) 12/05/18: pt has PACs (NOT A. Fib)  - Cardiac monitoring  - Hold home lopressor    Symptomatic GERD  - Hold home prilosec  - Start IV protonix 40mg daily    HLD  No lipid panel on record. - Hold home zocor while NPO    FEN/GI - NPO until passed speech eval. NS at 105 mL/hr.   Activity - Ambulate with assistance  DVT prophylaxis - Sub Q Heparin  GI prophylaxis - Not indicated at this time  Fall prophylaxis - Fall precautions ordered. Disposition - Admit to ICU. Plan to d/c TBD. Consulting PT/OT/CM  Code Status - Full, discussed with patient / caregivers. Next of Kin Name and Contact Daughter: Ms. Alana Henry ([phone])    Patient Hiwot Elias was seen and discussed w/ Dr. James Bergeron.     9:25 PM, 12/15/18  Shahzad Howell MD  Family Medicine Resident

## 2018-12-16 NOTE — PROGRESS NOTES
[facility] Dosing Services: Antimicrobial Stewardship Progress Note    Consult for antibiotic dosing of Vancomycin and automatic adjustment Levaquin by Dr. Thomas Dupont  Pharmacist reviewed antibiotic appropriateness for 80year old , male  for indication of Pneumona (HAP)  Day of Therapy 1    Plan:  Vancomycin therapy:  Start Vancomycin therapy, with loading dose of 1.5 grams given in ED  Follow with maintenance dosing based on levels - will re-dose when random at or below 15. Last trough level / Plan for level: within next 24-48 hours  Pharmacy to follow daily and will make changes to dose and/or frequency based on clinical status. Non-Kinetic Antimicrobial Dosing: Levaquin 750 mg IV every 48 hours (CrCl 20-49)        Other Antimicrobial  (not dosed by pharmacist)   Zosyn 3.375 grams every 8 hours extended infusion   Serum Creatinine     Lab Results   Component Value Date/Time    Creatinine 1.33 (H) 12/15/2018 03:47 PM       Creatinine Clearance Estimated Creatinine Clearance: 33.4 mL/min (A) (based on SCr of 1.33 mg/dL (H)).      WBC   Lab Results   Component Value Date/Time    WBC 12.4 (H) 12/15/2018 03:47 PM           Pharmacist: [address] information:

## 2018-12-16 NOTE — PROGRESS NOTES
[facility] Dosing Services: Antimicrobial Stewardship Daily Doc    Consult for antibiotic dosing of Vancomycin by Dr. Peg Franks ([facility])  Day of Therapy 2    Vancomycin therapy:  Current maintenance dose: by level   Goal trough 15-20 mcg/mL. Adjustment to therapy:start 1000 mg IV every 24 hours per protocol CrCl > 30    Pharmacy to follow daily and will make changes to dose and/or frequency based on clinical status.   Pharmacist Vicenta AGUILAR:[id number]

## 2018-12-16 NOTE — ROUTINE PROCESS
TRANSFER - OUT REPORT:    Verbal report given to Marah Vaca RN on Katarzyna Hightower  being transferred to [address] [place] [address] for admission/higher level of care       Report consisted of patients Situation, Background, Assessment and   Recommendations(SBAR). Information from the following report(s) SBAR was reviewed with the receiving nurse. Lines:   Venous Access Device port  (Active)   Site Assessment Clean, dry, & intact 12/15/2018  6:54 PM   Date of Last Dressing Change 12/15/18 12/15/2018  6:54 PM   Dressing Status Clean, dry, & intact; New 12/15/2018  6:54 PM   Dressing Type 4 X 4;Transparent 12/15/2018  6:54 PM   Date Accessed (Medial Site) 12/15/18 12/15/2018  6:54 PM   Access Time (Medial Site) 1844 12/15/2018  6:54 PM   Access Needle Size (Site #1) 20 G 12/15/2018  6:54 PM   Access Needle Length (Medial Site) 1.5 inches 12/15/2018  6:54 PM   Positive Blood Return (Medial Site) Yes 12/15/2018  6:54 PM   Action Taken (Medial Site) Blood drawn;Flushed 12/15/2018  6:54 PM       Peripheral IV 12/15/18 Left Antecubital (Active)   Site Assessment Clean, dry, & intact 12/15/2018  3:54 PM   Phlebitis Assessment 0 12/15/2018  3:54 PM   Infiltration Assessment 0 12/15/2018  3:54 PM   Dressing Status Clean, dry, & intact; New 12/15/2018  3:54 PM   Dressing Type Transparent; Topical skin adhesive 12/15/2018  3:54 PM   Hub Color/Line Status Blue; Infusing;Flushed;Patent 12/15/2018  3:54 PM   Action Taken Blood drawn 12/15/2018  3:54 PM        Opportunity for questions and clarification was provided.       Patient transported with:  EMTALA  Hearing aides  glasses

## 2018-12-16 NOTE — ED NOTES
3rd liter of IVF finished infusing. Lungs remain clear post IVF infusions. Pt drinking strawberry milkshake.

## 2018-12-16 NOTE — PROGRESS NOTES
12/16/2018. 11:54 AM    Reason for Admission:   Hypotension               RRAT Score:   25               Resources/supports as identified by patient/family:   The patient is a retired [employer] , he is  but his wife is in poor health and she is now residing with her children in another state. The patient's daughter is his support system. Top Challenges facing patient (as identified by patient/family and CM): The patient's daughter is concerned about being able to take care of the patient by herself if the patient is a fall risk. Finances/Medication cost?   No concerns; the patient's daughter advised the patient is a  and he has his prescriptions filled by the [facility] via mail order                 Transportation? The patient's daughter will transport the patient home. Support system or lack thereof? The patient's only living relative is his daughter Shalini Lafleur. Living arrangements? The patient resides with his daughter in a one level home and there are 3-4 stairs into the entrance of the home. Self-care/ADLs/Cognition? The patient's daughter advised the patient was using a cane to assist with mobility, but started using a walker two days ago. Current Advanced Directive/Advance Care Plan:  Patient's daughter is also the patient's POA. Patient's daughter provided a copy of patient's Living Will and POA. CM put both documents in patient's file. Plan for utilizing home health:    Patient was receiving nursing, PT, and OT home health services with [facility] before admission. Pt's daughter advised the patient's PT services ended this past week, he had one more OT visit remaining, and he was scheduled to continue the nursing services through the first week of January.   If patient is referred for home health at discharge the patient's daughter would like to resume home health services with [facility]. Patient has been admitted to inpatient rehab three times this year (not in [place]). Likelihood of readmission: High                 Transition of Care Plan:    TBD - Patient's daughter advised before patient was admitted they were completing the application process for [facility] in [facility] for independent living housing. Care Management Interventions  PCP Verified by CM: Yes  Last Visit to PCP: 12/12/18  Transition of Care Consult (CM Consult): Discharge Planning  MyChart Signup: No  Discharge Durable Medical Equipment: No  Physical Therapy Consult: Yes  Occupational Therapy Consult: No  Speech Therapy Consult: No  Current Support Network: Relative's Home  Confirm Follow Up Transport: Family  Plan discussed with Pt/Family/Caregiver:  Yes

## 2018-12-16 NOTE — ED NOTES
10:40 PM Patient daughter Kylie Canas) and  arrive at bedside. Family practice resident began to discuss code status with family but reports she will return later. Family discussed concerns and options they had regarding DNR vs Full code with this RN. Per  states he will bring patient's living will in the morning when he returns. Patient family currently deciding on what they would like done. 10:21 PM Family practice at bedside evaluating patient. 10:15 PM Patient arrived to [facility] ED as ICU hold, assumed care of patient. Patient resting in stretcher in low and locked position, patient placed into hospital bed, call bell within reach. Introduced self as primary nurse. Discussed plan of care with patient. Opportunity to ask questions given. Patient denies any additional complaints at this time.

## 2018-12-16 NOTE — PROGRESS NOTES
Physical Therapy: Acknowledged PT consult orders, and chart reviewed. Spoke to RN, pt weaning pressors with MAP>65. Asked to defer PT evaluation today, and proceed tomorrow pending discontinue of pressor support. Thank you for referral. Follow up tomorrow. Pt currently has fall , and aspiration risk precautions.      Nina Quick, PT MS

## 2018-12-16 NOTE — PROGRESS NOTES
[facility]   Daily Progress Note    Date: 12/16/2018  PCP: Joao Boyd MD     Assessment/Plan:   Rissa Ray is a 80 y.o. male who is admitted for hypotension in the setting of sepsis of unknown etiology (C. Diff vs HAP/aspiration PNA vs UTI).    24 Hour Events: No acute events overnight    Sepsis of Unknown Etiology   WBC today: 9.8. C. diff (lowe likelihood; no BM overnight) vs HAP/aspiration PNA vs UTI. CXR: possible L basilar consolidation. UA: neg nitrites/LE, 3+ bacteria, 2+ amorphous crystals. - Broad spectrum Abx (IV zosyn, IV levaquin, PO vanc)  - Pending BCx, UCx  - Stool studies to be collected pending BM: C. diff, O&P, Stool Cx  - Pending PNA labs: S. pneumo, legionella, mycoplasma, RVP  - NPO until passed speech eval  - Strict I/O  - Daily CBC, CMP     Hypotension  Currently on levophed (7mcg/min). - Continue levophed (goal MAP >65)  - MIVF  - Strict I/O     Diarrhea in the Setting of h/o Recurrent C.diff   No BM overnight. - Stool studies: Cx, C. Diff, O&P  - Enteric precautions  - PO Vanc     DM II   HgbA1c 9.6 (11/2018). - Holding home saxagliptin  - SSI for now, consider adding long-acting insulin based on SSI patient requirement.   - POC glucose q6h as patient NPO at this time     Chronic Anemia  Hgb 7 today, expected to be hemo-dilutional s/p 3L NS. (POA of 8.6 was at [facility])  - Reorder home iron once passed speech eval  - Pending Retic count, B12, folate  - Daily CBC    Oral Thrush  - oral nystatin     Elevated Cr - RESOLVED  Cr 1.05 (POA 1.33; baseline 1.0). - MIVF  - Strict I/Os  - Daily CMP     Hx Recurrent Large B-cell Lymphoma  In remission. Established care w Dr. Torres Lipoma 11/26/18. Rajni Leaver in place.      PACs   - Cardiac monitoring  - Hold home lopressor     Symptomatic GERD  - Hold home prilosec  - Start IV protonix 40mg daily     HLD  No lipid panel on record.    - Hold home zocor while NPO     FEN/GI - NPO until passed speech eval. NS at 105 mL/hr. Activity - Ambulate with assistance  DVT prophylaxis - Sub Q Heparin  GI prophylaxis - Not indicated at this time  Fall prophylaxis - Fall precautions ordered. Disposition - Admit to ICU. Plan to d/c TBD. Consulting PT/OT/CM  Code Status - Full, discussed with patient / caregivers. Next of Fabian 69 Name and Contact Daughter: Ms. Nile Burrell ([phone])     Patient Lakhwinder Lopez will be discussed w/ Dr. Lenin Rosen MD  Family Medicine Resident       CC: Diarrhea, hypotension    Subjective  No acute events overnight. Patient reports feeling better. Denies fever, chills, chest pain, palpitations, shortness of breath, abdominal pain, nausea and vomiting, and LE edema. Currently NPO pending speech eval. No BM since admission. Pt's daughter at bedside, reports no issues overnight but repeatedly complains that NPO order is \"cruel\" (despite repeatedly explaining the medical reasoning behind the decision to her).     Inpatient Medications  Current Facility-Administered Medications   Medication Dose Route Frequency    sodium chloride (NS) flush 5-10 mL  5-10 mL IntraVENous Q8H    sodium chloride (NS) flush 5-10 mL  5-10 mL IntraVENous PRN    vancomycin (FIRVANQ) 50 mg/mL oral solution 125 mg  125 mg Oral Q6H    NOREPINephrine (LEVOPHED) 8 mg in dextrose 5% 250 mL infusion  2-16 mcg/min IntraVENous TITRATE    sodium chloride (NS) flush 5-10 mL  5-10 mL IntraVENous Q8H    sodium chloride (NS) flush 5-10 mL  5-10 mL IntraVENous PRN    acetaminophen (TYLENOL) suppository 650 mg  650 mg Rectal Q4H PRN    heparin (porcine) injection 5,000 Units  5,000 Units SubCUTAneous Q8H    0.9% sodium chloride infusion  105 mL/hr IntraVENous CONTINUOUS    insulin lispro (HUMALOG) injection   SubCUTAneous Q6H    glucose chewable tablet 16 g  4 Tab Oral PRN    dextrose (D50W) injection syrg 12.5-25 g  12.5-25 g IntraVENous PRN    glucagon (GLUCAGEN) injection 1 mg  1 mg IntraMUSCular PRN    pantoprazole (PROTONIX) 40 mg in sodium chloride 0.9% 10 mL injection  40 mg IntraVENous DAILY    piperacillin-tazobactam (ZOSYN) 3.375 g in 0.9% sodium chloride (MBP/ADV) 100 mL  3.375 g IntraVENous Q8H    [START ON 2018] levoFLOXacin (LEVAQUIN) 750 mg in D5W IVPB  750 mg IntraVENous Q48H    Vancomycin: Rx to dose based on levels   Other Rx Dosing/Monitoring       Allergies  No Known Allergies      Objective  Vitals:  Visit Vitals  /66   Pulse (!) 106   Temp (!) 100.7 °F (38.2 °C) Comment: pt given sup. tylenol   Resp 16   Ht 5' 8\" (1.727 m)   Wt 144 lb (65.3 kg)   SpO2 94%   BMI 21.90 kg/m²      Temp (24hrs), Av.1 °F (37.8 °C), Min:99.1 °F (37.3 °C), Max:101.5 °F (38.6 °C)     O2 Flow Rate (L/min): 2 l/min   O2 Device: Nasal cannula    I/O:    Intake/Output Summary (Last 24 hours) at 2018 0457  Last data filed at 2018 0201  Gross per 24 hour   Intake 3100 ml   Output 200 ml   Net 2900 ml     Last 3 shifts:     0701 - 12/15 1900  In: 2000 [I.V.:]  Out: 50 [Urine:50]    Physical Exam:  General: No acute distress. Alert. Cooperative. Hard of hearing   Head: Normocephalic. Atraumatic. Respiratory: Decreased breath sounds in L mid-axillary line. Symmetric air entry and chest expansion. Cardiovascular: RRR. Normal S1,S2. No m/r/g.    GI: + bowel sounds. Minimally tender in RLQ. No rebound tenderness or guarding. Nondistended   Extremities: No edema. No tenderness. Neuro:                          Oriented. No focal deficits  Skin:                             Warm and dry.        Labs and Data:    Recent Labs     18  0256 12/15/18  1547   WBC 9.8 12.4*   HGB 7.0* 8.6*   HCT 25.1* 29.4*    263     Recent Labs     18  0256 12/15/18  1547    142   K 4.0 5.2*   * 106   CO2 23 27   * 165*   BUN 16 19   CREA 1.05 1.33*   CA 7.3* 8.7   ALB 1.8* 2.5*   SGOT 15 26   ALT 10* 13       Signed by: Allyson Pillai MD  Resident, Family Medicine  12/16/18

## 2018-12-16 NOTE — ED NOTES
7:03 PM  Change of shift. Care of patient taken over from Dr. Frankie Scott; H&P reviewed, bedside handoff complete. Awaiting bed assignment to Step Down, patient admitted by [facility]. Receiving 3rd L IVNS. No ABX as of yet as patient did not have definite source. Lower blood pressure now, Plan to start levophed if BP worsens. 7:32 PM  Patient reassessed and MAPs continuing to trend downward despite completion of 3 L NS. Will order levophed. Updated Family medicine. Agrees with plan to start broad spectrum ABX with vancomycin, zosyn and levaquin. Also order 2nd blood culture (inital drawn off port) and stool culture. Will also obtain PA and lateral CXR to see if there may be an underlying PNA that was not evident on PCXR prior to rehydration. Family Medicine will upgrade admission order to ICU.     8:07 PM  No ICU beds available. Patient to board in [facility] ED. Updated Dr. Bessie Delarosa, (ED) on ED history and current treatment plan. Accepts patient to board in ED, Admitted by [facility].      Total critical care time spent exclusive of procedures:  35 minutes

## 2018-12-16 NOTE — PROGRESS NOTES
@0700 Bedside and Verbal shift change report given to Ce Masters RN (oncoming nurse) by Giselle Geiger RN (offgoing nurse). Report included the following information SBAR, Kardex, ED Summary, Procedure Summary, Intake/Output, MAR, Accordion, Recent Results, Med Rec Status, Cardiac Rhythm Sinus and Alarm Parameters . Overnight events reviewed. Received 3 liters of fluid, Levophed 7 mcg/min, and on board antibiotics for coverage. PBC and urine culture sent. Noted plan of care is to obtain stool specimen, urine studies, and some serum blood work. Lactic 0.8 no longer trending. Also on aspiration precautions secondary to aspiration pneumonia per MDs. Will need to see speech in am to be cleared for diet. @0830 Sputum and Stool specimens obtain and sent to lab.      @1030 Dr. Erika Hill arrives and exams the patient. Discontinues Zosyn and orders dysphagia diet nectar thick after meeting with family. @0 Inquired with family practice if it was okay to switch to AC&HS with SSI as patient is now eating.

## 2018-12-16 NOTE — ED NOTES
Spoke with Cesario in the lab and he has the urine (cath sample)  to run for urine culture that has been ordered

## 2018-12-16 NOTE — PROGRESS NOTES
[facility]   Senior Resident Admission Note    CC: Diarrhea, Hypotension    HPI:  Brianna Ray is a 80 y.o. male who presents to the ER complaining of diarrhea, lethargy x 3 weeks. Chart reviewed. Patient seen, examined, and discussed with Dr. Daja Joyner  (PGY-1). See her note for more details. 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 Patient recently moved to [address] [place] [address] from [place]. Was hospitalized in [facility] 11/10-11/12 for diarrhea, thought to be due to C.diff due to abx for intraoral infection, though Cdiff testing was negative. He was not dc'd on any additional antibiotics. After discharge he established care with Dr. Blanca Servin (heme/onc) for lymphoma (in remission) and Dr. Beckie Grimm (cardiology) for what was thought to have been Afib but was determined to be PACs. Pt with stable chronic RBBB. Since discharge has continued to have diarrhea. Established care with Dr. Marzena Menezes 11/13 who obtained additional stool studies which were negative. Had referral to GI but not yet seen. Diarrhea did not improve over the past month, patient now with very little PO intake and very little voiding per family x the past few days. Of note, ED administered one dose of imodium to patient. A/P: Symptoms of lethargy, hypotension likely d/t sepsis due to possible infectious diarrhea (need to r/o Cdiff given hx), HCAP (Possible L basilar consolidation seen on CXR), and possible UTI. 1. Sepsis of unknown etiology - 3/4 SIRS (WBC 12.4, T 101.5, ). C.diff vs HCAP vs UTI. Broad spectrum Abx (IV zosyn, IV vanc, IV levaquin). 2. Hypotension - likely 2/2 volume depletion after extensive diarrhea, possible hypovolemic shock. Still hypotensive after 3L NS. Currently on levophed. Continue mIVF    3. Recurrent diarrhea, hx of recurrent C-diff - Stool studies, C. Diff, enteric precautions. Had negative C-diff on 11/10/18. PO Vanc pending studies. 4. Possible HCAP - Vanc/Zosyn/Levaquin will cover. CXR PA/Lat with possible L basilar consolidation. Patient does report sometimes he chokes with thin liquids so has had decreased PO. Sometimes drinks thickened liquids. NPO until speech eval.     5. Possible UTI - UA with 3+ bacteria, 2+ amorphous crystals. Abx will cover. Sending for UCx. 6. DMII - A1c 9.6 (11/2018). Holding home saxagliptin. SSI for now, consider adding long-acting insulin once we determine how much SSI patient requires. POG glucose q6h as patient NPO at this time. 7. Anemia of Chronic Disease and MINNIE - at baseline, likely of chronic disease, but recent iron studies 11/2018 also show MINNIE. Reorder home iron once able to take PO.     8. Elevated Cr - Cr 1.33, baseline 1.0. Likely due to volume depletion. IVF. Monitor strict I/Os. 9. Recurrent Large B-cell lymphoma - In remission. Established care w Dr. Virgen Barbosa 11/26/18. Has Port-A-Cath in place. 10. PACs - telemetry monitoring. During last hospitalization he was stated to have Afib but per cardiology note (Dr. Reinaldo Hartman 12/5), patient does NOT have Afib but instead has PACs. I agree with remaining assessment and plan as documented in Dr. Ofelia Freitas note.   Pt seen and discussed with Dr. Joseph Lesches (on-call attending physician)    Kim Alex MD  Family Medicine Resident

## 2018-12-16 NOTE — ED NOTES
Levophed started at 4 mcg/min at 2007 and increased to 5mcg/min at 2018.  BP is now 118/62 with a map of 74

## 2018-12-16 NOTE — PROGRESS NOTES
7:22 AM Bedside and Verbal shift change report given to [employer] (oncoming nurse) by LUZ MARIA Frazier RN (offgoing nurse). Report included the following information SBAR, Kardex, Intake/Output, MAR, Recent Results and Cardiac Rhythm NSR.     6:15 AM Day shift resident at bedside, reese made aware luis angel    5:20 AM Night shift resident at bedside. 5:00 AM Patient had drop in hgb, thrush noted to tongue and need for aspiration precations due to patient recent history of aspiration pneumonia. Dr. Earl Marino made aware. 2:40 AM Patient moved from ED holding area into ICU room. Patient resting comfortably VSS. MRSA sample obtained sent to lab. Patient has not yet had a BM> Daughter at bedside. will continue to monitor. 12 AM Patient blood sugar 170 per patient home glucometer. HS insulin held.      continuation of note established 12/15/18 at 10:21PM.

## 2018-12-17 LAB
ALBUMIN SERPL-MCNC: 1.7 G/DL (ref 3.5–5)
ALBUMIN/GLOB SERPL: 0.5 {RATIO} (ref 1.1–2.2)
ALP SERPL-CCNC: 54 U/L (ref 45–117)
ALT SERPL-CCNC: 11 U/L (ref 12–78)
ANION GAP SERPL CALC-SCNC: 13 MMOL/L (ref 5–15)
ANION GAP SERPL CALC-SCNC: 14 MMOL/L (ref 5–15)
AST SERPL-CCNC: 13 U/L (ref 15–37)
ATRIAL RATE: 288 BPM
ATRIAL RATE: 300 BPM
BACTERIA SPEC CULT: NORMAL
BASOPHILS # BLD: 0 K/UL (ref 0–0.1)
BASOPHILS # BLD: 0 K/UL (ref 0–0.1)
BASOPHILS NFR BLD: 0 % (ref 0–1)
BASOPHILS NFR BLD: 0 % (ref 0–1)
BILIRUB SERPL-MCNC: 0.4 MG/DL (ref 0.2–1)
BUN SERPL-MCNC: 12 MG/DL (ref 6–20)
BUN SERPL-MCNC: 12 MG/DL (ref 6–20)
BUN/CREAT SERPL: 11 (ref 12–20)
BUN/CREAT SERPL: 12 (ref 12–20)
C DIFF GDH STL QL: POSITIVE
C DIFF TOX A+B STL QL IA: POSITIVE
CALCIUM SERPL-MCNC: 7.2 MG/DL (ref 8.5–10.1)
CALCIUM SERPL-MCNC: 7.4 MG/DL (ref 8.5–10.1)
CALCULATED P AXIS, ECG09: 67 DEGREES
CALCULATED R AXIS, ECG10: -60 DEGREES
CALCULATED R AXIS, ECG10: -62 DEGREES
CALCULATED T AXIS, ECG11: -26 DEGREES
CALCULATED T AXIS, ECG11: -27 DEGREES
CC UR VC: NORMAL
CHLORIDE SERPL-SCNC: 113 MMOL/L (ref 97–108)
CHLORIDE SERPL-SCNC: 113 MMOL/L (ref 97–108)
CO2 SERPL-SCNC: 20 MMOL/L (ref 21–32)
CO2 SERPL-SCNC: 21 MMOL/L (ref 21–32)
CREAT SERPL-MCNC: 1.04 MG/DL (ref 0.7–1.3)
CREAT SERPL-MCNC: 1.05 MG/DL (ref 0.7–1.3)
DATE LAST DOSE: ABNORMAL
DIAGNOSIS, 93000: NORMAL
DIAGNOSIS, 93000: NORMAL
DIFFERENTIAL METHOD BLD: ABNORMAL
DIFFERENTIAL METHOD BLD: ABNORMAL
EOSINOPHIL # BLD: 0 K/UL (ref 0–0.4)
EOSINOPHIL # BLD: 0.2 K/UL (ref 0–0.4)
EOSINOPHIL NFR BLD: 0 % (ref 0–7)
EOSINOPHIL NFR BLD: 1 % (ref 0–7)
ERYTHROCYTE [DISTWIDTH] IN BLOOD BY AUTOMATED COUNT: 19 % (ref 11.5–14.5)
ERYTHROCYTE [DISTWIDTH] IN BLOOD BY AUTOMATED COUNT: 19.2 % (ref 11.5–14.5)
FERRITIN SERPL-MCNC: 410 NG/ML (ref 26–388)
FOLATE SERPL-MCNC: 29 NG/ML (ref 5–21)
GLOBULIN SER CALC-MCNC: 3.3 G/DL (ref 2–4)
GLUCOSE BLD STRIP.AUTO-MCNC: 136 MG/DL (ref 65–100)
GLUCOSE BLD STRIP.AUTO-MCNC: 146 MG/DL (ref 65–100)
GLUCOSE BLD STRIP.AUTO-MCNC: 156 MG/DL (ref 65–100)
GLUCOSE BLD STRIP.AUTO-MCNC: 161 MG/DL (ref 65–100)
GLUCOSE SERPL-MCNC: 128 MG/DL (ref 65–100)
GLUCOSE SERPL-MCNC: 131 MG/DL (ref 65–100)
HCT VFR BLD AUTO: 25 % (ref 36.6–50.3)
HCT VFR BLD AUTO: 27.6 % (ref 36.6–50.3)
HGB BLD-MCNC: 7.2 G/DL (ref 12.1–17)
HGB BLD-MCNC: 7.7 G/DL (ref 12.1–17)
IMM GRANULOCYTES # BLD: 0 K/UL
IMM GRANULOCYTES # BLD: 0 K/UL
IMM GRANULOCYTES NFR BLD AUTO: 0 %
IMM GRANULOCYTES NFR BLD AUTO: 0 %
INTERPRETATION: ABNORMAL
IRON SATN MFR SERPL: 7 % (ref 20–50)
IRON SERPL-MCNC: 9 UG/DL (ref 35–150)
L PNEUMO1 AG UR QL IA: NEGATIVE
LYMPHOCYTES # BLD: 0.3 K/UL (ref 0.8–3.5)
LYMPHOCYTES # BLD: 1.3 K/UL (ref 0.8–3.5)
LYMPHOCYTES NFR BLD: 11 % (ref 12–49)
LYMPHOCYTES NFR BLD: 2 % (ref 12–49)
MAGNESIUM SERPL-MCNC: 1.4 MG/DL (ref 1.6–2.4)
MCH RBC QN AUTO: 21.2 PG (ref 26–34)
MCH RBC QN AUTO: 21.7 PG (ref 26–34)
MCHC RBC AUTO-ENTMCNC: 27.9 G/DL (ref 30–36.5)
MCHC RBC AUTO-ENTMCNC: 28.8 G/DL (ref 30–36.5)
MCV RBC AUTO: 75.3 FL (ref 80–99)
MCV RBC AUTO: 75.8 FL (ref 80–99)
METAMYELOCYTES NFR BLD MANUAL: 3 %
METAMYELOCYTES NFR BLD MANUAL: 7 %
MONOCYTES # BLD: 1.7 K/UL (ref 0–1)
MONOCYTES # BLD: 2.8 K/UL (ref 0–1)
MONOCYTES NFR BLD: 15 % (ref 5–13)
MONOCYTES NFR BLD: 16 % (ref 5–13)
MYELOCYTES NFR BLD MANUAL: 2 %
MYELOCYTES NFR BLD MANUAL: 2 %
NEUTS BAND NFR BLD MANUAL: 4 % (ref 0–6)
NEUTS SEG # BLD: 13.1 K/UL (ref 1.8–8)
NEUTS SEG # BLD: 7.5 K/UL (ref 1.8–8)
NEUTS SEG NFR BLD: 65 % (ref 32–75)
NEUTS SEG NFR BLD: 72 % (ref 32–75)
NRBC # BLD: 0 K/UL (ref 0–0.01)
NRBC # BLD: 0 K/UL (ref 0–0.01)
NRBC BLD-RTO: 0 PER 100 WBC
NRBC BLD-RTO: 0 PER 100 WBC
PERIPHERAL SMEAR,PSM: NORMAL
PLATELET # BLD AUTO: 212 K/UL (ref 150–400)
PLATELET # BLD AUTO: 301 K/UL (ref 150–400)
PLATELET COMMENTS,PCOM: ABNORMAL
PMV BLD AUTO: 10.7 FL (ref 8.9–12.9)
PMV BLD AUTO: 11.5 FL (ref 8.9–12.9)
POTASSIUM SERPL-SCNC: 3.6 MMOL/L (ref 3.5–5.1)
POTASSIUM SERPL-SCNC: 3.6 MMOL/L (ref 3.5–5.1)
PROT SERPL-MCNC: 5 G/DL (ref 6.4–8.2)
Q-T INTERVAL, ECG07: 292 MS
Q-T INTERVAL, ECG07: 314 MS
QRS DURATION, ECG06: 110 MS
QRS DURATION, ECG06: 120 MS
QTC CALCULATION (BEZET), ECG08: 461 MS
QTC CALCULATION (BEZET), ECG08: 487 MS
RBC # BLD AUTO: 3.32 M/UL (ref 4.1–5.7)
RBC # BLD AUTO: 3.64 M/UL (ref 4.1–5.7)
RBC MORPH BLD: ABNORMAL
REPORTED DOSE,DOSE: ABNORMAL UNITS
REPORTED DOSE/TIME,TMG: ABNORMAL
S PNEUM AG SPEC QL LA: NEGATIVE
SERVICE CMNT-IMP: ABNORMAL
SERVICE CMNT-IMP: NORMAL
SODIUM SERPL-SCNC: 147 MMOL/L (ref 136–145)
SODIUM SERPL-SCNC: 147 MMOL/L (ref 136–145)
SPECIMEN SOURCE: NORMAL
TIBC SERPL-MCNC: 127 UG/DL (ref 250–450)
TSH SERPL DL<=0.05 MIU/L-ACNC: 0.96 UIU/ML (ref 0.36–3.74)
VANCOMYCIN TROUGH SERPL-MCNC: 18.5 UG/ML (ref 5–10)
VENTRICULAR RATE, ECG03: 145 BPM
VENTRICULAR RATE, ECG03: 150 BPM
VIT B12 SERPL-MCNC: >2000 PG/ML (ref 193–986)
WBC # BLD AUTO: 11.6 K/UL (ref 4.1–11.1)
WBC # BLD AUTO: 17.2 K/UL (ref 4.1–11.1)

## 2018-12-17 PROCEDURE — 65610000006 HC RM INTENSIVE CARE

## 2018-12-17 PROCEDURE — 80202 ASSAY OF VANCOMYCIN: CPT

## 2018-12-17 PROCEDURE — 74011250637 HC RX REV CODE- 250/637: Performed by: EMERGENCY MEDICINE

## 2018-12-17 PROCEDURE — 74011250636 HC RX REV CODE- 250/636: Performed by: INTERNAL MEDICINE

## 2018-12-17 PROCEDURE — 83540 ASSAY OF IRON: CPT

## 2018-12-17 PROCEDURE — C9113 INJ PANTOPRAZOLE SODIUM, VIA: HCPCS | Performed by: STUDENT IN AN ORGANIZED HEALTH CARE EDUCATION/TRAINING PROGRAM

## 2018-12-17 PROCEDURE — 82962 GLUCOSE BLOOD TEST: CPT

## 2018-12-17 PROCEDURE — 92610 EVALUATE SWALLOWING FUNCTION: CPT

## 2018-12-17 PROCEDURE — 74011250636 HC RX REV CODE- 250/636: Performed by: STUDENT IN AN ORGANIZED HEALTH CARE EDUCATION/TRAINING PROGRAM

## 2018-12-17 PROCEDURE — 93306 TTE W/DOPPLER COMPLETE: CPT

## 2018-12-17 PROCEDURE — 82746 ASSAY OF FOLIC ACID SERUM: CPT

## 2018-12-17 PROCEDURE — 77010033678 HC OXYGEN DAILY

## 2018-12-17 PROCEDURE — 74011000250 HC RX REV CODE- 250: Performed by: SPECIALIST

## 2018-12-17 PROCEDURE — 74011250636 HC RX REV CODE- 250/636: Performed by: FAMILY MEDICINE

## 2018-12-17 PROCEDURE — 74011000258 HC RX REV CODE- 258: Performed by: STUDENT IN AN ORGANIZED HEALTH CARE EDUCATION/TRAINING PROGRAM

## 2018-12-17 PROCEDURE — 74011636637 HC RX REV CODE- 636/637: Performed by: FAMILY MEDICINE

## 2018-12-17 PROCEDURE — 74011250637 HC RX REV CODE- 250/637: Performed by: STUDENT IN AN ORGANIZED HEALTH CARE EDUCATION/TRAINING PROGRAM

## 2018-12-17 PROCEDURE — 85025 COMPLETE CBC W/AUTO DIFF WBC: CPT

## 2018-12-17 PROCEDURE — 74011250636 HC RX REV CODE- 250/636: Performed by: SPECIALIST

## 2018-12-17 PROCEDURE — 74011250637 HC RX REV CODE- 250/637: Performed by: FAMILY MEDICINE

## 2018-12-17 PROCEDURE — 82607 VITAMIN B-12: CPT

## 2018-12-17 PROCEDURE — 82728 ASSAY OF FERRITIN: CPT

## 2018-12-17 PROCEDURE — 74011250637 HC RX REV CODE- 250/637: Performed by: INTERNAL MEDICINE

## 2018-12-17 PROCEDURE — 36415 COLL VENOUS BLD VENIPUNCTURE: CPT

## 2018-12-17 RX ORDER — VANCOMYCIN HYDROCHLORIDE 250 MG/5ML
500 POWDER, FOR SOLUTION ORAL EVERY 6 HOURS
Status: DISCONTINUED | OUTPATIENT
Start: 2018-12-17 | End: 2018-12-22 | Stop reason: HOSPADM

## 2018-12-17 RX ORDER — POTASSIUM CHLORIDE 750 MG/1
40 TABLET, FILM COATED, EXTENDED RELEASE ORAL
Status: COMPLETED | OUTPATIENT
Start: 2018-12-17 | End: 2018-12-17

## 2018-12-17 RX ORDER — SODIUM CHLORIDE, SODIUM LACTATE, POTASSIUM CHLORIDE, CALCIUM CHLORIDE 600; 310; 30; 20 MG/100ML; MG/100ML; MG/100ML; MG/100ML
50 INJECTION, SOLUTION INTRAVENOUS CONTINUOUS
Status: DISCONTINUED | OUTPATIENT
Start: 2018-12-17 | End: 2018-12-21

## 2018-12-17 RX ORDER — METRONIDAZOLE 500 MG/100ML
500 INJECTION, SOLUTION INTRAVENOUS EVERY 8 HOURS
Status: DISCONTINUED | OUTPATIENT
Start: 2018-12-17 | End: 2018-12-20

## 2018-12-17 RX ORDER — MAGNESIUM SULFATE HEPTAHYDRATE 40 MG/ML
2 INJECTION, SOLUTION INTRAVENOUS ONCE
Status: COMPLETED | OUTPATIENT
Start: 2018-12-17 | End: 2018-12-17

## 2018-12-17 RX ORDER — DILTIAZEM HYDROCHLORIDE 5 MG/ML
10 INJECTION INTRAVENOUS ONCE
Status: COMPLETED | OUTPATIENT
Start: 2018-12-17 | End: 2018-12-17

## 2018-12-17 RX ORDER — POTASSIUM CHLORIDE 7.45 MG/ML
10 INJECTION INTRAVENOUS
Status: COMPLETED | OUTPATIENT
Start: 2018-12-17 | End: 2018-12-17

## 2018-12-17 RX ORDER — DIGOXIN 0.25 MG/ML
250 INJECTION INTRAMUSCULAR; INTRAVENOUS
Status: COMPLETED | OUTPATIENT
Start: 2018-12-17 | End: 2018-12-17

## 2018-12-17 RX ADMIN — DILTIAZEM HYDROCHLORIDE 10 MG: 5 INJECTION INTRAVENOUS at 08:56

## 2018-12-17 RX ADMIN — MAGNESIUM SULFATE HEPTAHYDRATE 2 G: 40 INJECTION, SOLUTION INTRAVENOUS at 01:26

## 2018-12-17 RX ADMIN — VANCOMYCIN HYDROCHLORIDE 125 MG: KIT at 12:03

## 2018-12-17 RX ADMIN — PANTOPRAZOLE SODIUM 40 MG: 40 INJECTION, POWDER, FOR SOLUTION INTRAVENOUS at 08:34

## 2018-12-17 RX ADMIN — PHENYLEPHRINE HYDROCHLORIDE 80 MCG/MIN: 10 INJECTION INTRAVENOUS at 10:45

## 2018-12-17 RX ADMIN — POTASSIUM CHLORIDE 10 MEQ: 10 INJECTION, SOLUTION INTRAVENOUS at 06:00

## 2018-12-17 RX ADMIN — DEXTROSE MONOHYDRATE 150 MG: 5 INJECTION, SOLUTION INTRAVENOUS at 06:18

## 2018-12-17 RX ADMIN — NYSTATIN 500000 UNITS: 500000 SUSPENSION ORAL at 08:36

## 2018-12-17 RX ADMIN — METRONIDAZOLE 500 MG: 500 INJECTION, SOLUTION INTRAVENOUS at 13:15

## 2018-12-17 RX ADMIN — SODIUM CHLORIDE, SODIUM LACTATE, POTASSIUM CHLORIDE, AND CALCIUM CHLORIDE 105 ML/HR: 600; 310; 30; 20 INJECTION, SOLUTION INTRAVENOUS at 00:57

## 2018-12-17 RX ADMIN — HEPARIN SODIUM 5000 UNITS: 5000 INJECTION INTRAVENOUS; SUBCUTANEOUS at 21:02

## 2018-12-17 RX ADMIN — SODIUM CHLORIDE, SODIUM LACTATE, POTASSIUM CHLORIDE, AND CALCIUM CHLORIDE 75 ML/HR: 600; 310; 30; 20 INJECTION, SOLUTION INTRAVENOUS at 12:04

## 2018-12-17 RX ADMIN — INSULIN LISPRO 2 UNITS: 100 INJECTION, SOLUTION INTRAVENOUS; SUBCUTANEOUS at 08:35

## 2018-12-17 RX ADMIN — Medication 10 ML: at 21:02

## 2018-12-17 RX ADMIN — VANCOMYCIN HYDROCHLORIDE 125 MG: KIT at 07:19

## 2018-12-17 RX ADMIN — DIGOXIN 250 MCG: 0.25 INJECTION INTRAMUSCULAR; INTRAVENOUS at 10:06

## 2018-12-17 RX ADMIN — Medication 10 ML: at 14:00

## 2018-12-17 RX ADMIN — HEPARIN SODIUM 5000 UNITS: 5000 INJECTION INTRAVENOUS; SUBCUTANEOUS at 05:30

## 2018-12-17 RX ADMIN — MAGNESIUM SULFATE HEPTAHYDRATE 2 G: 40 INJECTION, SOLUTION INTRAVENOUS at 03:22

## 2018-12-17 RX ADMIN — HEPARIN SODIUM 5000 UNITS: 5000 INJECTION INTRAVENOUS; SUBCUTANEOUS at 13:15

## 2018-12-17 RX ADMIN — POTASSIUM CHLORIDE 10 MEQ: 10 INJECTION, SOLUTION INTRAVENOUS at 05:27

## 2018-12-17 RX ADMIN — VANCOMYCIN HYDROCHLORIDE 500 MG: KIT at 23:51

## 2018-12-17 RX ADMIN — VANCOMYCIN HYDROCHLORIDE 1000 MG: 1 INJECTION, POWDER, LYOPHILIZED, FOR SOLUTION INTRAVENOUS at 21:01

## 2018-12-17 RX ADMIN — NYSTATIN 500000 UNITS: 500000 SUSPENSION ORAL at 13:15

## 2018-12-17 RX ADMIN — VANCOMYCIN HYDROCHLORIDE 500 MG: KIT at 18:44

## 2018-12-17 RX ADMIN — POTASSIUM CHLORIDE 10 MEQ: 10 INJECTION, SOLUTION INTRAVENOUS at 07:20

## 2018-12-17 RX ADMIN — DEXTROSE 1 MG/MIN: 5 SOLUTION INTRAVENOUS at 03:21

## 2018-12-17 RX ADMIN — NYSTATIN 500000 UNITS: 500000 SUSPENSION ORAL at 21:02

## 2018-12-17 RX ADMIN — DIGOXIN 250 MCG: 0.25 INJECTION INTRAMUSCULAR; INTRAVENOUS at 08:56

## 2018-12-17 RX ADMIN — POTASSIUM CHLORIDE 40 MEQ: 750 TABLET, EXTENDED RELEASE ORAL at 01:34

## 2018-12-17 RX ADMIN — PHENYLEPHRINE HYDROCHLORIDE 100 MCG/MIN: 10 INJECTION INTRAVENOUS at 05:26

## 2018-12-17 RX ADMIN — METRONIDAZOLE 500 MG: 500 INJECTION, SOLUTION INTRAVENOUS at 21:01

## 2018-12-17 RX ADMIN — INSULIN LISPRO 2 UNITS: 100 INJECTION, SOLUTION INTRAVENOUS; SUBCUTANEOUS at 12:03

## 2018-12-17 RX ADMIN — NYSTATIN 500000 UNITS: 500000 SUSPENSION ORAL at 17:44

## 2018-12-17 NOTE — PROGRESS NOTES
[address] Medicine Residency  Resident Note in Brief  ----------------------------------------    S: Called by nurse for HR increase to 140-160s with corresponding BP 70s. Recently got blood return from port after cathflo but previously was getting levophed peripherally with SBPs 90-120s. Patient denies any chest pain, palpitations or other pain at this time. O:  Visit Vitals  /66   Pulse (!) 136   Temp 100.1 °F (37.8 °C)   Resp 24   Ht 5' 8\" (1.727 m)   Wt 156 lb (70.8 kg)   SpO2 95%   BMI 23.72 kg/m²     Gen: awake, alert in no distress  Cardio: Tachycardic ~140bpm with regular rhythm, no murmur, distal pulses 2+ and equal  Pulm: CTAB anteriorly with good air movement    EKG: Rate 145bpm, A flutter with RBBB    A/P: Patient with hx of PACs, without a fib, currently   - NS Bolus 500mL  - changed levophed to carlos  - STAT CBC, Mg and BMP  - consider beta blocker if BP will tolerate but will monitor VS for now      Please see full daily progress note for complete plan. Alfredo Ortiz MD  11:47 PM    3:02AM  Patient continued to have -160s with corresponding SBPs 80-90s. Jumping between a fib and a flutter in the 150-160s on telemetry. K and Mg repleted. Spoke with cardiology regarding interventions for intermittent Afib/flutter with RVR.    - Start Amiodarone 1mg/min  - Continue Carlos and titrate to maintain MAP >65

## 2018-12-17 NOTE — PROGRESS NOTES
I met with pt .'s daughter (Jacinto Grimes). Her daughter's number is [phone]. Daughter informed me that she had been trying to get her father established in the [facility] but then her father became ill and subsequently admitted to [address] [place] [address]   Per daughter,she would like for case management to assist her with SNF placement closer to discharge. Daughter & her  just relocated to [facility] so are hopeful to find a SNF near [facility] until they can get their father accepted into the [facility]. Daughter is researching the [facility] @ the [address] [place] [address]. I will discuss details with her again tomorrow.     Dorita Johnson

## 2018-12-17 NOTE — PROGRESS NOTES
Spiritual Care Assessment/Progress Note  [address]      NAME: Radha Ramirez      MRN: [id number]  AGE: 80 y.o. SEX: male  Gnosticist Affiliation: Orthodoxy   Language: English     12/17/2018     Total Time (in minutes): 5     Spiritual Assessment begun in [facility] INTENSIVE CARE through conversation with:         [x]Patient        [] Family    [] Friend(s)        Reason for Consult: Initial/Spiritual assessment, critical care     Spiritual beliefs: (Please include comment if needed)     [x] Identifies with a elise tradition:         [] Supported by a elise community:            [] Claims no spiritual orientation:           [] Seeking spiritual identity:                [] Adheres to an individual form of spirituality:           [] Not able to assess:                           Identified resources for coping:      [] Prayer                               [] Music                  [] Guided Imagery     [] Family/friends                 [] Pet visits     [] Devotional reading                         [x] Unknown     [] Other:                                               Interventions offered during this visit: (See comments for more details)    Patient Interventions: Initial/Spiritual assessment, Critical care     Family/Friend(s): Prayer (assurance of), Prayer (actual)     Plan of Care:     [] Support spiritual and/or cultural needs    [] Support AMD and/or advance care planning process      [] Support grieving process   [] Coordinate Rites and/or Rituals    [] Coordination with community clergy   [] No spiritual needs identified at this time   [] Detailed Plan of Care below (See Comments)  [] Make referral to Music Therapy  [] Make referral to Pet Therapy     [] Make referral to Addiction services  [] Make referral to [facility]  [] Make referral to Spiritual Care Partner  [] No future visits requested        [x] Follow up visits as needed     Comments: Upon arrival Mr Bennett Vazquez was sleeping.  As I exited his room his daughter was arriving. Introduced myself and explained our role. She noted that he does a lot of sleeping and felt that sleeping might be good for him. We agreed that I would just keep them in my prayers at this time. Pastoral Care continues to be available. Please page if needed/ desired  . Hannah Fountain., [facility], [facility]

## 2018-12-17 NOTE — CONSULTS
Willi Simpson MD [facility]  Office [phone]  Mobile [phone]    Date of  Admission: 12/15/2018  3:19 PM  PCP- Brenda Garcia MD    Jacinto Bah is a 80 y.o. male admitted for Hypotension; Hypotension. Consult requested by Bertin Mason MD    Assessment  New AF with RVR  Sepsis syndrome with low BP requiring pressor support  Diarrhea with volume depletion  Severe anemia with normal indices  Very elderly        Discussion/Recommendations:  Stop iv amiodarone - response  Dig load iv  Diltiazem bolus  If no success in rate control, consider DCCV  Following. .. Subjective:  Admitted with persistent, severe diarrhea with hx C.diff, complicated by hypotension/sepsis syndrome requiring pressor support. Developed AF with RVR around 1am. Iv amiodarone drip with bolus ineffective for rate control. Current HR 130s but asx. Currently on Carlos for BP support with MAP around 60s. Denies chest pain or dyspnea at rest. No palpitations. No hx of stroke. Saw Dr Ronnell Perez recently for question of AF but none documented at that time. No previous cardiac testing available for review      Past Medical History:   Diagnosis Date    Anemia     Arthritis     Cancer ([address]     Constipation     Diabetes ([address]     Gastrointestinal disorder     History of neoplasms, uncertain behavior     Of soft tissue and skin    Hypercholesterolemia     Hyperkalemia     Hypertension     Low back pain     Melanoma in situ of other parts of face ([address]     Lower lip; removed    Neuropathy     Right bundle-branch block     Spinal stenosis     Type II diabetes mellitus ([address]     Venous insufficiency       Past Surgical History:   Procedure Laterality Date    HX HEENT      HX ORTHOPAEDIC      HX PROSTATECTOMY      HX UROLOGICAL      NEUROLOGICAL PROCEDURE UNLISTED       No current facility-administered medications on file prior to encounter. Current Outpatient Medications on File Prior to Encounter   Medication Sig Dispense Refill    sAXagliptin (ONGLYZA) 5 mg tab tablet Take 5 mg by mouth daily.  Omeprazole delayed release (PRILOSEC D/R) 20 mg tablet Take 20 mg by mouth daily.  ferrous sulfate 325 mg (65 mg iron) tablet Take 325 mg by mouth two (2) times a day.  b complex vitamins (B COMPLEX 1) tablet Take 1 Tab by mouth daily.  chlorhexidine (PERIDEX) 0.12 % solution 15 mL by Swish and Spit route three (3) times daily.  lactobacillus combo no.6 (PROBIOTIC COMPLEX PO) Take 1 Cap by mouth daily.  insulin degludec (TRESIBA FLEXTOUCH U-100) 100 unit/mL (3 mL) inpn 10 Units by SubCUTAneous route nightly. (Patient taking differently: 6 Units by SubCUTAneous route nightly.) 1 Pen 0    lutein 20 mg cap Take 20 mg by mouth daily.  cholecalciferol (VITAMIN D3) 1,000 unit tablet Take 1,000 Units by mouth daily.  mirabegron ER (MYRBETRIQ) 50 mg ER tablet Take 50 mg by mouth every fourty-eight (48) hours.  metoprolol tartrate (LOPRESSOR) 50 mg tablet Take 25 mg by mouth two (2) times a day.  simvastatin (ZOCOR) 80 mg tablet Take 40 mg by mouth nightly.  ferrous fumarate/vit Bcomp,C (SUPER B COMPLEX PO) Take 1 Cap by mouth daily. No Known Allergies          Review of Symptoms:  Patient unable to provide detailed hx    Physical Exam    Visit Vitals  /46   Pulse 93   Temp 98.8 °F (37.1 °C)   Resp 25   Ht 5' 8\" (1.727 m)   Wt 156 lb (70.8 kg)   SpO2 96%   BMI 23.72 kg/m²     Visit Vitals  /55   Pulse 89   Temp 98.8 °F (37.1 °C)   Resp 22   Ht 5' 8\" (1.727 m)   Wt 156 lb (70.8 kg)   SpO2 95%   BMI 23.72 kg/m²     General Appearance:  Ill appearing pale, lethargic elderly WM  individual in no acute distress. Ears/Nose/Mouth/Throat:   Hearing grossly normal.         Neck: Supple. Chest:   Lungs clear to auscultation bilaterally.    Cardiovascular:  Tachy irregular rate and rhythm, S1, S2 normal, no murmur. Abdomen:   Soft, non-tender, bowel sounds are active. Extremities: No edema bilaterally. Skin: Warm and dry. Cardiographics    Telemetry: AFIB 130s        Recent Results (from the past 12 hour(s))   GLUCOSE, POC    Collection Time: 12/17/18  7:53 AM   Result Value Ref Range    Glucose (POC) 161 (H) 65 - 100 mg/dL    Performed by Jun Darling    IRON PROFILE    Collection Time: 12/17/18  8:55 AM   Result Value Ref Range    Iron 9 (L) 35 - 150 ug/dL    TIBC 127 (L) 250 - 450 ug/dL    Iron % saturation 7 (L) 20 - 50 %   PERIPHERAL SMEAR    Collection Time: 12/17/18  8:55 AM   Result Value Ref Range    PERIPHERAL SMEAR (NOTE)    CBC WITH AUTOMATED DIFF    Collection Time: 12/17/18  8:55 AM   Result Value Ref Range    WBC 17.2 (H) 4.1 - 11.1 K/uL    RBC 3.64 (L) 4.10 - 5.70 M/uL    HGB 7.7 (L) 12.1 - 17.0 g/dL    HCT 27.6 (L) 36.6 - 50.3 %    MCV 75.8 (L) 80.0 - 99.0 FL    MCH 21.2 (L) 26.0 - 34.0 PG    MCHC 27.9 (L) 30.0 - 36.5 g/dL    RDW 19.2 (H) 11.5 - 14.5 %    PLATELET 950 781 - 061 K/uL    MPV 11.5 8.9 - 12.9 FL    NRBC 0.0 0  WBC    ABSOLUTE NRBC 0.00 0.00 - 0.01 K/uL    NEUTROPHILS 72 32 - 75 %    BAND NEUTROPHILS 4 0 - 6 %    LYMPHOCYTES 2 (L) 12 - 49 %    MONOCYTES 16 (H) 5 - 13 %    EOSINOPHILS 1 0 - 7 %    BASOPHILS 0 0 - 1 %    METAMYELOCYTES 3 (H) 0 %    MYELOCYTES 2 (H) 0 %    IMMATURE GRANULOCYTES 0 %    ABS. NEUTROPHILS 13.1 (H) 1.8 - 8.0 K/UL    ABS. LYMPHOCYTES 0.3 (L) 0.8 - 3.5 K/UL    ABS. MONOCYTES 2.8 (H) 0.0 - 1.0 K/UL    ABS. EOSINOPHILS 0.2 0.0 - 0.4 K/UL    ABS. BASOPHILS 0.0 0.0 - 0.1 K/UL    ABS. IMM.  GRANS. 0.0 K/UL    DF MANUAL      PLATELET COMMENTS Large Platelets      RBC COMMENTS ANISOCYTOSIS  1+        RBC COMMENTS OVALOCYTES  PRESENT        RBC COMMENTS TAMIKA CELLS  PRESENT       GLUCOSE, POC    Collection Time: 12/17/18 11:30 AM   Result Value Ref Range    Glucose (POC) 156 (H) 65 - 100 mg/dL    Performed by Jun Darling Asked to see for AF with RVR    Impression:  New AF with RVR  Sepsis syndrome with low BP requiring pressor support  Diarrhea  Severe anemia with normal indices  Very elderly    Rec

## 2018-12-17 NOTE — PROGRESS NOTES
0715: Bedside shift change report given to Petra Donald RN (oncoming nurse) by Barry Quispe RN (offgoing nurse). Report included the following information SBAR, Kardex, ED Summary, Procedure Summary and MAR.   0800: Initial assessment performed. Patient sleeping but awakens to voice. Patient on Carlos with MAPs in 62s. Will continue to titrate per protocol (see MAR). Patient's heart rate in upper 130s. MD aware. No new orders at this time. Patient on 2 L nasal cannula with oxygen saturations in upper 90%s. Patient does not appear to be in pain and no distress noted. Call bell and Erin within reach. No additional needs at this time. Will continue to monitor patient. 0840: Dr. Benigno Rodriguez at bedside evaluating patient. 1015: Dr. Mindy Duenas at bedside evaluating patient. 1100: Family Practice at bedside evaluating patient. 1200: Reassessment performed. No new changes. Patient's heart rate in 70s-80s and off Amio. Patient now on 70 mcg/min of Carlos and will continue to titrate (See MAR). 1600: Reassessment performed. No new changes. Carlos continues to be titrated (see MAR). VSS. Will continue to monitor patient. 1630: Kira Mitchell NP at bedside evaluating patient. 1730: Echo at bedside. 1900: Bedside shift change report given to Agnes, [facility] (oncoming nurse) by Petra Donald RN (offgoing nurse). Report included the following information SBAR, Kardex, ED Summary, Procedure Summary and MAR.

## 2018-12-17 NOTE — PROGRESS NOTES
Discontinued Levaquin as C. Diff test (+). As of this moment, no evidence of other GI pathogens on preliminary results for stool culture, ova and parasites.  Will continue Oral and IV Vancomycin until further recommendations by SAWYER and Steff Venegas MD  PGY-3 Family Medicine Resident

## 2018-12-17 NOTE — PROGRESS NOTES
2030 Patient BP low. Levophed to portacath. Flushed portacath no blood return. Removed Holman needle and replaced with smaller 3/4 inch needle. Still no blood return. Paged Dr. Bran Montesinos with [facility] practice. MD ordered cathflow. Hooked up Levophed to PIV temporarily. Able to get blood return with cath flow. 2300 Pt HR up as much as 170. Paged family practice and did EKG and xavi labs. [facility], Dr. Jennifer Reynolds to bedside to see patient. Dr. Tyson Ferguson paged and spoke with Dr. Jennifer Reynolds. Per MD give a 500cc fluid bolus NS and stop levophed and start carlos. Fluid bolus started and levo stopped and carlos started. Will continue to monior. 0100 Labs back and called to MD. Mag runs ordered and potassium ordered. Lactated Ringers ordered and NS discontinued. Will administer medication and continue to monitor. 0200 Paged family practice about patient with continuous afib in the 150's. MD Dr. Jennifer Reynolds will come and see patient. Paged Dr. Tyson Ferguson to update MD on patient . Reviewed lab results and new orders. Asked Dr. Tyson Ferguson is Cardiolohyper Dr. Tyson Reynolds to manage. Dr. Jennifer Reynolds to bedside. 0230 Residents attempting to contact Cardiology without success. Paged Cardiology for residents. Dr Julia Pineda called back and spoke with Dr. Jennifer Reynolds. Reviewed patient situation and ordered amiodarone. Will hang medication once ordered and arrives from pharmacy. 0321 Amiodarone hung. 0530 Patient continues to have HR in the 150's. Have increased Carlos to 100. Patient unable to tolerate po potassium so have hung first bag of iv potassium. Dr. Carissa Morataya to bedside to check patient paged Cardiology in reference to heart rate that continues to be aflutter in the 150's.   0600 Per Dr. Julia Pineda to [facility] practice Ramlolita hang amiodarone bolus. Bolus hs been delivered. HR down to 140. Day shift family practice to bedside to see patient. Patient lost PIV access for potassium infusion. All other iv medications going through port and are compatible.  Started new 22g PIV in left arm will continue to administer IV potassium and draw labs afterward.

## 2018-12-17 NOTE — PROGRESS NOTES
[address] [place] [address]. [address] [place] [address] NP  [phone]                    GASTROENTEROLOGY CONSULTATION NOTE              NAME:  Jo Becker   :   1927   MRN:   [id number]       Referring Physician:   Dr. Judythe Landau Date:   2018 4:33 PM    Chief Complaint:    Diarrhea     History of Present Illness:    Patient is a 80 y.o. who we were asked to see in consultation for the above complaint. The patient presented from home complaining of diarrhea persisting for 2 weeks. The patient has a history of recurrent C Diff. Per his daughter he had received antibiotics for a dental procedure about 10 weeks ago. She explains that he has had C Diff 3 times in the past.  The patient denies blood in stool. Had been having up to 4 diarrhea stools per day. His stool sample from yesterday was positive for C Diff. PMH:  Past Medical History:   Diagnosis Date    Anemia     Arthritis     [facility] ([facility]     Constipation     Diabetes ([facility]     Gastrointestinal disorder     History of neoplasms, uncertain behavior     Of soft tissue and skin    Hypercholesterolemia     Hyperkalemia     Hypertension     Low back pain     Melanoma in situ of other parts of face ([address]     Lower lip; removed    Neuropathy     Right bundle-branch block     Spinal stenosis     Type II diabetes mellitus (HCC)     Venous insufficiency        PSH:  Past Surgical History:   Procedure Laterality Date    HX HEENT      HX ORTHOPAEDIC      HX PROSTATECTOMY      HX UROLOGICAL      NEUROLOGICAL PROCEDURE UNLISTED         Allergies:  No Known Allergies    Home Medications:  Prior to Admission Medications   Prescriptions Last Dose Informant Patient Reported? Taking? Omeprazole delayed release (PRILOSEC D/R) 20 mg tablet 12/15/2018 at Unknown time  Yes Yes   Sig: Take 20 mg by mouth daily. b complex vitamins (B COMPLEX 1) tablet 2018 at Unknown time  Yes Yes   Sig: Take 1 Tab by mouth daily. chlorhexidine (PERIDEX) 0.12 % solution 12/14/2018 at Unknown time  Yes Yes   Sig: 15 mL by Swish and Spit route three (3) times daily. cholecalciferol (VITAMIN D3) 1,000 unit tablet 12/14/2018 at Unknown time  Yes Yes   Sig: Take 1,000 Units by mouth daily. ferrous fumarate/vit Bcomp,C (SUPER B COMPLEX PO) 12/14/2018 at Unknown time  Yes Yes   Sig: Take 1 Cap by mouth daily. ferrous sulfate 325 mg (65 mg iron) tablet   Yes Yes   Sig: Take 325 mg by mouth two (2) times a day. insulin degludec (TRESIBA FLEXTOUCH U-100) 100 unit/mL (3 mL) inpn 12/14/2018 at Unknown time  No Yes   Sig: 10 Units by SubCUTAneous route nightly. Patient taking differently: 6 Units by SubCUTAneous route nightly. lactobacillus combo no.6 (PROBIOTIC COMPLEX PO) 12/14/2018 at Unknown time  Yes Yes   Sig: Take 1 Cap by mouth daily. lutein 20 mg cap 12/14/2018 at Unknown time  Yes Yes   Sig: Take 20 mg by mouth daily. metoprolol tartrate (LOPRESSOR) 50 mg tablet 12/14/2018 at Unknown time  Yes Yes   Sig: Take 25 mg by mouth two (2) times a day. mirabegron ER (MYRBETRIQ) 50 mg ER tablet 12/14/2018 at Unknown time  Yes Yes   Sig: Take 50 mg by mouth every fourty-eight (48) hours. sAXagliptin (ONGLYZA) 5 mg tab tablet 12/14/2018 at 2100  Yes Yes   Sig: Take 5 mg by mouth daily. simvastatin (ZOCOR) 80 mg tablet 12/14/2018 at Unknown time  Yes Yes   Sig: Take 40 mg by mouth nightly.       Facility-Administered Medications: None       Hospital Medications:  Current Facility-Administered Medications   Medication Dose Route Frequency    lactated Ringers infusion  75 mL/hr IntraVENous CONTINUOUS    Vancomycin trough @ 2030   Other ONCE    [START ON 12/18/2018] famotidine (PF) (PEPCID) 20 mg in sodium chloride 0.9% 10 mL injection  20 mg IntraVENous DAILY    vancomycin (FIRVANQ) 50 mg/mL oral solution 500 mg  500 mg Oral Q6H    metroNIDAZOLE (FLAGYL) IVPB premix 500 mg  500 mg IntraVENous Q8H    nystatin (MYCOSTATIN) 100,000 unit/mL oral suspension 500,000 Units  500,000 Units Oral QID    0.9% sodium chloride infusion 250 mL  250 mL IntraVENous PRN    vancomycin (VANCOCIN) 1,000 mg in 0.9% sodium chloride (MBP/ADV) 250 mL  1,000 mg IntraVENous Q24H    insulin lispro (HUMALOG) injection   SubCUTAneous AC&HS    PHENYLephrine (WILBERT-SYNEPHRINE) 30 mg in 0.9% sodium chloride 250 mL infusion   mcg/min IntraVENous TITRATE    sodium chloride (NS) flush 5-10 mL  5-10 mL IntraVENous Q8H    sodium chloride (NS) flush 5-10 mL  5-10 mL IntraVENous PRN    sodium chloride (NS) flush 5-10 mL  5-10 mL IntraVENous Q8H    sodium chloride (NS) flush 5-10 mL  5-10 mL IntraVENous PRN    acetaminophen (TYLENOL) suppository 650 mg  650 mg Rectal Q4H PRN    heparin (porcine) injection 5,000 Units  5,000 Units SubCUTAneous Q8H    glucose chewable tablet 16 g  4 Tab Oral PRN    dextrose (D50W) injection syrg 12.5-25 g  12.5-25 g IntraVENous PRN    glucagon (GLUCAGEN) injection 1 mg  1 mg IntraMUSCular PRN       Social History:  Social History     Tobacco Use    Smoking status: Former Smoker    Smokeless tobacco: Never Used   Substance Use Topics    Alcohol use: No     Frequency: Never       Family History:  History reviewed. No pertinent family history.     Review of Systems:  Constitutional: negative fever, negative chills, negative weight loss  Eyes:   negative visual changes  ENT:   negative sore throat, tongue or lip swelling  Respiratory:  negative cough, negative dyspnea  Cards:  negative for chest pain, palpitations, lower extremity edema  GI:   See HPI  :  negative for frequency, dysuria  Integument:  negative for rash and pruritus  Heme:  negative for easy bruising and gum/nose bleeding  Musculoskel: negative for myalgias,  back pain and muscle weakness  Neuro: negative for headaches, dizziness, vertigo  Psych:  negative for feelings of anxiety, depression     Objective:     Patient Vitals for the past 8 hrs:   BP Temp Pulse Resp SpO2   12/17/18 1615 111/54  86 17 97 %   12/17/18 1600 116/60 98.6 °F (37 °C) 74 21 95 %   12/17/18 1545 93/51  85 16 95 %   12/17/18 1530 109/56  82 18 93 %   12/17/18 1515 100/50  85 18 95 %   12/17/18 1500 118/55  88 18 96 %   12/17/18 1430 106/55  87 20 95 %   12/17/18 1415 113/59  88 20 95 %   12/17/18 1400 95/54  86 21 95 %   12/17/18 1345 103/55  89 22 95 %   12/17/18 1330 108/46  93 25 96 %   12/17/18 1315 (!) 89/53  (!) 110 (!) 7 94 %   12/17/18 1300 134/61  (!) 108 23 96 %   12/17/18 1245 113/54  89 28 97 %   12/17/18 1230 106/54  72 17 96 %   12/17/18 1215 110/56  78 16 95 %   12/17/18 1200 105/63 98.8 °F (37.1 °C) (!) 101 23 94 %   12/17/18 1145 90/54  67 19 95 %   12/17/18 1130 101/63  90 21 96 %   12/17/18 1100 110/73  (!) 132 21 96 %   12/17/18 1045 111/49  93 23 96 %   12/17/18 1030 118/72  (!) 114 21 95 %   12/17/18 1015 109/62  76 17 94 %   12/17/18 1008 (!) 85/59  (!) 117     12/17/18 1000 (!) 85/59  (!) 119 18 94 %   12/17/18 0945 92/61  (!) 112 19 93 %   12/17/18 0930 (!) 87/57  98 17 94 %   12/17/18 0915 90/59  (!) 101 14 94 %   12/17/18 0900 98/68  (!) 133 18 95 %   12/17/18 0845 95/63  (!) 141 23 95 %     12/17 0701 - 12/17 1900  In: 6097.4 [I.V.:6097.4]  Out: -   12/15 1901 - 12/17 0700  In: 3286.1 [I.V.:3286.1]  Out: 1100 [Urine:1100]    EXAM:     NEURO-alert, oriented x3, affect appropriate   HEENT-Head: Normocephalic, no lesions, without obvious abnormality. LUNGS-clear to auscultation bilaterally    COR-regular rate and rhythym     ABD- soft, TTP in LLQ.  Bowel sounds normal. No masses,  no organomegaly     EXT-no edema    Skin - No rash     Data Review     Recent Labs     12/17/18  0855 12/16/18  1257 12/16/18  0256   WBC 17.2*  --  9.8   HGB 7.7* 7.8* 7.0*   HCT 27.6* 27.7* 25.1*     --  193     Recent Labs     12/16/18  2331 12/16/18  0256   * 143   K 3.6 4.0   * 112*   CO2 20* 23   BUN 12 16   CREA 1.04 1.05   * 182*   CA 7.4* 7.3*     Recent Labs     12/16/18  0256 12/15/18  2332   SGOT 15 13*   AP 56 54   TP 5.1* 5.0*   ALB 1.8* 1.7*   GLOB 3.3 3.3     No results for input(s): INR, PTP, APTT in the last 72 hours. No lab exists for component: INREXT    Patient Active Problem List   Diagnosis Code    History of Clostridium difficile colitis Z86.19    DM [facility] E11.9    Premature atrial complexes I49.1    Non Hodgkin's lymphoma ([facility] C85.90    Diarrhea of presumed infectious origin R19.7    Hypotension I95.9       Assessment and Plan:  C Diff Diarrhea:  - Continue IV Metronidazole and PO Vanc.  - Monitor CMP and CBC  - Diet as tolerated. - Will taper of vancomycin as follows:    125 mg orally four times daily for 10 to 14 days, then  125 mg orally twice daily for 7 days, then  125 mg orally once daily for 7 days, then  125 mg orally every 2 or 3 days for 2 to 8 weeks    - Consider fecal transplant after acute symptoms resolve. Will continue to follow. Thanks for allowing me to participate in the care of this patient.   Signed By: Juanpablo Millan NP     12/17/2018  4:33 PM

## 2018-12-17 NOTE — PROGRESS NOTES
[address] [facility] [address]   Office [phone]  Fax [phone]          Assessment and Plan     Korey Lea a 80 y. o. male who is admitted for hypotension in the setting of sepsis of unknown etiology (C. Diff vs HAP/aspiration PNA vs UTI).    24 Hour Events: HR found to be in 140-160s with Afib/Aflutter. Pt transitioned to Carlos from Levophed. Discussed with Dr. Ginna Vásquez and started on Amio 1 mg/min which did not result in decreased HR. Discussed case with Dr. Ginna Vásquez again this morning and gave Amio bolus at 0600. Cardiology to see this morning. Infectious:      Sepsis of Unknown Etiology   WBC today: 9.8. C. diff (lowe likelihood; no BM overnight) vs HAP/aspiration PNA vs UTI. CXR: possible L basilar consolidation. UA: neg nitrites/LE, 3+ bacteria, 2+ amorphous crystals.   - Broad spectrum Abx (IV zosyn, IV levaquin, PO vanc)  - BCx- NG 12 hrs, UCx- pending   - Stool studies to be collected pending BM: C. diff, O&P, Stool Cx-prelim neg  - Pending PNA labs: S. pneumo, legionella, mycoplasma, RVP  - Strict I/O  - Daily CBC, CMP    Cardiac:      Hypotension s/p 4000 mL of Bolus   Currently on Carlos (100 mcg/min)  - Continue lNeo (goal MAP >65)  - MIVF  - Strict I/O    Afib/flutter with RVR. - Start Amiodarone 1mg/min and given amio bolus on 12/17 am   - Continue Carlos and titrate to maintain MAP >65  - Cardiology consulted, appreciate recs     PACs   - Cardiac monitoring  - Hold home lopressor    HLD  No lipid panel on record. - Hold home zocor while NPO     Gastroenterology:      Diarrhea in the Setting of h/o Recurrent C.diff   No BM overnight. - Stool studies: Cx, C. Diff, O&P  - Enteric precautions  - PO Vanc    Symptomatic GERD  - Hold home prilosec  - Start IV protonix 40mg daily    Endocrine:      DM II   HgbA1c 9.6 (11/2018).    - Holding home saxagliptin  - SSI for now, consider adding long-acting insulin based on SSI patient requirement.   - POC glucose q6h as patient NPO at this time     Chronic Anemia  Hgb 7 today, expected to be hemo-dilutional s/p 3L NS. (POA of 8.6 was at BL)  - Reorder home iron once passed speech eval  - Pending Retic count, B12, folate  - Daily CBC    Nephrology:     Elevated Cr - RESOLVED  Cr 1.05 (POA 1.33; baseline 1.0). - MIVF  - Strict I/Os  - Daily CMP    Misc:    Hx Recurrent Large B-cell Lymphoma  In remission. Established care w Dr. Janie Christina 18. R. Port-A-Cath in place.      FEN/GI - LR at 105 mL/hr. Activity - Ambulate with assistance  DVT prophylaxis - Sub Q Heparin  GI prophylaxis - Not indicated at this time  Fall prophylaxis - Fall precautions ordered. Disposition - Admit to ICU. Plan to d/c TBD. Consulting PT/OT/CM  Code Status - Full, discussed with patient / caregivers. Next of Kin Name and Contact Daughter: Ms. Kim Rojas ([phone])    I appreciate the opportunity to participate in the care of this patient,  Gina Moralez MD  Family Medicine Resident         Subjective / Objective     Subjective States that he has abdominal pain. Unable to answer most questions. Temp (24hrs), Av.7 °F (37.6 °C), Min:99.4 °F (37.4 °C), Max:100.1 °F (37.8 °C)     Objective  Respiratory: O2 Flow Rate (L/min): 2 l/min O2 Device: Nasal cannula   Visit Vitals  BP 90/59   Pulse (!) 149   Temp 100.1 °F (37.8 °C)   Resp 26   Ht 5' 8\" (1.727 m)   Wt 156 lb (70.8 kg)   SpO2 93%   BMI 23.72 kg/m²     General: No acute distress. Alert. Cooperative. Hard of hearing   Head: Normocephalic. Atraumatic. Respiratory: Decreased breath sounds in L mid-axillary line. Symmetric air entry and chest expansion. Cardiovascular: RRR. Normal S1,S2. No m/r/g.    GI: + bowel sounds. Generalized tenderness. No rebound tenderness or guarding. Nondistended   Extremities: No edema. No tenderness. Neuro:                          Oriented. No focal deficits  Skin:                             Warm and dry.           I/O:  Date 18 0700 - 12/17/18 9144 12/17/18 0700 - 12/18/18 0659   Shift 4153-7825 4182-7071 24 Hour Total 4416-9145 6761-8123 24 Hour Total   INTAKE   I.V.(mL/kg/hr) 2186.1(2.8)  2186.1(1.3)        Levophed Volume 208.4  208.4        Volume (0.9% sodium chloride infusion) 1877.8  1877.8        Volume (piperacillin-tazobactam (ZOSYN) 3.375 g in 0.9% sodium chloride (MBP/ADV) 100 mL) 100  100        Volume (levoFLOXacin (LEVAQUIN) 750 mg in D5W IVPB) 0  0      Shift Total(mL/kg) 2186. 1(33.5)  2186. 1(30.9)      OUTPUT   Urine(mL/kg/hr) 750(1)  750(0.4)        Urine Voided 550  550        Urine Occurrence(s) 2 x  2 x        Urine Output (mL) (Condom Catheter 12/16/18) 200  200      Stool           Stool Occurrence(s) 2 x  2 x      Shift Total(mL/kg) 750(11.5)  750(10.6)      NET 1436.1  1436.1      Weight (kg) 65.3 70.8 70.8 70.8 70.8 70.8       CBC:  Recent Labs     12/16/18  1257 12/16/18  0256 12/15/18  2332 12/15/18  1547   WBC  --  9.8 11.6* 12.4*   HGB 7.8* 7.0* 7.2* 8.6*   HCT 27.7* 25.1* 25.0* 29.4*   PLT  --  193 212 998       Metabolic Panel:  Recent Labs     12/16/18  2331 12/16/18  0256 12/15/18  2332 12/15/18  1547   * 143 147* 142   K 3.6 4.0 3.6 5.2*   * 112* 113* 106   CO2 20* 23 21 27   BUN 12 16 12 19   CREA 1.04 1.05 1.05 1.33*   * 182* 128* 165*   CA 7.4* 7.3* 7.2* 8.7   MG 1.4*  --   --   --    ALB  --  1.8* 1.7* 2.5*   SGOT  --  15 13* 26   ALT  --  10* 11* 13          For Billing    Chief Complaint   Patient presents with   [facility]       Hospital Problems  Date Reviewed: 12/12/2018          Codes Class Noted POA    * (Principal) Hypotension ICD-10-CM: I95.9  ICD-9-CM: 458.9  12/15/2018 Unknown

## 2018-12-17 NOTE — PROGRESS NOTES
Problem: Dysphagia (Adult)  Goal: *Acute Goals and Plan of Care (Insert Text)  Swallowing goals initiated 12-17-18: (weekly re-eval 12-24-18)  1)  Tolerate dysphagia 1, nectars without s/s aspiration by 12-20-18  2) re-eval for solids upon patient request by 12-20-18  Speech LAnguage Pathology bedside swallow evaluation  Patient: Marielle Baker (87 y.o. male)  Date: 12/17/2018  Primary Diagnosis: Hypotension  Hypotension       Precautions: aspiration       ASSESSMENT :  Based on the objective data described below, the patient presents with moderate oral-pharyngeal dysphagia with generalized weakness. He has serious oral care//hygiene issues due to recent/currently infected tooth sockets and bone necrosis. Also has oral thrush. Aspiration risks for bacteria infected saliva is high. He is not much interested in PO so has nutritional risks as well. Aspiration risks due to :   1) feeder status 2) dependence for oral care 3) weakness,  4)GERd,  5) dysphagia, 6) oral infection  7) weaker cough. Patient admitted 12-15 with sepsis , diarrhat, candidiasis, possible L base PNA. PMH:  Lymphona,-last chemo-may/june. , c-diff, GERD, DM, dysphagia with use of thick liquids, recent dental procedure. Patient will benefit from skilled intervention to address the above impairments. Patients rehabilitation potential is considered to be Guarded  Factors which may influence rehabilitation potential include:   []            None noted  [x]            Mental ability/status  [x]            Medical condition  []            Home/family situation and support systems  []            Safety awareness  []            Pain tolerance/management  []            Other:      PLAN :  Recommendations and Planned Interventions:  Downgrade to dysphagia 1, nectars. Feed only when awake and alert. Palliative consult for goals of care. Defer to RD. is it possble for protein powder for applesauce b/c that is all he wants to eat.  Frequency/Duration: Patient will be followed by speech-language pathology 3 times a week to address goals. Discharge Recommendations: To Be Determined     SUBJECTIVE:   Patient stated I don't want anything to eat. .    OBJECTIVE:     Past Medical History:   Diagnosis Date    Anemia     Arthritis     Cancer [facility]     Constipation     Diabetes ([facility]     Gastrointestinal disorder     History of neoplasms, uncertain behavior     Of soft tissue and skin    Hypercholesterolemia     Hyperkalemia     Hypertension     Low back pain     Melanoma in situ of other parts of face ([address]     Lower lip; removed    Neuropathy     Right bundle-branch block     Spinal stenosis     Type II diabetes mellitus ([address]     Venous insufficiency      Past Surgical History:   Procedure Laterality Date    HX HEENT      HX ORTHOPAEDIC      HX PROSTATECTOMY      HX UROLOGICAL      NEUROLOGICAL PROCEDURE UNLISTED       Prior Level of Function/Home Situation: lives with famly. Home Situation  Home Environment: Private residence  One/Two Story Residence: One story  Living Alone: No  Support Systems: Family member(s)  Patient Expects to be Discharged to[de-identified] Private residence  Current DME Used/Available at Home: [place], straight, Ava Katos, rollator  Diet prior to admission: mech soft, nectars  Current Diet:  Dysphagia 2, nectars   Cognitive and Communication Status:  Neurologic State: Alert  Orientation Level: Oriented to person  Cognition: Decreased command following, Impaired decision making, Memory loss  Perception: (Torres Martinez-mild)  Perseveration: No perseveration noted  Safety/Judgement: Insight into deficits  Oral Assessment:  Oral Assessment  Labial: No impairment  Dentition: (issues:) family reports that he had serious dental surgery 12 weeks ago in [place]. The socket did not hel and became infected. Complications with chemo drugs may have impacted bone and now he has exposed jaw bone in his mouth with pain.  Has had f/u surgeries to scrape off \"dead bone\" and work on infections. On serious anti-biotics for this. Oral candidiasis related to anti-biotics. He uses a water pic and saline flush at home for this area in his mouth. Oral Hygiene: dry and sticky. white coating on tongue-treated for thrush currently  Lingual: No impairment  Velum: No impairment  Mandible: No impairment  P.O. Trials:  Patient Position: upright in bed  Vocal quality prior to P.O.: No impairment  Consistency Presented: Nectar thick liquid;Pudding(he refused solids)  How Presented: SLP-fed/presented;Spoon;Straw   ORAL PHASE:   Bolus Acceptance: Impaired(reluctatant for solids; mild issues with straw suck)  Bolus Formation/Control: No impairment(for purees, nectars)        Oral Residue: Pocketing(family reports he can get some in his mandible area in infected tooth socket. -using oral sx)   Patient refusing all solids. Family reports that at home he eats soup, toast  PHARYNGEAL PHASE:   Initiation of Swallow: Delayed (# of seconds)(mild)  Laryngeal Elevation: Weak(mild)  Aspiration Signs/Symptoms: Clear throat(fair cough. wet)  Pharyngeal Phase Characteristics: Suspected pharyngeal residue;Poor endurance      Patient with moderate aspiration risks due to weakness, oral care issues, compromised immune system. Family reports that he has been on nectar thick liquids since w/u for dysphagia last Feb 2018. At home he takes mech soft, nectars. NOMS:   The NOMS functional outcome measure was used to quantify this patient's level of swallowing impairment. Based on the NOMS, the patient was determined to be at level 4 for swallow function     G Codes: In compliance with CMSs Claims Based Outcome Reporting, the following G-code set was chosen for this patient based the use of the NOMS functional outcome to quantify this patient's level of swallowing impairment.     Using the NOMS, the patient was determined to be at level 4 for swallow function which correlates with the CK= 40-59% level of severity. Based on the objective assessment provided within this note, the current, goal, and discharge g-codes are as follows:    Swallow  Swallowing:   Swallow Current Status CK= 40-59%   Swallow Goal Status CK= 40-59%      NOMS Swallowing Levels:  Level 1 (CN): NPO  Level 2 (CM): NPO but takes consistency in therapy  Level 3 (CL): Takes less than 50% of nutrition p.o. and continues with nonoral feedings; and/or safe with mod cues; and/or max diet restriction  Level 4 (CK): Safe swallow but needs mod cues; and/or mod diet restriction; and/or still requires some nonoral feeding/supplements  Level 5 (CJ): Safe swallow with min diet restriction; and/or needs min cues  Level 6 (CI): Independent with p.o.; rare cues; usually self cues; may need to avoid some foods or needs extra time  Level 7 ([address] [place] [address]): Independent for all p.o.  PARVEEN. (2003). National Outcomes Measurement System (NOMS): Adult Speech-Language Pathology User's Guide. Pain:           After treatment:   []            Patient left in no apparent distress sitting up in chair  []            Patient left in no apparent distress in bed  []            Call bell left within reach  []            Nursing notified  []            Caregiver present  []            Bed alarm activated    COMMUNICATION/EDUCATION:   The patients plan of care including recommendations, planned interventions, and recommended diet changes were discussed with: Registered Nurse and Physician. Patient was educated regarding His deficit(s) of dysphagia  as this relates to His diagnosis of sepsis. He demonstrated Guarded understanding as evidenced by LOC, AMS. Family understood. .  []            Posted safety precautions in patient's room. [x]           /family have participated as able in goal setting and plan of care. [x]            t/family agree to work toward stated goals and plan of care.   []            Patient understands intent and goals of therapy, but is neutral about his/her participation. []            Patient is unable to participate in goal setting and plan of care.     Thank you for this referral.  Aminta Carlin, DHAVAL  Time Calculation: 20 mins

## 2018-12-17 NOTE — PROGRESS NOTES
Occupational Therapy Note:  Chart reviewed and spoke with team at [address] [place] [address]. Patient requiring pressor support for hypotension and not yet ready to engage in therapy services. Will hold and follow-up next tx day for OT ha.  Thank you.   GOPI Corona/RICKI

## 2018-12-17 NOTE — CONSULTS
[facility]  INTENSIVIST DAILY PROGRESS NOTE  Name: Hiwot Elias   : 1927   MRN: [id number]   Date: 2018 10:27 AM   I have reviewed the flowsheet and previous days notes. Overnight events:   Still having diarrhea. Per daughter worse in the last 2-3 weeks and mental status and energy with severe decline in the last 4-5 days. Adequate urine output    Vital Signs:    Visit Vitals  BP (!) 85/59   Pulse (!) 117   Temp 98.9 °F (37.2 °C)   Resp 19   Ht 5' 8\" (1.727 m)   Wt 70.8 kg (156 lb)   SpO2 93%   BMI 23.72 kg/m²       O2 Device: Nasal cannula   O2 Flow Rate (L/min): 2 l/min   Temp (24hrs), Av.5 °F (37.5 °C), Min:98.9 °F (37.2 °C), Max:100.1 °F (37.8 °C)       Intake/Output:   Last shift:       0701 -  1900  In: 2564.3 [I.V.:2564.3]  Out: -   Last 3 shifts: 12/15 1901 -  0700  In: 3286.1 [I.V.:3286.1]  Out: 1100 [Urine:1100]    Intake/Output Summary (Last 24 hours) at 2018 1015  Last data filed at 2018 0800  Gross per 24 hour   Intake 3681.83 ml   Output 750 ml   Net 2931.83 ml       Physical Exam:  General:  Alert, cooperative, no distress, frail   Head:  Normocephalic   Eyes:  EOMs intact. Nose: Nares normal.   O2 off currently   Throat: Lips normal.   Neck: Supple       Lungs:   CTA, no w/r/r   Chest wall:  No tenderness    Heart:  Regular rate and rhythm   Abdomen:   Soft, non-tender. Extremities: Trace edema.    Skin: Warm, dry   Neurologic: Alert, hard of hearing, no focal changes       DATA:   Current Facility-Administered Medications   Medication Dose Route Frequency    lactated Ringers infusion  105 mL/hr IntraVENous CONTINUOUS    Vancomycin trough @ 2030   Other ONCE    nystatin (MYCOSTATIN) 100,000 unit/mL oral suspension 500,000 Units  500,000 Units Oral QID    0.9% sodium chloride infusion 250 mL  250 mL IntraVENous PRN    vancomycin (VANCOCIN) 1,000 mg in 0.9% sodium chloride (MBP/ADV) 250 mL  1,000 mg IntraVENous Q24H    insulin lispro (HUMALOG) injection   SubCUTAneous AC&HS    PHENYLephrine (WILBERT-SYNEPHRINE) 30 mg in 0.9% sodium chloride 250 mL infusion   mcg/min IntraVENous TITRATE    sodium chloride (NS) flush 5-10 mL  5-10 mL IntraVENous Q8H    sodium chloride (NS) flush 5-10 mL  5-10 mL IntraVENous PRN    vancomycin (FIRVANQ) 50 mg/mL oral solution 125 mg  125 mg Oral Q6H    sodium chloride (NS) flush 5-10 mL  5-10 mL IntraVENous Q8H    sodium chloride (NS) flush 5-10 mL  5-10 mL IntraVENous PRN    acetaminophen (TYLENOL) suppository 650 mg  650 mg Rectal Q4H PRN    heparin (porcine) injection 5,000 Units  5,000 Units SubCUTAneous Q8H    glucose chewable tablet 16 g  4 Tab Oral PRN    dextrose (D50W) injection syrg 12.5-25 g  12.5-25 g IntraVENous PRN    glucagon (GLUCAGEN) injection 1 mg  1 mg IntraMUSCular PRN    pantoprazole (PROTONIX) 40 mg in sodium chloride 0.9% 10 mL injection  40 mg IntraVENous DAILY    levoFLOXacin (LEVAQUIN) 750 mg in D5W IVPB  750 mg IntraVENous Q48H       Telemetry: sinus rhythm          Labs:  Recent Labs     12/17/18  0855 12/16/18  1257 12/16/18  0256 12/15/18  2332   WBC 17.2*  --  9.8 11.6*   HGB 7.7* 7.8* 7.0* 7.2*   HCT 27.6* 27.7* 25.1* 25.0*     --  193 212     Recent Labs     12/16/18  2331 12/16/18  0256 12/15/18  2332 12/15/18  1547   * 143 147* 142   K 3.6 4.0 3.6 5.2*   * 112* 113* 106   CO2 20* 23 21 27   * 182* 128* 165*   BUN 12 16 12 19   CREA 1.04 1.05 1.05 1.33*   CA 7.4* 7.3* 7.2* 8.7   MG 1.4*  --   --   --    ALB  --  1.8* 1.7* 2.5*   SGOT  --  15 13* 26   ALT  --  10* 11* 13     No results for input(s): PH, PCO2, PO2, HCO3, FIO2 in the last 72 hours. Imaging:  I have personally reviewed the patients radiographs and reports.   PA/lateral film suggests bibasilar airspace disease       IMPRESSION:   · Hypotension: appears primarily related to volume depletion from chronic diarrhea  · Hx of C.diff, concern for recurrence  · Dysphagia with reported good response to nectar thick liquids  · ? Pneumonia: history not suggestive as he has minimal resp symptoms   PLAN:   · Agree with checking stool for studies including c.diff and continuing po vancomycin- c-diff still pending  · COntinue oral Vanc  · Will continue Levaquin for now as it will cover other infectious diarhea and possible pulm infection. Stop if c.diff returns positive  · GI consult entered  · Discussed with daughter and will resume dysphagia diet with aspiration precautions at their request.  Potential risks reviewed. · Decrease IV fluids  · Wean pressors for MAP>65  · PT/OT when GI symptoms controlled   GLOBAL ISSUES:   · Head of bed elevated  · DVT Prophylaxis: heparin SQ     The pt is critically ill and on pressor support. cctime 36 min outside procedures.      See my orders for details    My assessment/plan was discussed with: nurse, daughter        Brenton Hanks MD

## 2018-12-18 LAB
ALBUMIN SERPL-MCNC: 1.6 G/DL (ref 3.5–5)
ALBUMIN/GLOB SERPL: 0.5 {RATIO} (ref 1.1–2.2)
ALP SERPL-CCNC: 55 U/L (ref 45–117)
ALT SERPL-CCNC: 8 U/L (ref 12–78)
ANION GAP SERPL CALC-SCNC: 10 MMOL/L (ref 5–15)
AST SERPL-CCNC: 13 U/L (ref 15–37)
BACTERIA SPEC CULT: ABNORMAL
BACTERIA SPEC CULT: NORMAL
BASOPHILS # BLD: 0 K/UL (ref 0–0.1)
BASOPHILS NFR BLD: 0 % (ref 0–1)
BILIRUB SERPL-MCNC: 0.3 MG/DL (ref 0.2–1)
BUN SERPL-MCNC: 15 MG/DL (ref 6–20)
BUN/CREAT SERPL: 15 (ref 12–20)
C JEJUNI+C COLI AG STL QL: NEGATIVE
CALCIUM SERPL-MCNC: 7.3 MG/DL (ref 8.5–10.1)
CHLORIDE SERPL-SCNC: 117 MMOL/L (ref 97–108)
CO2 SERPL-SCNC: 21 MMOL/L (ref 21–32)
CREAT SERPL-MCNC: 0.99 MG/DL (ref 0.7–1.3)
DIFFERENTIAL METHOD BLD: ABNORMAL
E COLI SXT1+2 STL IA: NEGATIVE
EOSINOPHIL # BLD: 0 K/UL (ref 0–0.4)
EOSINOPHIL NFR BLD: 0 % (ref 0–7)
ERYTHROCYTE [DISTWIDTH] IN BLOOD BY AUTOMATED COUNT: 19.5 % (ref 11.5–14.5)
GLOBULIN SER CALC-MCNC: 3.2 G/DL (ref 2–4)
GLUCOSE BLD STRIP.AUTO-MCNC: 134 MG/DL (ref 65–100)
GLUCOSE BLD STRIP.AUTO-MCNC: 143 MG/DL (ref 65–100)
GLUCOSE BLD STRIP.AUTO-MCNC: 156 MG/DL (ref 65–100)
GLUCOSE BLD STRIP.AUTO-MCNC: 163 MG/DL (ref 65–100)
GLUCOSE SERPL-MCNC: 142 MG/DL (ref 65–100)
GRAM STN SPEC: ABNORMAL
HCT VFR BLD AUTO: 27.3 % (ref 36.6–50.3)
HGB BLD-MCNC: 7.6 G/DL (ref 12.1–17)
IMM GRANULOCYTES # BLD: 0 K/UL
IMM GRANULOCYTES NFR BLD AUTO: 0 %
LYMPHOCYTES # BLD: 0.5 K/UL (ref 0.8–3.5)
LYMPHOCYTES NFR BLD: 4 % (ref 12–49)
M PNEUMO IGG SER IA-ACNC: 307 U/ML (ref 0–99)
M PNEUMO IGM SER IA-ACNC: <770 U/ML (ref 0–769)
MAGNESIUM SERPL-MCNC: 2.2 MG/DL (ref 1.6–2.4)
MCH RBC QN AUTO: 21.2 PG (ref 26–34)
MCHC RBC AUTO-ENTMCNC: 27.8 G/DL (ref 30–36.5)
MCV RBC AUTO: 76 FL (ref 80–99)
METAMYELOCYTES NFR BLD MANUAL: 1 %
MONOCYTES # BLD: 0.9 K/UL (ref 0–1)
MONOCYTES NFR BLD: 8 % (ref 5–13)
MYELOCYTES NFR BLD MANUAL: 1 %
NEUTS BAND NFR BLD MANUAL: 8 % (ref 0–6)
NEUTS SEG # BLD: 10 K/UL (ref 1.8–8)
NEUTS SEG NFR BLD: 78 % (ref 32–75)
NRBC # BLD: 0 K/UL (ref 0–0.01)
NRBC BLD-RTO: 0 PER 100 WBC
PLATELET # BLD AUTO: 252 K/UL (ref 150–400)
PMV BLD AUTO: 11 FL (ref 8.9–12.9)
POTASSIUM SERPL-SCNC: 4.1 MMOL/L (ref 3.5–5.1)
PROT SERPL-MCNC: 4.8 G/DL (ref 6.4–8.2)
RBC # BLD AUTO: 3.59 M/UL (ref 4.1–5.7)
RBC MORPH BLD: ABNORMAL
SERVICE CMNT-IMP: ABNORMAL
SERVICE CMNT-IMP: NORMAL
SODIUM SERPL-SCNC: 148 MMOL/L (ref 136–145)
WBC # BLD AUTO: 11.6 K/UL (ref 4.1–11.1)
WBC MORPH BLD: ABNORMAL

## 2018-12-18 PROCEDURE — 74011250636 HC RX REV CODE- 250/636: Performed by: INTERNAL MEDICINE

## 2018-12-18 PROCEDURE — 74011636637 HC RX REV CODE- 636/637: Performed by: FAMILY MEDICINE

## 2018-12-18 PROCEDURE — 74011250636 HC RX REV CODE- 250/636: Performed by: STUDENT IN AN ORGANIZED HEALTH CARE EDUCATION/TRAINING PROGRAM

## 2018-12-18 PROCEDURE — 74011250637 HC RX REV CODE- 250/637: Performed by: FAMILY MEDICINE

## 2018-12-18 PROCEDURE — 80053 COMPREHEN METABOLIC PANEL: CPT

## 2018-12-18 PROCEDURE — 83735 ASSAY OF MAGNESIUM: CPT

## 2018-12-18 PROCEDURE — 65660000000 HC RM CCU STEPDOWN

## 2018-12-18 PROCEDURE — 36415 COLL VENOUS BLD VENIPUNCTURE: CPT

## 2018-12-18 PROCEDURE — 74011250637 HC RX REV CODE- 250/637: Performed by: STUDENT IN AN ORGANIZED HEALTH CARE EDUCATION/TRAINING PROGRAM

## 2018-12-18 PROCEDURE — 77010033678 HC OXYGEN DAILY

## 2018-12-18 PROCEDURE — 82962 GLUCOSE BLOOD TEST: CPT

## 2018-12-18 PROCEDURE — 85025 COMPLETE CBC W/AUTO DIFF WBC: CPT

## 2018-12-18 PROCEDURE — 74011250636 HC RX REV CODE- 250/636: Performed by: NURSE PRACTITIONER

## 2018-12-18 PROCEDURE — 74011250637 HC RX REV CODE- 250/637: Performed by: INTERNAL MEDICINE

## 2018-12-18 RX ORDER — SIMVASTATIN 20 MG/1
40 TABLET, FILM COATED ORAL
Status: DISCONTINUED | OUTPATIENT
Start: 2018-12-18 | End: 2018-12-22 | Stop reason: HOSPADM

## 2018-12-18 RX ORDER — ACETAMINOPHEN 325 MG/1
650 TABLET ORAL
Status: DISCONTINUED | OUTPATIENT
Start: 2018-12-18 | End: 2018-12-22 | Stop reason: HOSPADM

## 2018-12-18 RX ORDER — DIGOXIN 0.25 MG/ML
125 INJECTION INTRAMUSCULAR; INTRAVENOUS DAILY
Status: DISCONTINUED | OUTPATIENT
Start: 2018-12-18 | End: 2018-12-19

## 2018-12-18 RX ADMIN — NYSTATIN 500000 UNITS: 500000 SUSPENSION ORAL at 08:33

## 2018-12-18 RX ADMIN — Medication 10 ML: at 17:31

## 2018-12-18 RX ADMIN — METRONIDAZOLE 500 MG: 500 INJECTION, SOLUTION INTRAVENOUS at 13:38

## 2018-12-18 RX ADMIN — SIMVASTATIN 40 MG: 20 TABLET, FILM COATED ORAL at 21:11

## 2018-12-18 RX ADMIN — VANCOMYCIN HYDROCHLORIDE 500 MG: KIT at 13:38

## 2018-12-18 RX ADMIN — INSULIN LISPRO 2 UNITS: 100 INJECTION, SOLUTION INTRAVENOUS; SUBCUTANEOUS at 17:23

## 2018-12-18 RX ADMIN — HEPARIN SODIUM 5000 UNITS: 5000 INJECTION INTRAVENOUS; SUBCUTANEOUS at 21:11

## 2018-12-18 RX ADMIN — SODIUM CHLORIDE, SODIUM LACTATE, POTASSIUM CHLORIDE, AND CALCIUM CHLORIDE 75 ML/HR: 600; 310; 30; 20 INJECTION, SOLUTION INTRAVENOUS at 17:23

## 2018-12-18 RX ADMIN — Medication 10 ML: at 06:00

## 2018-12-18 RX ADMIN — METRONIDAZOLE 500 MG: 500 INJECTION, SOLUTION INTRAVENOUS at 05:32

## 2018-12-18 RX ADMIN — DIGOXIN 125 MCG: 0.25 INJECTION INTRAMUSCULAR; INTRAVENOUS at 09:30

## 2018-12-18 RX ADMIN — PHENYLEPHRINE HYDROCHLORIDE 25 MCG/MIN: 10 INJECTION INTRAVENOUS at 17:10

## 2018-12-18 RX ADMIN — PHENYLEPHRINE HYDROCHLORIDE 35 MCG/MIN: 10 INJECTION INTRAVENOUS at 08:15

## 2018-12-18 RX ADMIN — NYSTATIN 500000 UNITS: 500000 SUSPENSION ORAL at 12:46

## 2018-12-18 RX ADMIN — SODIUM CHLORIDE, SODIUM LACTATE, POTASSIUM CHLORIDE, AND CALCIUM CHLORIDE 75 ML/HR: 600; 310; 30; 20 INJECTION, SOLUTION INTRAVENOUS at 00:50

## 2018-12-18 RX ADMIN — METRONIDAZOLE 500 MG: 500 INJECTION, SOLUTION INTRAVENOUS at 21:12

## 2018-12-18 RX ADMIN — PHENYLEPHRINE HYDROCHLORIDE 45 MCG/MIN: 10 INJECTION INTRAVENOUS at 04:20

## 2018-12-18 RX ADMIN — ACETAMINOPHEN 650 MG: 325 TABLET, FILM COATED ORAL at 21:12

## 2018-12-18 RX ADMIN — PHENYLEPHRINE HYDROCHLORIDE 20 MCG/MIN: 10 INJECTION INTRAVENOUS at 12:30

## 2018-12-18 RX ADMIN — NYSTATIN 500000 UNITS: 500000 SUSPENSION ORAL at 21:11

## 2018-12-18 RX ADMIN — HEPARIN SODIUM 5000 UNITS: 5000 INJECTION INTRAVENOUS; SUBCUTANEOUS at 05:31

## 2018-12-18 RX ADMIN — VANCOMYCIN HYDROCHLORIDE 500 MG: KIT at 06:07

## 2018-12-18 RX ADMIN — Medication 10 ML: at 21:17

## 2018-12-18 RX ADMIN — ALTEPLASE 2 MG: 2.2 INJECTION, POWDER, LYOPHILIZED, FOR SOLUTION INTRAVENOUS at 05:37

## 2018-12-18 RX ADMIN — Medication 10 ML: at 21:11

## 2018-12-18 RX ADMIN — HEPARIN SODIUM 5000 UNITS: 5000 INJECTION INTRAVENOUS; SUBCUTANEOUS at 13:38

## 2018-12-18 RX ADMIN — FAMOTIDINE 20 MG: 10 INJECTION, SOLUTION INTRAVENOUS at 08:33

## 2018-12-18 RX ADMIN — INSULIN LISPRO 2 UNITS: 100 INJECTION, SOLUTION INTRAVENOUS; SUBCUTANEOUS at 08:34

## 2018-12-18 NOTE — PROGRESS NOTES
Bedside and Verbal shift change report given to Jose Luis Moore (oncoming nurse) by Liu Esteban (offgoing nurse). Report included the following information SBAR, Kardex, Intake/Output, MAR, Accordion, Recent Results and Cardiac Rhythm NSR. Primary Nurse Noam Harmon, RN and Tess Chatman RN performed a dual skin assessment on this patient No impairment noted  Bryan score is 13    2000: Shift assessment performed. Pt sleeping quietly in bed with TV on. Call bell in reach. Bed alarm on. Pt states no needs at this time. · Cardiac: NSR HR in 80's to 90's, BPs in range on Neosynephrine, Pulses palpable  · Pulmonary: 2L NC  · GI/: Incontinent briefs, Multiple loose BMs recorded today  · Mentation: Oriented to self, follows commands, pt very hard of hearing  · Pain: 0/10  · IVs: R. Portacath, 1PIV, LR and Neosynephrine infusing continuously at this time  · Skin: Noted blanchable redness to sacrum. Barrier cream applied. 2050: Vancomycin trough drawn and sent for processing. 0000: Reassessment performed. Pt sleeping soundly in bed. Pt not wanting to be disturbed, explained to pt importance of performing routine assessments. Pt states understanding. Pt states no pain at this time. Pt states no needs. Will monitor. 0100: Pt had incontinent episode. Brief changed. Pt given CHG bath with linen and gown change. Pt assisted to reposition in bed. Pt states no additional needs at this time. 0200: Pt had another incontinent episode pt cleaned up and repositioned in bed.     0400: Pt reassessment. No changes to prior assessment. Mackenzie care/incontinence care performed. Pt states no needs at this time. 0430: Labs drawn and sent for processing. 0700: Bedside and Verbal shift change report given to Shadi (oncoming nurse) by Jose Luis Moore (offgoing nurse). Report included the following information SBAR, Kardex, Intake/Output, MAR, Accordion, Recent Results, Med Rec Status and Cardiac Rhythm NSR.

## 2018-12-18 NOTE — PROGRESS NOTES
[facility]. [facility]       Resident Progress Note in Brief    S: Patient seen and examined at bedside. In sinus rhythm at bedside with regular rate. Pt taken off amio this morning and received dilt and digoxin IV in AM. Pt denies any chest pain or sob. Currently on 50 mcg of Carlos for pressor support         O:  Visit Vitals  /56   Pulse 83   Temp 98.6 °F (37 °C)   Resp 27   Ht 5' 8\" (1.727 m)   Wt 156 lb (70.8 kg)   SpO2 97%   BMI 23.72 kg/m²     Physical Examination:   General appearance - alert, well appearing, and in no distress  Chest -decreased breath sounds throughout, no wheezes, rales or rhonchi, symmetric air entry  Heart - normal rate, regular rhythm, normal S1, S2, no murmurs, rubs, clicks or gallops,   Abdomen - soft, nontender, nondistended, no masses or organomegaly  Neurological - alert, oriented, normal speech, no focal findings  Extremities - peripheral pulses normal, no pedal edema, no clubbing or cyanosis    A/P:   80 y. o. male with known hx recurrent C. Diff, large B-cell lymphoma, DM II, who was admitted for sepsis, Afib,  and hypotension. Pt appears to have under went pharmacologic cardioversion.  Continue plan per daily progress note and specialist recommendations       Erna Blanco MD  Family Medicine Resident

## 2018-12-18 NOTE — PROGRESS NOTES
0700-Bedside shift change report given to Shadi (oncoming nurse) by Kam Mckeon (offgoing nurse). Report included the following information SBAR, Intake/Output, MAR, Recent Results, Cardiac Rhythm NSR/A Fib and Alarm Parameters . 0800-Patient asleep. Awakens easily. Very [place]. Encouraged to eat. Alert to name and some situation. Follows commands. 1000-Daughter at bedside. Patient resting, no distress noted. 1200-Patient sitting up in bed eating lunch. Daughter and son in law at bedside. 1300-Titrating Neosynephrine down. Patient up to chair without difficulty. 1400-Bedside and Verbal shift change report given to Demian RN (oncoming nurse) by Dequan Quick RN (offgoing nurse). Report included the following information SBAR, Intake/Output, MAR, Recent Results, Cardiac Rhythm NSR/ A Fib and Alarm Parameters .

## 2018-12-18 NOTE — PROGRESS NOTES
Cardiology Progress Note    ICU      NAME:  Ren Arriaza   :   1927   MRN:   [id number]     Assessment/Plan:   1. A fib RVR in setting of sepsis/c diff/hypotension: Now in SR. Responded well to digoxin. Cont IV today then po in am if diarrhea resolved. ECHO pending. Replete electrolytes as needed. Hold on [address] initiation until sepsis resolves. 2. Sepsis: per intensivist   3. Hypotension; weaning Carlos now at 35 mcg   4. c diff: on flagyl   5. DVT prophylaxis: heparin       Subjective:   Admitted with persistent, severe diarrhea with hx C.diff, complicated by hypotension/sepsis syndrome requiring pressor support. Developed AF with RVR around 1am. Iv amiodarone drip with bolus ineffective for rate control. Current HR 130s but asx. Dig loaded yesterday.       Denies chest pain or dyspnea at rest. No palpitations. No hx of stroke.      Saw Dr Josie Celis recently for question of AF but none documented at that time.                  Cardiac ROS: Patient denies any exertional chest pain, dyspnea, palpitations, syncope, orthopnea, edema or paroxysmal nocturnal dyspnea. Previous Cardiac Eval  ECHO pending        Review of Systems: No nausea, indigestion, vomiting, pain, cough, sputum. No bleeding. Taking little po. Objective:     Visit Vitals  /68   Pulse 92   Temp 98.6 °F (37 °C)   Resp 18   Ht 5' 8\" (1.727 m)   Wt 159 lb 12.8 oz (72.5 kg)   SpO2 98%   BMI 24.30 kg/m²    O2 Flow Rate (L/min): 2 l/min O2 Device: Nasal cannula    Temp (24hrs), Av.6 °F (37 °C), Min:98.2 °F (36.8 °C), Max:98.9 °F (37.2 °C)      No intake/output data recorded.  1901 -  0700  In: 7852.1 [I.V.:7852.1]  Out: -   TELE: SR     General: AAOx3 cooperative, no acute distress. HEENT: Atraumatic. Pink and moist.  Anicteric sclerae. Neck : Supple,   Lungs: CTA bilaterally. No wheezing/rhonchi/rales. Heart: Regular rhythm, no murmur,  No JVD. No carotid bruits.    Abdomen: Soft, non-distended, non-tender. + Bowel sounds. Extremities: No edema,   Neurologic: Grossly intact. Alert and oriented X 3. No acute neurological distress. Psych: Fair insight. Not anxious or agitated. Care Plan discussed with:    Comments   Patient x    Family  x    RN x    Care Manager                    Consultant:  x intensivist        Data Review:     No lab exists for component: ITNL   No results for input(s): CPK, CKMB, TROIQ in the last 72 hours. Recent Labs     12/18/18  0430 12/17/18  0855 12/16/18  2331 12/16/18  1257 12/16/18  0256 12/15/18  2332   *  --  147*  --  143 147*   K 4.1  --  3.6  --  4.0 3.6   *  --  113*  --  112* 113*   CO2 21  --  20*  --  23 21   BUN 15  --  12  --  16 12   CREA 0.99  --  1.04  --  1.05 1.05   *  --  131*  --  182* 128*   MG 2.2  --  1.4*  --   --   --    ALB 1.6*  --   --   --  1.8* 1.7*   WBC 11.6* 17.2*  --   --  9.8 11.6*   HGB 7.6* 7.7*  --  7.8* 7.0* 7.2*   HCT 27.3* 27.6*  --  27.7* 25.1* 25.0*    301  --   --  193 212     No results for input(s): INR, PTP, APTT in the last 72 hours.     No lab exists for component: INREXT    Medications reviewed  Current Facility-Administered Medications   Medication Dose Route Frequency    digoxin (LANOXIN) 100 mcg/mL injection 125 mcg  125 mcg IntraVENous DAILY    lactated Ringers infusion  75 mL/hr IntraVENous CONTINUOUS    famotidine (PF) (PEPCID) 20 mg in sodium chloride 0.9% 10 mL injection  20 mg IntraVENous DAILY    vancomycin (FIRVANQ) 50 mg/mL oral solution 500 mg  500 mg Oral Q6H    metroNIDAZOLE (FLAGYL) IVPB premix 500 mg  500 mg IntraVENous Q8H    nystatin (MYCOSTATIN) 100,000 unit/mL oral suspension 500,000 Units  500,000 Units Oral QID    0.9% sodium chloride infusion 250 mL  250 mL IntraVENous PRN    insulin lispro (HUMALOG) injection   SubCUTAneous AC&HS    PHENYLephrine (WILBERT-SYNEPHRINE) 30 mg in 0.9% sodium chloride 250 mL infusion   mcg/min IntraVENous TITRATE  sodium chloride (NS) flush 5-10 mL  5-10 mL IntraVENous Q8H    sodium chloride (NS) flush 5-10 mL  5-10 mL IntraVENous PRN    sodium chloride (NS) flush 5-10 mL  5-10 mL IntraVENous Q8H    sodium chloride (NS) flush 5-10 mL  5-10 mL IntraVENous PRN    acetaminophen (TYLENOL) suppository 650 mg  650 mg Rectal Q4H PRN    heparin (porcine) injection 5,000 Units  5,000 Units SubCUTAneous Q8H    glucose chewable tablet 16 g  4 Tab Oral PRN    dextrose (D50W) injection syrg 12.5-25 g  12.5-25 g IntraVENous PRN    glucagon (GLUCAGEN) injection 1 mg  1 mg IntraMUSCular PRN         Quita Cruz NP

## 2018-12-18 NOTE — PROGRESS NOTES
[facility]  INTENSIVIST DAILY PROGRESS NOTE  Name: Sadaf Rivera   : 1927   MRN: [id number]   Date: 2018 10:27 AM   I have reviewed the flowsheet and previous days notes. Overnight events:   Diarrhea has slowed down and more formed  Adequate urine output  Down to Carlos 35    Vital Signs:    Visit Vitals  /68   Pulse 92   Temp 98.6 °F (37 °C)   Resp 18   Ht 5' 8\" (1.727 m)   Wt 72.5 kg (159 lb 12.8 oz)   SpO2 98%   BMI 24.30 kg/m²       O2 Device: Nasal cannula   O2 Flow Rate (L/min): 2 l/min   Temp (24hrs), Av.6 °F (37 °C), Min:98.2 °F (36.8 °C), Max:98.9 °F (37.2 °C)       Intake/Output:   Last shift:      No intake/output data recorded. Last 3 shifts:  1901 -  0700  In: 7852.1 [I.V.:7852.1]  Out: -     Intake/Output Summary (Last 24 hours) at 2018 0850  Last data filed at 2018 0600  Gross per 24 hour   Intake 5287.74 ml   Output    Net 5287.74 ml       Physical Exam:  General:  Alert, cooperative, no distress, frail   Head:  Normocephalic   Eyes:  EOMs intact. Nose: Nares normal.   O2 off currently   Throat: Lips normal.   Neck: Supple   Lungs:   CTA, no w/r/r   Chest wall:  No tenderness    Heart:  Regular rate and rhythm   Abdomen:   Soft, non-tender. Extremities: Trace edema.    Skin: Warm, dry   Neurologic: Alert, hard of hearing, no focal changes       DATA:   Current Facility-Administered Medications   Medication Dose Route Frequency    lactated Ringers infusion  75 mL/hr IntraVENous CONTINUOUS    famotidine (PF) (PEPCID) 20 mg in sodium chloride 0.9% 10 mL injection  20 mg IntraVENous DAILY    vancomycin (FIRVANQ) 50 mg/mL oral solution 500 mg  500 mg Oral Q6H    metroNIDAZOLE (FLAGYL) IVPB premix 500 mg  500 mg IntraVENous Q8H    nystatin (MYCOSTATIN) 100,000 unit/mL oral suspension 500,000 Units  500,000 Units Oral QID    0.9% sodium chloride infusion 250 mL  250 mL IntraVENous PRN    insulin lispro (HUMALOG) injection SubCUTAneous AC&HS    PHENYLephrine (WILBERT-SYNEPHRINE) 30 mg in 0.9% sodium chloride 250 mL infusion   mcg/min IntraVENous TITRATE    sodium chloride (NS) flush 5-10 mL  5-10 mL IntraVENous Q8H    sodium chloride (NS) flush 5-10 mL  5-10 mL IntraVENous PRN    sodium chloride (NS) flush 5-10 mL  5-10 mL IntraVENous Q8H    sodium chloride (NS) flush 5-10 mL  5-10 mL IntraVENous PRN    acetaminophen (TYLENOL) suppository 650 mg  650 mg Rectal Q4H PRN    heparin (porcine) injection 5,000 Units  5,000 Units SubCUTAneous Q8H    glucose chewable tablet 16 g  4 Tab Oral PRN    dextrose (D50W) injection syrg 12.5-25 g  12.5-25 g IntraVENous PRN    glucagon (GLUCAGEN) injection 1 mg  1 mg IntraMUSCular PRN       Telemetry: sinus rhythm          Labs:  Recent Labs     12/18/18  0430 12/17/18  0855 12/16/18  1257 12/16/18  0256   WBC 11.6* 17.2*  --  9.8   HGB 7.6* 7.7* 7.8* 7.0*   HCT 27.3* 27.6* 27.7* 25.1*    301  --  193     Recent Labs     12/18/18  0430 12/16/18  2331 12/16/18  0256 12/15/18  2332   * 147* 143 147*   K 4.1 3.6 4.0 3.6   * 113* 112* 113*   CO2 21 20* 23 21   * 131* 182* 128*   BUN 15 12 16 12   CREA 0.99 1.04 1.05 1.05   CA 7.3* 7.4* 7.3* 7.2*   MG 2.2 1.4*  --   --    ALB 1.6*  --  1.8* 1.7*   SGOT 13*  --  15 13*   ALT 8*  --  10* 11*     No results for input(s): PH, PCO2, PO2, HCO3, FIO2 in the last 72 hours. Imaging:  I have personally reviewed the patients radiographs and reports. PA/lateral film suggests bibasilar airspace disease       IMPRESSION:   · Hypotension: appears primarily related to volume depletion from chronic diarrhea  · Hx of C.diff, concern for recurrence  · Dysphagia with reported good response to nectar thick liquids  · ?  Pneumonia: history not suggestive as he has minimal resp symptoms   PLAN:   · PO Vanc and IV flagyl  · Levoquin discontinued yesterday  · GI following  · Slow IV fluids  · Dysphagia diet  · Wean pressors for MAP>65  · Famotidine instead of PPI  · PT/OT  · OOB to chair   GLOBAL ISSUES:   · Head of bed elevated  · DVT Prophylaxis: heparin SQ     The pt is critically ill and on pressor support. cctime 31 min outside procedures.      See my orders for details    My assessment/plan was discussed with: nurse, daughter        Mell Owusu MD

## 2018-12-18 NOTE — PROGRESS NOTES
Occupational Therapy Note:  Chart reviewed and spoke with team at [address] [place] [address]. Patient still requiring pressor support and not yet ready to engage in therapy. Will follow-up next tx day as appropriate. Thank you.   Yuliana Albarran OTR/L

## 2018-12-18 NOTE — PROGRESS NOTES
Nutrition Assessment:    RECOMMENDATIONS/INTERVENTION(S):   1. Continue with diet rec's per SLP    2. Will order Ensure Enlive BID, Magic Cup daily, Ensure pudding BID and continue Zackery TID. 3. Will monitor PO intakes of meals and all supplements, weight, labs, GI.      ASSESSMENT:   79yo male admitted for hypotension. RD assessment for LOS. PMhx: DM, lymphoma, HTN, hypercholesterolemia, recurrent cdiff. Weight WNL per BMI per age but has had ~35lb weight loss since Jan 2018. Pt eats less when he has diarrhea and most recently had thrush which was also affecting his intakes. Currently tested + for cdiff again. SLP following - on recommended diet of Pureed with Nectar thick liquids. Pt eating very little at each meal.  Zackery ordered in applesauce with all meals because pt does eat applesauce. Drinks Ensure daily at home, will order that. Daughter present, states she will thicken it, she does that at home. Thinks pt would also like Ensure Pudding and Magic Cup. Labs: , POC -143. Meds: Humalog. Diet Order: Puree  % Eaten:    Patient Vitals for the past 72 hrs:   % Diet Eaten   12/18/18 1245 25 %   12/18/18 0945 15 %       Pertinent Medications: [x] Reviewed    Labs: [x] Reviewed    Anthropometrics: Height: 5' 8\" (172.7 cm) Weight: 72.5 kg (159 lb 12.8 oz)    IBW (%IBW):   ( ) UBW (%UBW):   (  %)      BMI: Body mass index is 24.3 kg/m². This BMI is indicative of:   [] Underweight    [x] Normal - per age   [] Overweight    []  Obesity    []  Extreme Obesity (BMI>40)  Estimated Nutrition Needs (Based on): 1760 Kcals/day(BMR 1354 x AF 1.3) , 72 g(1-1.2gm/kg/day (72-87)) Protein  Carbohydrate: At Least 130 g/day  Fluids: 1760 mL/day (1 ml/kcal)    Last BM: 12/18  []Active     [x]Hyperactive  []Hypoactive       [] Absent   BS  Skin:    [] Intact   [] Incision  [] Breakdown   [] DTI   [] Tears/Excoriation/Abrasion  [x]Edema- trace BLE [] Other:    Wt Readings from Last 30 Encounters: 12/18/18 72.5 kg (159 lb 12.8 oz)   12/12/18 64.6 kg (142 lb 8 oz)   12/05/18 65.3 kg (144 lb)   11/26/18 64.9 kg (143 lb)   11/13/18 63.8 kg (140 lb 9.6 oz)   11/12/18 63.9 kg (140 lb 14 oz)      NUTRITION DIAGNOSES:   Problem:  Inadequate oral intake     Etiology: related to decreased ability to consume sufficient energy secondary to poor appetite     Signs/Symptoms: as evidenced by PO intakes < EEN's since admission      NUTRITION INTERVENTIONS:  Meals/Snacks: General/healthful diet   Supplements: Commercial supplement              GOAL:   Consume > 50% meals/supplements in next 1-3 days    Cultural, Yarsani, or Ethnic Dietary Needs: None     EDUCATION & DISCHARGE NEEDS:    [x] None Identified   [] Identified and Education Provided/Documented   [] Identified and Pt declined/was not appropriate      [x] Interdisciplinary Care Plan Reviewed/Documented    [x] Discharge Needs:    Continue PO supplements at home   [] No Nutrition Related Discharge Needs    NUTRITION RISK:   Pt Is At Nutrition Risk  [x]     No Nutrition Risk Identified  []       PT SEEN FOR:    []  MD Consult: []Calorie Count      []Diabetic Diet Education        []Diet Education     []Electrolyte Management     []General Nutrition Management and Supplements     []Management of Tube Feeding     []TPN Recommendations    []  RN Referral:  []MST score >=2     []Enteral/Parenteral Nutrition PTA     []Pregnant: Gestational DM or Multigestation                 [] Pressure Ulcer    []  Low BMI      [x]  Length of Stay       [] Dysphagia Diet         [] Ventilator  []  Follow-up     Previous Recommendations:   [] Implemented          [] Not Implemented          [x] Not Applicable    Previous Goal:   [] Met              [] Progressing Towards Goal              [] Not Progressing Towards Goal   [x] Not Applicable            Radha Ziegler, [address] [place] [address]  Pager [phone]  Phone [phone]

## 2018-12-18 NOTE — PROGRESS NOTES
[address] [facility] Kim [address]   Office [phone]  Fax [phone]          Assessment and Plan     Karan Andersen a 80 y. o. male who is admitted for hypotension in the setting of sepsis of unknown etiology (C. Diff vs HAP/aspiration PNA vs UTI).    24 Hour Events: No acute events     Infectious:      Sepsis of Unknown Etiology   WBC today: 9.8. C. diff (lowe likelihood; no BM overnight) vs HAP/aspiration PNA vs UTI. CXR: possible L basilar consolidation. UA: neg nitrites/LE, 3+ bacteria, 2+ amorphous crystals.   - BCx- NG 2 days, UCx- NG  - Stool studies to be collected pending BM: C. Diff- postive, O&P, Stool Cx-prelim neg  - Pending PNA labs: S. pneumo, legionella, mycoplasma, RVP  - Strict I/O  - Daily CBC, CMP    Cardiac:      Hypotension s/p 4000 mL of Bolus   Currently on Carlos (45 mcg/min)  - Continue lNeo (goal MAP >65)  - MIVF  - Strict I/O    Afib/flutter with RVR. (RESOLVED)  - Resolved after Dig and Dilt bolus, pt is now in Sinus rhythm   - Continue Carlos and titrate to maintain MAP >65  - Cardiology consulted, appreciate recs     PACs   - Cardiac monitoring  - Hold home lopressor    HLD  No lipid panel on record. 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 - Stool studies: Cx, C. Diff, O&P  - Enteric precautions  - PO Vanc & IV Flagyl, GI following     Symptomatic GERD  - Hold home prilosec  - Start IV protonix 40mg daily    Endocrine:      DM II   HgbA1c 9.6 (11/2018).    - Holding home saxagliptin  - SSI for now, consider adding long-acting insulin based on SSI patient requirement.      Chronic Anemia  Hgb 7.6 today, expected to be hemo-dilutional s/p 4L NS. (POA of 8.6 was at BL)  - home iron   - B12, folate- wnl  - Daily CBC    Nephrology:     Elevated Cr - RESOLVED  Cr 1.05 (POA 1.33; baseline 1.0).    - MIVF  - Strict I/Os  - Daily CMP    Misc:    Hx Recurrent Large B-cell Lymphoma  In remission. Established care w Dr. Virgen Barbosa 18. R. Port-A-Cath in place.      FEN/GI - LR at 75 mL/hr. Activity - Ambulate with assistance  DVT prophylaxis - Sub Q Heparin  GI prophylaxis - Not indicated at this time  Fall prophylaxis - Fall precautions ordered. Disposition - Admit to ICU. Plan to d/c TBD. Consulting PT/OT/CM  Code Status - Full, discussed with patient / caregivers. Next of Kin Name and Contact Daughter: Ms. Kami Saeed ([phone])    I appreciate the opportunity to participate in the care of this patient,  Mary Alice Rebolledo MD  Family Medicine Resident         Subjective / Objective     Subjective States that he has abdominal pain. Temp (24hrs), Av.8 °F (37.1 °C), Min:98.6 °F (37 °C), Max:98.9 °F (37.2 °C)     Objective:  Vital signs: (most recent): Blood pressure 110/55, pulse 82, temperature 98.9 °F (37.2 °C), resp. rate 11, height 5' 8\" (1.727 m), weight 156 lb (70.8 kg), SpO2 100 %. Respiratory: O2 Flow Rate (L/min): 2 l/min O2 Device: Nasal cannula   Visit Vitals  /55 (BP 1 Location: Right arm, BP Patient Position: At rest)   Pulse 82   Temp 98.9 °F (37.2 °C)   Resp 11   Ht 5' 8\" (1.727 m)   Wt 156 lb (70.8 kg)   SpO2 100%   BMI 23.72 kg/m²     General: No acute distress. Alert. Cooperative. Hard of hearing   Head: Normocephalic. Atraumatic. Respiratory: Decreased breath sounds in L mid-axillary line. Symmetric air entry and chest expansion. Cardiovascular: RRR. Normal S1,S2. No m/r/g.    GI: + bowel sounds. Generalized tenderness. No rebound tenderness or guarding. Nondistended   Extremities: No edema. No tenderness. Neuro:                          Oriented. No focal deficits  Skin:                             Warm and dry.           I/O:  Date 18 - 18 0618 07 - 18 0659   Shift 4843-9612 24 Hour Total 9517-2746 0699-1200 24 Hour Total   INTAKE   I.V.(mL/kg/hr)  8102.9 6097.4(7.2)  6097.4 Cardizem Volume   2  2     Phenylephrine Volume   581.8  581.8     Amiodarone Volume   3643.3  3643. 3     Levophed Volume  208.4 97.5  97.5     Volume (0.9% sodium chloride infusion)  1877.8        Volume (lactated Ringers infusion)   1422.8  1422.8     Volume (piperacillin-tazobactam (ZOSYN) 3.375 g in 0.9% sodium chloride (MBP/ADV) 100 mL)  100        Volume (levoFLOXacin (LEVAQUIN) 750 mg in D5W IVPB)  0        Volume (vancomycin (VANCOCIN) 1,000 mg in 0.9% sodium chloride (MBP/ADV) 250 mL)   250  250     Volume (metroNIDAZOLE (FLAGYL) IVPB premix 500 mg)   100  100   Shift Total(mL/kg)  2186. 1(30.9) [id number]. 4(86.2)  [id number]. 4(86.2)   OUTPUT   Urine(mL/kg/hr)  750        Urine Voided  550        Urine Occurrence(s)  2 x        Urine Output (mL) ([REMOVED] Condom Catheter 12/16/18)  200      Stool          Stool Occurrence(s)  2 x 6 x  6 x   Shift Total(mL/kg)  750(10.6)      NET  1436.1 6097.4  6097.4   Weight (kg) 70.8 70.8 70.8 70.8 70.8       CBC:  Recent Labs     12/17/18  0855 12/16/18  1257 12/16/18  0256 12/15/18  2332   WBC 17.2*  --  9.8 11.6*   HGB 7.7* 7.8* 7.0* 7.2*   HCT 27.6* 27.7* 25.1* 25.0*     --  193 500       Metabolic Panel:  Recent Labs     12/16/18  2331 12/16/18  0256 12/15/18  2332 12/15/18  1547   * 143 147* 142   K 3.6 4.0 3.6 5.2*   * 112* 113* 106   CO2 20* 23 21 27   BUN 12 16 12 19   CREA 1.04 1.05 1.05 1.33*   * 182* 128* 165*   CA 7.4* 7.3* 7.2* 8.7   MG 1.4*  --   --   --    ALB  --  1.8* 1.7* 2.5*   SGOT  --  15 13* 26   ALT  --  10* 11* 13          For Billing    Chief Complaint   Patient presents with   [facility] Dehydration       Hospital Problems  Date Reviewed: 12/12/2018          Codes Class Noted POA    * (Principal) Hypotension ICD-10-CM: I95.9  ICD-9-CM: 458.9  12/15/2018 Unknown

## 2018-12-18 NOTE — PROGRESS NOTES
Physical Therapy  Orders received and chart reviewed, spoke with OT who attended IDR. Patient currently still requiring pressor support, not ready for therapy at this time. We will continue to follow. Thank you.   Sapna Thomas PT,DPT,NCS

## 2018-12-18 NOTE — PROGRESS NOTES
Shift Report     1336    Bedside and Verbal shift change report given to Kimmy Neal RN (oncoming nurse) by Demetri Hay (offgoing nurse). Report included the following information SBAR, Kardex, MAR and Recent Results. Introduced myself as primary RN. Assumed care of patient. Updated, discussed and explained plan of care to patient, including patient's expectations of visit. Pt given opportunity to ask questions. Pt advised that medical information will be discussed and it is their own responsibility to tell nurse if such conversation should not take place in the presence of visitors. Pt verbalizes understanding. Pt denies any additional complaints at this time. Pt resting on stretcher in low/locked position. Call bell in reach. Pt care whiteboard updated and approximate length of time for test results explained to patient. Lashay Miranda RN        Carlos just increased by off going nurse. BP now 103/50. Patient just returned to bed. Son in law at bedside. Unable to wean carlos further MAP at 65.       1900    Bedside and Verbal shift change report given to Anju Walters RN (oncoming nurse) by Salma Danielle. Karan Alfaro RN (offgoing nurse). Report included the following information SBAR, Kardex, MAR, Recent Results and Cardiac Rhythm  .

## 2018-12-18 NOTE — PROGRESS NOTES
[address] NP  [phone]           GI PROGRESS NOTE        NAME: Kristy Biswas   :  1927   MRN:  [id number]       Subjective: Bowel movements becoming more formed. Objective:         VITALS:   Last 24hrs VS reviewed since prior progress note. Most recent are:  Visit Vitals  /55   Pulse 89   Temp 97.9 °F (36.6 °C)   Resp 19   Ht 5' 8\" (1.727 m)   Wt 72.5 kg (159 lb 12.8 oz)   SpO2 98%   BMI 24.30 kg/m²       Intake/Output Summary (Last 24 hours) at 2018 1617  Last data filed at 2018 1400  Gross per 24 hour   Intake 2936.04 ml   Output    Net 2936.04 ml       PHYSICAL EXAM:  General: Alert, in no acute distress    HEENT: Anicteric sclerae. Lungs:            CTA Bilaterally. Heart:  Regular  rhythm,    Abdomen: Soft, Non distended, Non tender.  (+)Bowel sounds, no HSM  Extremities: No c/c/e  Neurologic:  CN 2-12 gi, Alert and oriented X 3. No acute neurological distress   Psych:   Good insight. Not anxious nor agitated. Lab Data Reviewed:   Recent Labs     18  0430 18  0855   WBC 11.6* 17.2*   HGB 7.6* 7.7*   HCT 27.3* 27.6*    301     Recent Labs     18  0430 18  2331   * 147*   K 4.1 3.6   * 113*   CO2 21 20*   BUN 15 12   CREA 0.99 1.04   * 131*   CA 7.3* 7.4*     Recent Labs     18  0430 18  0256   SGOT 13* 15   AP 55 56   TP 4.8* 5.1*   ALB 1.6* 1.8*   GLOB 3.2 3.3       ________________________________________________________________________  Patient Active Problem List   Diagnosis Code    History of Clostridium difficile colitis Z86.19    DM (diabetes mellitus) ([facility] [address] E11.9    Premature atrial complexes I49.1    Non Hodgkin's lymphoma (HCC) C85.90    Diarrhea of presumed infectious origin R19.7    Hypotension I95.9         Assessment and Plan:  C Diff Diarrhea:  WBC downtrending and stools becoming more formed.   - Continue PO vanc and IV flagyl.  - Trend CMP and CBC  -  Diet as tolerated. - Consider fecal transplant if symptoms recur.      Signed By: Taurus Roberson NP     12/18/2018  4:17 PM

## 2018-12-19 ENCOUNTER — APPOINTMENT (OUTPATIENT)
Dept: GENERAL RADIOLOGY | Age: 83
DRG: 871 | End: 2018-12-19
Attending: INTERNAL MEDICINE
Payer: MEDICARE

## 2018-12-19 PROBLEM — A04.72 C. DIFFICILE DIARRHEA: Status: ACTIVE | Noted: 2018-11-13

## 2018-12-19 LAB
ALBUMIN SERPL-MCNC: 1.6 G/DL (ref 3.5–5)
ALBUMIN/GLOB SERPL: 0.5 {RATIO} (ref 1.1–2.2)
ALP SERPL-CCNC: 59 U/L (ref 45–117)
ALT SERPL-CCNC: 8 U/L (ref 12–78)
ANION GAP SERPL CALC-SCNC: 9 MMOL/L (ref 5–15)
AST SERPL-CCNC: 14 U/L (ref 15–37)
BASOPHILS # BLD: 0 K/UL (ref 0–0.1)
BASOPHILS NFR BLD: 0 % (ref 0–1)
BILIRUB SERPL-MCNC: 0.3 MG/DL (ref 0.2–1)
BUN SERPL-MCNC: 16 MG/DL (ref 6–20)
BUN/CREAT SERPL: 17 (ref 12–20)
CALCIUM SERPL-MCNC: 7.8 MG/DL (ref 8.5–10.1)
CHLORIDE SERPL-SCNC: 117 MMOL/L (ref 97–108)
CO2 SERPL-SCNC: 22 MMOL/L (ref 21–32)
CREAT SERPL-MCNC: 0.95 MG/DL (ref 0.7–1.3)
DIFFERENTIAL METHOD BLD: ABNORMAL
EOSINOPHIL # BLD: 0.3 K/UL (ref 0–0.4)
EOSINOPHIL NFR BLD: 3 % (ref 0–7)
ERYTHROCYTE [DISTWIDTH] IN BLOOD BY AUTOMATED COUNT: 19.6 % (ref 11.5–14.5)
GLOBULIN SER CALC-MCNC: 3.1 G/DL (ref 2–4)
GLUCOSE BLD STRIP.AUTO-MCNC: 178 MG/DL (ref 65–100)
GLUCOSE BLD STRIP.AUTO-MCNC: 193 MG/DL (ref 65–100)
GLUCOSE BLD STRIP.AUTO-MCNC: 208 MG/DL (ref 65–100)
GLUCOSE BLD STRIP.AUTO-MCNC: 227 MG/DL (ref 65–100)
GLUCOSE SERPL-MCNC: 172 MG/DL (ref 65–100)
HCT VFR BLD AUTO: 27.3 % (ref 36.6–50.3)
HGB BLD-MCNC: 7.9 G/DL (ref 12.1–17)
IMM GRANULOCYTES # BLD: 0 K/UL
IMM GRANULOCYTES NFR BLD AUTO: 0 %
LYMPHOCYTES # BLD: 1.3 K/UL (ref 0.8–3.5)
LYMPHOCYTES NFR BLD: 14 % (ref 12–49)
MCH RBC QN AUTO: 21.5 PG (ref 26–34)
MCHC RBC AUTO-ENTMCNC: 28.9 G/DL (ref 30–36.5)
MCV RBC AUTO: 74.4 FL (ref 80–99)
METAMYELOCYTES NFR BLD MANUAL: 1 %
MONOCYTES # BLD: 1 K/UL (ref 0–1)
MONOCYTES NFR BLD: 10 % (ref 5–13)
NEUTS BAND NFR BLD MANUAL: 3 % (ref 0–6)
NEUTS SEG # BLD: 6.9 K/UL (ref 1.8–8)
NEUTS SEG NFR BLD: 69 % (ref 32–75)
NRBC # BLD: 0 K/UL (ref 0–0.01)
NRBC BLD-RTO: 0 PER 100 WBC
PLATELET # BLD AUTO: 266 K/UL (ref 150–400)
PMV BLD AUTO: 10.8 FL (ref 8.9–12.9)
POTASSIUM SERPL-SCNC: 3.5 MMOL/L (ref 3.5–5.1)
PROT SERPL-MCNC: 4.7 G/DL (ref 6.4–8.2)
RBC # BLD AUTO: 3.67 M/UL (ref 4.1–5.7)
RBC MORPH BLD: ABNORMAL
RBC MORPH BLD: ABNORMAL
SERVICE CMNT-IMP: ABNORMAL
SODIUM SERPL-SCNC: 148 MMOL/L (ref 136–145)
WBC # BLD AUTO: 9.6 K/UL (ref 4.1–11.1)

## 2018-12-19 PROCEDURE — 94667 MNPJ CHEST WALL 1ST: CPT

## 2018-12-19 PROCEDURE — 36415 COLL VENOUS BLD VENIPUNCTURE: CPT

## 2018-12-19 PROCEDURE — 74011636637 HC RX REV CODE- 636/637: Performed by: FAMILY MEDICINE

## 2018-12-19 PROCEDURE — 74011250637 HC RX REV CODE- 250/637: Performed by: STUDENT IN AN ORGANIZED HEALTH CARE EDUCATION/TRAINING PROGRAM

## 2018-12-19 PROCEDURE — 77030010545

## 2018-12-19 PROCEDURE — 74011250636 HC RX REV CODE- 250/636: Performed by: INTERNAL MEDICINE

## 2018-12-19 PROCEDURE — 97530 THERAPEUTIC ACTIVITIES: CPT

## 2018-12-19 PROCEDURE — 71045 X-RAY EXAM CHEST 1 VIEW: CPT

## 2018-12-19 PROCEDURE — 97162 PT EVAL MOD COMPLEX 30 MIN: CPT

## 2018-12-19 PROCEDURE — 85025 COMPLETE CBC W/AUTO DIFF WBC: CPT

## 2018-12-19 PROCEDURE — C1758 CATHETER, URETERAL: HCPCS

## 2018-12-19 PROCEDURE — 97165 OT EVAL LOW COMPLEX 30 MIN: CPT

## 2018-12-19 PROCEDURE — 74011250636 HC RX REV CODE- 250/636: Performed by: STUDENT IN AN ORGANIZED HEALTH CARE EDUCATION/TRAINING PROGRAM

## 2018-12-19 PROCEDURE — 92526 ORAL FUNCTION THERAPY: CPT

## 2018-12-19 PROCEDURE — 74011250636 HC RX REV CODE- 250/636: Performed by: NURSE PRACTITIONER

## 2018-12-19 PROCEDURE — 65660000000 HC RM CCU STEPDOWN

## 2018-12-19 PROCEDURE — 74011250637 HC RX REV CODE- 250/637: Performed by: INTERNAL MEDICINE

## 2018-12-19 PROCEDURE — 77010033678 HC OXYGEN DAILY

## 2018-12-19 PROCEDURE — 74011250637 HC RX REV CODE- 250/637: Performed by: FAMILY MEDICINE

## 2018-12-19 PROCEDURE — 80053 COMPREHEN METABOLIC PANEL: CPT

## 2018-12-19 PROCEDURE — 82962 GLUCOSE BLOOD TEST: CPT

## 2018-12-19 PROCEDURE — 74011000258 HC RX REV CODE- 258: Performed by: INTERNAL MEDICINE

## 2018-12-19 RX ORDER — DIGOXIN 125 MCG
0.12 TABLET ORAL DAILY
Status: DISCONTINUED | OUTPATIENT
Start: 2018-12-20 | End: 2018-12-22 | Stop reason: HOSPADM

## 2018-12-19 RX ORDER — POTASSIUM CHLORIDE 14.9 MG/ML
10 INJECTION INTRAVENOUS
Status: COMPLETED | OUTPATIENT
Start: 2018-12-19 | End: 2018-12-19

## 2018-12-19 RX ORDER — POTASSIUM CHLORIDE 750 MG/1
40 TABLET, FILM COATED, EXTENDED RELEASE ORAL
Status: COMPLETED | OUTPATIENT
Start: 2018-12-19 | End: 2018-12-19

## 2018-12-19 RX ORDER — DIGOXIN 0.25 MG/ML
125 INJECTION INTRAMUSCULAR; INTRAVENOUS DAILY
Status: COMPLETED | OUTPATIENT
Start: 2018-12-19 | End: 2018-12-19

## 2018-12-19 RX ADMIN — NYSTATIN 500000 UNITS: 500000 SUSPENSION ORAL at 12:25

## 2018-12-19 RX ADMIN — HEPARIN SODIUM 5000 UNITS: 5000 INJECTION INTRAVENOUS; SUBCUTANEOUS at 14:33

## 2018-12-19 RX ADMIN — ACETAMINOPHEN 650 MG: 325 TABLET, FILM COATED ORAL at 11:24

## 2018-12-19 RX ADMIN — Medication 10 ML: at 14:38

## 2018-12-19 RX ADMIN — SODIUM CHLORIDE, SODIUM LACTATE, POTASSIUM CHLORIDE, AND CALCIUM CHLORIDE 75 ML/HR: 600; 310; 30; 20 INJECTION, SOLUTION INTRAVENOUS at 08:28

## 2018-12-19 RX ADMIN — POTASSIUM CHLORIDE 10 MEQ: 200 INJECTION, SOLUTION INTRAVENOUS at 10:38

## 2018-12-19 RX ADMIN — NYSTATIN 500000 UNITS: 500000 SUSPENSION ORAL at 08:16

## 2018-12-19 RX ADMIN — HEPARIN SODIUM 5000 UNITS: 5000 INJECTION INTRAVENOUS; SUBCUTANEOUS at 21:13

## 2018-12-19 RX ADMIN — POTASSIUM CHLORIDE 40 MEQ: 750 TABLET, EXTENDED RELEASE ORAL at 06:06

## 2018-12-19 RX ADMIN — VANCOMYCIN HYDROCHLORIDE 500 MG: KIT at 06:14

## 2018-12-19 RX ADMIN — NYSTATIN 500000 UNITS: 500000 SUSPENSION ORAL at 21:14

## 2018-12-19 RX ADMIN — METRONIDAZOLE 500 MG: 500 INJECTION, SOLUTION INTRAVENOUS at 14:33

## 2018-12-19 RX ADMIN — VANCOMYCIN HYDROCHLORIDE 500 MG: KIT at 11:24

## 2018-12-19 RX ADMIN — PHENYLEPHRINE HYDROCHLORIDE 10 MCG/MIN: 10 INJECTION INTRAVENOUS at 14:35

## 2018-12-19 RX ADMIN — Medication 1 CAPSULE: at 12:25

## 2018-12-19 RX ADMIN — Medication 10 ML: at 21:15

## 2018-12-19 RX ADMIN — INSULIN LISPRO 2 UNITS: 100 INJECTION, SOLUTION INTRAVENOUS; SUBCUTANEOUS at 11:24

## 2018-12-19 RX ADMIN — PHENYLEPHRINE HYDROCHLORIDE 20 MCG/MIN: 10 INJECTION INTRAVENOUS at 11:31

## 2018-12-19 RX ADMIN — NYSTATIN 500000 UNITS: 500000 SUSPENSION ORAL at 17:27

## 2018-12-19 RX ADMIN — INSULIN LISPRO 2 UNITS: 100 INJECTION, SOLUTION INTRAVENOUS; SUBCUTANEOUS at 21:14

## 2018-12-19 RX ADMIN — METRONIDAZOLE 500 MG: 500 INJECTION, SOLUTION INTRAVENOUS at 06:19

## 2018-12-19 RX ADMIN — POTASSIUM CHLORIDE 10 MEQ: 200 INJECTION, SOLUTION INTRAVENOUS at 08:17

## 2018-12-19 RX ADMIN — Medication 10 ML: at 06:06

## 2018-12-19 RX ADMIN — INSULIN LISPRO 2 UNITS: 100 INJECTION, SOLUTION INTRAVENOUS; SUBCUTANEOUS at 08:16

## 2018-12-19 RX ADMIN — VANCOMYCIN HYDROCHLORIDE 500 MG: KIT at 00:26

## 2018-12-19 RX ADMIN — DIGOXIN 125 MCG: 0.25 INJECTION INTRAMUSCULAR; INTRAVENOUS at 08:17

## 2018-12-19 RX ADMIN — VANCOMYCIN HYDROCHLORIDE 500 MG: KIT at 23:45

## 2018-12-19 RX ADMIN — INSULIN LISPRO 3 UNITS: 100 INJECTION, SOLUTION INTRAVENOUS; SUBCUTANEOUS at 17:27

## 2018-12-19 RX ADMIN — METRONIDAZOLE 500 MG: 500 INJECTION, SOLUTION INTRAVENOUS at 21:14

## 2018-12-19 RX ADMIN — HEPARIN SODIUM 5000 UNITS: 5000 INJECTION INTRAVENOUS; SUBCUTANEOUS at 06:06

## 2018-12-19 RX ADMIN — FAMOTIDINE 20 MG: 10 INJECTION, SOLUTION INTRAVENOUS at 08:16

## 2018-12-19 RX ADMIN — CEFTRIAXONE SODIUM 2 G: 2 INJECTION, POWDER, FOR SOLUTION INTRAMUSCULAR; INTRAVENOUS at 12:25

## 2018-12-19 RX ADMIN — VANCOMYCIN HYDROCHLORIDE 500 MG: KIT at 17:27

## 2018-12-19 RX ADMIN — SIMVASTATIN 40 MG: 20 TABLET, FILM COATED ORAL at 21:14

## 2018-12-19 NOTE — PROGRESS NOTES
[facility] [address]   Office [phone]  Fax [phone]          Assessment and Plan     Debbie Rosa a 80 y. o. male who is admitted for hypotension in the setting of sepsis of unknown etiology (C. Diff vs HAP/aspiration PNA vs UTI).    24 Hour Events: No acute events. Infectious:      Sepsis of Unknown Etiology (RESOLVED)  C. diff (lowe likelihood; no BM overnight) vs HAP/aspiration PNA vs UTI. CXR: possible L basilar consolidation. UA: neg nitrites/LE, 3+ bacteria, 2+ amorphous crystals.   - BCx- NG 3 days, UCx- NG  - Stool studies to be collected pending BM: C. Diff- postive, O&P, Stool Cx-prelim neg  -  S. pneumo, legionella, mycoplasma, RVP- negative   - Strict I/O  - Daily CBC, CMP    Cardiac:      Hypotension s/p 4000 mL of Bolus, Likely due to hypovolemia, (RESOLVED)  - S/p Carlos which was stopped on 12/20   - MIVF with Strict I/O    Afib/flutter with RVR. S/P Dig and Dilt bolus  - On Digoxin 0.125 mg PO, OAC after C diff has resolved   - Continue Carlos and titrate to maintain MAP >65  - Cardiology consulted, appreciate recs     HLD: No lipid panel on record. 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 - Stool studies: Cx-negative, C. Diff- positive, O&P- pending   - Enteric precautions  - PO Vanc & IV Flagyl, GI following     Symptomatic GERD  - Hold home prilosec  - Start IV protonix 40mg daily    Endocrine:      DM II   HgbA1c 9.6 (11/2018).    - Holding home saxagliptin  - SSI for now, consider adding long-acting insulin based on SSI patient requirement.   - Added 10 units of Lantus to regimen      Chronic Anemia  Likely worsened due to  hemo-dilutional s/p 4L NS. (POA of 8.6 was at BL)  - home iron, B12, folate- wnl  - Daily CBC    Nephrology:     Elevated Cr - RESOLVED  Cr today . 95 (POA 1.33; baseline 1.0).    - MIVF  - Strict I/Os  - Daily CMP    Misc:    Hx Recurrent Large B-cell Lymphoma  In remission. Established care w Dr. Kiya Morales 18. R. Port-A-Cath in place. Hypernatremia: Likely hypovolemic in nature due to GI losses  - Urine lytes ordered, will make changes to plan based on results      FEN/GI - LR at 50 mL/hr. Activity - Ambulate with assistance  DVT prophylaxis - Sub Q Heparin  GI prophylaxis - Not indicated at this time  Fall prophylaxis - Fall precautions ordered. Disposition - Admit to ICU. Plan to d/c TBD. Consulting PT/OT/CM  Code Status - Full, discussed with patient / caregivers. Next of Kin Name and Contact Daughter: Ms. Van Parks ([phone])    I appreciate the opportunity to participate in the care of this patient,  Fermin Crespo MD  Family Medicine Resident         Subjective / Objective     Subjective States he doesn't have any pain today     Temp (24hrs), Av.1 °F (36.7 °C), Min:97.6 °F (36.4 °C), Max:98.4 °F (36.9 °C)     Objective:  Vital signs: (most recent): Blood pressure 90/46, pulse 96, temperature 97.8 °F (36.6 °C), resp. rate 30, height 5' 8\" (1.727 m), weight 164 lb 6.4 oz (74.6 kg), SpO2 97 %. Respiratory: O2 Flow Rate (L/min): 2 l/min O2 Device: Nasal cannula   Visit Vitals  /64 (BP 1 Location: Right arm, BP Patient Position: At rest)   Pulse 91   Temp 97.6 °F (36.4 °C)   Resp 22   Ht 5' 8\" (1.727 m)   Wt 162 lb 4.8 oz (73.6 kg)   SpO2 98%   BMI 24.68 kg/m²     General: No acute distress. Alert. Cooperative. Hard of hearing   Head: Normocephalic. Atraumatic. Respiratory: Decreased breath sounds in L mid-axillary line. Symmetric air entry and chest expansion. Cardiovascular: RRR. Normal S1,S2. No m/r/g.    GI: + bowel sounds. Slight discomfort on abdominal palpation   Extremities: No edema. No tenderness. Neuro:                          Oriented. No focal deficits  Skin:                             Warm and dry.           I/O:  Date 18 0700 - 18 9886 18 0700 - 18 4828 Shift [id number] 1900-0659 24 Hour Total 9519-7893 6150-0609 24 Hour Total   INTAKE   P.O. 50 240 290        P. O. 50 240 290      I. V.(mL/kg/hr) 926.7(1) 218.5(0.2) 1145.2(0.6)        Phenylephrine Volume 105 18.5 123.5        Volume (lactated Ringers infusion) 671.7 200 871.7        Volume (cefTRIAXone (ROCEPHIN) 2 g in 0.9% sodium chloride (MBP/ADV) 50 mL) 50  50        Volume (metroNIDAZOLE (FLAGYL) IVPB premix 500 mg) 100  100      Shift Total(mL/kg) 976. 7(13.1) 458.5(6.2) 1435.2(19.5)      OUTPUT   Urine(mL/kg/hr)  300(0.3) 300(0.2)        Urine Occurrence(s) 3 x 3 x 6 x        Urine Output (mL) (Condom Catheter 12/19/18)  300 300      Stool           Stool Occurrence(s) 5 x  5 x      Shift Total(mL/kg)  300(4.1) 300(4.1)      .7 158.5 1135.2      Weight (kg) 74.6 73.6 73.6 73.6 73.6 73.6       CBC:  Recent Labs     12/20/18  0529 12/19/18  0309 12/18/18  0430   WBC 7.0 9.6 11.6*   HGB 7.3* 7.9* 7.6*   HCT 26.3* 27.3* 27.3*    266 645       Metabolic Panel:  Recent Labs     12/20/18  0529 12/19/18  0309 12/18/18  0430   * 148* 148*   K 3.8 3.5 4.1   * 117* 117*   CO2 24 22 21   BUN 14 16 15   CREA 0.90 0.95 0.99   * 172* 142*   CA 7.9* 7.8* 7.3*   MG 1.7  --  2.2   PHOS 2.0*  --   --    ALB 1.5* 1.6* 1.6*   SGOT 13* 14* 13*   ALT 6* 8* 8*          For Billing    Chief Complaint   Patient presents with   Maximus.Butt Dehydration       Hospital Problems  Date Reviewed: 12/12/2018          Codes Class Noted POA    Hypotension ICD-10-CM: I95.9  ICD-9-CM: 458.9  12/15/2018 Unknown        * (Principal) C. difficile diarrhea ICD-10-CM: A04.72  ICD-9-CM: 008.45  11/13/2018 Yes

## 2018-12-19 NOTE — PROGRESS NOTES
Cardiology Progress Note    ICU      NAME:  Franki Palma   :   1927   MRN:   [id number]     Assessment/Plan:   1. A fib RVR in setting of sepsis/c diff/hypotension: Now in SR w/ PAC's/PVC's. . Responded well to digoxin. Change dig to po tomorrow. Replete K (3.5 today). Ideally would benefit from chronic anticoagulation but will defer decision until C. Diff resolves - high risk for bleeding short term. ECHO with nml EF . Ck Dig level   2. Sepsis: per medicine team   3. Hypotension; required inc pressor support overnight. Carlos at 30 mcg   4. c diff: on flagyl , stool more formed now   5. DVT prophylaxis: heparin       Subjective:   Admitted with persistent, severe diarrhea with hx C.diff, complicated by hypotension/sepsis syndrome requiring pressor support. Developed AF with RVR around 1am. Iv amiodarone drip with bolus ineffective for rate control. Current HR 130s but asx. Dig loaded yesterday.       Denies chest pain or dyspnea at rest. No palpitations. No hx of stroke.      Saw Dr Noa Osorio recently for question of AF but none documented at that time.          Cardiac ROS: Patient denies any exertional chest pain, dyspnea, palpitations, syncope, orthopnea, edema or paroxysmal nocturnal dyspnea. Previous Cardiac Eval  ECHO 18 LVEF 55-60% No WMA, Mild TR        Review of Systems: No nausea, indigestion, vomiting, pain, cough, sputum. No bleeding. Taking little po. Objective:     Visit Vitals  /60 (BP 1 Location: Right arm, BP Patient Position: At rest)   Pulse (!) 104   Temp 98.4 °F (36.9 °C)   Resp 15   Ht 5' 8\" (1.727 m)   Wt 164 lb 6.4 oz (74.6 kg)   SpO2 93%   BMI 25.00 kg/m²    O2 Flow Rate (L/min): 2 l/min O2 Device: Nasal cannula    Temp (24hrs), Av.3 °F (36.8 °C), Min:97.8 °F (36.6 °C), Max:98.8 °F (37.1 °C)      No intake/output data recorded.      1901 -  0700  In: 4798 [P.O.:415; I.V.:4383]  Out: -   TELE:      General: AAOx3 cooperative, no acute distress. HEENT: Atraumatic. Pink and moist.  Anicteric sclerae. Neck : Supple,   Lungs: CTA bilaterally. No wheezing/rhonchi/rales. Heart: Regular rhythm, no murmur,  No JVD. No carotid bruits. Abdomen: Soft, non-distended, non-tender. + Bowel sounds. Extremities: No edema,   Neurologic: Grossly intact. Alert and oriented X 3. No acute neurological distress. Psych: Fair insight. Not anxious or agitated. Care Plan discussed with:    Comments   Patient x    Family  x    RN x    Care Manager                    Consultant:  x intensivist        Data Review:     No lab exists for component: ITNL   No results for input(s): CPK, CKMB, TROIQ in the last 72 hours. Recent Labs     12/19/18  0309 12/18/18  0430 12/17/18  0855 12/16/18  2331   * 148*  --  147*   K 3.5 4.1  --  3.6   * 117*  --  113*   CO2 22 21  --  20*   BUN 16 15  --  12   CREA 0.95 0.99  --  1.04   * 142*  --  131*   MG  --  2.2  --  1.4*   ALB 1.6* 1.6*  --   --    WBC 9.6 11.6* 17.2*  --    HGB 7.9* 7.6* 7.7*  --    HCT 27.3* 27.3* 27.6*  --     252 301  --      No results for input(s): INR, PTP, APTT in the last 72 hours.     No lab exists for component: INREXT, INREXT    Medications reviewed  Current Facility-Administered Medications   Medication Dose Route Frequency    digoxin (LANOXIN) injection 125 mcg  125 mcg IntraVENous DAILY    [START ON 12/20/2018] digoxin (LANOXIN) tablet 0.125 mg  0.125 mg Oral DAILY    potassium chloride 10 mEq in 50 ml IVPB  10 mEq IntraVENous Q1H    simvastatin (ZOCOR) tablet 40 mg  40 mg Oral QHS    acetaminophen (TYLENOL) tablet 650 mg  650 mg Oral Q6H PRN    lactated Ringers infusion  75 mL/hr IntraVENous CONTINUOUS    famotidine (PF) (PEPCID) 20 mg in sodium chloride 0.9% 10 mL injection  20 mg IntraVENous DAILY    vancomycin (FIRVANQ) 50 mg/mL oral solution 500 mg  500 mg Oral Q6H    metroNIDAZOLE (FLAGYL) IVPB premix 500 mg  500 mg IntraVENous Q8H    nystatin (MYCOSTATIN) 100,000 unit/mL oral suspension 500,000 Units  500,000 Units Oral QID    0.9% sodium chloride infusion 250 mL  250 mL IntraVENous PRN    insulin lispro (HUMALOG) injection   SubCUTAneous AC&HS    PHENYLephrine (WILBERT-SYNEPHRINE) 30 mg in 0.9% sodium chloride 250 mL infusion   mcg/min IntraVENous TITRATE    sodium chloride (NS) flush 5-10 mL  5-10 mL IntraVENous Q8H    sodium chloride (NS) flush 5-10 mL  5-10 mL IntraVENous PRN    sodium chloride (NS) flush 5-10 mL  5-10 mL IntraVENous Q8H    sodium chloride (NS) flush 5-10 mL  5-10 mL IntraVENous PRN    acetaminophen (TYLENOL) suppository 650 mg  650 mg Rectal Q4H PRN    heparin (porcine) injection 5,000 Units  5,000 Units SubCUTAneous Q8H    glucose chewable tablet 16 g  4 Tab Oral PRN    dextrose (D50W) injection syrg 12.5-25 g  12.5-25 g IntraVENous PRN    glucagon (GLUCAGEN) injection 1 mg  1 mg IntraMUSCular PRN         Melissa Lujan, NP

## 2018-12-19 NOTE — PROGRESS NOTES
Beginning of shift:  Bedside and Verbal shift change report given to Megan Mcdonough, RN (oncoming nurse) by Bebeto Dupree RN (offgoing nurse). Report included the following information SBAR, Kardex, Procedure Summary, Intake/Output, MAR, Accordion, Recent Results, Med Rec Status and Cardiac Rhythm NSR.    2000  Initial assessment completed. Introduced self as primary RN. Pt appears to be very hard of hearing.  VSS.  Pt unable to relay if he is in pain or any additional complaints at this time. He is in bed but appears restless with some moaning. Plan for the evening discussed with pt. Bed locked and in low position with call bell within reach.  Instructed him to press call connor when needed. White board updated with this RN's name. 2018  Assisted PCT in cleaning up pt. He had incontinence of urine and passed a small, loose BM. Prior to leaving, pt requested Tylenol. 2032  Responded to call bell. Pt requesting Tylenol. 2044  Telephone call made to Dr. Nico Chatman from [facility]. Informed him that pt is requesting PO Tylenol and what he has ordered are suppository Tylenol. Informed him that pt continues to have diarrhea and that a suppositories would not be beneficial for him at this time. He is able to take PO meds w/ Magic Cup.     2115  PO meds well tolerated w/ Magic Cup and thickened water. 0005  Reported to pt's room after hearing him yell out loud. Pt was in the middle of trying to remove his booties. Booties taken off. Pt repositioned in the bed. Call bell placed within reach.  Instructed him to press call bell when needed. 0045  Pt noted to be hypotensive at 86/53 with a MAP of 61. He is currently sleeping on his right side with both his arms bent up. Pt and BP cuff repositioned. 0115  Pt continues to be hypotensive at 81/46 with a MAP of 55.   Carlos drip increased by 5 mcg/min to 30 mcg/min.    0123  BP  91/51  MAP of 61.    0130  BP  101/57  MAP 67.    0145  BP  100/56  MAP 67.    0405  BP  107/64 MAP 72. Carlos drip infusing at 30 mcg/min.    0600  BP  118/85  MAP 93. Pt called out stating that he needed to use the restroom. 1925  Pt requested to use the restroom. Informed him he may use a bedpan since it would not be safe for him to get up with his BP's being low. Pt appeared to be agitated and stated that he would just wait. Pt able to tolerate PO meds w/ his thickened water. Heparin given SQ and metronidazle IV initiated. End of shift:  Bedside and Verbal shift change report given to MEI Archibald RN  (oncoming nurse) by Estefany Webb RN (offgoing nurse). Report included the following information SBAR, Kardex, Intake/Output, MAR, Accordion, Recent Results, Med Rec Status and Cardiac Rhythm Sinus Arrhythmia.

## 2018-12-19 NOTE — PROGRESS NOTES
1515: Bedside and Verbal shift change report given to Bobbi RN (oncoming nurse) by Gail Jacome (offgoing nurse). Report included the following information SBAR, Kardex, ED Summary, Intake/Output, MAR, Recent Results and Cardiac Rhythm Afib.     1815: Neosynephrine stopped at this time. 1900: Bedside and Verbal shift change report given to Winnie Maher RN (oncoming nurse) by Sharonda Acosta RN (offgoing nurse). Report included the following information SBAR, Kardex, ED Summary, Intake/Output, MAR, Recent Results and Cardiac Rhythm Afib.

## 2018-12-19 NOTE — PROGRESS NOTES
Problem: Self Care Deficits Care Plan (Adult)  Goal: *Acute Goals and Plan of Care (Insert Text)  Occupational Therapy Goals  Initiated 12/19/2018  1. Patient will perform grooming with modified independence within 7 day(s). 2.  Patient will perform upper body dressing and bathing with modified independence within 7 day(s). 3.  Patient will perform lower body dressing and bathing with minimal assistance within 7 day(s). 4.  Patient will perform toilet transfers with minimal assistance x1 using rolling walker within 7 day(s). 5.  Patient will perform all aspects of toileting with moderate assistance  within 7 day(s). 6.  Patient will participate in upper extremity therapeutic exercise/activities with supervision/set-up for 10 minutes within 7 day(s). 7.  Patient will utilize energy conservation techniques during functional activities with verbal cues within 7 day(s). Occupational Therapy EVALUATION  Patient: Franki Palma (1 y.o. male)  Date: 12/19/2018  Primary Diagnosis: Hypotension  Hypotension       Precautions:  WBAT, Fall, enteric     ASSESSMENT :  Based on the objective data described below, the patient presents with decreased activity tolerance following admission on 12/15 hypotension/sepsis with Cdiff. Patient requiring high dose pressors initially however now weaning him down with all vitals remaining stable with tx. Patient oriented to self only, limited by confusion, and became increasingly agitated throughout tx. Patient required mod A for both bed mobility and OOB transfer from bed to chair using rolling walker. Patient currently requires supervision/setup to min A for UB ADLs and max to total A for LB ADLs. Patient would benefit from continued skilled OT to progress towards goals and improve overall independence. Patient will benefit from skilled intervention to address the above impairments.   Patients rehabilitation potential is considered to be Good  Factors which may influence rehabilitation potential include:   [x]             None noted  []             Mental ability/status  []             Medical condition  []             Home/family situation and support systems  []             Safety awareness  []             Pain tolerance/management  []             Other:      PLAN :  Recommendations and Planned Interventions:  [x]               Self Care Training                  [x]        Therapeutic Activities  [x]               Functional Mobility Training    []        Cognitive Retraining  [x]               Therapeutic Exercises           [x]        Endurance Activities  []               Balance Training                   []        Neuromuscular Re-Education  []               Visual/Perceptual Training     [x]   Home Safety Training  [x]               Patient Education                 [x]        Family Training/Education  []               Other (comment):    Frequency/Duration: Patient will be followed by occupational therapy 3 times a week to address goals. Discharge Recommendations: Skilled Nursing Facility   Further Equipment Recommendations for Discharge: none      SUBJECTIVE:   Patient stated García Shah (daughter and PRUDENCE) are going to come get me to go shopping.     OBJECTIVE DATA SUMMARY:   HISTORY:   Past Medical History:   Diagnosis Date    Anemia     Arthritis     [facility] [address]     Constipation     Diabetes [facility] [address] [facility]     Gastrointestinal disorder     History of neoplasms, uncertain behavior     Of soft tissue and skin    Hypercholesterolemia     Hyperkalemia     Hypertension     Low back pain     Melanoma in situ of other parts of face ([address]     Lower lip; removed    Neuropathy     Right bundle-branch block     Spinal stenosis     Type II diabetes mellitus ([address]     Venous insufficiency      Past Surgical History:   Procedure Laterality Date    HX HEENT      HX ORTHOPAEDIC      HX PROSTATECTOMY      HX UROLOGICAL      NEUROLOGICAL PROCEDURE UNLISTED Prior Level of Function/Environment/Context: Patient recently moved to [place] to live with his daughter and son-in-law. Home Situation  Home Environment: Private residence  One/Two Story Residence: One story  Living Alone: No  Support Systems: Family member(s)  Patient Expects to be Discharged to[de-identified] Skilled nursing facility  Current DME Used/Available at Home: [address], [place], straight, Alice Emms, rollator    Hand dominance: Right    EXAMINATION OF PERFORMANCE DEFICITS:  Cognitive/Behavioral Status:  Neurologic State: Alert;Confused  Orientation Level: Oriented to person;Disoriented to situation;Disoriented to time;Disoriented to place  Cognition: Decreased attention/concentration;Decreased command following  Perception: Appears intact  Perseveration: No perseveration noted  Safety/Judgement: Decreased awareness of environment    Skin: Intact in the uppers    Edema: None noted in the uppers    Hearing: Auditory  Auditory Impairment: Hard of hearing, bilateral  Hearing Aids/Status: Bilateral    Vision/Perceptual:    Tracking: Able to track stimulus in all quadrants w/o difficulty    Diplopia: No    Acuity: Within Defined Limits       Range of Motion:  AROM: Within functional limits in the uppers  PROM: Within functional limits in the uppers    Strength:  Strength: Generally decreased, functional in the uppers    Coordination:  Fine Motor Skills-Upper: Left Intact; Right Intact    Gross Motor Skills-Upper: Left Intact; Right Intact    Tone & Sensation:  Tone: Normal  Sensation: Intact    Balance:  Sitting: Intact  Standing: Impaired  Standing - Static: Constant support  Standing - Dynamic : Poor    Functional Mobility and Transfers for ADLs:  Bed Mobility:  Rolling: Moderate assistance  Supine to Sit: Moderate assistance    Transfers:  Sit to Stand:  Moderate assistance;Assist x1;Additional time  Stand to Sit: Moderate assistance;Assist x1;Additional time  Bed to Chair: Moderate assistance;Assist x1;Additional time    ADL Assessment:  Feeding: Supervision;Setup    Oral Facial Hygiene/Grooming: Minimum assistance    Bathing: Maximum assistance    Upper Body Dressing: Minimum assistance    Lower Body Dressing: Total assistance    Toileting: Total assistance    Cognitive Retraining  Safety/Judgement: Decreased awareness of environment    Functional Measure:  Barthel Index:    Bathin  Bladder: 5  Bowels: 0  Groomin  Dressin  Feedin  Mobility: 0  Stairs: 0  Toilet Use: 0  Transfer (Bed to Chair and Back): 5  Total: 15       Barthel and G-code impairment scale:  Percentage of impairment CH  0% CI  1-19% CJ  20-39% CK  40-59% CL  60-79% CM  80-99% CN  100%   Barthel Score 0-100 100 99-80 79-60 59-40 20-39 1-19   0   Barthel Score 0-20 20 17-19 13-16 9-12 5-8 1-4 0      The Barthel ADL Index: Guidelines  1. The index should be used as a record of what a patient does, not as a record of what a patient could do. 2. The main aim is to establish degree of independence from any help, physical or verbal, however minor and for whatever reason. 3. The need for supervision renders the patient not independent. 4. A patient's performance should be established using the best available evidence. Asking the patient, friends/relatives and nurses are the usual sources, but direct observation and common sense are also important. However direct testing is not needed. 5. Usually the patient's performance over the preceding 24-48 hours is important, but occasionally longer periods will be relevant. 6. Middle categories imply that the patient supplies over 50 per cent of the effort. 7. Use of aids to be independent is allowed. Brina Soriano., Barthel, D.W. ([id number]). Functional evaluation: the Barthel Index. [address] [facility] (14)2. Shon García brigitte MARIS Kat, Rhianna Medina., Alanna Finney., Breezy Lucio, [address] [facility] ().  Measuring the change indisability after inpatient rehabilitation; comparison of the responsiveness of the Barthel Index and Functional [place] Measure. Journal of Neurology, Neurosurgery, and Psychiatry, 66(4), 191-437. MELISSA Dale, YAMILE Sanderson, & Maida Hsu M.A. (2004.) Assessment of post-stroke quality of life in cost-effectiveness studies: The usefulness of the Barthel Index and the EuroQoL-5D. Quality of Life Research, 13, 263-44       G codes: In compliance with CMSs Claims Based Outcome Reporting, the following G-code set was chosen for this patient based on their primary functional limitation being treated: The outcome measure chosen to determine the severity of the functional limitation was the Barthel Index with a score of 15/100 which was correlated with the impairment scale. ? Self Care:     - CURRENT STATUS: CM - 80%-99% impaired, limited or restricted    - GOAL STATUS: CL - 60%-79% impaired, limited or restricted    - D/C STATUS:  ---------------To be determined---------------     Occupational Therapy Evaluation Charge Determination   History Examination Decision-Making   LOW Complexity : Brief history review  LOW Complexity : 1-3 performance deficits relating to physical, cognitive , or psychosocial skils that result in activity limitations and / or participation restrictions  LOW Complexity : No comorbidities that affect functional and no verbal or physical assistance needed to complete eval tasks       Based on the above components, the patient evaluation is determined to be of the following complexity level: LOW   Activity Tolerance:   Patient tolerated eval well. Please refer to the flowsheet for vital signs taken during this treatment.   After treatment:   [x] Patient left in no apparent distress sitting up in chair  [] Patient left in no apparent distress in bed  [x] Call bell left within reach  [x] Nursing notified  [x] Caregiver present  [x] Bed alarm activated    COMMUNICATION/EDUCATION:   The patients plan of care was discussed with: Physical Therapist, Registered Nurse and patient. .  [x] Home safety education was provided and the patient/caregiver indicated understanding. [x] Patient/family have participated as able in goal setting and plan of care. [x] Patient/family agree to work toward stated goals and plan of care. [] Patient understands intent and goals of therapy, but is neutral about his/her participation. [] Patient is unable to participate in goal setting and plan of care. This patients plan of care is appropriate for delegation to [facility].     Thank you for this referral.  Yuliana Albarran, OTR/L  Time Calculation: 26 mins

## 2018-12-19 NOTE — PROGRESS NOTES
I met with pt and his Storm Parent) as a follow-up visit to further discuss SNF placement in the future. Daughter has narrowed down her search to two SNFs ([facility] and [facility]. near [place]. Her sons live in [place] so [facility] would be near them. Daughter is planning to set up a tour @ [facility]. I am requesting a Palliative Medicine Consult from attending  to outline goals of care and code status with pt and his daughter. Once PT and OT evaluate pt today,I will send clinicals to both [facility] and [facility].     Virginia Collazo

## 2018-12-19 NOTE — PROGRESS NOTES
Problem: Mobility Impaired (Adult and Pediatric)  Goal: *Acute Goals and Plan of Care (Insert Text)  Physical Therapy Goals  Initiated 12/19/2018  1. Patient will move from supine to sit and sit to supine  in bed with minimal assistance/contact guard assist within 7 day(s). 2.  Patient will transfer from bed to chair and chair to bed with minimal assistance/contact guard assist using the least restrictive device within 7 day(s). 3.  Patient will perform sit to stand with minimal assistance/contact guard assist within 7 day(s). 4.  Patient will ambulate with minimal assistance/contact guard assist for 50 feet with the least restrictive device within 7 day(s). physical Therapy EVALUATION  Patient: Marielle Baker (25 y.o. male)  Date: 12/19/2018  Primary Diagnosis: Hypotension  Hypotension       Precautions:   WBAT, Fall    ASSESSMENT :  Based on the objective data described below, the patient presents with decreased endurance, strength, and overall balance following admission for hypotension. Patient initially requiring pressor support (hussein) and now weaned and cleared to work with therapy. His hospital course has been complicated by confusion, and frequent diarrhea and +c-diff. Patient still requiring ICU level care. He is requiring supplemental O2 at 2L with sats 98% with activity. Patient today received supine in bed, he is alert to himself only. He is confused throughout the session and requires constant cuing for re-direction. Patient prior to admission just moved from [place] (6 weeks) and is living with his family in a 1 story home. Today, patient required MOD A for bed mobility, and MOD A x2 for sit-stand and stand pivot transfer to chair. Patient was incontinent and required hygiene change and hygiene assistance. Patient would benefit from SNF at discharge. Patient will benefit from skilled intervention to address the above impairments.   Patients rehabilitation potential is considered to be Good  Factors which may influence rehabilitation potential include:   []         None noted  [x]         Mental ability/status  [x]         Medical condition  []         Home/family situation and support systems  []         Safety awareness  []         Pain tolerance/management  []         Other:      PLAN :  Recommendations and Planned Interventions:  [x]           Bed Mobility Training             [x]    Neuromuscular Re-Education  [x]           Transfer Training                   []    Orthotic/Prosthetic Training  [x]           Gait Training                         []    Modalities  [x]           Therapeutic Exercises           []    Edema Management/Control  [x]           Therapeutic Activities            [x]    Patient and Family Training/Education  []           Other (comment):    Frequency/Duration: Patient will be followed by physical therapy  5 times a week to address goals. Discharge Recommendations: Skilled Nursing Facility  Further Equipment Recommendations for Discharge: TBD at rehab     SUBJECTIVE:   Patient stated Christian Rhondajosr don't think I can go out shopping.     OBJECTIVE DATA SUMMARY:   HISTORY:    Past Medical History:   Diagnosis Date    Anemia     Arthritis     [facility] [place] [facility] [address]     Constipation     Diabetes ([facility] [place] [facility] [address]     Gastrointestinal disorder     History of neoplasms, uncertain behavior     Of soft tissue and skin    Hypercholesterolemia     Hyperkalemia     Hypertension     Low back pain     Melanoma in situ of other parts of face ([facility] [place] [facility] [address]     Lower lip; removed    Neuropathy     Right bundle-branch block     Spinal stenosis     Type II diabetes mellitus ([facility] [address] [facility]     Venous insufficiency      Past Surgical History:   Procedure Laterality Date    HX HEENT      HX ORTHOPAEDIC      HX PROSTATECTOMY      HX UROLOGICAL      NEUROLOGICAL PROCEDURE UNLISTED       Prior Level of Function/Home Situation: see above  Personal factors and/or comorbidities impacting plan of care: see below    Home Situation  Home Environment: Private residence  One/Two Story Residence: One story  Living Alone: No  Support Systems: Family member(s)  Patient Expects to be Discharged to[de-identified] Skilled nursing facility  Current DME Used/Available at Home: vinnie Sagast[employer] Alonzo Coss, rollator    EXAMINATION/PRESENTATION/DECISION MAKING:   Vitals  Vitals:    12/19/18 1404 12/19/18 1409 12/19/18 1435 12/19/18 1441   BP: 118/71 128/81 113/58 90/46   BP 1 Location: Right arm Right arm     BP Patient Position: Sitting      Pulse:  (!) 104 (!) 113 (!) 106   Resp:       Temp:       SpO2:  97%     Weight:       Height:           Critical Behavior:  Neurologic State: Alert, Confused, Drowsy  Orientation Level: Oriented to person, Oriented to time, Disoriented to place, Disoriented to situation  Cognition: Follows commands, Memory loss  Safety/Judgement: Insight into deficits  Hearing: Auditory  Auditory Impairment: Hard of hearing, bilateral  Hearing Aids/Status: Bilateral  Skin:  All exposed intact  Edema: none noted  Range Of Motion:  AROM: Within functional limits           PROM: Within functional limits           Strength:    Strength: Generally decreased, functional                    Tone & Sensation:   Tone: Normal              Sensation: Intact               Coordination:  Coordination: Generally decreased, functional  Vision:      Functional Mobility:  Bed Mobility:  Rolling: Moderate assistance  Supine to Sit: Moderate assistance        Transfers:  Sit to Stand: Moderate assistance;Assist x1;Additional time  Stand to Sit: Moderate assistance;Assist x1;Additional time        Bed to Chair: Moderate assistance;Assist x1;Additional time              Balance:   Sitting: Intact  Standing: Impaired  Standing - Static: Constant support  Standing - Dynamic : Poor  Ambulation/Gait Training:                                                         Stairs:               Therapeutic Exercises:       Functional Measure:  Barthel Index:    Bathin  Bladder: 5  Bowels: 0  Groomin  Dressin  Feedin  Mobility: 5  Stairs: 0  Toilet Use: 0  Transfer (Bed to Chair and Back): 5  Total: 20       Barthel and G-code impairment scale:  Percentage of impairment CH  0% CI  1-19% CJ  20-39% CK  40-59% CL  60-79% CM  80-99% CN  100%   Barthel Score 0-100 100 99-80 79-60 59-40 20-39 1-19   0   Barthel Score 0-20 20 17-19 13-16 9-12 5-8 1-4 0      The Barthel ADL Index: Guidelines  1. The index should be used as a record of what a patient does, not as a record of what a patient could do. 2. The main aim is to establish degree of independence from any help, physical or verbal, however minor and for whatever reason. 3. The need for supervision renders the patient not independent. 4. A patient's performance should be established using the best available evidence. Asking the patient, friends/relatives and nurses are the usual sources, but direct observation and common sense are also important. However direct testing is not needed. 5. Usually the patient's performance over the preceding 24-48 hours is important, but occasionally longer periods will be relevant. 6. Middle categories imply that the patient supplies over 50 per cent of the effort. 7. Use of aids to be independent is allowed. Igor Alfaro., Barthel, D.W. ([id number]). Functional evaluation: the Barthel Index. [address] [facility] (14)2. Jolly Byrd, KYLE.J.MGEOFF, Charles Celaya., Carolyn Arndt.[facility], [address] [place] [address] (). Measuring the change indisability after inpatient rehabilitation; comparison of the responsiveness of the Barthel Index and Functional [place] Measure. Journal of Neurology, Neurosurgery, and Psychiatry, 66(4), 888-325. Marta Barba, N.J.A, WOLFGANG SandersonJ.SERJIO, & Jay Hensley M.A. (2004.) Assessment of post-stroke quality of life in cost-effectiveness studies: The usefulness of the Barthel Index and the EuroQoL-5D. Quality of Life Research, 13, 936-94       G codes:   In compliance with CMSs Claims Based Outcome Reporting, the following G-code set was chosen for this patient based on their primary functional limitation being treated: The outcome measure chosen to determine the severity of the functional limitation was the Barthel Index with a score of 20/100 which was correlated with the impairment scale. ? Mobility - Walking and Moving Around:     - CURRENT STATUS: CM - 80%-99% impaired, limited or restricted    - GOAL STATUS: CL - 60%-79% impaired, limited or restricted    - D/C STATUS:  ---------------To be determined---------------      Physical Therapy Evaluation Charge Determination   History Examination Presentation Decision-Making   HIGH Complexity :3+ comorbidities / personal factors will impact the outcome/ POC  HIGH Complexity : 4+ Standardized tests and measures addressing body structure, function, activity limitation and / or participation in recreation  MEDIUM Complexity : Evolving with changing characteristics  Other outcome measures Barthel Index  HIGH       Based on the above components, the patient evaluation is determined to be of the following complexity level: MEDIUM    Pain:  Pain Scale 1: FLACC  Pain Intensity 1: 0  Pain Location 1: Arm  Pain Orientation 1: Left  Pain Description 1: Aching  Pain Intervention(s) 1: Medication (see MAR)  Activity Tolerance:   No complications with upright activity  Please refer to the flowsheet for vital signs taken during this treatment. After treatment:   [x]         Patient left in no apparent distress sitting up in chair  []         Patient left in no apparent distress in bed  [x]         Call bell left within reach  [x]         Nursing notified  [x]         Caregiver present  []         Bed alarm activated    COMMUNICATION/EDUCATION:   The patients plan of care was discussed with: Registered Nurse.   [x]         Fall prevention education was provided and the patient/caregiver indicated understanding. [x]         Patient/family have participated as able in goal setting and plan of care. [x]         Patient/family agree to work toward stated goals and plan of care. []         Patient understands intent and goals of therapy, but is neutral about his/her participation. []         Patient is unable to participate in goal setting and plan of care.     Thank you for this referral.  Singh Mack, PT, DPT   Time Calculation: 30 mins

## 2018-12-19 NOTE — PROGRESS NOTES
[address] NP  [phone]           GI PROGRESS NOTE        NAME: Franki Palma   :  1927   MRN:  [id number]       Subjective:   Still having diarrhea. Eating and drinking ok. No abdominal pain. Objective:         VITALS:   Last 24hrs VS reviewed since prior progress note. Most recent are:  Visit Vitals  BP 90/46   Pulse (!) 106   Temp 98.4 °F (36.9 °C)   Resp 30   Ht 5' 8\" (1.727 m)   Wt 74.6 kg (164 lb 6.4 oz)   SpO2 97%   BMI 25.00 kg/m²       Intake/Output Summary (Last 24 hours) at 2018 1500  Last data filed at 2018 1043  Gross per 24 hour   Intake 1912 ml   Output    Net 1912 ml       PHYSICAL EXAM:  General: Alert, in no acute distress    HEENT: Anicteric sclerae. Lungs:            CTA Bilaterally. Heart:  Regular  rhythm,    Abdomen: Soft, Non distended, Non tender.  (+)Bowel sounds, no HSM  Extremities: No c/c/e  Neurologic:  CN 2-12 gi, Alert and oriented X 3. No acute neurological distress   Psych:   Good insight. Not anxious nor agitated. Lab Data Reviewed:   Recent Labs     18  0309 18  0430   WBC 9.6 11.6*   HGB 7.9* 7.6*   HCT 27.3* 27.3*    252     Recent Labs     18  0309 18  0430   * 148*   K 3.5 4.1   * 117*   CO2 22 21   BUN 16 15   CREA 0.95 0.99   * 142*   CA 7.8* 7.3*     Recent Labs     18  0309 18  0430   SGOT 14* 13*   AP 59 55   TP 4.7* 4.8*   ALB 1.6* 1.6*   GLOB 3.1 3.2       ________________________________________________________________________  Patient Active Problem List   Diagnosis Code    History of Clostridium difficile colitis Z86.19    DM (diabetes mellitus) ([facility] [address] E11.9    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 difficile diarrhea A04.72    Hypotension I95.9         Assessment and Plan:  C Diff Diarrhea:  WBC continues to down trend.   - Continue PO vanc and IV flagyl.  - Trend CMP and CBC  -  Diet as tolerated. - Consider fecal transplant if symptoms recur. Will continue to follow.            Signed By: Taurus Roberson NP     12/19/2018  3:00 PM

## 2018-12-19 NOTE — ROUTINE PROCESS
Bedside and Verbal shift change report given to Bobbi RN (oncoming nurse) by Lorenza RN (offgoing nurse). Report included the following information SBAR, Kardex, Intake/Output, Recent Results and Cardiac Rhythm AF.

## 2018-12-19 NOTE — PROGRESS NOTES
[address] [facility] [address]   Office [phone]  Fax [phone]          Assessment and Plan     Emmy Mcguire a 80 y. o. male who is admitted for hypotension in the setting of sepsis of unknown etiology (C. Diff vs HAP/aspiration PNA vs UTI).    24 Hour Events: No acute events. 2 soft BMs overnight. Infectious:      Sepsis of Unknown Etiology (RESOLVED)  C. diff (lowe likelihood; no BM overnight) vs HAP/aspiration PNA vs UTI. CXR: possible L basilar consolidation. UA: neg nitrites/LE, 3+ bacteria, 2+ amorphous crystals.   - BCx- NG 3 days, UCx- NG  - Stool studies to be collected pending BM: C. Diff- postive, O&P, Stool Cx-prelim neg  -  S. pneumo, legionella, mycoplasma, RVP- negative   - Strict I/O  - Daily CBC, CMP    Cardiac:      Hypotension s/p 4000 mL of Bolus (Likely due to hypovolemia)   Currently on Carlos (30 mcg/min)  - Continue Carlos (goal MAP >65)  - MIVF  - Strict I/O    Afib/flutter with RVR. S/P Dig and Dilt bolus  - On Digoxin 125 mcg IV, OAC after C diff has resolved   - Continue Carlos and titrate to maintain MAP >65  - Cardiology consulted, appreciate recs     PACs   - Cardiac monitoring  - Hold home lopressor    HLD  No lipid panel on record. 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 - Stool studies: Cx-negative, C. Diff- positive, O&P- pending   - Enteric precautions  - PO Vanc & IV Flagyl, GI following     Symptomatic GERD  - Hold home prilosec  - Start IV protonix 40mg daily    Endocrine:      DM II   HgbA1c 9.6 (11/2018).    - Holding home saxagliptin  - SSI for now, consider adding long-acting insulin based on SSI patient requirement.      Chronic Anemia  Likely worsened due to  hemo-dilutional s/p 4L NS. (POA of 8.6 was at BL)  - home iron    - B12, folate- wnl  - Daily CBC    Nephrology:     Elevated Cr - RESOLVED  Cr today . 95 (POA 1.33; baseline 1.0).    - MIVF  - Strict I/Os  - Daily CMP    Misc:    Hx Recurrent Large B-cell Lymphoma  In remission. Established care w Dr. Anju Olmos 18. R. Port-A-Cath in place.      FEN/GI - LR at 75 mL/hr. Activity - Ambulate with assistance  DVT prophylaxis - Sub Q Heparin  GI prophylaxis - Not indicated at this time  Fall prophylaxis - Fall precautions ordered. Disposition - Admit to ICU. Plan to d/c TBD. Consulting PT/OT/CM  Code Status - Full, discussed with patient / caregivers. Next of Kin Name and Contact Daughter: Ms. Alexandra Pantoja ([phone])    I appreciate the opportunity to participate in the care of this patient,  Rose Yousif MD  Family Medicine Resident         Subjective / Objective     Subjective States he doesn't have any pain today     Temp (24hrs), Av.2 °F (36.8 °C), Min:97.8 °F (36.6 °C), Max:98.8 °F (37.1 °C)     Objective:  Vital signs: (most recent): Blood pressure 99/60, pulse (!) 104, temperature 98.8 °F (37.1 °C), resp. rate 18, height 5' 8\" (1.727 m), weight 159 lb 12.8 oz (72.5 kg), SpO2 98 %. Respiratory: O2 Flow Rate (L/min): 2 l/min O2 Device: Nasal cannula   Visit Vitals  BP 99/60   Pulse (!) 104   Temp 97.8 °F (36.6 °C)   Resp 18   Ht 5' 8\" (1.727 m)   Wt 159 lb 12.8 oz (72.5 kg)   SpO2 98%   BMI 24.30 kg/m²     General: No acute distress. Alert. Cooperative. Hard of hearing   Head: Normocephalic. Atraumatic. Respiratory: Decreased breath sounds in L mid-axillary line. Symmetric air entry and chest expansion. Cardiovascular: RRR. Normal S1,S2. No m/r/g.    GI: + bowel sounds. Slight discomfort on abdominal palpation   Extremities: No edema. No tenderness. Neuro:                          Oriented. No focal deficits  Skin:                             Warm and dry. I/O:  Date 18 0700 - 18 0659 18 07 - 18 0659   Shift  24 Hour Total 8350-9812 8958-8961 24 Hour Total   INTAKE   P.O. 415  415        P. O. 415  688 I.V.(mL/kg/hr) 941.4(1.1)  941. 4        Phenylephrine Volume 141.4  141.4        Volume (0.9% sodium chloride infusion 250 mL) 0  0        Volume (lactated Ringers infusion) 600  600        Volume (metroNIDAZOLE (FLAGYL) IVPB premix 500 mg) 200  200      Shift Total(mL/kg) 1356. 4(18.7)  1356. 4(18.7)      OUTPUT   Urine(mL/kg/hr)           Urine Occurrence(s) 5 x  5 x      Stool           Stool Occurrence(s) 5 x  5 x      Shift Total(mL/kg)         NET 1356.4  1356.4      Weight (kg) 72.5 72.5 72.5 72.5 72.5 72.5       CBC:  Recent Labs     12/19/18  0309 12/18/18  0430 12/17/18  0855   WBC 9.6 11.6* 17.2*   HGB 7.9* 7.6* 7.7*   HCT 27.3* 27.3* 27.6*    252 545       Metabolic Panel:  Recent Labs     12/19/18  0309 12/18/18  0430 12/16/18  2331   * 148* 147*   K 3.5 4.1 3.6   * 117* 113*   CO2 22 21 20*   BUN 16 15 12   CREA 0.95 0.99 1.04   * 142* 131*   CA 7.8* 7.3* 7.4*   MG  --  2.2 1.4*   ALB 1.6* 1.6*  --    SGOT 14* 13*  --    ALT 8* 8*  --           For Billing    Chief Complaint   Patient presents with   [address] [place] [address] Dehydration       Hospital Problems  Date Reviewed: 12/12/2018          Codes Class Noted POA    Hypotension ICD-10-CM: I95.9  ICD-9-CM: 458.9  12/15/2018 Unknown        * (Principal) C. difficile diarrhea ICD-10-CM: A04.72  ICD-9-CM: 008.45  11/13/2018 Yes

## 2018-12-19 NOTE — PROGRESS NOTES
Problem: Dysphagia (Adult)  Goal: *Acute Goals and Plan of Care (Insert Text)  Swallowing goals initiated 12-17-18: (weekly re-eval 12-24-18)  1)  Tolerate dysphagia 1, nectars without s/s aspiration by 12-20-18  2) re-eval for solids upon patient request by 12-20-18   Speech language pathology dysphagia treatment  Patient: Stephen Wu (23 y.o. male)  Date: 12/19/2018  Diagnosis: Hypotension  Hypotension C. difficile diarrhea      Precautions: aspiration      ASSESSMENT:  Patient taking min PO. Daughter concerned. He has tried many supplements but only taking bites, sips. He wants ice cream, but this is a thin liquid and daughter wants patient to be a full code and recover. Will try MIghty Shake. Serious nutrition and aspiration risks that are chronic persist.   Progression toward goals:  []         Improving appropriately and progressing toward goals  [x]         Improving slowly and progressing toward goals  []         Not making progress toward goals and plan of care will be adjusted     PLAN:  Recommendations and Planned Interventions:  Continue dysphagia 1, nectars. Patient continues to benefit from skilled intervention to address the above impairments. Continue treatment per established plan of care. Discharge Recommendations:  Skilled Nursing Facility     SUBJECTIVE:   Patient daughter Lincoln Mg brought him a strawberry milkshake from [employer] and it was thick and he drank half of the third of milkshake that we gave him. .    OBJECTIVE:   Cognitive and Communication Status:  Neurologic State: Alert, Confused, Drowsy  Orientation Level: Oriented to person, Oriented to time, Disoriented to place, Disoriented to situation  Cognition: Follows commands, Memory loss  Perception: ([place]-mild)  Perseveration: No perseveration noted  Safety/Judgement: Insight into deficits  Dysphagia Treatment:  Oral Assessment:     P.O.  Trials:  Patient Position: upright in bed  Vocal quality prior to P.O.: No impairment  Consistency Presented: Nectar thick liquid;Pill/Tablet  How Presented: (RN gave meds)   ORAL PHASE:   Bolus Acceptance: No impairment(he is only taking a few bites and sips at meals)  Bolus Formation/Control: No impairment     Propulsion: No impairment  Oral Residue: None   PHARYNGEAL PHASE:   Initiation of Swallow: Delayed (# of seconds)  Laryngeal Elevation: Weak(mild-moderate)   Patient was seen when RN giving meds with NTL. No overt s/s in this session. Daughter reports poor PO with a few bites and sips at each meal only. He wants ice cream. Educated family that ice cream is a thin liquid. RN reports that patient has been coughing up mucous and CXR ordered. Old CXR 12/15/18 showed L basilar consolidation. New CXR ordered today per RN. Exercises:  Laryngeal Exercises:                                                                                                                                   Pain:  Pain Scale 1: FLACC  Pain Intensity 1: 0  Pain Location 1: Arm  After treatment:   []              Patient left in no apparent distress sitting up in chair  []              Patient left in no apparent distress in bed  []              Call bell left within reach  []              Nursing notified  []              Caregiver present  []              Bed alarm activated    COMMUNICATION/EDUCATION:   Patient was educated regarding His deficit(s) of dysphagia  as this relates to His diagnosis of chronic dysphagia. He demonstrated Guarded understanding as evidenced by discussion. Family with partial understanding. Needs f/u. Candi Crowell The patients plan of care including recommendations, planned interventions, and recommended diet changes were discussed with: Registered Nurse. []              Posted safety precautions in patient's room.     Arianna Thomas, SLP  Time Calculation: 10 mins

## 2018-12-19 NOTE — PROGRESS NOTES
[facility]  INTENSIVIST DAILY PROGRESS NOTE  Name: Junaid Silver   : 1927   MRN: [id number]   Date: 2018 10:27 AM   I have reviewed the flowsheet and previous days notes. Overnight events:   Still having multiple stools  Today coughing much more with yellow sputum production  Down to Carlos 20  WBC improving  No fevers  On 2 liters O2    Vital Signs:    Visit Vitals  BP 90/46   Pulse (!) 106   Temp 98.4 °F (36.9 °C)   Resp 30   Ht 5' 8\" (1.727 m)   Wt 74.6 kg (164 lb 6.4 oz)   SpO2 97%   BMI 25.00 kg/m²       O2 Device: Nasal cannula   O2 Flow Rate (L/min): 2.5 l/min   Temp (24hrs), Av.3 °F (36.8 °C), Min:97.8 °F (36.6 °C), Max:98.8 °F (37.1 °C)       Intake/Output:   Last shift:      701 - 1900  In: 50 [P.O.:50]  Out: -   Last 3 shifts: 1901 -  07  In: 7475 [P.O.:415; I.V.:4383]  Out: -     Intake/Output Summary (Last 24 hours) at 2018 1507  Last data filed at 2018 1043  Gross per 24 hour   Intake 1912 ml   Output    Net 1912 ml       Physical Exam:  General:  Alert, cooperative, no distress, frail, coughing incessantly   Head:  Normocephalic   Eyes:  EOMs intact. Nose: Nares normal.     Throat: Lips normal.   Neck: Supple   Lungs:   CTA, no w/r/r   Chest wall:  No tenderness    Heart:  Regular rate and rhythm   Abdomen:   Soft, non-tender. Extremities: Trace edema.    Skin: Warm, dry   Neurologic: Alert, hard of hearing, no focal changes       DATA:   Current Facility-Administered Medications   Medication Dose Route Frequency    [START ON 2018] digoxin (LANOXIN) tablet 0.125 mg  0.125 mg Oral DAILY    lactobac ac& pc-s.therm-b.anim (MIKE Q/RISAQUAD)  1 Cap Oral DAILY    cefTRIAXone (ROCEPHIN) 2 g in 0.9% sodium chloride (MBP/ADV) 50 mL  2 g IntraVENous Q24H    simvastatin (ZOCOR) tablet 40 mg  40 mg Oral QHS    acetaminophen (TYLENOL) tablet 650 mg  650 mg Oral Q6H PRN    lactated Ringers infusion  75 mL/hr IntraVENous CONTINUOUS    famotidine (PF) (PEPCID) 20 mg in sodium chloride 0.9% 10 mL injection  20 mg IntraVENous DAILY    vancomycin (FIRVANQ) 50 mg/mL oral solution 500 mg  500 mg Oral Q6H    metroNIDAZOLE (FLAGYL) IVPB premix 500 mg  500 mg IntraVENous Q8H    nystatin (MYCOSTATIN) 100,000 unit/mL oral suspension 500,000 Units  500,000 Units Oral QID    0.9% sodium chloride infusion 250 mL  250 mL IntraVENous PRN    insulin lispro (HUMALOG) injection   SubCUTAneous AC&HS    PHENYLephrine (WILBERT-SYNEPHRINE) 30 mg in 0.9% sodium chloride 250 mL infusion   mcg/min IntraVENous TITRATE    sodium chloride (NS) flush 5-10 mL  5-10 mL IntraVENous Q8H    sodium chloride (NS) flush 5-10 mL  5-10 mL IntraVENous PRN    sodium chloride (NS) flush 5-10 mL  5-10 mL IntraVENous Q8H    sodium chloride (NS) flush 5-10 mL  5-10 mL IntraVENous PRN    acetaminophen (TYLENOL) suppository 650 mg  650 mg Rectal Q4H PRN    heparin (porcine) injection 5,000 Units  5,000 Units SubCUTAneous Q8H    glucose chewable tablet 16 g  4 Tab Oral PRN    dextrose (D50W) injection syrg 12.5-25 g  12.5-25 g IntraVENous PRN    glucagon (GLUCAGEN) injection 1 mg  1 mg IntraMUSCular PRN       Telemetry: sinus rhythm          Labs:  Recent Labs     12/19/18  0309 12/18/18  0430 12/17/18  0855   WBC 9.6 11.6* 17.2*   HGB 7.9* 7.6* 7.7*   HCT 27.3* 27.3* 27.6*    252 301     Recent Labs     12/19/18  0309 12/18/18  0430 12/16/18  2331   * 148* 147*   K 3.5 4.1 3.6   * 117* 113*   CO2 22 21 20*   * 142* 131*   BUN 16 15 12   CREA 0.95 0.99 1.04   CA 7.8* 7.3* 7.4*   MG  --  2.2 1.4*   ALB 1.6* 1.6*  --    SGOT 14* 13*  --    ALT 8* 8*  --      No results for input(s): PH, PCO2, PO2, HCO3, FIO2 in the last 72 hours. Imaging:  I have personally reviewed the patients radiographs and reports.   CXR 12       IMPRESSION:   · Hypotension: appears primarily related to volume depletion from chronic diarrhea  · Hx of C.diff, concern for recurrence  · Dysphagia with reported good response to nectar thick liquids  · ? Pneumonia in LLL. PLAN:   · PO Vanc and IV flagyl  · CXR with ? LLL infiltrate- will add ceftriaxone for GPC and GNR coverage.    · GI following  · Decrease IV fluids  · Dysphagia diet  · Wean pressors for MAP>65  · Famotidine instead of PPI  · PT/OT  · OOB to chair   GLOBAL ISSUES:   · Head of bed elevated  · DVT Prophylaxis: heparin SQ       See my orders for details    My assessment/plan was discussed with: nurse, daughter        Scooby Leung MD

## 2018-12-20 LAB
ABO + RH BLD: NORMAL
ALBUMIN SERPL-MCNC: 1.5 G/DL (ref 3.5–5)
ALBUMIN/GLOB SERPL: 0.5 {RATIO} (ref 1.1–2.2)
ALP SERPL-CCNC: 61 U/L (ref 45–117)
ALT SERPL-CCNC: 6 U/L (ref 12–78)
ANION GAP SERPL CALC-SCNC: 8 MMOL/L (ref 5–15)
AST SERPL-CCNC: 13 U/L (ref 15–37)
BASOPHILS # BLD: 0.1 K/UL (ref 0–0.1)
BASOPHILS NFR BLD: 2 % (ref 0–1)
BILIRUB SERPL-MCNC: 0.2 MG/DL (ref 0.2–1)
BLD PROD TYP BPU: NORMAL
BLD PROD TYP BPU: NORMAL
BLOOD GROUP ANTIBODIES SERPL: NORMAL
BPU ID: NORMAL
BPU ID: NORMAL
BUN SERPL-MCNC: 14 MG/DL (ref 6–20)
BUN/CREAT SERPL: 16 (ref 12–20)
CALCIUM SERPL-MCNC: 7.9 MG/DL (ref 8.5–10.1)
CHLORIDE SERPL-SCNC: 119 MMOL/L (ref 97–108)
CO2 SERPL-SCNC: 24 MMOL/L (ref 21–32)
CREAT SERPL-MCNC: 0.9 MG/DL (ref 0.7–1.3)
CREAT UR-MCNC: 23.78 MG/DL
CROSSMATCH RESULT,%XM: NORMAL
CROSSMATCH RESULT,%XM: NORMAL
DIFFERENTIAL METHOD BLD: ABNORMAL
DIGOXIN SERPL-MCNC: 0.8 NG/ML (ref 0.9–2)
EOSINOPHIL # BLD: 0 K/UL (ref 0–0.4)
EOSINOPHIL NFR BLD: 0 % (ref 0–7)
ERYTHROCYTE [DISTWIDTH] IN BLOOD BY AUTOMATED COUNT: 19.7 % (ref 11.5–14.5)
GLOBULIN SER CALC-MCNC: 2.8 G/DL (ref 2–4)
GLUCOSE BLD STRIP.AUTO-MCNC: 154 MG/DL (ref 65–100)
GLUCOSE BLD STRIP.AUTO-MCNC: 198 MG/DL (ref 65–100)
GLUCOSE BLD STRIP.AUTO-MCNC: 210 MG/DL (ref 65–100)
GLUCOSE BLD STRIP.AUTO-MCNC: 291 MG/DL (ref 65–100)
GLUCOSE SERPL-MCNC: 149 MG/DL (ref 65–100)
HCT VFR BLD AUTO: 26.3 % (ref 36.6–50.3)
HGB BLD-MCNC: 7.3 G/DL (ref 12.1–17)
IMM GRANULOCYTES # BLD: 0 K/UL
IMM GRANULOCYTES NFR BLD AUTO: 0 %
LYMPHOCYTES # BLD: 0.7 K/UL (ref 0.8–3.5)
LYMPHOCYTES NFR BLD: 10 % (ref 12–49)
MAGNESIUM SERPL-MCNC: 1.7 MG/DL (ref 1.6–2.4)
MCH RBC QN AUTO: 20.9 PG (ref 26–34)
MCHC RBC AUTO-ENTMCNC: 27.8 G/DL (ref 30–36.5)
MCV RBC AUTO: 75.1 FL (ref 80–99)
METAMYELOCYTES NFR BLD MANUAL: 3 %
MONOCYTES # BLD: 0.8 K/UL (ref 0–1)
MONOCYTES NFR BLD: 12 % (ref 5–13)
NEUTS SEG # BLD: 5.1 K/UL (ref 1.8–8)
NEUTS SEG NFR BLD: 73 % (ref 32–75)
NRBC # BLD: 0 K/UL (ref 0–0.01)
NRBC BLD-RTO: 0 PER 100 WBC
O+P SPEC MICRO: NORMAL
O+P STL CONC: NORMAL
OSMOLALITY UR: 441 MOSM/KG H2O
PHOSPHATE SERPL-MCNC: 2 MG/DL (ref 2.6–4.7)
PLATELET # BLD AUTO: 246 K/UL (ref 150–400)
PMV BLD AUTO: 11 FL (ref 8.9–12.9)
POTASSIUM SERPL-SCNC: 3.8 MMOL/L (ref 3.5–5.1)
PROT SERPL-MCNC: 4.3 G/DL (ref 6.4–8.2)
RBC # BLD AUTO: 3.5 M/UL (ref 4.1–5.7)
RBC MORPH BLD: ABNORMAL
SERVICE CMNT-IMP: ABNORMAL
SODIUM SERPL-SCNC: 151 MMOL/L (ref 136–145)
SODIUM UR-SCNC: 142 MMOL/L
SPECIMEN EXP DATE BLD: NORMAL
SPECIMEN SOURCE: NORMAL
STATUS OF UNIT,%ST: NORMAL
STATUS OF UNIT,%ST: NORMAL
UNIT DIVISION, %UDIV: 0
UNIT DIVISION, %UDIV: 0
WBC # BLD AUTO: 7 K/UL (ref 4.1–11.1)

## 2018-12-20 PROCEDURE — 74011250637 HC RX REV CODE- 250/637: Performed by: INTERNAL MEDICINE

## 2018-12-20 PROCEDURE — 74011636637 HC RX REV CODE- 636/637: Performed by: FAMILY MEDICINE

## 2018-12-20 PROCEDURE — 80162 ASSAY OF DIGOXIN TOTAL: CPT

## 2018-12-20 PROCEDURE — 74011250636 HC RX REV CODE- 250/636: Performed by: INTERNAL MEDICINE

## 2018-12-20 PROCEDURE — 77030011943

## 2018-12-20 PROCEDURE — 84100 ASSAY OF PHOSPHORUS: CPT

## 2018-12-20 PROCEDURE — 77030010545

## 2018-12-20 PROCEDURE — 82570 ASSAY OF URINE CREATININE: CPT

## 2018-12-20 PROCEDURE — 83935 ASSAY OF URINE OSMOLALITY: CPT

## 2018-12-20 PROCEDURE — 85025 COMPLETE CBC W/AUTO DIFF WBC: CPT

## 2018-12-20 PROCEDURE — 74011250637 HC RX REV CODE- 250/637: Performed by: FAMILY MEDICINE

## 2018-12-20 PROCEDURE — 76450000000

## 2018-12-20 PROCEDURE — C1758 CATHETER, URETERAL: HCPCS

## 2018-12-20 PROCEDURE — 77030032490 HC SLV COMPR SCD KNE COVD -B

## 2018-12-20 PROCEDURE — 74011250637 HC RX REV CODE- 250/637: Performed by: NURSE PRACTITIONER

## 2018-12-20 PROCEDURE — 82962 GLUCOSE BLOOD TEST: CPT

## 2018-12-20 PROCEDURE — 65660000000 HC RM CCU STEPDOWN

## 2018-12-20 PROCEDURE — 80053 COMPREHEN METABOLIC PANEL: CPT

## 2018-12-20 PROCEDURE — 83735 ASSAY OF MAGNESIUM: CPT

## 2018-12-20 PROCEDURE — 74011250636 HC RX REV CODE- 250/636: Performed by: STUDENT IN AN ORGANIZED HEALTH CARE EDUCATION/TRAINING PROGRAM

## 2018-12-20 PROCEDURE — 74011636637 HC RX REV CODE- 636/637: Performed by: STUDENT IN AN ORGANIZED HEALTH CARE EDUCATION/TRAINING PROGRAM

## 2018-12-20 PROCEDURE — 74011250637 HC RX REV CODE- 250/637: Performed by: STUDENT IN AN ORGANIZED HEALTH CARE EDUCATION/TRAINING PROGRAM

## 2018-12-20 PROCEDURE — 36415 COLL VENOUS BLD VENIPUNCTURE: CPT

## 2018-12-20 PROCEDURE — 84300 ASSAY OF URINE SODIUM: CPT

## 2018-12-20 PROCEDURE — 74011000258 HC RX REV CODE- 258: Performed by: INTERNAL MEDICINE

## 2018-12-20 RX ORDER — POTASSIUM CHLORIDE 750 MG/1
20 TABLET, FILM COATED, EXTENDED RELEASE ORAL
Status: COMPLETED | OUTPATIENT
Start: 2018-12-20 | End: 2018-12-20

## 2018-12-20 RX ORDER — INSULIN GLARGINE 100 [IU]/ML
10 INJECTION, SOLUTION SUBCUTANEOUS DAILY
Status: DISCONTINUED | OUTPATIENT
Start: 2018-12-20 | End: 2018-12-22

## 2018-12-20 RX ORDER — SODIUM,POTASSIUM PHOSPHATES 280-250MG
2 POWDER IN PACKET (EA) ORAL ONCE
Status: COMPLETED | OUTPATIENT
Start: 2018-12-20 | End: 2018-12-20

## 2018-12-20 RX ORDER — MAGNESIUM SULFATE 1 G/100ML
1 INJECTION INTRAVENOUS ONCE
Status: COMPLETED | OUTPATIENT
Start: 2018-12-20 | End: 2018-12-20

## 2018-12-20 RX ADMIN — NYSTATIN 500000 UNITS: 500000 SUSPENSION ORAL at 19:52

## 2018-12-20 RX ADMIN — ACETAMINOPHEN 650 MG: 325 TABLET, FILM COATED ORAL at 22:53

## 2018-12-20 RX ADMIN — INSULIN LISPRO 2 UNITS: 100 INJECTION, SOLUTION INTRAVENOUS; SUBCUTANEOUS at 18:01

## 2018-12-20 RX ADMIN — NYSTATIN 500000 UNITS: 500000 SUSPENSION ORAL at 18:01

## 2018-12-20 RX ADMIN — VANCOMYCIN HYDROCHLORIDE 500 MG: KIT at 23:42

## 2018-12-20 RX ADMIN — FAMOTIDINE 20 MG: 10 INJECTION, SOLUTION INTRAVENOUS at 09:08

## 2018-12-20 RX ADMIN — SODIUM CHLORIDE, SODIUM LACTATE, POTASSIUM CHLORIDE, AND CALCIUM CHLORIDE 50 ML/HR: 600; 310; 30; 20 INJECTION, SOLUTION INTRAVENOUS at 03:28

## 2018-12-20 RX ADMIN — SIMVASTATIN 40 MG: 20 TABLET, FILM COATED ORAL at 19:52

## 2018-12-20 RX ADMIN — INSULIN LISPRO 5 UNITS: 100 INJECTION, SOLUTION INTRAVENOUS; SUBCUTANEOUS at 22:00

## 2018-12-20 RX ADMIN — Medication 10 ML: at 16:18

## 2018-12-20 RX ADMIN — CEFTRIAXONE SODIUM 2 G: 2 INJECTION, POWDER, FOR SOLUTION INTRAMUSCULAR; INTRAVENOUS at 12:04

## 2018-12-20 RX ADMIN — MAGNESIUM SULFATE HEPTAHYDRATE 1 G: 1 INJECTION, SOLUTION INTRAVENOUS at 07:50

## 2018-12-20 RX ADMIN — INSULIN LISPRO 2 UNITS: 100 INJECTION, SOLUTION INTRAVENOUS; SUBCUTANEOUS at 11:56

## 2018-12-20 RX ADMIN — METRONIDAZOLE 500 MG: 500 INJECTION, SOLUTION INTRAVENOUS at 05:15

## 2018-12-20 RX ADMIN — DIGOXIN 0.12 MG: 125 TABLET ORAL at 09:08

## 2018-12-20 RX ADMIN — NYSTATIN 500000 UNITS: 500000 SUSPENSION ORAL at 09:08

## 2018-12-20 RX ADMIN — NYSTATIN 500000 UNITS: 500000 SUSPENSION ORAL at 11:56

## 2018-12-20 RX ADMIN — Medication 10 ML: at 05:15

## 2018-12-20 RX ADMIN — POTASSIUM & SODIUM PHOSPHATES POWDER PACK 280-160-250 MG 2 PACKET: 280-160-250 PACK at 10:12

## 2018-12-20 RX ADMIN — VANCOMYCIN HYDROCHLORIDE 500 MG: KIT at 05:37

## 2018-12-20 RX ADMIN — Medication 1 CAPSULE: at 09:08

## 2018-12-20 RX ADMIN — Medication 10 ML: at 19:51

## 2018-12-20 RX ADMIN — POTASSIUM CHLORIDE 20 MEQ: 750 TABLET, EXTENDED RELEASE ORAL at 09:08

## 2018-12-20 RX ADMIN — HEPARIN SODIUM 5000 UNITS: 5000 INJECTION INTRAVENOUS; SUBCUTANEOUS at 16:14

## 2018-12-20 RX ADMIN — HEPARIN SODIUM 5000 UNITS: 5000 INJECTION INTRAVENOUS; SUBCUTANEOUS at 19:53

## 2018-12-20 RX ADMIN — INSULIN LISPRO 2 UNITS: 100 INJECTION, SOLUTION INTRAVENOUS; SUBCUTANEOUS at 09:09

## 2018-12-20 RX ADMIN — SODIUM CHLORIDE, SODIUM LACTATE, POTASSIUM CHLORIDE, AND CALCIUM CHLORIDE 50 ML/HR: 600; 310; 30; 20 INJECTION, SOLUTION INTRAVENOUS at 23:43

## 2018-12-20 RX ADMIN — VANCOMYCIN HYDROCHLORIDE 500 MG: KIT at 18:01

## 2018-12-20 RX ADMIN — Medication 10 ML: at 16:17

## 2018-12-20 RX ADMIN — Medication 10 ML: at 19:52

## 2018-12-20 RX ADMIN — INSULIN GLARGINE 10 UNITS: 100 INJECTION, SOLUTION SUBCUTANEOUS at 09:09

## 2018-12-20 RX ADMIN — HEPARIN SODIUM 5000 UNITS: 5000 INJECTION INTRAVENOUS; SUBCUTANEOUS at 05:15

## 2018-12-20 RX ADMIN — VANCOMYCIN HYDROCHLORIDE 500 MG: KIT at 11:56

## 2018-12-20 RX ADMIN — Medication 10 ML: at 05:16

## 2018-12-20 NOTE — PROGRESS NOTES
[facility]  DAILY PROGRESS NOTE  Name: Mickey Padilla   : 1927   MRN: [id number]   Date: 2018   I have reviewed the flowsheet and previous days notes. Overnight events:   Afebrile  Sats 99% on 3L  No stool overnight per nursing  Off Carlos  WBC 7.0 - better  Hgb 7.3  Na 151 - trending up  Mag 1.7  Phos 2.0  Alb 1.5  Pna work up negative for acute process  Resp cx with yeast and NRF  ECHO: EF 55-60%; mild pulm HTN; mild MR    ROS: Asking for water. Has no other complaints. Denies fever, chills, SOB, or CP. Denies abd pain. Vital Signs:    Visit Vitals  /74 (BP 1 Location: Right arm, BP Patient Position: At rest)   Pulse 83   Temp 97 °F (36.1 °C)   Resp 21   Ht 5' 8\" (1.727 m)   Wt 73.6 kg (162 lb 4.8 oz)   SpO2 99%   BMI 24.68 kg/m²       O2 Device: Nasal cannula   O2 Flow Rate (L/min): 3 l/min   Temp (24hrs), Av.9 °F (36.6 °C), Min:97 °F (36.1 °C), Max:98.4 °F (36.9 °C)       Intake/Output:   Last shift:      No intake/output data recorded. Last 3 shifts:  1901 -  0700  In: 2702.5 [P.O.:290; I.V.:2412.5]  Out: 300 [Urine:300]    Intake/Output Summary (Last 24 hours) at 2018 0901  Last data filed at 2018 0602  Gross per 24 hour   Intake 1435.21 ml   Output 300 ml   Net 1135.21 ml       Physical Exam:  General:  Alert, cooperative, no distress, frail. Head:  Normocephalic   Eyes:  EOMs intact. Anicteric   Throat: Lips normal.   Neck: Supple   Lungs:   Mild crackles over the left base, otherwise CTA. Fair air movement and resp nonlabored. Chest wall:  No tenderness    Heart:  Regular rate and rhythm   Abdomen:   Soft, non-tender. Extremities: No c/c/e.    Skin: Warm, dry   Neurologic: Alert, hard of hearing, no focal changes       DATA:   Current Facility-Administered Medications   Medication Dose Route Frequency    potassium chloride SR (KLOR-CON 10) tablet 20 mEq  20 mEq Oral NOW    insulin glargine (LANTUS) injection 10 Units  10 Units SubCUTAneous DAILY    digoxin (LANOXIN) tablet 0.125 mg  0.125 mg Oral DAILY    lactobac ac& pc-s.therm-b.anim (MIKE Q/RISAQUAD)  1 Cap Oral DAILY    cefTRIAXone (ROCEPHIN) 2 g in 0.9% sodium chloride (MBP/ADV) 50 mL  2 g IntraVENous Q24H    simvastatin (ZOCOR) tablet 40 mg  40 mg Oral QHS    acetaminophen (TYLENOL) tablet 650 mg  650 mg Oral Q6H PRN    lactated Ringers infusion  50 mL/hr IntraVENous CONTINUOUS    famotidine (PF) (PEPCID) 20 mg in sodium chloride 0.9% 10 mL injection  20 mg IntraVENous DAILY    vancomycin (FIRVANQ) 50 mg/mL oral solution 500 mg  500 mg Oral Q6H    metroNIDAZOLE (FLAGYL) IVPB premix 500 mg  500 mg IntraVENous Q8H    nystatin (MYCOSTATIN) 100,000 unit/mL oral suspension 500,000 Units  500,000 Units Oral QID    0.9% sodium chloride infusion 250 mL  250 mL IntraVENous PRN    insulin lispro (HUMALOG) injection   SubCUTAneous AC&HS    PHENYLephrine (WILBERT-SYNEPHRINE) 30 mg in 0.9% sodium chloride 250 mL infusion   mcg/min IntraVENous TITRATE    sodium chloride (NS) flush 5-10 mL  5-10 mL IntraVENous Q8H    sodium chloride (NS) flush 5-10 mL  5-10 mL IntraVENous PRN    sodium chloride (NS) flush 5-10 mL  5-10 mL IntraVENous Q8H    sodium chloride (NS) flush 5-10 mL  5-10 mL IntraVENous PRN    acetaminophen (TYLENOL) suppository 650 mg  650 mg Rectal Q4H PRN    heparin (porcine) injection 5,000 Units  5,000 Units SubCUTAneous Q8H    glucose chewable tablet 16 g  4 Tab Oral PRN    dextrose (D50W) injection syrg 12.5-25 g  12.5-25 g IntraVENous PRN    glucagon (GLUCAGEN) injection 1 mg  1 mg IntraMUSCular PRN               Labs:  Recent Labs     12/20/18  0529 12/19/18  0309 12/18/18  0430   WBC 7.0 9.6 11.6*   HGB 7.3* 7.9* 7.6*   HCT 26.3* 27.3* 27.3*    266 252     Recent Labs     12/20/18  0529 12/19/18  0309 12/18/18  0430   * 148* 148*   K 3.8 3.5 4.1   * 117* 117*   CO2 24 22 21   * 172* 142*   BUN 14 16 15   CREA 0.90 0.95 0.99   CA 7.9* 7.8* 7.3*   MG 1.7  --  2.2   PHOS 2.0*  --   --    ALB 1.5* 1.6* 1.6*   SGOT 13* 14* 13*   ALT 6* 8* 8*     No results for input(s): PH, PCO2, PO2, HCO3, FIO2 in the last 72 hours. Imaging:  I have personally reviewed the patients radiographs and reports. CXR (12/19/18): Shallow volumes and patchy bibasilar airspace disease. Atelectasis is favored over aspiration or pneumonia appear     IMPRESSION:   · Hypotension: appears primarily related to volume depletion from chronic diarrhea  · Recurrent C.diff  · Dysphagia with reported good response to nectar thick liquids  · ? Pneumonia in LLL. · Hypernatremia   PLAN:   · PO Vanc and IV flagyl per GI  · CXR with ? LLL infiltrate- can switch ceftriaxone to po Augmentin and Doxy tomorrow for 5 day total course. · Vanc taper per GI; they have signed off  · Gentle LR IV fluids  · Dysphagia diet  · Off pressors  · F/U urine lytes  · Famotidine instead of PPI  · K and Mag repleted  · PT/OT  · IS  · OOB to chair   · Head of bed elevated/aspiration precautions  · DVT Prophylaxis: heparin SQ    Patient is stable from a pulmonary standpoint. F/U with pulmonary in 4-6 weeks with repeat chest imaging - we will arrange. We will sign off and be available as needed. Please call with questions.          Florecita Mendoza

## 2018-12-20 NOTE — PROGRESS NOTES
Palliative Medicine      Code Status: DNR    Advance Care Planning:  Advance Care Planning 2018   Patient's Healthcare Decision Maker is: Named in scanned ACP document   Confirm Advance Directive Yes, on file   Patient Would Like to Complete Advance Directive Already in place   Does the patient have other document types Copy of DDNR was provided for family to review     Patient / Family Encounter Documentation    Participants (names): Pt, dtr Fito Lo, son-in-law Mackenzie Jacy, Palliative Medicine (Dr. Randal Rowland, [facility])    Narrative:  Pt was awake in bed with family at bedside, engaged in conversation but is very hard of hearing, disoriented at times. Pt served in [employer], attended [email or web address], stated he's traveled all over the [address]. Pt's first wife and son are . Pt moved to [place] only 8 weeks ago after his wife of 10 yrs began to show increasing signs of dementia (her children have moved into the home to assist in her care). Pt has been staying with dtr and son-in-law temporarily but his ultimate goal is to live on his own, was making arrangements to move into [address] [facility] but process has been delayed due to setbacks in his health. Family shared that pt has had several prior hospitalizations for various medical issues over the past year, was treated in [place] and [place]. Pt has ACP documents on file. AMD dated 05 contains appointment of health care agents and health care instructions; however, primary agent (son) is now , dtr had been appointed as secondary. POA document dated 16 appoints dtr and son-in-law as primary and secondary medical/$ POAs respectively. Code status was addressed on this date. Dtr expressed belief that pt would not want attempts at resuscitation in the event of cardiac/respiratory arrest, expressed belief that pt is [place]. \"    Psychosocial Issues Identified/ Resilience Factors:  Pt spoke very openly re: his elise, stated God has blessed him with a wonderful life, spoke of the comfort he feels knowing he will one day be face to face with Zackery Morillo, stated he will not have any illness in [facility]. Pt has faced several losses, including the death of his first wife and son, is now adjusting to life in a new state, away from his second wife. Dtr and PRUDENCE are very supportive, along with two local grandsons. Family is looking forward to the birth of twin great-grandbabies in March. Goals of Care / Plan: Code status changed to DNR; copy of DDNR was provided for family to review. Potential plan is for transfer to SNF when stable for discharge, with goal of moving to [facility] if pt regains enough strength to live in [address]. SW will follow for support. Thank you for including Palliative Medicine in the care of Mr. Radha Griffith.     [address], [facility]-  [phone]

## 2018-12-20 NOTE — PROGRESS NOTES
12- CASE MANAGEMENT NOTE:  I met briefly with the pt's daughter, Alexandra Dawkins, who said she toured [facility] today and was told they should have a bed. I sent the referral to them thru [employer] and will await their response.  Shilpi Zaragoza, BSW, CM

## 2018-12-20 NOTE — PROGRESS NOTES
[address] NP  [phone]           GI PROGRESS NOTE        NAME: Radha Ramirez   :  1927   MRN:  [id number]       Subjective:   No complaints of pain. Tolerating diet. Nursing reports no BM overnight      Objective:         VITALS:   Last 24hrs VS reviewed since prior progress note. Most recent are:  Visit Vitals  /74 (BP 1 Location: Right arm, BP Patient Position: At rest)   Pulse 83   Temp 97 °F (36.1 °C)   Resp 21   Ht 5' 8\" (1.727 m)   Wt 73.6 kg (162 lb 4.8 oz)   SpO2 99%   BMI 24.68 kg/m²       Intake/Output Summary (Last 24 hours) at 2018 5460  Last data filed at 2018 0602  Gross per 24 hour   Intake 1435.21 ml   Output 300 ml   Net 1135.21 ml       PHYSICAL EXAM:  General: Alert, in no acute distress    HEENT: Anicteric sclerae. Lungs:            CTA Bilaterally. Heart:  Regular  rhythm,    Abdomen: Soft, Non distended, Non tender.  (+)Bowel sounds, no HSM  Extremities: No c/c/e  Neurologic:  CN 2-12 gi, Alert and oriented X 3. No acute neurological distress   Psych:   Good insight. Not anxious nor agitated.     Lab Data Reviewed:   Recent Labs     18  0529 18  0309   WBC 7.0 9.6   HGB 7.3* 7.9*   HCT 26.3* 27.3*    266     Recent Labs     18  0529 18  0309   * 148*   K 3.8 3.5   * 117*   CO2 24 22   BUN 14 16   CREA 0.90 0.95   * 172*   PHOS 2.0*  --    CA 7.9* 7.8*     Recent Labs     18  0529 18  0309   SGOT 13* 14*   AP 61 59   TP 4.3* 4.7*   ALB 1.5* 1.6*   GLOB 2.8 3.1       ________________________________________________________________________  Patient Active Problem List   Diagnosis Code    History of Clostridium difficile colitis Z86.19    DM [facility]diabetes mellitus) ([facility] E11.9    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 difficile diarrhea A04.72    Hypotension I95.9         Assessment and Plan:  C Diff Diarrhea:  WBC continues to improve. No bowel movements over night. Please taper Vancomycin as follows:    125 mg orally four times daily for 10 to 14 days, then  125 mg orally twice daily for 7 days, then  125 mg orally once daily for 7 days, then  125 mg orally every 2 or 3 days for 2 to 8 weeks    Will sign off. Please have him follow up in the office after his discharge. Please call with questions or concerns.        Signed By: Rajendra Herbert NP     12/20/2018  8:33 AM

## 2018-12-20 NOTE — ROUTINE PROCESS
Bedside and Verbal shift change report given to Kaila Baker (oncoming nurse) by Garrick Gibson (offgoing nurse). Report included the following information SBAR, Kardex, ED Summary, Procedure Summary, Intake/Output, MAR, Recent Results and Cardiac Rhythm Sinus arrhythmia.

## 2018-12-20 NOTE — PROGRESS NOTES
Follow up visit with Mr. Toyin Sevilla suggested by Crissy Brasher. Per  Mr. Toyin Sevilla is hard of hearing. Mr. Alexandrea Limon, asked why he cannot be a  when I introduced myself. We preceded to have a conversation about how God is ALWAYS with us no matter what. He quoted scripture and we laughed together as we shared. His daughter and son in law were in the room. She indicated this has been the most he has smiled in a while. Wing Fabian said he did not need a preacher and I told him that was a good thing because I am a . .. He grin. We shared together in Prayer. Provided gentle presence with his daughter as she expressed some exhaustion and stress. Staff came into the room.   Provided assurance of prayer and Advised of  Availability   Visited by: Gonzalo Dillon., MS., [address] [facility] Y[place]anda ([id number])

## 2018-12-20 NOTE — CONSULTS
Palliative Medicine Consult    Patient Name: Reagan Vegas  YOB: 1927    Date of Initial Consult: 18  Reason for Consult: care decisions  Requesting Provider: Dr. Sara Mayfield  Primary Care Physician: Jason Arciniega MD      SUMMARY:   Reagan Vegas is a 80 y.o. with a past history of recurrent C. Dif,B cell lymphoma, HLD, dysphagia , who was admitted on 12/15/2018 from home with a diagnosis of dehydration/recurrent c.dif. Current medical issues leading to Palliative Medicine involvement include: care decisions. Chart reviewed/HPI-patient admitted to the hospital with recurrent c.dif. This would be his 4th episode this year. Had prior treatment in [place] where he used to live. Moved here about 8 weeks ago and living with daughter and PRUDENCE. Plan was to move into [facility] independent living until this hospitalization. Had been in the ICU on pressor support but moved to [facility] off pressors now. SH-living with daughter(Niru and PRUDENCE) in [facility]. One son has . First wife passed and them remarried for the last 10 years. His current wife stayed back in [place] with family as she is developing dementia       PALLIATIVE DIAGNOSES:   1. GOC discussion  2. DNR discussion  3. ACP review  4. Debility  5. Recurrent c dif infection       PLAN:   1. Macy([facility]) and I met with patient(very hard of hearing), daughter and PRUDENCE. Reviewed our role in his care and then discussed current medical issues. Family well versed in his medical problems and understand that he remains at increased risk of reoccurrence of this infection because of immunocompromise and possible need for use of abx in the future secondary to issues with his mouth/jaw abscess/infections. He is much more awake this afternoon than he had been over the last couple of days. Reassured them about some of the confusion is not unusual in the hospital setting. 2. GOC-continue to attempt full restorative measures.  Hoping he can go to SNF for rehab. Not sure he will be able to attempt independent living as had been planned but will see how his recovery progresses. 3. AMD-in the chart and his daughter is MPOA. His son has passed. 4. Code status-reviewed with family->patient seems to understand some issues but not sure he can understand the full complexity of this discussion. His daughter support change to DNAR/DNI. We have provided a blank DDNR for her to review and explained that would need to be signed at discharge and travel with him to the facility. Will change order in the EMR to reflect DNAR/DNI  5. Symptom management-no acute symptoms for us to manage  6. Psychosocial-appears to have great support from family. Danielle is very important to him and he prayed with us in the room. Family a little tearful at times during the discussion  7. Discussed with FM team and bedside nurse  8. Initial consult note routed to primary continuity provider  9. Communicated plan of care with: Palliative IDT       GOALS OF CARE / TREATMENT PREFERENCES:   [====Goals of Care====]  GOALS OF CARE:  Patient/Health Care Proxy Stated Goals: Rehabilitation      TREATMENT PREFERENCES:   Code Status: DNR    Advance Care Planning:  Advance Care Planning 12/20/2018   Patient's Healthcare Decision Maker is: Named in scanned ACP document   Confirm Advance Directive Yes, on file   Patient Would Like to Complete Advance Directive -   Does the patient have other document types -       Medical Interventions: Limited additional interventions  Other Instructions: The palliative care team has discussed with patient / health care proxy about goals of care / treatment preferences for patient.  [====Goals of Care====]         HISTORY:     History obtained from: chart, patient, family    CHIEF COMPLAINT: diarrhea    HPI/SUBJECTIVE:    The patient is:   [x] Verbal and participatory  [] Non-participatory due to:   Patient is very hard of hearing. Denies any pain.  Feeling better overall Clinical Pain Assessment (nonverbal scale for severity on nonverbal patients):   Clinical Pain Assessment  Severity: 0    Adult Nonverbal Pain Scale  Face: No particular expression or smile  Activity (Movement): Lying quietly, normal position  Guarding: Lying quietly, no positioning of hands over areas of body  Physiology (Vital Signs): Stable vital signs  Respiratory: Baseline RR/SpO2 compliant with ventilator  Total Score: 0       FUNCTIONAL ASSESSMENT:     Palliative Performance Scale (PPS):  PPS: 50       PSYCHOSOCIAL/SPIRITUAL SCREENING:     Advance Care Planning:  Advance Care Planning 12/20/2018   Patient's Healthcare Decision Maker is: Named in scanned ACP document   Confirm Advance Directive Yes, on file   Patient Would Like to Complete Advance Directive -   Does the patient have other document types -        Any spiritual / Shinto concerns:  [] Yes /  [x] No    Caregiver Burnout:  [] Yes /  [x] No /  [] No Caregiver Present      Anticipatory grief assessment:   [x] Normal  / [] Maladaptive       ESAS Anxiety: Anxiety: 0    ESAS Depression: Depression: 0        REVIEW OF SYSTEMS:     Positive and pertinent negative findings in ROS are noted above in HPI. The following systems were [x] reviewed / [] unable to be reviewed as noted in HPI  Other findings are noted below. Systems: constitutional, ears/nose/mouth/throat, respiratory, gastrointestinal, genitourinary, musculoskeletal, integumentary, neurologic, psychiatric, endocrine. Positive findings noted below. Modified ESAS Completed by: provider   Fatigue: 4 Drowsiness: 0   Depression: 0 Pain: 0   Anxiety: 0 Nausea: 0   Anorexia: 1 Dyspnea: 0     Constipation: No     Stool Occurrence(s): 1        PHYSICAL EXAM:     From RN flowsheet:  Wt Readings from Last 3 Encounters:   12/20/18 162 lb 4.8 oz (73.6 kg)   12/12/18 142 lb 8 oz (64.6 kg)   12/05/18 144 lb (65.3 kg)     Blood pressure 127/70, pulse (!) 108, temperature 97.8 °F (36.6 °C), resp.  rate 20, height 5' 8\" (1.727 m), weight 162 lb 4.8 oz (73.6 kg), SpO2 97 %. Pain Scale 1: Numeric (0 - 10)  Pain Intensity 1: 0  Pain Onset 1: Acute  Pain Location 1: Arm  Pain Orientation 1: Left  Pain Description 1: Aching  Pain Intervention(s) 1: Medication (see MAR)  Last bowel movement, if known:     Constitutional: thin, appears younger than stated age, alert to person, at times->may think in [place]  Eyes: pupils equal, anicteric  ENMT: no nasal discharge, moist mucous membranes  Cardiovascular: regular rhythm, distal pulses intact  Respiratory: breathing not labored, symmetric  Gastrointestinal: soft non-tender, +bowel sounds  Musculoskeletal: no deformity, no tenderness to palpation  Skin: warm, dry  Neurologic: following commands, moving all extremities  Psychiatric: full affect, no hallucinations  Other:       HISTORY:     Principal Problem:    C. difficile diarrhea (11/13/2018)    Active Problems:    Hypotension (12/15/2018)      Past Medical History:   Diagnosis Date    Anemia     Arthritis     Cancer ([address]     Constipation     Diabetes ([address]     Gastrointestinal disorder     History of neoplasms, uncertain behavior     Of soft tissue and skin    Hypercholesterolemia     Hyperkalemia     Hypertension     Low back pain     Melanoma in situ of other parts of face ([address]     Lower lip; removed    Neuropathy     Right bundle-branch block     Spinal stenosis     Type II diabetes mellitus ([address]     Venous insufficiency       Past Surgical History:   Procedure Laterality Date    HX HEENT      HX ORTHOPAEDIC      HX PROSTATECTOMY      HX UROLOGICAL      NEUROLOGICAL PROCEDURE UNLISTED        History reviewed. No pertinent family history. History reviewed, no pertinent family history.   Social History     Tobacco Use    Smoking status: Former Smoker    Smokeless tobacco: Never Used   Substance Use Topics    Alcohol use: No     Frequency: Never     No Known Allergies   Current Facility-Administered Medications   Medication Dose Route Frequency    insulin glargine (LANTUS) injection 10 Units  10 Units SubCUTAneous DAILY    digoxin (LANOXIN) tablet 0.125 mg  0.125 mg Oral DAILY    lactobac ac& pc-s.therm-b.anim (MIKE Q/RISAQUAD)  1 Cap Oral DAILY    cefTRIAXone (ROCEPHIN) 2 g in 0.9% sodium chloride (MBP/ADV) 50 mL  2 g IntraVENous Q24H    simvastatin (ZOCOR) tablet 40 mg  40 mg Oral QHS    acetaminophen (TYLENOL) tablet 650 mg  650 mg Oral Q6H PRN    lactated Ringers infusion  50 mL/hr IntraVENous CONTINUOUS    famotidine (PF) (PEPCID) 20 mg in sodium chloride 0.9% 10 mL injection  20 mg IntraVENous DAILY    vancomycin (FIRVANQ) 50 mg/mL oral solution 500 mg  500 mg Oral Q6H    nystatin (MYCOSTATIN) 100,000 unit/mL oral suspension 500,000 Units  500,000 Units Oral QID    0.9% sodium chloride infusion 250 mL  250 mL IntraVENous PRN    insulin lispro (HUMALOG) injection   SubCUTAneous AC&HS    PHENYLephrine (WILBERT-SYNEPHRINE) 30 mg in 0.9% sodium chloride 250 mL infusion   mcg/min IntraVENous TITRATE    sodium chloride (NS) flush 5-10 mL  5-10 mL IntraVENous Q8H    sodium chloride (NS) flush 5-10 mL  5-10 mL IntraVENous PRN    sodium chloride (NS) flush 5-10 mL  5-10 mL IntraVENous Q8H    sodium chloride (NS) flush 5-10 mL  5-10 mL IntraVENous PRN    acetaminophen (TYLENOL) suppository 650 mg  650 mg Rectal Q4H PRN    heparin (porcine) injection 5,000 Units  5,000 Units SubCUTAneous Q8H    glucose chewable tablet 16 g  4 Tab Oral PRN    dextrose (D50W) injection syrg 12.5-25 g  12.5-25 g IntraVENous PRN    glucagon (GLUCAGEN) injection 1 mg  1 mg IntraMUSCular PRN          LAB AND IMAGING FINDINGS:     Lab Results   Component Value Date/Time    WBC 7.0 12/20/2018 05:29 AM    HGB 7.3 (L) 12/20/2018 05:29 AM    PLATELET 871 74/43/7939 05:29 AM     Lab Results   Component Value Date/Time    Sodium 151 (H) 12/20/2018 05:29 AM    Potassium 3.8 12/20/2018 05:29 AM Chloride 119 (H) 12/20/2018 05:29 AM    CO2 24 12/20/2018 05:29 AM    BUN 14 12/20/2018 05:29 AM    Creatinine 0.90 12/20/2018 05:29 AM    Calcium 7.9 (L) 12/20/2018 05:29 AM    Magnesium 1.7 12/20/2018 05:29 AM    Phosphorus 2.0 (L) 12/20/2018 05:29 AM      Lab Results   Component Value Date/Time    AST (SGOT) 13 (L) 12/20/2018 05:29 AM    Alk. phosphatase 61 12/20/2018 05:29 AM    Protein, total 4.3 (L) 12/20/2018 05:29 AM    Albumin 1.5 (L) 12/20/2018 05:29 AM    Globulin 2.8 12/20/2018 05:29 AM     Lab Results   Component Value Date/Time    INR 1.2 (H) 11/12/2018 01:22 AM    Prothrombin time 11.6 (H) 11/12/2018 01:22 AM      Lab Results   Component Value Date/Time    Iron 9 (L) 12/17/2018 08:55 AM    TIBC 127 (L) 12/17/2018 08:55 AM    Iron % saturation 7 (L) 12/17/2018 08:55 AM    Ferritin 410 (H) 12/17/2018 08:55 AM      No results found for: PH, PCO2, PO2  No components found for: GLPOC   No results found for: CPK, CKMB             Total time: 70  Counseling / coordination time, spent as noted above: 65  > 50% counseling / coordination?: yes    Prolonged service was provided for  []30 min   []75 min in face to face time in the presence of the patient, spent as noted above. Time Start:   Time End:   Note: this can only be billed with 51203 (initial) or 01412 (follow up). If multiple start / stop times, list each separately.

## 2018-12-20 NOTE — PROGRESS NOTES
Shift Summary  12/20/2018  [id number]    0700 Pt asleep at time of bedside shift report [facility] from [facility]      8878 AM vitals, assessment, and meds complete without difficulty, AM rhythm strip shows sinus arrhythmia. Reviewed today's plan of care and goals with patient/family; plan of care today includes monitor GI, cardiac, respiratory symptoms. [address] [facility] [address] to get order for straight cath if needed to obtain urine samples requested by their team this morning, replaced condom catheter to see if this was viable option of collection first.    0900 Able to obtain urine sample via condom catheter port. Family at bedside. No significant changes noted on my shift. 1900 Bedside and Verbal shift change report given to Johnson (oncoming nurse) by Jordin Dominguez (offgoing nurse). Report included the following information SBAR, Intake/Output, MAR, Recent Results and Cardiac Rhythm sinus arrhythmia.

## 2018-12-20 NOTE — PROGRESS NOTES
Physical Therapy  Multiple unsuccessful attempts to see patient this afternoon.   Will attempt tomorrow  Norm Solorio PT

## 2018-12-20 NOTE — ACP (ADVANCE CARE PLANNING)
Primary Decision Maker (Health Care Agent): Renetta Freeman  Relationship to patient:  Dtr  Phone number:  [phone]  [x] Named in a scanned document   [] Legal Next of Kin  [] Guardian    Secondary Decision Maker ([address]):  Jose Armando Fraga  Relationship to patient:  Son-in-law  Phone number: [phone]  [x] Named in a scanned document   [] Legal Next of Kin  [] Guardian    ACP documents you current have include:  [] Advance Directive or Living Will  [] Durable Do Not Resuscitate  [] Physician Orders for Scope of Treatment (POST)  [] Medical Power of   [] Other

## 2018-12-20 NOTE — PROGRESS NOTES
1900: Bedside and Verbal shift change report given to Shayla Avila RN (oncoming nurse) by Muriel Armendariz RN (offgoing nurse). Report included the following information SBAR, Kardex, ED Summary, Procedure Summary, Intake/Output, MAR, Recent Results and Cardiac Rhythm A-fib/Flutter ST depression, rate controlled. 2015: TRANSFER - OUT REPORT:    Verbal report given to TOMASZ Zavala(name) on Stephen Wu  being transferred to [facility](unit) for routine progression of care       Report consisted of patients Situation, Background, Assessment and   Recommendations(SBAR). Information from the following report(s) SBAR, Kardex, ED Summary, Procedure Summary, Intake/Output, MAR, Recent Results and Cardiac Rhythm A-fib/A-flutter, rate controlled was reviewed with the receiving nurse. Lines:   Venous Access Device Holman needle/3/4 inch 12/16/18 Upper chest (subclavicular area, right (Active)   Central Line Being Utilized Yes 12/19/2018  8:00 PM   Criteria for Appropriate Use Irritant/vesicant medication 12/19/2018  8:00 PM   Site Assessment Clean, dry, & intact 12/19/2018  8:00 PM   Date of Last Dressing Change 12/17/18 12/19/2018  8:00 PM   Dressing Status Clean, dry, & intact 12/19/2018  8:00 PM   Dressing Type Disk with Chlorhexadine gluconate (CHG) 12/19/2018  8:00 PM   Action Taken Open ports on tubing capped 12/19/2018  8:00 PM   Positive Blood Return (Medial Site) Yes 12/19/2018  8:00 PM   Action Taken (Medial Site) Infusing 12/19/2018  8:00 PM   Alcohol Cap Used Yes 12/19/2018  8:00 PM        Opportunity for questions and clarification was provided.       Patient transported with:   Monitor  O2 @ 2L  liters  Registered Nurse  [facility]

## 2018-12-21 ENCOUNTER — APPOINTMENT (OUTPATIENT)
Dept: CT IMAGING | Age: 83
DRG: 871 | End: 2018-12-21
Attending: FAMILY MEDICINE
Payer: MEDICARE

## 2018-12-21 ENCOUNTER — APPOINTMENT (OUTPATIENT)
Dept: GENERAL RADIOLOGY | Age: 83
DRG: 871 | End: 2018-12-21
Attending: STUDENT IN AN ORGANIZED HEALTH CARE EDUCATION/TRAINING PROGRAM
Payer: MEDICARE

## 2018-12-21 LAB
ALBUMIN SERPL-MCNC: 1.8 G/DL (ref 3.5–5)
ALBUMIN/GLOB SERPL: 0.8 {RATIO} (ref 1.1–2.2)
ALP SERPL-CCNC: 89 U/L (ref 45–117)
ALT SERPL-CCNC: 10 U/L (ref 12–78)
ANION GAP SERPL CALC-SCNC: 7 MMOL/L (ref 5–15)
AST SERPL-CCNC: 24 U/L (ref 15–37)
BACTERIA SPEC CULT: NORMAL
BASOPHILS # BLD: 0.1 K/UL (ref 0–0.1)
BASOPHILS NFR BLD: 1 % (ref 0–1)
BILIRUB SERPL-MCNC: 0.1 MG/DL (ref 0.2–1)
BUN SERPL-MCNC: 13 MG/DL (ref 6–20)
BUN/CREAT SERPL: 15 (ref 12–20)
CALCIUM SERPL-MCNC: 8.2 MG/DL (ref 8.5–10.1)
CHLORIDE SERPL-SCNC: 115 MMOL/L (ref 97–108)
CO2 SERPL-SCNC: 27 MMOL/L (ref 21–32)
CREAT SERPL-MCNC: 0.89 MG/DL (ref 0.7–1.3)
DIFFERENTIAL METHOD BLD: ABNORMAL
EOSINOPHIL # BLD: 0.6 K/UL (ref 0–0.4)
EOSINOPHIL NFR BLD: 9 % (ref 0–7)
ERYTHROCYTE [DISTWIDTH] IN BLOOD BY AUTOMATED COUNT: 20.1 % (ref 11.5–14.5)
GLOBULIN SER CALC-MCNC: 2.4 G/DL (ref 2–4)
GLUCOSE BLD STRIP.AUTO-MCNC: 113 MG/DL (ref 65–100)
GLUCOSE BLD STRIP.AUTO-MCNC: 166 MG/DL (ref 65–100)
GLUCOSE BLD STRIP.AUTO-MCNC: 172 MG/DL (ref 65–100)
GLUCOSE BLD STRIP.AUTO-MCNC: 203 MG/DL (ref 65–100)
GLUCOSE SERPL-MCNC: 115 MG/DL (ref 65–100)
HCT VFR BLD AUTO: 28.9 % (ref 36.6–50.3)
HGB BLD-MCNC: 8.3 G/DL (ref 12.1–17)
IMM GRANULOCYTES # BLD: 0 K/UL
IMM GRANULOCYTES NFR BLD AUTO: 0 %
LYMPHOCYTES # BLD: 1.4 K/UL (ref 0.8–3.5)
LYMPHOCYTES NFR BLD: 23 % (ref 12–49)
MAGNESIUM SERPL-MCNC: 1.8 MG/DL (ref 1.6–2.4)
MCH RBC QN AUTO: 21.2 PG (ref 26–34)
MCHC RBC AUTO-ENTMCNC: 28.7 G/DL (ref 30–36.5)
MCV RBC AUTO: 73.9 FL (ref 80–99)
METAMYELOCYTES NFR BLD MANUAL: 1 %
MONOCYTES # BLD: 0.6 K/UL (ref 0–1)
MONOCYTES NFR BLD: 10 % (ref 5–13)
MYELOCYTES NFR BLD MANUAL: 1 %
NEUTS SEG # BLD: 3.5 K/UL (ref 1.8–8)
NEUTS SEG NFR BLD: 55 % (ref 32–75)
NRBC # BLD: 0 K/UL (ref 0–0.01)
NRBC BLD-RTO: 0 PER 100 WBC
PHOSPHATE SERPL-MCNC: 3 MG/DL (ref 2.6–4.7)
PLATELET # BLD AUTO: 267 K/UL (ref 150–400)
PMV BLD AUTO: 10.8 FL (ref 8.9–12.9)
POTASSIUM SERPL-SCNC: 4 MMOL/L (ref 3.5–5.1)
PROT SERPL-MCNC: 4.2 G/DL (ref 6.4–8.2)
RBC # BLD AUTO: 3.91 M/UL (ref 4.1–5.7)
RBC MORPH BLD: ABNORMAL
RBC MORPH BLD: ABNORMAL
SERVICE CMNT-IMP: ABNORMAL
SERVICE CMNT-IMP: NORMAL
SODIUM SERPL-SCNC: 148 MMOL/L (ref 136–145)
SODIUM SERPL-SCNC: 149 MMOL/L (ref 136–145)
WBC # BLD AUTO: 6.3 K/UL (ref 4.1–11.1)

## 2018-12-21 PROCEDURE — 36415 COLL VENOUS BLD VENIPUNCTURE: CPT

## 2018-12-21 PROCEDURE — 74011250637 HC RX REV CODE- 250/637: Performed by: STUDENT IN AN ORGANIZED HEALTH CARE EDUCATION/TRAINING PROGRAM

## 2018-12-21 PROCEDURE — 85025 COMPLETE CBC W/AUTO DIFF WBC: CPT

## 2018-12-21 PROCEDURE — 74011636637 HC RX REV CODE- 636/637: Performed by: FAMILY MEDICINE

## 2018-12-21 PROCEDURE — C1758 CATHETER, URETERAL: HCPCS

## 2018-12-21 PROCEDURE — 84295 ASSAY OF SERUM SODIUM: CPT

## 2018-12-21 PROCEDURE — 74011636320 HC RX REV CODE- 636/320: Performed by: RADIOLOGY

## 2018-12-21 PROCEDURE — 80053 COMPREHEN METABOLIC PANEL: CPT

## 2018-12-21 PROCEDURE — 71046 X-RAY EXAM CHEST 2 VIEWS: CPT

## 2018-12-21 PROCEDURE — 74011250637 HC RX REV CODE- 250/637: Performed by: NURSE PRACTITIONER

## 2018-12-21 PROCEDURE — 83735 ASSAY OF MAGNESIUM: CPT

## 2018-12-21 PROCEDURE — 84100 ASSAY OF PHOSPHORUS: CPT

## 2018-12-21 PROCEDURE — 82962 GLUCOSE BLOOD TEST: CPT

## 2018-12-21 PROCEDURE — 65660000000 HC RM CCU STEPDOWN

## 2018-12-21 PROCEDURE — 74011000258 HC RX REV CODE- 258: Performed by: INTERNAL MEDICINE

## 2018-12-21 PROCEDURE — 74011250636 HC RX REV CODE- 250/636: Performed by: STUDENT IN AN ORGANIZED HEALTH CARE EDUCATION/TRAINING PROGRAM

## 2018-12-21 PROCEDURE — 74011250637 HC RX REV CODE- 250/637: Performed by: INTERNAL MEDICINE

## 2018-12-21 PROCEDURE — 71275 CT ANGIOGRAPHY CHEST: CPT

## 2018-12-21 PROCEDURE — 77030027138 HC INCENT SPIROMETER -A

## 2018-12-21 PROCEDURE — 97530 THERAPEUTIC ACTIVITIES: CPT

## 2018-12-21 PROCEDURE — 74011250636 HC RX REV CODE- 250/636: Performed by: INTERNAL MEDICINE

## 2018-12-21 PROCEDURE — 97116 GAIT TRAINING THERAPY: CPT

## 2018-12-21 PROCEDURE — 92526 ORAL FUNCTION THERAPY: CPT

## 2018-12-21 PROCEDURE — 74011636637 HC RX REV CODE- 636/637: Performed by: STUDENT IN AN ORGANIZED HEALTH CARE EDUCATION/TRAINING PROGRAM

## 2018-12-21 RX ORDER — NYSTATIN 100000 [USP'U]/ML
500000 SUSPENSION ORAL 4 TIMES DAILY
Qty: 473 ML | Refills: 0 | Status: SHIPPED | OUTPATIENT
Start: 2018-12-21 | End: 2018-12-22

## 2018-12-21 RX ORDER — FUROSEMIDE 10 MG/ML
20 INJECTION INTRAMUSCULAR; INTRAVENOUS ONCE
Status: COMPLETED | OUTPATIENT
Start: 2018-12-21 | End: 2018-12-21

## 2018-12-21 RX ADMIN — DIGOXIN 0.12 MG: 125 TABLET ORAL at 08:53

## 2018-12-21 RX ADMIN — NYSTATIN 500000 UNITS: 500000 SUSPENSION ORAL at 13:57

## 2018-12-21 RX ADMIN — VANCOMYCIN HYDROCHLORIDE 500 MG: KIT at 06:00

## 2018-12-21 RX ADMIN — FAMOTIDINE 20 MG: 10 INJECTION, SOLUTION INTRAVENOUS at 08:53

## 2018-12-21 RX ADMIN — INSULIN LISPRO 2 UNITS: 100 INJECTION, SOLUTION INTRAVENOUS; SUBCUTANEOUS at 12:52

## 2018-12-21 RX ADMIN — Medication 10 ML: at 14:00

## 2018-12-21 RX ADMIN — Medication 10 ML: at 06:00

## 2018-12-21 RX ADMIN — VANCOMYCIN HYDROCHLORIDE 500 MG: KIT at 21:34

## 2018-12-21 RX ADMIN — HEPARIN SODIUM 5000 UNITS: 5000 INJECTION INTRAVENOUS; SUBCUTANEOUS at 05:07

## 2018-12-21 RX ADMIN — IOPAMIDOL 80 ML: 755 INJECTION, SOLUTION INTRAVENOUS at 16:45

## 2018-12-21 RX ADMIN — NYSTATIN 500000 UNITS: 500000 SUSPENSION ORAL at 08:53

## 2018-12-21 RX ADMIN — Medication 10 ML: at 21:23

## 2018-12-21 RX ADMIN — NYSTATIN 500000 UNITS: 500000 SUSPENSION ORAL at 21:25

## 2018-12-21 RX ADMIN — FUROSEMIDE 20 MG: 10 INJECTION, SOLUTION INTRAMUSCULAR; INTRAVENOUS at 21:19

## 2018-12-21 RX ADMIN — VANCOMYCIN HYDROCHLORIDE 500 MG: KIT at 13:57

## 2018-12-21 RX ADMIN — NYSTATIN 500000 UNITS: 500000 SUSPENSION ORAL at 17:51

## 2018-12-21 RX ADMIN — INSULIN LISPRO 2 UNITS: 100 INJECTION, SOLUTION INTRAVENOUS; SUBCUTANEOUS at 17:50

## 2018-12-21 RX ADMIN — SIMVASTATIN 40 MG: 20 TABLET, FILM COATED ORAL at 21:23

## 2018-12-21 RX ADMIN — CEFTRIAXONE SODIUM 2 G: 2 INJECTION, POWDER, FOR SOLUTION INTRAMUSCULAR; INTRAVENOUS at 13:00

## 2018-12-21 RX ADMIN — INSULIN GLARGINE 10 UNITS: 100 INJECTION, SOLUTION SUBCUTANEOUS at 08:54

## 2018-12-21 RX ADMIN — Medication 1 CAPSULE: at 08:53

## 2018-12-21 RX ADMIN — INSULIN LISPRO 2 UNITS: 100 INJECTION, SOLUTION INTRAVENOUS; SUBCUTANEOUS at 21:36

## 2018-12-21 RX ADMIN — HEPARIN SODIUM 5000 UNITS: 5000 INJECTION INTRAVENOUS; SUBCUTANEOUS at 17:51

## 2018-12-21 RX ADMIN — HEPARIN SODIUM 5000 UNITS: 5000 INJECTION INTRAVENOUS; SUBCUTANEOUS at 21:26

## 2018-12-21 NOTE — PROGRESS NOTES
Problem: Mobility Impaired (Adult and Pediatric)  Goal: *Acute Goals and Plan of Care (Insert Text)  Physical Therapy Goals  Initiated 12/19/2018  1. Patient will move from supine to sit and sit to supine  in bed with minimal assistance/contact guard assist within 7 day(s). 2.  Patient will transfer from bed to chair and chair to bed with minimal assistance/contact guard assist using the least restrictive device within 7 day(s). 3.  Patient will perform sit to stand with minimal assistance/contact guard assist within 7 day(s). 4.  Patient will ambulate with minimal assistance/contact guard assist for 50 feet with the least restrictive device within 7 day(s). physical Therapy TREATMENT  Patient: Jessika Mayo (89 y.o. male)  Date: 12/21/2018  Diagnosis: Hypotension  Hypotension C. difficile diarrhea      Precautions: WBAT, Fall  Chart, physical therapy assessment, plan of care and goals were reviewed. ASSESSMENT:  Based on the objective data described below, patient participated well with treatment today. Rolled on the edge of bed mod assist, supine to sit mod assist x 2, sit to stand mod assist x 2, ambulate with mod assist hand held assist toward the stretcher chair patient will be going down for CT. Nurse in the room and assisting with transfer to the stretcher and agreed to monitor patient. Progression toward goals:  [x]    Improving appropriately and progressing toward goals  []    Improving slowly and progressing toward goals  []    Not making progress toward goals and plan of care will be adjusted     PLAN:  Patient continues to benefit from skilled intervention to address the above impairments. Continue treatment per established plan of care. Discharge Recommendations:  [facility]  Further Equipment Recommendations for Discharge:  TBD     SUBJECTIVE:   Patient stated ok.     OBJECTIVE DATA SUMMARY:   Critical Behavior:  Neurologic State: Alert  Orientation Level: Disoriented to time  Cognition: Follows commands  Safety/Judgement: Decreased awareness of environment  Functional Mobility Training:  Bed Mobility:  Rolling: Moderate assistance; Additional time  Supine to Sit: Moderate assistance; Additional time  Sit to Supine: Moderate assistance; Additional time  Scooting: Moderate assistance; Additional time        Transfers:  Sit to Stand: Moderate assistance; Additional time;Assist x2  Stand to Sit: Moderate assistance; Additional time;Assist x2  Stand Pivot Transfers: Assist x2                          Balance:  Sitting: Intact; High guard  Standing: Impaired;Pull to stand; With support  Standing - Static: Fair  Standing - Dynamic : Fair  Ambulation/Gait Training:  Distance (ft): 5 Feet (ft)     Ambulation - Level of Assistance: Moderate assistance; Additional time;Assist x2     Gait Description (WDL): Exceptions to WDL  Gait Abnormalities: Path deviations; Step to gait        Base of Support: Widened     Speed/Shannan: Fluctuations; Slow                Therapeutic Exercises:    Instructed patient to continue active range of motion exercise on both legs while up on chair or on bed. Pain:  Pain Scale 1: Numeric (0 - 10)  Pain Intensity 1: 0              Activity Tolerance:   Good. Please refer to the flowsheet for vital signs taken during this treatment. After treatment:   [x]    Patient left in no apparent distress sitting up in chair  []    Patient left in no apparent distress in bed  [x]    Call bell left within reach  [x]    Nursing notified  [x]    Caregiver present  []    Bed alarm activated    COMMUNICATION/COLLABORATION:   The patients plan of care was discussed with: Registered Nurse and patient    Zotlan Rhodes PT,WCC.    Time Calculation: 24 mins

## 2018-12-21 NOTE — PROGRESS NOTES
12- CASE MANAGEMENT NOTE:  [id number] [facility] informed me that they do not have a private room for this pt and he is on contact precaution. Per the daughter's choice, I sent a referral thru [employer] to [facility] and await their response.  Shilpi Zaragoza, BSW, CM

## 2018-12-21 NOTE — PROGRESS NOTES
Nutrition Assessment:    RECOMMENDATIONS/INTERVENTION(S):   1. Continue with diet rec's per SLP    2. Will d/c Magic Cup, mighty shakes, and Ensure pudding as pt not consuming any of these. 3. Continue Ensure Enlive BID, Banatrol, and Zackery. 4. Will monitor PO intakes of meals and all supplements, weight, labs, GI.      ASSESSMENT:   12/21:  SLP continues to follow and pt is still on recommended diet of Pureed with nectar thick liquids. PO intakes remain very poor: < 25%. Pt is really only taking a few bites at each meal.  Pt is disoriented and states he is full after bites. Family assists with feeding him. Multiple supplements ordered. Banatrol for diarrhea as pt tested + for cdiff, Zackery, and Ensure Enlive all of which pt does consume some of. Magic Cup, [employer], Ensure pudding pt does not like and is not eating. Labs: POC BG  [phone]. Meds: lantus, humalog, probiotic.    12/18:  79yo male admitted for hypotension. RD assessment for LOS. PMhx: DM, lymphoma, HTN, hypercholesterolemia, recurrent cdiff. Weight WNL per BMI per age but has had ~35lb weight loss since Jan 2018. Pt eats less when he has diarrhea and most recently had thrush which was also affecting his intakes. Currently tested + for cdiff again. SLP following - on recommended diet of Pureed with Nectar thick liquids. Pt eating very little at each meal.  Zackery ordered in applesauce with all meals because pt does eat applesauce. Drinks Ensure daily at home, will order that. Daughter present, states she will thicken it, she does that at home. Thinks pt would also like Ensure Pudding and Magic Cup. Labs: , POC -143. Meds: Humalog.        Diet Order: Puree  % Eaten:    Patient Vitals for the past 72 hrs:   % Diet Eaten   12/21/18 0915 5 %   12/19/18 1043 5 %   12/18/18 1845 15 %       Pertinent Medications: [x] Reviewed    Labs: [x] Reviewed    Anthropometrics: Height: 5' 8\" (172.7 cm) Weight: 73.6 kg (162 lb 4.8 oz)    IBW (%IBW):   ( ) UBW (%UBW):   (  %)      BMI: Body mass index is 24.68 kg/m². This BMI is indicative of:   [] Underweight    [x] Normal - per age   [] Overweight    []  Obesity    []  Extreme Obesity (BMI>40)  Estimated Nutrition Needs (Based on): 1760 Kcals/day(BMR 1354 x AF 1.3) , 72 g(1-1.2gm/kg/day (72-87)) Protein  Carbohydrate: At Least 130 g/day  Fluids: 1760 mL/day (1 ml/kcal)    Last BM: diarrhea  []Active     [x]Hyperactive  []Hypoactive       [] Absent   BS  Skin:    [] Intact   [] Incision  [] Breakdown   [] DTI   [] Tears/Excoriation/Abrasion  [x]Edema- trace BLE [] Other:    Wt Readings from Last 30 Encounters:   12/20/18 73.6 kg (162 lb 4.8 oz)   12/12/18 64.6 kg (142 lb 8 oz)   12/05/18 65.3 kg (144 lb)   11/26/18 64.9 kg (143 lb)   11/13/18 63.8 kg (140 lb 9.6 oz)   11/12/18 63.9 kg (140 lb 14 oz)      NUTRITION DIAGNOSES:   Problem:  Inadequate oral intake     Etiology: related to decreased ability to consume sufficient energy secondary to poor appetite     Signs/Symptoms: as evidenced by PO intakes < EEN's since admission      12/21: Nutrition dx continues    NUTRITION INTERVENTIONS:  Meals/Snacks: General/healthful diet   Supplements: Commercial supplement              GOAL:   Consume > 50% meals/supplements in next 3-5 days    Cultural, Baptist, or Ethnic Dietary Needs: None     EDUCATION & DISCHARGE NEEDS:    [x] None Identified   [] Identified and Education Provided/Documented   [] Identified and Pt declined/was not appropriate      [x] Interdisciplinary Care Plan Reviewed/Documented    [x] Discharge Needs:    Continue PO supplements at home   [] No Nutrition Related Discharge Needs    NUTRITION RISK:   Pt Is At Nutrition Risk  [x]     No Nutrition Risk Identified  []       PT SEEN FOR:    []  MD Consult: []Calorie Count      []Diabetic Diet Education        []Diet Education     []Electrolyte Management     []General Nutrition Management and Supplements     []Management of Tube Feeding     []TPN Recommendations    []  RN Referral:  []MST score >=2     []Enteral/Parenteral Nutrition PTA     []Pregnant: Gestational DM or Multigestation                 [] Pressure Ulcer    []  Low BMI      []  Length of Stay       [] Dysphagia Diet         [] Ventilator  [x]  Follow-up     Previous Recommendations:   [x] Implemented          [] Not Implemented          [] Not Applicable    Previous Goal:   [] Met              [] Progressing Towards Goal              [x] Not Progressing Towards Goal   [] Not Applicable            Abe Monroy, [address] [place] [address]  Pager [phone]  Phone [phone]

## 2018-12-21 NOTE — PROGRESS NOTES
Bedside and Verbal shift change report given to Johnson (oncoming nurse) by Jermaine Ramírez (offgoing nurse). Report included the following information SBAR, Kardex, Procedure Summary, Intake/Output and MAR. Wean oxygen off desat 80's. Reapplied 2LO2 NC. Ladi cath right chest flush but does not draw blood a lot of blood return.

## 2018-12-21 NOTE — PROGRESS NOTES
Palliative Medicine Social Work Note      Palliative Medicine Physician Dr. Rupert Ferguson and SW met with dtr Twila Rodriguez and son-in-law Jessi Hernandez outside of room while pt rested. Family reports pt will likely transfer to [facility] over the weekend, are pleased that overall condition is improving though report pt was agitated overnight, called dtr several times. Family hopeful pt will do well at SNF and be able to return to their home, unclear if ultimate plan is still for [address] Family reviewed DDNR form yesterday and wished to complete. Original was placed on chart to be sent out with pt at time of discharge, copy placed on chart to be scanned into medical record, copy returned to family. PRUDENCE inquired about what to look for down the road, family is aware that pt might have ongoing issues with c.diff. SW will be available for ongoing support and assistance, as needed. Thank you for including Palliative Medicine in the care of Mr. Laina Gupta.     [address] Fiona Johnson, [facility]  Palliative Medicine:  [phone])

## 2018-12-21 NOTE — PROGRESS NOTES
[address] [facility] [address]   Office [phone]  Fax [phone]          Assessment and Plan     Meg Mejia a 80 y. o. male who is admitted for hypotension in the setting of sepsis of unknown etiology (C. Diff vs HAP/aspiration PNA vs UTI).    24 Hour Events: No acute events. Nursing unable to wean NC since sats dropped to 70-80s when NC taken off while pt was awake. 2 Liquid BMs overnight. 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 Last antibiotics were taken in November  - PO Vanc taper per GI recommendation, GI signed off as of 12/20   - Stool studies: Cx-negative, C. Diff- positive, O&P- pending   - Enteric precautions    New onset Afib/flutter with RVR- S/P Dig and Dilt bolus  - On Digoxin 0.125 mg PO, OAC after C diff has resolved--> pt to f/u with cards OP    Concern for PNA: CXR 12/19: Atelectasis is favored over aspiration or pneumonia appear. CXR 12/15: L basilar consolidation  - Ceftriaxone- Day 3 today   - Repeat CXR this morning due to O2 demand. Hypotension  (RESOLVED) Likely due to hypovolemia,  - S/p Carlos which was stopped on 12/20   - MIVF with Strict I/O    Hypernatremia: (IMPROVED) Likely hypovolemic in nature due to GI losses  - Continue     Chronic Conditions:     HLD- stable, No lipid panel on record. - Home Zocor ordered     Symptomatic GERD- Hold home prilosec  - Start IV protonix 40mg daily     DM II-  HgbA1c 9.6 (11/2018).    - Holding home saxagliptin  - SSI for now, consider adding long-acting insulin based on SSI patient requirement.   - Added 10 units of Lantus to regimen      Chronic Anemia- stable   Likely worsened due to hemo-dilutional s/p 4L NS. (POA of 8.6 was at BL)  - home iron, B12, folate- wnl  - Daily CBC    Elevated Cr- RESOLVED  Cr today . 95 (POA 1.33; baseline 1.0).    - MIVF  - Strict I/Os  - Daily CMP    Hx Recurrent Large B-cell  Lymphoma-  In remission. Established care w Dr. Miguelina Dawn 18. R. Port-A-Cath in place. FEN/GI - LR at 50 mL/hr. Activity - Ambulate with assistance  DVT prophylaxis - Sub Q Heparin  GI prophylaxis - Not indicated at this time  Fall prophylaxis - Fall precautions ordered. Disposition - Admit to ICU. Plan to d/c TBD. Consulting PT/OT/CM  Code Status - Full, discussed with patient / caregivers. Next of Kin Name and Contact Daughter: Ms. Joanna Hamilton ([phone])    I appreciate the opportunity to participate in the care of this patient,  Jina Noel MD  Family Medicine Resident         Subjective / Objective     Subjective States abdominal discomfort on palpation     Temp (24hrs), Av.8 °F (36.6 °C), Min:97.8 °F (36.6 °C), Max:97.8 °F (36.6 °C)     Objective:  Vital signs: (most recent): Blood pressure 136/88, pulse 93, temperature 97.8 °F (36.6 °C), resp. rate 20, height 5' 8\" (1.727 m), weight 162 lb 4.8 oz (73.6 kg), SpO2 98 %. Respiratory: O2 Flow Rate (L/min): 1.5 l/min O2 Device: Nasal cannula   Visit Vitals  /63 (BP 1 Location: Left arm, BP Patient Position: Supine; At rest)   Pulse 93   Temp 97.8 °F (36.6 °C)   Resp 18   Ht 5' 8\" (1.727 m)   Wt 162 lb 4.8 oz (73.6 kg)   SpO2 98%   BMI 24.68 kg/m²     General: No acute distress. Alert. Cooperative. Hard of hearing   Head: Normocephalic. Atraumatic. Respiratory: Decreased breath sounds in L mid-axillary line. Symmetric air entry and chest expansion. Cardiovascular: RRR. Normal S1,S2. No m/r/g.    GI: + bowel sounds. Slight discomfort on abdominal palpation   Extremities: No edema. No tenderness. Neuro:                          Oriented. No focal deficits  Skin:                             Warm and dry.           I/O:  Date 18 07 - 18 0659 18 07 - 18 0659   Shift 7317-0643 2970-5578 24 Hour Total 8912-3477 9611-3388 24 Hour Total   INTAKE   P.O.  360 360 P.O.  360 360      I. V.(mL/kg/hr)  1440(1.6) 1440(0.8)        Volume (lactated Ringers infusion)  1440 1440      Shift Total(mL/kg)  1800(24.5) 1800(24.5)      OUTPUT   Urine(mL/kg/hr) 400(0.5) 1400(1.6) 1800(1)        Urine Voided 200 1400 1600        Urine Occurrence(s) 1 x 2 x 3 x        Urine Output (mL) (Condom Catheter 12/19/18) 200  200      Stool           Stool Occurrence(s) 3 x 2 x 5 x      Shift Total(mL/kg) 400(5.4) 1400(19) 1800(24.5)      NET -400 400 0      Weight (kg) 73.6 73.6 73.6 73.6 73.6 73.6       CBC:  Recent Labs     12/21/18  0630 12/20/18  0529 12/19/18  0309   WBC 6.3 7.0 9.6   HGB 8.3* 7.3* 7.9*   HCT 28.9* 26.3* 27.3*    246 209       Metabolic Panel:  Recent Labs     12/21/18  0630 12/20/18  0529 12/19/18  0309   * 151* 148*   K 4.0 3.8 3.5   * 119* 117*   CO2 27 24 22   BUN 13 14 16   CREA 0.89 0.90 0.95   * 149* 172*   CA 8.2* 7.9* 7.8*   MG 1.8 1.7  --    PHOS 3.0 2.0*  --    ALB 1.8* 1.5* 1.6*   SGOT 24 13* 14*   ALT 10* 6* 8*          For Billing    Chief Complaint   Patient presents with   [facility]       Hospital Problems  Date Reviewed: 12/12/2018          Codes Class Noted POA    Hypotension ICD-10-CM: I95.9  ICD-9-CM: 458.9  12/15/2018 Unknown        * (Principal) C. difficile diarrhea ICD-10-CM: A04.72  ICD-9-CM: 008.45  11/13/2018 Yes

## 2018-12-21 NOTE — PROGRESS NOTES
12- CASE MANAGEMENT NOTE:  The pt has been accepted by [facility] and arrangements have been made for him to be transferred there on 12- with  by [facility] at 2:00 PM . PLEASE [phone] AND CALL REPORT [phone]. IF DISCHARGE IS CANCELLED, CALL [facility]- [phone]. The family is aware of the arrangements.  Shilpi Zaragoza, BSW, CM

## 2018-12-21 NOTE — PROGRESS NOTES
Problem: Dysphagia (Adult)  Goal: *Acute Goals and Plan of Care (Insert Text)  Swallowing goals initiated 12-17-18: (weekly re-eval 12-24-18)  1)  Tolerate dysphagia 1, nectars without s/s aspiration by 12-20-18; continue to 12-24-18  2) re-eval for solids upon patient request by 12-20-18; continue to 12-24-18   Speech language pathology dysphagia treatment  Patient: Junaid Brown (50 y.o. male)  Date: 12/21/2018  Diagnosis: Hypotension  Hypotension C. difficile diarrhea      Precautions: aspiration WBAT, Fall    ASSESSMENT:  Patient with weakening swallow, probably since he has poor PO chronically. Informed family -they are trying to work with him on PO intake. Progression toward goals:  []         Improving appropriately and progressing toward goals  []         Improving slowly and progressing toward goals  [x]         Not making progress toward goals and plan of care will be adjusted     PLAN:  Recommendations and Planned Interventions:  Continue dysphagia 1, nectars. Supplements per RD  Patient continues to benefit from skilled intervention to address the above impairments. Continue treatment per established plan of care. Discharge Recommendations:  Skilled Nursing Facility     SUBJECTIVE:   Patient stated Yogesh Dane you go to the store and get me some large Depends. .    OBJECTIVE:   Cognitive and Communication Status:  Neurologic State: Alert  Orientation Level: Disoriented to time  Cognition: Follows commands  Perception: Appears intact  Perseveration: No perseveration noted  Safety/Judgement: Decreased awareness of environment  Dysphagia Treatment:  Oral Assessment:     P.O. Trials:  Patient Position: upright in bed  Vocal quality prior to P.O.: No impairment  Consistency Presented: Puree;Nectar thick liquid  How Presented: Self-fed/presented;Spoon;Cup/sip         ORAL PHASE:   Feeding self, but taking only a few sips and bites at a time, then refusing.    PHARYNGEAL PHASE:   Moderate to severe weak swallow  Mild-moderate delay in swallow. No overt s/s aspiration, but suspect silent aspiration at times. worked with RD to provide the most calories and protein per bite. Gave the family suggestions of pureed items to try. Exercises:  Laryngeal Exercises:                                                                                                                                   Pain:  Pain Scale 1: Numeric (0 - 10)  Pain Intensity 1: 0     After treatment:   []              Patient left in no apparent distress sitting up in chair  []              Patient left in no apparent distress in bed  []              Call bell left within reach  []              Nursing notified  []              Caregiver present  []              Bed alarm activated    COMMUNICATION/EDUCATION:   Patient was educated regarding His deficit(s) of dysphagia  as this relates to His diagnosis of weakness from C-diff. He demonstrated Guarded understanding as evidenced by confusion. Family present and understood. .    The patients plan of care including recommendations, planned interventions, and recommended diet changes were discussed with: Registered Nurse and case management, . []              Posted safety precautions in patient's room.     Geoffery Apley, SLP  Time Calculation: 10 mins

## 2018-12-22 VITALS
SYSTOLIC BLOOD PRESSURE: 123 MMHG | WEIGHT: 159.2 LBS | HEIGHT: 68 IN | HEART RATE: 92 BPM | RESPIRATION RATE: 12 BRPM | TEMPERATURE: 98 F | BODY MASS INDEX: 24.13 KG/M2 | DIASTOLIC BLOOD PRESSURE: 67 MMHG | OXYGEN SATURATION: 98 %

## 2018-12-22 LAB
ALBUMIN SERPL-MCNC: 1.8 G/DL (ref 3.5–5)
ALBUMIN/GLOB SERPL: 0.6 {RATIO} (ref 1.1–2.2)
ALP SERPL-CCNC: 94 U/L (ref 45–117)
ALT SERPL-CCNC: 8 U/L (ref 12–78)
ANION GAP SERPL CALC-SCNC: 8 MMOL/L (ref 5–15)
AST SERPL-CCNC: 26 U/L (ref 15–37)
BACTERIA SPEC CULT: NORMAL
BASOPHILS # BLD: 0 K/UL (ref 0–0.1)
BASOPHILS NFR BLD: 0 % (ref 0–1)
BILIRUB SERPL-MCNC: 0.2 MG/DL (ref 0.2–1)
BUN SERPL-MCNC: 12 MG/DL (ref 6–20)
BUN/CREAT SERPL: 13 (ref 12–20)
CALCIUM SERPL-MCNC: 8.4 MG/DL (ref 8.5–10.1)
CHLORIDE SERPL-SCNC: 110 MMOL/L (ref 97–108)
CO2 SERPL-SCNC: 29 MMOL/L (ref 21–32)
CREAT SERPL-MCNC: 0.93 MG/DL (ref 0.7–1.3)
DIFFERENTIAL METHOD BLD: ABNORMAL
EOSINOPHIL # BLD: 0.4 K/UL (ref 0–0.4)
EOSINOPHIL NFR BLD: 6 % (ref 0–7)
ERYTHROCYTE [DISTWIDTH] IN BLOOD BY AUTOMATED COUNT: 19.8 % (ref 11.5–14.5)
GLOBULIN SER CALC-MCNC: 2.8 G/DL (ref 2–4)
GLUCOSE BLD STRIP.AUTO-MCNC: 202 MG/DL (ref 65–100)
GLUCOSE BLD STRIP.AUTO-MCNC: 205 MG/DL (ref 65–100)
GLUCOSE BLD STRIP.AUTO-MCNC: 284 MG/DL (ref 65–100)
GLUCOSE SERPL-MCNC: 193 MG/DL (ref 65–100)
HCT VFR BLD AUTO: 27.7 % (ref 36.6–50.3)
HGB BLD-MCNC: 7.9 G/DL (ref 12.1–17)
IMM GRANULOCYTES # BLD: 0 K/UL
IMM GRANULOCYTES NFR BLD AUTO: 0 %
LYMPHOCYTES # BLD: 1.2 K/UL (ref 0.8–3.5)
LYMPHOCYTES NFR BLD: 20 % (ref 12–49)
MAGNESIUM SERPL-MCNC: 1.7 MG/DL (ref 1.6–2.4)
MCH RBC QN AUTO: 21.3 PG (ref 26–34)
MCHC RBC AUTO-ENTMCNC: 28.5 G/DL (ref 30–36.5)
MCV RBC AUTO: 74.7 FL (ref 80–99)
METAMYELOCYTES NFR BLD MANUAL: 3 %
MONOCYTES # BLD: 0.7 K/UL (ref 0–1)
MONOCYTES NFR BLD: 12 % (ref 5–13)
NEUTS SEG # BLD: 3.7 K/UL (ref 1.8–8)
NEUTS SEG NFR BLD: 59 % (ref 32–75)
NRBC # BLD: 0 K/UL (ref 0–0.01)
NRBC BLD-RTO: 0 PER 100 WBC
PHOSPHATE SERPL-MCNC: 3.6 MG/DL (ref 2.6–4.7)
PLATELET # BLD AUTO: 248 K/UL (ref 150–400)
PMV BLD AUTO: 11 FL (ref 8.9–12.9)
POTASSIUM SERPL-SCNC: 3.7 MMOL/L (ref 3.5–5.1)
PROT SERPL-MCNC: 4.6 G/DL (ref 6.4–8.2)
RBC # BLD AUTO: 3.71 M/UL (ref 4.1–5.7)
RBC MORPH BLD: ABNORMAL
SERVICE CMNT-IMP: ABNORMAL
SERVICE CMNT-IMP: NORMAL
SODIUM SERPL-SCNC: 147 MMOL/L (ref 136–145)
WBC # BLD AUTO: 6.2 K/UL (ref 4.1–11.1)

## 2018-12-22 PROCEDURE — 97530 THERAPEUTIC ACTIVITIES: CPT

## 2018-12-22 PROCEDURE — 82962 GLUCOSE BLOOD TEST: CPT

## 2018-12-22 PROCEDURE — 84100 ASSAY OF PHOSPHORUS: CPT

## 2018-12-22 PROCEDURE — 74011000250 HC RX REV CODE- 250: Performed by: FAMILY MEDICINE

## 2018-12-22 PROCEDURE — 74011250636 HC RX REV CODE- 250/636: Performed by: FAMILY MEDICINE

## 2018-12-22 PROCEDURE — 85025 COMPLETE CBC W/AUTO DIFF WBC: CPT

## 2018-12-22 PROCEDURE — 83735 ASSAY OF MAGNESIUM: CPT

## 2018-12-22 PROCEDURE — 36415 COLL VENOUS BLD VENIPUNCTURE: CPT

## 2018-12-22 PROCEDURE — 74011250637 HC RX REV CODE- 250/637: Performed by: INTERNAL MEDICINE

## 2018-12-22 PROCEDURE — C1758 CATHETER, URETERAL: HCPCS

## 2018-12-22 PROCEDURE — 74011250636 HC RX REV CODE- 250/636: Performed by: STUDENT IN AN ORGANIZED HEALTH CARE EDUCATION/TRAINING PROGRAM

## 2018-12-22 PROCEDURE — 74011250637 HC RX REV CODE- 250/637: Performed by: NURSE PRACTITIONER

## 2018-12-22 PROCEDURE — 74011250637 HC RX REV CODE- 250/637: Performed by: STUDENT IN AN ORGANIZED HEALTH CARE EDUCATION/TRAINING PROGRAM

## 2018-12-22 PROCEDURE — 74011636637 HC RX REV CODE- 636/637: Performed by: STUDENT IN AN ORGANIZED HEALTH CARE EDUCATION/TRAINING PROGRAM

## 2018-12-22 PROCEDURE — 74011636637 HC RX REV CODE- 636/637: Performed by: FAMILY MEDICINE

## 2018-12-22 PROCEDURE — 80053 COMPREHEN METABOLIC PANEL: CPT

## 2018-12-22 PROCEDURE — 74011000250 HC RX REV CODE- 250: Performed by: INTERNAL MEDICINE

## 2018-12-22 PROCEDURE — 74011250636 HC RX REV CODE- 250/636: Performed by: INTERNAL MEDICINE

## 2018-12-22 RX ORDER — FUROSEMIDE 10 MG/ML
20 INJECTION INTRAMUSCULAR; INTRAVENOUS ONCE
Status: COMPLETED | OUTPATIENT
Start: 2018-12-22 | End: 2018-12-22

## 2018-12-22 RX ORDER — NYSTATIN 100000 [USP'U]/ML
500000 SUSPENSION ORAL 4 TIMES DAILY
Qty: 473 ML | Refills: 0 | Status: SHIPPED | OUTPATIENT
Start: 2018-12-22 | End: 2019-02-11 | Stop reason: ALTCHOICE

## 2018-12-22 RX ORDER — DIGOXIN 125 MCG
0.12 TABLET ORAL DAILY
Qty: 30 TAB | Refills: 2 | Status: SHIPPED | OUTPATIENT
Start: 2018-12-23 | End: 2018-12-22

## 2018-12-22 RX ORDER — VANCOMYCIN HYDROCHLORIDE 250 MG/5ML
POWDER, FOR SOLUTION ORAL
Qty: 210 ML | Refills: 0 | Status: SHIPPED | OUTPATIENT
Start: 2018-12-22 | End: 2018-12-22

## 2018-12-22 RX ORDER — HEPARIN 100 UNIT/ML
300 SYRINGE INTRAVENOUS AS NEEDED
Status: DISCONTINUED | OUTPATIENT
Start: 2018-12-22 | End: 2018-12-22 | Stop reason: HOSPADM

## 2018-12-22 RX ORDER — INSULIN GLARGINE 100 [IU]/ML
16 INJECTION, SOLUTION SUBCUTANEOUS DAILY
Status: DISCONTINUED | OUTPATIENT
Start: 2018-12-22 | End: 2018-12-22 | Stop reason: HOSPADM

## 2018-12-22 RX ORDER — VANCOMYCIN HYDROCHLORIDE 250 MG/5ML
POWDER, FOR SOLUTION ORAL
Qty: 210 ML | Refills: 0 | Status: SHIPPED | OUTPATIENT
Start: 2018-12-22 | End: 2019-02-02

## 2018-12-22 RX ORDER — DIGOXIN 125 MCG
0.12 TABLET ORAL DAILY
Qty: 30 TAB | Refills: 2 | Status: SHIPPED | OUTPATIENT
Start: 2018-12-23 | End: 2019-01-11 | Stop reason: ALTCHOICE

## 2018-12-22 RX ADMIN — INSULIN GLARGINE 16 UNITS: 100 INJECTION, SOLUTION SUBCUTANEOUS at 08:25

## 2018-12-22 RX ADMIN — Medication 10 ML: at 01:57

## 2018-12-22 RX ADMIN — VANCOMYCIN HYDROCHLORIDE 500 MG: KIT at 01:46

## 2018-12-22 RX ADMIN — NYSTATIN 500000 UNITS: 500000 SUSPENSION ORAL at 08:26

## 2018-12-22 RX ADMIN — Medication 10 ML: at 02:52

## 2018-12-22 RX ADMIN — Medication 1 CAPSULE: at 08:26

## 2018-12-22 RX ADMIN — HEPARIN SODIUM 5000 UNITS: 5000 INJECTION INTRAVENOUS; SUBCUTANEOUS at 13:39

## 2018-12-22 RX ADMIN — FUROSEMIDE 20 MG: 10 INJECTION, SOLUTION INTRAMUSCULAR; INTRAVENOUS at 08:27

## 2018-12-22 RX ADMIN — FAMOTIDINE 20 MG: 10 INJECTION, SOLUTION INTRAVENOUS at 13:38

## 2018-12-22 RX ADMIN — Medication 10 ML: at 13:47

## 2018-12-22 RX ADMIN — HEPARIN SODIUM 5000 UNITS: 5000 INJECTION INTRAVENOUS; SUBCUTANEOUS at 06:11

## 2018-12-22 RX ADMIN — NYSTATIN 500000 UNITS: 500000 SUSPENSION ORAL at 13:00

## 2018-12-22 RX ADMIN — INSULIN LISPRO 3 UNITS: 100 INJECTION, SOLUTION INTRAVENOUS; SUBCUTANEOUS at 11:30

## 2018-12-22 RX ADMIN — ALTEPLASE 0.5 MG: 2.2 INJECTION, POWDER, LYOPHILIZED, FOR SOLUTION INTRAVENOUS at 02:47

## 2018-12-22 RX ADMIN — VANCOMYCIN HYDROCHLORIDE 500 MG: KIT at 06:10

## 2018-12-22 RX ADMIN — INSULIN LISPRO 5 UNITS: 100 INJECTION, SOLUTION INTRAVENOUS; SUBCUTANEOUS at 16:30

## 2018-12-22 RX ADMIN — VANCOMYCIN HYDROCHLORIDE 500 MG: KIT at 12:50

## 2018-12-22 RX ADMIN — Medication 10 ML: at 13:53

## 2018-12-22 RX ADMIN — DIGOXIN 0.12 MG: 125 TABLET ORAL at 08:25

## 2018-12-22 RX ADMIN — SODIUM CHLORIDE, PRESERVATIVE FREE 300 UNITS: 5 INJECTION INTRAVENOUS at 16:36

## 2018-12-22 RX ADMIN — INSULIN LISPRO 3 UNITS: 100 INJECTION, SOLUTION INTRAVENOUS; SUBCUTANEOUS at 08:25

## 2018-12-22 NOTE — PROGRESS NOTES
12/22/2018  1:01 PM  (weekend coverage)    CM consult noted, pt does not require O2. Pt is going to SNF.      [address] Management

## 2018-12-22 NOTE — PROGRESS NOTES
[address] [facility] [address]   Office [phone]  Fax [phone]          Assessment and Plan     Karan Andersen a 80 y. o. male who is admitted for hypotension in the setting of sepsis of unknown etiology (C. Diff vs HAP/aspiration PNA vs UTI).    24 Hour Events: No acute events. No BMs overnight. 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 Last antibiotics were taken in November  - PO Vanc taper per GI recommendation, GI signed off as of 12/20   - Stool studies: Cx-negative, C. Diff- positive, O&P- pending   - Enteric precautions    New onset Afib/flutter with RVR- S/P Dig and Dilt bolus  - On Digoxin 0.125 mg PO, OAC after C diff has resolved--> pt to f/u with cards OP    New O2 demand: Initially believed to be due to PNA but repeat CXR on 12/21 unremarkable. Pt requiring 2l/min at this time to maintain sats. Likely due to atelectasis, deconditioning or pleural effusions.   - CXR on 12/21 unremarkable   - CTA on 12/21 does not show PE, moderate bilateral pleural effusions. - Will continue to monitor and wean off O2     Concern for PNA: (RESOLVED) CXR 12/19: Atelectasis is favored over aspiration or pneumonia appear. CXR 12/15: L basilar consolidation.   - Repeat CXR on 12/21 unremarkable,     Hypotension:  (RESOLVED) Likely due to hypovolemia,  - S/p Carlos which was stopped on 12/20   - MIVF with Strict I/O    Hypernatremia: (IMPROVED) Likely hypovolemic in nature due to GI losses  - Continue     Chronic Conditions:     HLD- stable, No lipid panel on record. - Home Zocor ordered     Symptomatic GERD- Hold home prilosec  - Start IV protonix 40mg daily     DM II-  HgbA1c 9.6 (11/2018).    - Holding home saxagliptin  - SSI for now, consider adding long-acting insulin based on SSI patient requirement.   - Lantus increased to 16 units from 10      Chronic Anemia- stable   Likely worsened due to hemo-dilutional s/p 4L NS. (POA of 8.6 was at BL)  - home iron, B12, folate- wnl  - Daily CBC    Elevated Cr- RESOLVED  Cr today . 95 (POA 1.33; baseline 1.0). - MIVF  - Strict I/Os  - Daily CMP    Hx Recurrent Large B-cell  Lymphoma-  In remission. Established care w Dr. Francis Clemens 18. R. Port-A-Cath in place. FEN/GI - LR at 50 mL/hr. Activity - Ambulate with assistance  DVT prophylaxis - Sub Q Heparin  GI prophylaxis - Not indicated at this time  Fall prophylaxis - Fall precautions ordered. Disposition - Admit to ICU. Plan to d/c TBD. Consulting PT/OT/CM  Code Status - Full, discussed with patient / caregivers. Next of Kin Name and Contact Daughter: Ms. Elsy Calderon ([phone])    I appreciate the opportunity to participate in the care of this patient,  Mathieu Hahn MD  Family Medicine Resident         Subjective / Objective     Subjective States abdominal discomfort on palpation     Temp (24hrs), Av.9 °F (36.6 °C), Min:97.7 °F (36.5 °C), Max:98.1 °F (36.7 °C)     Objective:  Vital signs: (most recent): Blood pressure 128/58, pulse 93, temperature 98.1 °F (36.7 °C), resp. rate 23, height 5' 8\" (1.727 m), weight 159 lb 3.2 oz (72.2 kg), SpO2 92 %. Respiratory: O2 Flow Rate (L/min): 2 l/min O2 Device: Nasal cannula   Visit Vitals  /58 (BP 1 Location: Left arm, BP Patient Position: At rest)   Pulse 93   Temp 98.1 °F (36.7 °C)   Resp 23   Ht 5' 8\" (1.727 m)   Wt 159 lb 3.2 oz (72.2 kg)   SpO2 92%   BMI 24.21 kg/m²     General: No acute distress. Alert. Cooperative. Hard of hearing   Head: Normocephalic. Atraumatic. Respiratory: Decreased breath sounds in L mid-axillary line. Symmetric air entry and chest expansion. Cardiovascular: RRR. Normal S1,S2. No m/r/g.    GI: + bowel sounds. Slight discomfort on abdominal palpation   Extremities: No edema. No tenderness. Neuro:                          Oriented.  No focal deficits  Skin: Warm and dry. I/O:  Date 12/21/18 0700 - 12/22/18 0659 12/22/18 0700 - 12/23/18 0659   Shift [id number] 1900-0659 24 Hour Total 0700-1859 1900-0659 24 Hour Total   INTAKE   P.O. 60 240 300        P. O. 60 240 300      I. V.(mL/kg/hr)  0(0) 0(0)        Volume (0.9% sodium chloride infusion 250 mL)  0 0      Shift Total(mL/kg) 60(0.8) 240(3.3) 300(4.2)      OUTPUT   Urine(mL/kg/hr) 750(0.8) 2500(2.9) 3250(1.9)        Urine Voided 750  750        Urine Occurrence(s)  2 x 2 x        Urine Output (mL) (Condom Catheter 12/19/18)  2500 2500      Stool           Stool Occurrence(s) 2 x 2 x 4 x      Shift Total(mL/kg) 750(10.2) 2500(34.6) 3250(45)      NET -690 -0790 -2950      Weight (kg) 73.6 72.2 72.2 72.2 72.2 72.2       CBC:  Recent Labs     12/22/18  0427 12/21/18  0630 12/20/18  0529   WBC 6.2 6.3 7.0   HGB 7.9* 8.3* 7.3*   HCT 27.7* 28.9* 26.3*    267 590       Metabolic Panel:  Recent Labs     12/22/18  0427 12/21/18  1540 12/21/18  0630 12/20/18  0529   * 148* 149* 151*   K 3.7  --  4.0 3.8   *  --  115* 119*   CO2 29  --  27 24   BUN 12  --  13 14   CREA 0.93  --  0.89 0.90   *  --  115* 149*   CA 8.4*  --  8.2* 7.9*   MG 1.7  --  1.8 1.7   PHOS 3.6  --  3.0 2.0*   ALB 1.8*  --  1.8* 1.5*   SGOT 26  --  24 13*   ALT 8*  --  10* 6*          For Billing    Chief Complaint   Patient presents with   [facility] Dehydration       Hospital Problems  Date Reviewed: 12/12/2018          Codes Class Noted POA    Hypotension ICD-10-CM: I95.9  ICD-9-CM: 458.9  12/15/2018 Unknown        * (Principal) C. difficile diarrhea ICD-10-CM: A04.72  ICD-9-CM: 008.45  11/13/2018 Yes

## 2018-12-22 NOTE — DISCHARGE SUMMARY
[facility] [facility] [address] [place] [address]   Office [phone]  Fax [phone]       Discharge / Transfer / Off-Service Note     Name: Christina Cain MRN: [id number]  Sex: Male   YOB: 1927  Age: 80 y.o. PCP: Toañ Huang MD     Date of admission: 12/15/2018  Date of discharge/transfer: 12/22/2018    Attending physician at admission: Dr. Yumiko Ortiz    Attending physician at discharge/transfer: Dr. Leesa Morales    Resident physician at discharge/transfer: Ele Segura MD     Consultants during hospitalization  Dr. Rg Soliz, [facility] [place] [address]      Admission diagnoses: Hypotension & Diarrhea     Recommended follow-up after discharge  1. PCP: Dr. Lord Gill   2. GI: Dr Jayna Rosado   3. Cardio: Dr. Tang De Leon     Things to follow up on:  - Follow up with Cardiology about oral anticoagulation for atrial fibrillation once C Diff resolves   - Follow up with GI to see if Vancomycin at the end of taper needs to be continued for 2 weeks or 10 weeks    Medications added:   - PO Vanc taper as below:  Take 2.5 mL by mouth every 6 hours for 14 days, then 2.5 mL 2 times a day for 7 days, then 2.5 mL daily for 7 days, then 2.5 mL every 48 hours for 14 days (last dose of 2.5 mL every 48 hours has been suggested for anywhere from 2-10 weeks- please follow up with GI to see if additional Vancomycin is required)  - Start Digoxin 0.125 mg daily     Medications removed:   - Stop Lopressor      History of Present Illness  Per admitting provider,     Christina Cain is a 80 y.o. male with known hx recurrent C. Diff, large B-cell lymphoma, DM II, who presents to the [facility] THE [facility] ED complaining of a 3 week hx of diarrhea, associated w/ fatigue/lethargy. He has also had decreased PO intake for the past 2-3 days due to swallowing/choking issues. The pt drinks thickened liquids at baseline. Per family, pt has not voided urine today. The pt reports first episode of C. diff in January '18, w/ recurrent episodes in February and March. He was hospitalized last month (11/10-11/12), found to have diarrhea, but not given Abx. He had a negative C. diff last month. Of note, he had recent Abx use for dental procedure last month - per chart review of Dr. Gabby Morales note (H/O). The pt denies any blood in urine/stool. No dysuria. No n/v/hematemesis. Denies abdominal pain. No chest pain/SOB/cough.      Pt recently moved to [place] of state and established medical care. He has a h/o prostate cancer + large B-cell lymphoma (currently in remission), and recently established care w/ H/O (Dr. Kiya Morales) in November. Has Ltanya Apo. Pt also has upcoming appointment w/ GI for evaluation of diarrhea in setting of recurrent C. Diff.      In the ER, vital signs were remarkable for BP 70/40, . Labs were remarkable for WBC 12.4, Hgb 8.6, Cr 1.33, LA 1.10, UA: neg nitrites/LE, 3+ bacteria, 2+ amorphous crystals. CXR showed possible L basilar consolidation. Pt was treated with 3L IV NS bolus, 1x PO vanc. Pt was started on levophed for MAP <65. 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 Last antibiotics were taken in November. Per GI recommendations pt to continue taking Vancomycin taper outpatient. BMs decreased over the course of admission and pt was stable at time of discharge.    - PO Vanc taper as below:  Take 2.5 mL by mouth every 6 hours for 14 days, then 2.5 mL 2 times a day for 7 days, then 2.5 mL daily for 7 days, then 2.5 mL every 48 hours for 14 days (last dose of 2.5 mL every 48 hours has been suggested for anywhere from 2-10 weeks- please follow up with GI to see if additional Vancomycin is required)    New onset Afib/flutter with RVR- Found on admission. Pt was started on digoxin 0.125 mg.  - Continue on Digoxin 0.125 mg daily   - Follow up with Cardiology once C Diff resolves to discuss anticoagulation for atrial fibrillation.      New O2 demand: Pt has required O2 1.5-2 L while being in the hospital with no previous O2 demand. Serial CXRs did not show any PNA but does show bilateral pleural effusions. CTA was negative for PE. Pt does not have any increased work of breathing. Likely due to deconditioning.   - Continue weaning pt off O2 NC  - Continue using incentive spirometry      Hypotension:  Pt was placed on Carlos for a short course at the beginning of admission but was able to be weaned off it. BP were stable at time of discharge.      Hypernatremia: Resolved during admission. It was likely due to hypovolemia.      HLD- Continue home Zocor ordered      Symptomatic GERD-Continue home Prisolec     DM II- Continue home saxagliptin      Elevated Cr- Likely from dehydration, resolved     Hx Recurrent Large B-cell  Lymphoma- In remission. Established care w Dr. Lindsey Bateman 11/26/18. R. Port-A-Cath in place. Physical exam at discharge:    General: No acute distress. Alert. Cooperative. Hard of hearing   Head: Normocephalic. Atraumatic. Respiratory: Decreased breath sounds in L mid-axillary line. Symmetric air entry and chest expansion. Cardiovascular: RRR. Normal S1,S2. No m/r/g.    GI: + bowel sounds. Slight discomfort on abdominal palpation   Extremities: No edema. No tenderness. Neuro:                          KPCXYHEB. No focal deficits  Skin:                             Warm and dry. Condition at discharge: Stable.     Labs  Recent Labs     12/22/18  0427 12/21/18  0630 12/20/18  0529   WBC 6.2 6.3 7.0   HGB 7.9* 8.3* 7.3*   HCT 27.7* 28.9* 26.3*    267 246     Recent Labs     12/22/18  0427 12/21/18  1540 12/21/18  0630 12/20/18  0529   * 148* 149* 151*   K 3.7  --  4.0 3.8   *  --  115* 119* CO2 29  --  27 24   BUN 12  --  13 14   CREA 0.93  --  0.89 0.90   *  --  115* 149*   CA 8.4*  --  8.2* 7.9*   MG 1.7  --  1.8 1.7   PHOS 3.6  --  3.0 2.0*     Recent Labs     12/22/18  0427 12/21/18  0630 12/20/18  0529   SGOT 26 24 13*   ALT 8* 10* 6*   AP 94 89 61   TBILI 0.2 0.1* 0.2   TP 4.6* 4.2* 4.3*   ALB 1.8* 1.8* 1.5*   GLOB 2.8 2.4 2.8     Recent Labs     12/22/18  9811 12/21/18  2117 12/21/18  1739 12/21/18  1135 12/21/18  0653   GLUCPOC 202* 203* 166* 172* 113*     Cultures  · None     Procedures / Diagnostic Studies  · None     Imaging  Results from [place] encounter on 12/15/18   XR CHEST PA LAT    Narrative Clinical indication: Hypoxia. Frontal and lateral views of the chest obtained, comparison December 19. Infusion catheter is in place. Persistent elevation of the right hemidiaphragm. Bilateral pleural effusions. No focal infiltrate. Impression  impression: Pleural effusions with elevation of the right hemidiaphragm. No results found for this or any previous visit. Results from [place] encounter on 12/15/18   CTA CHEST W OR W WO CONT    Narrative INDICATION: increased supplemental O2, fatigue. Patient has history of diabetes,  prostate cancer, melanoma, hypertension, hypercholesterolemia. Patient status  post prostatectomy. COMPARISON: Earlier chest x-ray    EXAM: Precontrast localizer was first performed to verify the levels of the  pulmonary arteries. Subsequently, contiguous axial images were obtained after  the rapid IV bolus administration of 80 cc Isovue-370, followed by thin section  axial reconstructions with multiplanar reformations. Three-dimensional post -  processing was performed. CT dose reduction was achieved through use of a  standardized protocol tailored for this examination and automatic exposure  control for dose modulation. FINDINGS: Imaging is degraded by respiratory artifacts.  No central pulmonary  embolism is demonstrated. There are moderate bilateral pleural effusions. Cardiac size is within normal limits. No pericardial effusion is shown. There is  prominence of the main pulmonary arteries, especially the left main pulmonary  artery with diameter measuring 3.1 cm. This should be correlated clinically. There is also mild ectasia of the ascending aorta without demonstration of  aneurysm or dissection. Diameter of the ascending aorta measures up to 3.9 cm. No mediastinal or hilar mass is shown. A 3 mm subpleural nodule is shown in the  lingula on series 3 image 25. Impression IMPRESSION:   1. Study rendered suboptimal by respiratory artifacts, no central pulmonary  embolism demonstrated. 2. Pulmonary arterial enlargement for which clinical correlation is recommended. 3. Moderate bilateral pleural effusions. 4. 3 mm subpleural nodule of the lingula. Guidelines for Management of Incidental Pulmonary Nodules Detected on CT  Images: from the [employer] 2017    LOW-RISK PATIENTS:    LESS THAN OR EQUAL TO 6 MM:  No routine follow-up needed. GREATER THAN 6-8 MM: CT at 6-12 months, if multiple at 3-6 months, then consider  CT at 18-24 months. GREATER THAN 8 MM:  Consider CT at 3 months, PET/CT, or tissue sampling. If  multiple CT at 3-6 months, then consider CT at 18-24 months. HIGH-RISK PATIENTS:    LESS THAN OR EQUAL TO 6 MM:  Optional CT at 12 months. GREATER THAN 6-8 MM: CT at 6-12 months, if multiple at 3-6 months, then CT at  18-24 months. GREATER THAN 8 MM:  Consider CT at 3 months, PET/CT, or tissue sampling. If  multiple CT at 3-6 months, then at 18-24 months. Guidelines for Management of Incidental Pulmonary Nodules Detected on CT : A  Statement from the [address] [place] [address] Dr 2017\"  Kristal Douglas. Radiology  2017; 000:1-16                  No procedure found.       Chronic diagnoses   Problem List as of 12/22/2018 Date Reviewed: 12/12/2018          Codes Class Noted - Resolved Hypotension ICD-10-CM: I95.9  ICD-9-CM: 458.9  12/15/2018 - Present        * (Principal) C. difficile diarrhea ICD-10-CM: A04.72  ICD-9-CM: 008.45  11/13/2018 - Present        Non Hodgkin's lymphoma ([facility] ICD-10-CM: C85.90  ICD-9-CM: 202.80  11/12/2018 - Present        History of Clostridium difficile colitis ICD-10-CM: Z86.19  ICD-9-CM: V12.79  11/10/2018 - Present        DM (diabetes mellitus) ([facility] ICD-10-CM: E11.9  ICD-9-CM: 250.00  11/10/2018 - Present        Premature atrial complexes ICD-10-CM: I49.1  ICD-9-CM: 427.61  11/10/2018 - Present        RESOLVED: Bandemia ICD-10-CM: L06.261  ICD-9-CM: 288.66  11/10/2018 - 11/12/2018        RESOLVED: Lactic acidosis ICD-10-CM: E87.2  ICD-9-CM: 276.2  11/10/2018 - 11/12/2018              Discharge/Transfer Medications  Current Discharge Medication List      START taking these medications    Details   nystatin (MYCOSTATIN) 100,000 unit/mL suspension Take 5 mL by mouth four (4) times daily. swish and spit  Qty: 473 mL, Refills: 0         CONTINUE these medications which have NOT CHANGED    Details   sAXagliptin (ONGLYZA) 5 mg tab tablet Take 5 mg by mouth daily. ferrous sulfate 325 mg (65 mg iron) tablet Take 325 mg by mouth two (2) times a day. !! b complex vitamins (B COMPLEX 1) tablet Take 1 Tab by mouth daily. chlorhexidine (PERIDEX) 0.12 % solution 15 mL by Swish and Spit route three (3) times daily. lactobacillus combo no.6 (PROBIOTIC COMPLEX PO) Take 1 Cap by mouth daily. insulin degludec (TRESIBA FLEXTOUCH U-100) 100 unit/mL (3 mL) inpn 10 Units by SubCUTAneous route nightly. Qty: 1 Pen, Refills: 0      lutein 20 mg cap Take 20 mg by mouth daily. cholecalciferol (VITAMIN D3) 1,000 unit tablet Take 1,000 Units by mouth daily. mirabegron ER (MYRBETRIQ) 50 mg ER tablet Take 50 mg by mouth every fourty-eight (48) hours. simvastatin (ZOCOR) 80 mg tablet Take 40 mg by mouth nightly.       !! ferrous fumarate/vit Bcomp,C (SUPER B COMPLEX PO) Take 1 Cap by mouth daily. !! - Potential duplicate medications found. Please discuss with provider. Diet:  Diabetic diet.     Activity:  As tolerated    Disposition: Home or Self Care    Discharge instructions to patient/family  Please seek medical attention for any new or worsening symptoms particularly fever, chest pain, shortness of breath, abdominal pain, nausea, vomiting    Follow up plans/appointments  Follow-up Information    None          Stan Kuo MD  Family Medicine Resident       For Billing    Chief Complaint   Patient presents with   [facility] Problems  Date Reviewed: 12/12/2018          Codes Class Noted POA    Hypotension ICD-10-CM: I95.9  ICD-9-CM: 458.9  12/15/2018 Unknown        * (Principal) C. difficile diarrhea ICD-10-CM: A04.72  ICD-9-CM: 008.45  11/13/2018 Yes

## 2018-12-22 NOTE — PROGRESS NOTES
Problem: Falls - Risk of  Goal: *Absence of Falls  Document Marli Fall Risk and appropriate interventions in the flowsheet. Outcome: Progressing Towards Goal  Fall Risk Interventions:  Mobility Interventions: Bed/chair exit alarm    Mentation Interventions: Adequate sleep, hydration, pain control, Bed/chair exit alarm    Medication Interventions: Bed/chair exit alarm    Elimination Interventions: Bed/chair exit alarm, Call light in reach    History of Falls Interventions: Bed/chair exit alarm, Door open when patient unattended        Problem: Pressure Injury - Risk of  Goal: *Prevention of pressure injury  Document Bryan Scale and appropriate interventions in the flowsheet.   Outcome: Progressing Towards Goal  Pressure Injury Interventions:  Sensory Interventions: Assess changes in LOC    Moisture Interventions: Absorbent underpads    Activity Interventions: PT/OT evaluation    Mobility Interventions: PT/OT evaluation    Nutrition Interventions: Document food/fluid/supplement intake    Friction and Shear Interventions: HOB 30 degrees or less

## 2018-12-22 NOTE — PROGRESS NOTES
Problem: Falls - Risk of  Goal: *Absence of Falls  Document Marli Fall Risk and appropriate interventions in the flowsheet. Outcome: Progressing Towards Goal  Fall Risk Interventions:  Mobility Interventions: Bed/chair exit alarm    Mentation Interventions: Adequate sleep, hydration, pain control    Medication Interventions: Bed/chair exit alarm    Elimination Interventions: Bed/chair exit alarm    History of Falls Interventions: Bed/chair exit alarm. [facility]- Bedside and Verbal shift change report given to Emory Joseph RN (oncoming nurse) by JOSEPH UP RN  (offgoing nurse). Report included the following information SBAR, Kardex and Recent Results. AnnHinklery Ripa 0800- Family @ bedside. .Had  A big BM, clean up. Channingbilly Coe given. .    1000- FP ( MD) @ bedside. ..     1100-- TO RA AIR. . O2 sat is upper 90's. .    1140- Called received form monitor room regarding low o2 sat. . NOW O2 sat is upper 70's to upper 80's with room air. .    1141- Notify to MD ( FP). .    1230- OT/PT worked with pt. . During this peroid noted pt o2 sat is going to low 70's and BP is also low 80's. Kelley Floyd to MD..    1640- TRANSFER - OUT REPORT:    Verbal report given to [address] [facility] (name) on Jacinto Leaver  being transferred to [facility] (unit) for routine progression of care       Report consisted of patients Situation, Background, Assessment and   Recommendations(SBAR). Information from the following report(s) SBAR, Kardex and Recent Results was reviewed with the receiving nurse. Lines:   Peripheral IV 12/21/18 Left Antecubital (Active)   Site Assessment Clean, dry, & intact 12/22/2018 10:11 AM   Phlebitis Assessment 0 12/22/2018 10:11 AM   Infiltration Assessment 0 12/22/2018 10:11 AM   Dressing Status Clean, dry, & intact 12/22/2018 10:11 AM   Dressing Type Transparent 12/22/2018 10:11 AM   Hub Color/Line Status Blue;Clotted; Flushed 12/22/2018 10:11 AM   Action Taken Open ports on tubing capped 12/22/2018 10:11 AM   Alcohol Cap Used Yes 12/22/2018 10:11 AM        Opportunity for questions and clarification was provided. Patient transported with: Ambulance. .   O2 @ 2 liters. .    1750- Ambulance is here to pick him up. Rogers Ellis UP. Pt left with ambulance. . Pt's belongings handed to family ( DRT). .  Handed all prescription  X3 , along with all paper work given to Ambulance people. Gisela Zimmerman

## 2018-12-22 NOTE — PROGRESS NOTES
1:00 PM  [facility] called at [phone] to delay  time. New scheduled time will be 1700. Primary RN made aware.

## 2018-12-22 NOTE — PROGRESS NOTES
Problem: Mobility Impaired (Adult and Pediatric)  Goal: *Acute Goals and Plan of Care (Insert Text)  Physical Therapy Goals  Initiated 12/19/2018  1. Patient will move from supine to sit and sit to supine  in bed with minimal assistance/contact guard assist within 7 day(s). 2.  Patient will transfer from bed to chair and chair to bed with minimal assistance/contact guard assist using the least restrictive device within 7 day(s). 3.  Patient will perform sit to stand with minimal assistance/contact guard assist within 7 day(s). 4.  Patient will ambulate with minimal assistance/contact guard assist for 50 feet with the least restrictive device within 7 day(s). physical Therapy TREATMENT  Patient: Scott Farusto (47 y.o. male)  Date: 12/22/2018  Diagnosis: Hypotension  Hypotension C. difficile diarrhea      Precautions: WBAT, Fall  Chart, physical therapy assessment, plan of care and goals were reviewed. ASSESSMENT:  Pt seen for oximetry on RA with activity. Pt SPO2 96% on RA sitting up in bed. Pt mod assist supine to sit. Pt to stand with min assist of 2. Pt ambulated 6 labored ft using RW  with SPO2 dropping to 86%%on RA. Pt was returned to 2L with SPO2 increased to 90% sitting. Pt increased to 94% on 2L after sitting 50 seconds. Pt returned to supine min assist of 2. Pt tolerated treatment fairly well. Progression toward goals:  []    Improving appropriately and progressing toward goals  []    Improving slowly and progressing toward goals  []    Not making progress toward goals and plan of care will be adjusted     PLAN:  Patient continues to benefit from skilled intervention to address the above impairments. Continue treatment per established plan of care.   Discharge Recommendations:  Bam Rosario  Further Equipment Recommendations for Discharge:  rolling walker     SUBJECTIVE:       OBJECTIVE DATA SUMMARY:   Critical Behavior:  Neurologic State: Alert  Orientation Level: Oriented to person, Oriented to place  Cognition: Follows commands  Safety/Judgement: Decreased awareness of environment  Functional Mobility Training:  Bed Mobility:     Supine to Sit: Moderate assistance  Sit to Supine: Moderate assistance           Transfers:  Sit to Stand: Minimum assistance;Assist x2  Stand to Sit: Assist x2;Minimum assistance                             Balance:  Sitting: Intact  Standing: With support  Standing - Static: Fair;Good  Ambulation/Gait Training:  Distance (ft): 6 Feet (ft)  Assistive Device: Walker, rolling;Gait belt  Ambulation - Level of Assistance: Minimal assistance;Assist x2        Gait Abnormalities: Decreased step clearance        Base of Support: Narrowed     Speed/Shannan: Pace decreased (<100 feet/min)             Documentation for home O2:     ROOM AIR    AT REST   O2 SATS  96%    ROOM AIR WITH ACTIVITY 02 SATS  86%    (0   ) LITERS OF O2 WITH ACTIVITY O2 SATS  90%    (0 )LITERS OF 02 PATIENT LEFT COMFORTABLY  SITTING/SUPINE 02 SATS  94%                 Pain:  Pain Scale 1: Numeric (0 - 10)  Pain Intensity 1: 0              Activity Tolerance:   Pt tolerated treatment fairly well. Please refer to the flowsheet for vital signs taken during this treatment.   After treatment:   []    Patient left in no apparent distress sitting up in chair  [x]    Patient left in no apparent distress in bed  []    Call bell left within reach  []    Nursing notified  []    Caregiver present  []    Bed alarm activated    COMMUNICATION/COLLABORATION:   The patients plan of care was discussed with: Physical Therapist    Johanny Ingram PTA   Time Calculation: 23 mins

## 2018-12-22 NOTE — DISCHARGE INSTRUCTIONS
[facility] - [facility] / Radu Collazo INSTRUCTIONS    Jo Becker / [id number] : 1927    Admission date: 12/15/2018 Discharge date: 2018       Primary care provider: Deann Paredes MD    Discharging provider:  Mary Alice Rebolledo MD  - Family Medicine Resident  Chano Carrington DO- Attending, Family Medicine   . . . . . . . . . . . . . . . . . . . . . . . . . . . . . . . . . . . . . . . . . . . . . . . . . . . . . . . . . . . . . . . . . . . . . . . Claudio Mcgrath FINAL DIAGNOSES & HOSPITAL COURSE:    C Diff Colitis: This is patient's 4th reoccurrence of c diff per daughter. 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 Last antibiotics were taken in November. Per GI recommendations pt to continue taking Vancomycin taper outpatient. BMs decreased over the course of admission and pt was stable at time of discharge. - PO Vanc taper as below:  Take 2.5 mL by mouth every 6 hours for 14 days, then 2.5 mL 2 times a day for 7 days, then 2.5 mL daily for 7 days, then 2.5 mL every 48 hours for 14 days (last dose of 2.5 mL every 48 hours has been suggested for anywhere from 2-10 weeks- please follow up with GI to see if additional Vancomycin is required)    New onset Afib/flutter with RVR- Found on admission. Pt was started on digoxin 0.125 mg.  - Continue on Digoxin 0.125 mg daily   - Follow up with Cardiology once C Diff resolves to discuss anticoagulation for atrial fibrillation.      New O2 demand: Pt has required O2 1.5-2 L while being in the hospital with no previous O2 demand. Serial CXRs did not show any PNA but does show bilateral pleural effusions. CTA was negative for PE. Pt does not have any increased work of breathing. Likely due to deconditioning.   - Continue weaning pt off O2 NC  - Continue using incentive spirometry      Hypotension:  Pt was placed on Carlos for a short course at the beginning of admission but was able to be weaned off it.  BP were stable at time of discharge.      Hypernatremia: Resolved during admission. It was likely due to hypovolemia.      HLD- Continue home Zocor ordered      Symptomatic GERD-Continue home Prisolec     DM II- Continue home saxagliptin      Elevated Cr- Likely from dehydration, resolved     Hx Recurrent Large B-cell  Lymphoma- In remission. Established care w Dr. Delaney  11/26/18. R. Port-A-Cath in place. FOLLOW-UP CARE RECOMMENDATIONS:  Follow-up Information     Follow up With Specialties Details Why Contact Info    Agapito Burgess MD Pediatrics, Family Practice Schedule an appointment as soon as possible for a visit Follow up hospital admission. [address]  [address] [facility] [address] [facility]  [phone]      Hetty Koyanagi, MD Gastroenterology  For GI follow up  [address]  [phone]              It is very important that you keep follow-up appointment(s). Bring discharge papers, medication list (and/or medication bottles) to follow-up appointments for review by outpatient provider(s). MEDICATION CHANGES:    Medications added:   - PO Vanc taper as below:  Take 2.5 mL by mouth every 6 hours for 14 days, then 2.5 mL 2 times a day for 7 days, then 2.5 mL daily for 7 days, then 2.5 mL every 48 hours for 14 days (last dose of 2.5 mL every 48 hours has been suggested for anywhere from 2-10 weeks- please follow up with GI to see if additional Vancomycin is required)  - Start Digoxin 0.125 mg daily     Medications removed:   - Stop Lopressor       FOLLOW-UP TESTS RECOMMENDED:   None     ONGOING TREATMENT PLAN:   · Follow up with Cardiology about oral anticoagulation for atrial fibrillation once C Diff resolves   · Follow up with GI to see if Vancomycin at the end of taper needs to be continued for 2 weeks or 10 weeks      PENDING TEST RESULTS:  At the time of discharge the following test results are still pending: none   Please review these results as they become available.     Specific symptoms to watch for: chest pain, shortness of breath, fever, chills, nausea, vomiting, diarrhea, change in mentation, falling, weakness, bleeding. DIET:  Diabetic Diet    ACTIVITY:  As PT/OT    WOUND CARE: none    EQUIPMENT needed:  O2     INCIDENTAL FINDINGS:  none    GOALS OF CARE:    Eventual return to home/independent/assisted living   X  Long term SNF      Hospice     No rehospitalization     Patient condition at discharge:   Functional status    Poor    X  Deconditioned      Independent   Cognition  x  Lucid     Forgetful (some sensescence)     Dementia   Catheters/lines (plus indication)    Lackey     PICC      PEG    X  None   Code status    Full code    X  DNR    . . . . . . . . . . . . . . . . . . . . . . . . . . . . . . . . . . . . . . . . . . . . . . . . . . . . . . . . . . . . . . . . . . . . . . . Apple Godoy CHRONIC MEDICAL CONDITIONS:  Problem List as of 12/22/2018 Date Reviewed: 12/12/2018          Codes Class Noted - Resolved    Hypotension ICD-10-CM: I95.9  ICD-9-CM: 458.9  12/15/2018 - Present        * (Principal) C. difficile diarrhea ICD-10-CM: A04.72  ICD-9-CM: 008.45  11/13/2018 - Present        Non Hodgkin's lymphoma ([facility] ICD-10-CM: C85.90  ICD-9-CM: 202.80  11/12/2018 - Present        History of Clostridium difficile colitis ICD-10-CM: Z86.19  ICD-9-CM: V12.79  11/10/2018 - Present        DM (diabetes mellitus) ([facility] ICD-10-CM: E11.9  ICD-9-CM: 250.00  11/10/2018 - Present        Premature atrial complexes ICD-10-CM: I49.1  ICD-9-CM: 427.61  11/10/2018 - Present        RESOLVED: Bandemia ICD-10-CM: A38.560  ICD-9-CM: 288.66  11/10/2018 - 11/12/2018        RESOLVED: Lactic acidosis ICD-10-CM: E87.2  ICD-9-CM: 276.2  11/10/2018 - 11/12/2018              Information obtained by :   I understand that if any problems occur once I am at home I am to contact my physician. I understand and acknowledge receipt of the instructions indicated above. Physician's or R.N.'s Signature                                                                  Date/Time                                                                                                                                              Patient or Representative Signature                                                          Date/Time

## 2018-12-22 NOTE — PROGRESS NOTES
Change of Shift Report:    1910:  Bedside and Verbal shift change report given to [address] [place] [address] Avenue, RN (oncoming nurse) by James Dubois RN (offgoing nurse). Report included the following information SBAR, Kardex, ED Summary, Intake/Output, MAR, Accordion, Recent Results and Cardiac Rhythm NSR, BBB. Shift Summary:    1938:  Vitals taken and patient assessment completed. Patient AOx1. Oriented to self, but disoriented to place, situation, and time. Patient's daughter states the patient is extremely hard of hearing and uses a white board by patient's bedside to communicate easier. No complaints of pain. 2126:  Patient's scheduled meds given. 2230:  Patient's condom cath came off and was replaced. Patient cleaned up and given new linens and new gown. 2329:  Patient's vitals taken. 0220:  Patient's portacath not drawing blood and patient is very difficult stick. Lab sticks attempted 4 times. Spoke to Dr. Rex Nieto regarding patient's portacath not having blood return. MD ordered Cathflo. 0340:  Portacath flushing well, but still no blood return. 0405:  Patient's Vitals taken. No BMs on night shift. 5924:  Patient's lab drawn by charge nurse Courtney Parker RN and sent to lab.     0609:  Patient given scheduled meds. 0710:  Bedside and Verbal shift change report given to Tracee Lara RN (oncoming nurse) by [address] [place] [address] Avenue, RN (offgoing nurse). Report included the following information SBAR, Kardex, Intake/Output, MAR, Accordion, Recent Results and Cardiac Rhythm NSR, BBB.

## 2018-12-30 NOTE — PROGRESS NOTES
Chief Complaint: 
Chief Complaint Patient presents with  Diarrhea Complains of having loose to watery stools 3-4 x day. History of Present Illness: He is a 80 y.o. male who presents with c/o continued loose watery stools for 3-4 days. He had recently been admitted for the same and concerns about C. Diff. This work up was negative. It is unclear as to the etiology of the loose stools. He is a relatively new patient with multiple other medical concerns. Diet is low. He has been generally in steady decline and now living with his daughter. He is also seeing oncology for ongoing problems with NHL. Reviewed PmHx, RxHx, FmHx, SocHx, AllgHx and updated and dated in the chart. Patient Active Problem List  
 Diagnosis  Hypotension  C. difficile diarrhea  Non Hodgkin's lymphoma ([facility]  History of Clostridium difficile colitis  DM (diabetes mellitus) ([facility]  Premature atrial complexes Review of Systems - negative except as listed above in the HPI Objective:  
 
Vitals:  
 12/12/18 1030 12/12/18 1122 BP: 96/56 Pulse: 72 Resp: 18 Temp: 98.5 °F (36.9 °C) TempSrc: Oral   
SpO2: 90% 93% Weight: 142 lb 8 oz (64.6 kg) Height: 5' 8\" (1.727 m) Physical Examination:  
General appearance - Frail, chronically ill appearing in NAD. Mental status - alert, oriented to person, place, and time Mouth - mucous membranes moist, pharynx normal without lesions Neck - supple, no significant adenopathy Chest - clear to auscultation, no wheezes, rales or rhonchi, symmetric air entry Heart - normal rate, regular rhythm, normal S1, S2, no murmurs, rubs, clicks or gallops Abdomen - soft, nontender, nondistended, no masses or organomegaly Neurological - alert, oriented, normal speech, no focal findings or movement disorder noted Musculoskeletal - no joint tenderness, deformity or swelling Extremities - peripheral pulses normal, no pedal edema, no clubbing or cyanosis Assessment/ Plan:  
Diagnoses and all orders for this visit: 1. Loose stools 
-     CBC W/O DIFF 
-     METABOLIC PANEL, COMPREHENSIVE 
-     MAGNESIUM 
-     REFERRAL TO GASTROENTEROLOGY 2. Other fatigue 
-     CBC W/O DIFF 
-     METABOLIC PANEL, COMPREHENSIVE 
-     THYROID CASCADE PROFILE 3. Low energy 
-     THYROID CASCADE PROFILE 4. History of prostate cancer -     PSA, DIAGNOSTIC (PROSTATE SPECIFIC AG) 89M who is fairly new to [facility]. who presents with continued diarrhea and unclear etiology. Will obtain the following labs and will also refer to GI. I have encouraged rehydration (oral) and to monitor symptoms. Will call when his labs return and see what the next best course of action would be. I have discussed the diagnosis with the patient and the intended treatment plan as seen in the above orders. The patient has received an after-visit summary and questions were answered concerning future plans. Asked to return should symptoms worsen or not improve with treatment. Any pending labs and studies will be relayed to patient when they become available. Pt verbalizes understanding of plan of care and denies further questions or concerns at this time. Follow-up Disposition: 
Return in about 1 month (around 1/12/2019), or if symptoms worsen or fail to improve. Emilie Nicolas MD 
Patient Instructions Diarrhea: Care Instructions Your Care Instructions Diarrhea is loose, watery stools (bowel movements). The exact cause is often hard to find. Sometimes diarrhea is your body's way of getting rid of what caused an upset stomach. Viruses, food poisoning, and many medicines can cause diarrhea. Some people get diarrhea in response to emotional stress, anxiety, or certain foods. Almost everyone has diarrhea now and then. It usually isn't serious, and your stools will return to normal soon.  The important thing to do is replace the fluids you have lost, so you can prevent dehydration. The doctor has checked you carefully, but problems can develop later. If you notice any problems or new symptoms, get medical treatment right away. Follow-up care is a key part of your treatment and safety. Be sure to make and go to all appointments, and call your doctor if you are having problems. It's also a good idea to know your test results and keep a list of the medicines you take. How can you care for yourself at home? · Watch for signs of dehydration, which means your body has lost too much water. Dehydration is a serious condition and should be treated right away. Signs of dehydration are: ? Increasing thirst and dry eyes and mouth. ? Feeling faint or lightheaded. ? Darker urine, and a smaller amount of urine than normal. 
· To prevent dehydration, drink plenty of fluids, enough so that your urine is light yellow or clear like water. Choose water and other caffeine-free clear liquids until you feel better. If you have kidney, heart, or liver disease and have to limit fluids, talk with your doctor before you increase the amount of fluids you drink. · Begin eating small amounts of mild foods the next day, if you feel like it. ? Try yogurt that has live cultures of Lactobacillus. (Check the label.) ? Avoid spicy foods, fruits, alcohol, and caffeine until 48 hours after all symptoms are gone. ? Avoid chewing gum that contains sorbitol. ? Avoid dairy products (except for yogurt with Lactobacillus) while you have diarrhea and for 3 days after symptoms are gone. · The doctor may recommend that you take over-the-counter medicine, such as loperamide (Imodium), if you still have diarrhea after 6 hours. Read and follow all instructions on the label. Do not use this medicine if you have bloody diarrhea, a high fever, or other signs of serious illness. Call your doctor if you think you are having a problem with your medicine. When should you call for help? Call 911 anytime you think you may need emergency care. For example, call if: 
  · You passed out (lost consciousness).  
  · Your stools are maroon or very bloody.  
 Call your doctor now or seek immediate medical care if: 
  · You are dizzy or lightheaded, or you feel like you may faint.  
  · Your stools are black and look like tar, or they have streaks of blood.  
  · You have new or worse belly pain.  
  · You have symptoms of dehydration, such as: 
? Dry eyes and a dry mouth. ? Passing only a little dark urine. ? Feeling thirstier than usual.  
  · You have a new or higher fever.  
 Watch closely for changes in your health, and be sure to contact your doctor if: 
  · Your diarrhea is getting worse.  
  · You see pus in the diarrhea.  
  · You are not getting better after 2 days (48 hours). Where can you learn more? Go to http://chuy-karlee.info/. Enter M257 in the search box to learn more about \"Diarrhea: Care Instructions. \" Current as of: November 20, 2017 Content Version: 11.8 © 5450-0387 [employer]. Care instructions adapted under license by [employer] (which disclaims liability or warranty for this information). If you have questions about a medical condition or this instruction, always ask your healthcare professional. [address] any warranty or liability for your use of this information.

## 2019-01-11 ENCOUNTER — OFFICE VISIT (OUTPATIENT)
Dept: CARDIOLOGY CLINIC | Age: 84
End: 2019-01-11

## 2019-01-11 VITALS
RESPIRATION RATE: 16 BRPM | HEIGHT: 68 IN | SYSTOLIC BLOOD PRESSURE: 162 MMHG | WEIGHT: 136.2 LBS | DIASTOLIC BLOOD PRESSURE: 80 MMHG | BODY MASS INDEX: 20.64 KG/M2

## 2019-01-11 DIAGNOSIS — I48.91 ATRIAL FIBRILLATION, UNSPECIFIED TYPE (HCC): Primary | ICD-10-CM

## 2019-01-11 RX ORDER — GUAIFENESIN 100 MG/5ML
81 LIQUID (ML) ORAL DAILY
Qty: 81 TAB | Refills: 30 | Status: SHIPPED | OUTPATIENT
Start: 2019-01-11 | End: 2020-01-01

## 2019-01-11 NOTE — PROGRESS NOTES
[facility] [address], [address] [facility], [address] [place] [address]    Office [phone],LUE [phone]           Efraín Robert is a 80 y.o. male who presents to the office for hospital follow-up for atrial fibrillation      Assessment/Recommendations:     Paroxysmal atrial fibrillation-during patient's hospitalization in December 2018 he had evidence of colitis resulting in atrial fibrillation with rapid ventricular response. He was on vasopressor medications at that time. His home beta-blocker was held and was started on digoxin for rate control. He continues on digoxin. EKG in the office today shows sinus rhythm with P PACs. -We will obtain a 14-day event monitor, patch to further, to assess heart rhythm  -We will discontinue digoxin at this time since he is back in a normal rhythm. Can consider restarting metoprolol therapy if he has evidence of uncontrolled ventricular rates  -Discussed with daughter and son-in-law risk benefit of anticoagulation at this time. Given the fact he is back in normal rhythm and is at risk for falls we will withhold anticoagulation therapy. Will consider possible anticoagulant if he has recurrent atrial fibrillation on his event monitor. Addendum: event moniotr 2/20 to 3/5 showed 4 episodes of atrial fibrillation with a total duration of 15 hours. Premature atrial contractions-we will withhold beta-blocker therapy at this time continue to monitor     Right bundle branch block-chronic, stable    Clostridium difficile infection-remains on p.o. Vancomycin    History of lymphoma-appears to be in remission followed by oncology    [facility]    Hyperlipidemia    Carotid artery bruit-continue aspirin 81 mg and statin. Discussed risk benefit of pursuing carotid artery screening given advanced age. Family wishes for conservative therapy with aspirin and statin without intervention.       Primary Care Physician- Stephanie Garcia MD    Follow-up 3 months    Subjective:    Presents to the office today for follow-up visit from recent hospitalization. He was hospitalized with sepsis related to Clostridium difficile. At that time he was on vasopressors and atrial fibrillation with uncontrolled ventricular rates. Was started on digoxin. Continues on digoxin. He has since been discharged to rehabilitation facility. Plans to be in rehabilitation facility for about another month. He is currently without any chest pain or shortness of breath. EKG in the office today shows sinus rhythm with PACs. 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 , Disp: , Rfl:     chlorhexidine (PERIDEX) 0.12 % solution, 15 mL by Swish and Spit route three (3) times daily. , Disp: , Rfl:     lactobacillus combo no.6 (PROBIOTIC COMPLEX PO), Take 1 Cap by mouth daily. , Disp: , Rfl:     insulin degludec (TRESIBA FLEXTOUCH U-100) 100 unit/mL (3 mL) inpn, 10 Units by SubCUTAneous route nightly.  (Patient taking differently: 20 Units by SubCUTAneous route nightly.), Disp: 1 Pen, Rfl: 0    metFORMIN (GLUMETZA ER) 500 mg TG24 24 hour tablet, Take 1,000 mg by mouth two (2) times a day., Disp: , Rfl:   lutein 20 mg cap, Take 20 mg by mouth daily. , Disp: , Rfl:     cholecalciferol (VITAMIN D3) 1,000 unit tablet, Take 1,000 Units by mouth daily. , Disp: , Rfl:     mirabegron ER (MYRBETRIQ) 50 mg ER tablet, Take 50 mg by mouth every fourty-eight (48) hours. , Disp: , Rfl:     metoprolol tartrate (LOPRESSOR) 50 mg tablet, Take 25 mg by mouth two (2) times a day., Disp: , Rfl:     simvastatin (ZOCOR) 80 mg tablet, Take 40 mg by mouth nightly., Disp: , Rfl:     ferrous fumarate/vit Bcomp,C (SUPER B COMPLEX PO), Take 1 Cap by mouth daily. , Disp: , Rfl:     OTHER,NON-FORMULARY,, Unknown OTC antacid: take one by mouth every morning, Disp: , Rfl:     No Known Allergies     History noncontributory due to advanced age    Social History     Tobacco Use    Smoking status: Former Smoker    Smokeless tobacco: Never Used   Substance Use Topics    Alcohol use: No     Frequency: Never    Drug use: No       Review of Symptoms:  Pertinent Positive: Negative  Pertinent Negative: Chest pain shortness of breath orthopnea PND  All Other systems reviewed and are negative for a Comprehensive ROS (10+)    Physical Exam    Blood pressure 110/72, pulse (!) 54, resp. rate 12, height 5' 8\" (1.727 m), weight 144 lb (65.3 kg), SpO2 96 %. Constitutional:  well-developed and well-nourished. No distress. HENT: Normocephalic. Eyes: No scleral icterus. Neck:  Neck supple. No JVD present. Pulmonary/Chest: Effort normal and breath sounds normal. No respiratory distress, wheezes or rales. Cardiovascular: Normal rate, irregular rhythm, S1 S2 . Exam reveals no gallop and no friction rub. 2/6 karolina  Extremities:  Normal muscle tone  Abdominal:   No abnormal distension. Neurological:  Moving all extremities, cranial nerves appear grossly intact. Skin: Skin is not cold. Not diaphoretic. No erythema. Psychiatric:  Grossly normal mood and affect. Intact insight.     Objective Data:    ECG: personally reviewed and  intrepreted  12/5/2018 shows sinus rhythm right bundle branch block PACs       Echocardiogram 12/2018-normal LVEF, mild AS, mild AI        Nunes Notice, DO

## 2019-01-11 NOTE — PROGRESS NOTES
Pauyl Otero is a 80 y.o. male    Chief Complaint   Patient presents with   [facility] Follow Up    Irregular Heart Beat     with rapid ventricular rate       1. Have you been to the ER, urgent care clinic since your last visit? Hospitalized since your last visit? No  M  2. Have you seen or consulted any other health care providers outside of the [address] [place] [address] since your last visit? Include any pap smears or colon screening. No      Visit Vitals  /80 (BP 1 Location: Left arm, BP Patient Position: Sitting)   Resp 16   Ht 5' 8\" (1.727 m)   Wt 136 lb 3.2 oz (61.8 kg)   BMI 20.71 kg/m²           Health Maintenance Due   Topic Date Due    LIPID PANEL Q1  05/04/1927    FOOT EXAM Q1  05/04/1937    MICROALBUMIN Q1  05/04/1937    EYE EXAM RETINAL OR DILATED  05/04/1937    DTaP/Tdap/Td series (1 - Tdap) 05/04/1948    Shingrix Vaccine Age 50> (1 of 2) 05/04/1977    GLAUCOMA SCREENING Q2Y  05/04/1992    Pneumococcal 65+ High/Highest Risk (1 of 2 - PCV13) 05/04/1992    Influenza Age 9 to Adult  08/01/2018    MEDICARE YEARLY EXAM  11/10/2018         Medication Reconciliation completed, changes noted.   Please  Update medication list.

## 2019-01-22 ENCOUNTER — APPOINTMENT (OUTPATIENT)
Dept: INFUSION THERAPY | Age: 84
End: 2019-01-22
Payer: MEDICARE

## 2019-01-23 ENCOUNTER — HOSPITAL ENCOUNTER (OUTPATIENT)
Dept: INFUSION THERAPY | Age: 84
Discharge: HOME | End: 2019-01-23
Payer: MEDICARE

## 2019-01-23 VITALS
DIASTOLIC BLOOD PRESSURE: 79 MMHG | SYSTOLIC BLOOD PRESSURE: 158 MMHG | RESPIRATION RATE: 18 BRPM | HEART RATE: 82 BPM | TEMPERATURE: 97.2 F

## 2019-01-23 DIAGNOSIS — C83.39 DIFFUSE LARGE B-CELL LYMPHOMA OF EXTRANODAL SITE EXCLUDING SPLEEN AND OTHER SOLID ORGANS (HCC): Primary | ICD-10-CM

## 2019-01-23 LAB
ALBUMIN SERPL-MCNC: 3.3 G/DL (ref 3.5–5)
ALBUMIN/GLOB SERPL: 1 {RATIO} (ref 1.1–2.2)
ALP SERPL-CCNC: 74 U/L (ref 45–117)
ALT SERPL-CCNC: 16 U/L (ref 12–78)
ANION GAP SERPL CALC-SCNC: 9 MMOL/L (ref 5–15)
AST SERPL-CCNC: 25 U/L (ref 15–37)
BASOPHILS # BLD: 0 K/UL (ref 0–0.1)
BASOPHILS NFR BLD: 0 % (ref 0–1)
BILIRUB SERPL-MCNC: 0.3 MG/DL (ref 0.2–1)
BUN SERPL-MCNC: 9 MG/DL (ref 6–20)
BUN/CREAT SERPL: 9 (ref 12–20)
CALCIUM SERPL-MCNC: 9.2 MG/DL (ref 8.5–10.1)
CHLORIDE SERPL-SCNC: 102 MMOL/L (ref 97–108)
CO2 SERPL-SCNC: 32 MMOL/L (ref 21–32)
CREAT SERPL-MCNC: 1.02 MG/DL (ref 0.7–1.3)
DIFFERENTIAL METHOD BLD: ABNORMAL
EOSINOPHIL # BLD: 0.2 K/UL (ref 0–0.4)
EOSINOPHIL NFR BLD: 3 % (ref 0–7)
ERYTHROCYTE [DISTWIDTH] IN BLOOD BY AUTOMATED COUNT: 22.9 % (ref 11.5–14.5)
FERRITIN SERPL-MCNC: 79 NG/ML (ref 26–388)
GLOBULIN SER CALC-MCNC: 3.3 G/DL (ref 2–4)
GLUCOSE SERPL-MCNC: 222 MG/DL (ref 65–100)
HCT VFR BLD AUTO: 33 % (ref 36.6–50.3)
HGB BLD-MCNC: 9.8 G/DL (ref 12.1–17)
IMM GRANULOCYTES # BLD AUTO: 0.1 K/UL (ref 0–0.04)
IMM GRANULOCYTES NFR BLD AUTO: 2 % (ref 0–0.5)
IRON SATN MFR SERPL: 45 % (ref 20–50)
IRON SERPL-MCNC: 138 UG/DL (ref 35–150)
LDH SERPL L TO P-CCNC: 268 U/L (ref 85–241)
LYMPHOCYTES # BLD: 1.2 K/UL (ref 0.8–3.5)
LYMPHOCYTES NFR BLD: 19 % (ref 12–49)
MCH RBC QN AUTO: 24 PG (ref 26–34)
MCHC RBC AUTO-ENTMCNC: 29.7 G/DL (ref 30–36.5)
MCV RBC AUTO: 80.9 FL (ref 80–99)
MONOCYTES # BLD: 1.1 K/UL (ref 0–1)
MONOCYTES NFR BLD: 17 % (ref 5–13)
NEUTS SEG # BLD: 3.9 K/UL (ref 1.8–8)
NEUTS SEG NFR BLD: 59 % (ref 32–75)
NRBC # BLD: 0 K/UL (ref 0–0.01)
NRBC BLD-RTO: 0 PER 100 WBC
PLATELET # BLD AUTO: 161 K/UL (ref 150–400)
POTASSIUM SERPL-SCNC: 3.6 MMOL/L (ref 3.5–5.1)
PROT SERPL-MCNC: 6.6 G/DL (ref 6.4–8.2)
RBC # BLD AUTO: 4.08 M/UL (ref 4.1–5.7)
RBC MORPH BLD: ABNORMAL
SODIUM SERPL-SCNC: 143 MMOL/L (ref 136–145)
TIBC SERPL-MCNC: 304 UG/DL (ref 250–450)
WBC # BLD AUTO: 6.5 K/UL (ref 4.1–11.1)

## 2019-01-23 PROCEDURE — 74011250636 HC RX REV CODE- 250/636: Performed by: NURSE PRACTITIONER

## 2019-01-23 PROCEDURE — 36591 DRAW BLOOD OFF VENOUS DEVICE: CPT

## 2019-01-23 PROCEDURE — 83615 LACTATE (LD) (LDH) ENZYME: CPT

## 2019-01-23 PROCEDURE — 80053 COMPREHEN METABOLIC PANEL: CPT

## 2019-01-23 PROCEDURE — 77030012965 HC NDL HUBR BBMI -A

## 2019-01-23 PROCEDURE — 85025 COMPLETE CBC W/AUTO DIFF WBC: CPT

## 2019-01-23 PROCEDURE — 74011000250 HC RX REV CODE- 250: Performed by: NURSE PRACTITIONER

## 2019-01-23 PROCEDURE — 82728 ASSAY OF FERRITIN: CPT

## 2019-01-23 PROCEDURE — 83540 ASSAY OF IRON: CPT

## 2019-01-23 PROCEDURE — 36415 COLL VENOUS BLD VENIPUNCTURE: CPT

## 2019-01-23 RX ORDER — HEPARIN 100 UNIT/ML
500 SYRINGE INTRAVENOUS AS NEEDED
Status: ACTIVE | OUTPATIENT
Start: 2019-01-23 | End: 2019-01-24

## 2019-01-23 RX ORDER — SODIUM CHLORIDE 0.9 % (FLUSH) 0.9 %
5-10 SYRINGE (ML) INJECTION AS NEEDED
Status: ACTIVE | OUTPATIENT
Start: 2019-01-23 | End: 2019-01-24

## 2019-01-23 RX ORDER — HEPARIN 100 UNIT/ML
500 SYRINGE INTRAVENOUS AS NEEDED
Status: CANCELLED | OUTPATIENT
Start: 2019-05-15

## 2019-01-23 RX ORDER — SODIUM CHLORIDE 0.9 % (FLUSH) 0.9 %
5-10 SYRINGE (ML) INJECTION AS NEEDED
Status: CANCELLED | OUTPATIENT
Start: 2019-05-15

## 2019-01-23 RX ORDER — SODIUM CHLORIDE 9 MG/ML
10 INJECTION INTRAMUSCULAR; INTRAVENOUS; SUBCUTANEOUS AS NEEDED
Status: ACTIVE | OUTPATIENT
Start: 2019-01-23 | End: 2019-01-24

## 2019-01-23 RX ORDER — SODIUM CHLORIDE 9 MG/ML
10 INJECTION INTRAMUSCULAR; INTRAVENOUS; SUBCUTANEOUS AS NEEDED
Status: CANCELLED | OUTPATIENT
Start: 2019-05-15

## 2019-01-23 RX ADMIN — Medication 500 UNITS: at 13:40

## 2019-01-23 RX ADMIN — SODIUM CHLORIDE 10 ML: 9 INJECTION, SOLUTION INTRAMUSCULAR; INTRAVENOUS; SUBCUTANEOUS at 13:40

## 2019-01-23 NOTE — PROGRESS NOTES
[facility] VISIT NOTE    1325  Pt arrived at [facility] in wheelchair and in no distress for [facility]. Assessment completed, pt states no complaints. RCW chest port accessed with . 75 in coyne with no difficulty. Positive blood return noted and labs drawn. Tolerated treatment well, no adverse reaction noted. Port de-accessed and flushed per protocol. Positive blood return noted. Patient Vitals for the past 12 hrs:   Temp Pulse Resp BP   01/23/19 1326 97.2 °F (36.2 °C) 82 18 158/79     Recent Results (from the past 12 hour(s))   METABOLIC PANEL, COMPREHENSIVE    Collection Time: 01/23/19  1:42 PM   Result Value Ref Range    Sodium 143 136 - 145 mmol/L    Potassium 3.6 3.5 - 5.1 mmol/L    Chloride 102 97 - 108 mmol/L    CO2 32 21 - 32 mmol/L    Anion gap 9 5 - 15 mmol/L    Glucose 222 (H) 65 - 100 mg/dL    BUN 9 6 - 20 MG/DL    Creatinine 1.02 0.70 - 1.30 MG/DL    BUN/Creatinine ratio 9 (L) 12 - 20      GFR est AA >60 >60 ml/min/1.73m2    GFR est non-AA >60 >60 ml/min/1.73m2    Calcium 9.2 8.5 - 10.1 MG/DL    Bilirubin, total 0.3 0.2 - 1.0 MG/DL    ALT (SGPT) 16 12 - 78 U/L    AST (SGOT) 25 15 - 37 U/L    Alk.  phosphatase 74 45 - 117 U/L    Protein, total 6.6 6.4 - 8.2 g/dL    Albumin 3.3 (L) 3.5 - 5.0 g/dL    Globulin 3.3 2.0 - 4.0 g/dL    A-G Ratio 1.0 (L) 1.1 - 2.2     LD    Collection Time: 01/23/19  1:42 PM   Result Value Ref Range     (H) 85 - 241 U/L   CBC WITH AUTOMATED DIFF    Collection Time: 01/23/19  1:42 PM   Result Value Ref Range    WBC 6.5 4.1 - 11.1 K/uL    RBC 4.08 (L) 4.10 - 5.70 M/uL    HGB 9.8 (L) 12.1 - 17.0 g/dL    HCT 33.0 (L) 36.6 - 50.3 %    MCV 80.9 80.0 - 99.0 FL    MCH 24.0 (L) 26.0 - 34.0 PG    MCHC 29.7 (L) 30.0 - 36.5 g/dL    RDW 22.9 (H) 11.5 - 14.5 %    PLATELET 179 403 - 153 K/uL    NRBC 0.0 0  WBC    ABSOLUTE NRBC 0.00 0.00 - 0.01 K/uL    NEUTROPHILS 59 32 - 75 %    LYMPHOCYTES 19 12 - 49 %    MONOCYTES 17 (H) 5 - 13 %    EOSINOPHILS 3 0 - 7 %    BASOPHILS 0 0 - 1 %    IMMATURE GRANULOCYTES 2 (H) 0.0 - 0.5 %    ABS. NEUTROPHILS 3.9 1.8 - 8.0 K/UL    ABS. LYMPHOCYTES 1.2 0.8 - 3.5 K/UL    ABS. MONOCYTES 1.1 (H) 0.0 - 1.0 K/UL    ABS. EOSINOPHILS 0.2 0.0 - 0.4 K/UL    ABS. BASOPHILS 0.0 0.0 - 0.1 K/UL    ABS. IMM. GRANS. 0.1 (H) 0.00 - 0.04 K/UL    DF SMEAR SCANNED      RBC COMMENTS NORMOCYTIC, NORMOCHROMIC       1340. D/C'd from [facility] in wheelchair and in no distress accompanied by daughter.  Next appointment is 3/19/19 at 1:30 pm.

## 2019-01-24 ENCOUNTER — CLINICAL SUPPORT (OUTPATIENT)
Dept: CARDIOLOGY CLINIC | Age: 84
End: 2019-01-24

## 2019-01-24 DIAGNOSIS — I48.91 ATRIAL FIBRILLATION, UNSPECIFIED TYPE (HCC): Primary | ICD-10-CM

## 2019-01-25 ENCOUNTER — TELEPHONE (OUTPATIENT)
Dept: ONCOLOGY | Age: 84
End: 2019-01-25

## 2019-01-25 NOTE — TELEPHONE ENCOUNTER
1/25/19 9:10 AM: Called patient to inform him that per Dr. Cheo Miller, his labs were normal. Patient's daughter answered patient's phone and stated that her father is currently at the [facility] facility. Advised daughter that the patient's labs were normal. Daughter verbalized understanding and denied questions or concerns.      ----- Message from Iván Baptiste MD sent at 1/24/2019  3:49 PM EST -----  Let him know his labs look good, thanks

## 2019-02-11 ENCOUNTER — OFFICE VISIT (OUTPATIENT)
Dept: FAMILY MEDICINE CLINIC | Age: 84
End: 2019-02-11

## 2019-02-11 VITALS
OXYGEN SATURATION: 97 % | SYSTOLIC BLOOD PRESSURE: 112 MMHG | TEMPERATURE: 98 F | BODY MASS INDEX: 20.28 KG/M2 | RESPIRATION RATE: 20 BRPM | HEIGHT: 68 IN | WEIGHT: 133.8 LBS | HEART RATE: 64 BPM | DIASTOLIC BLOOD PRESSURE: 64 MMHG

## 2019-02-11 DIAGNOSIS — Z11.1 SCREENING FOR TUBERCULOSIS: ICD-10-CM

## 2019-02-11 DIAGNOSIS — Z79.4 TYPE 2 DIABETES MELLITUS WITH HYPERGLYCEMIA, WITH LONG-TERM CURRENT USE OF INSULIN (HCC): Primary | ICD-10-CM

## 2019-02-11 DIAGNOSIS — C83.39 DIFFUSE LARGE B-CELL LYMPHOMA OF EXTRANODAL SITE EXCLUDING SPLEEN AND OTHER SOLID ORGANS (HCC): ICD-10-CM

## 2019-02-11 DIAGNOSIS — E11.65 TYPE 2 DIABETES MELLITUS WITH HYPERGLYCEMIA, WITH LONG-TERM CURRENT USE OF INSULIN (HCC): Primary | ICD-10-CM

## 2019-02-11 DIAGNOSIS — M70.32 BICIPITORADIAL BURSITIS OF LEFT ELBOW: ICD-10-CM

## 2019-02-11 RX ORDER — DICLOFENAC SODIUM 10 MG/G
4 GEL TOPICAL 4 TIMES DAILY
Qty: 100 G | Refills: 3 | Status: SHIPPED | OUTPATIENT
Start: 2019-02-11 | End: 2019-03-22

## 2019-02-11 RX ORDER — DIGOXIN 125 MCG
TABLET ORAL
COMMUNITY
Start: 2019-02-06 | End: 2019-03-19

## 2019-02-11 RX ORDER — VANCOMYCIN HYDROCHLORIDE 250 MG/5ML
125 POWDER, FOR SOLUTION ORAL 4 TIMES DAILY
COMMUNITY
End: 2019-03-22

## 2019-02-11 NOTE — PROGRESS NOTES
Identified pt with two pt identifiers(name and ). Chief Complaint Patient presents with  
[facility] Follow Up Follow up from hospital and rehab with diagnosis of C-diff. Health Maintenance Due Topic  LIPID PANEL Q1   
 FOOT EXAM Q1   
 MICROALBUMIN Q1   
 EYE EXAM RETINAL OR DILATED  DTaP/Tdap/Td series (1 - Tdap)  Shingrix Vaccine Age 50> (1 of 2)  GLAUCOMA SCREENING Q2Y  Pneumococcal 65+ High/Highest Risk (1 of 2 - PCV13)  Influenza Age 5 to Adult  MEDICARE YEARLY EXAM   
 
 
Wt Readings from Last 3 Encounters:  
19 136 lb 3.2 oz (61.8 kg) 18 159 lb 3.2 oz (72.2 kg) 18 142 lb 8 oz (64.6 kg) Temp Readings from Last 3 Encounters:  
19 97.2 °F (36.2 °C)  
18 98 °F (36.7 °C)  
18 98.5 °F (36.9 °C) (Oral) BP Readings from Last 3 Encounters:  
19 158/79  
19 162/80  
18 123/67 Pulse Readings from Last 3 Encounters:  
19 82  
18 92  
18 72 Learning Assessment: 
:  
 
Learning Assessment 2019 PRIMARY LEARNER Patient BARRIERS PRIMARY LEARNER HEARING  
PRIMARY LANGUAGE ENGLISH  
LEARNER PREFERENCE PRIMARY OTHER (COMMENT) ANSWERED BY daughter RELATIONSHIP OTHER Depression Screening: 
:  
 
PHQ over the last two weeks 2019 Little interest or pleasure in doing things Not at all Feeling down, depressed, irritable, or hopeless Not at all Total Score PHQ 2 0 Fall Risk Assessment: 
:  
 
Fall Risk Assessment, last 12 mths 2019 Able to walk? Yes Fall in past 12 months? Yes Fall with injury? No  
Number of falls in past 12 months 1 Fall Risk Score 1 Abuse Screening: 
:  
 
No flowsheet data found. Coordination of Care Questionnaire: 
:  
 
1) Have you been to an emergency room, urgent care clinic since your last visit? yes Hospitalized since your last visit? yes 2) Have you seen or consulted any other health care providers outside of [address] [place] [address] since your last visit? yes  (Include any pap smears or colon screenings in this section.) 3) Do you have an Advance Directive on file? yes Are you interested in receiving information about Advance Directives? no 
 
Patient is accompanied by daughter I have received verbal consent from Kenny Garcia to discuss any/all medical information while they are present in the room. Reviewed record in preparation for visit and have obtained necessary documentation. Medication reconciliation up to date and corrected with patient at this time.

## 2019-02-12 ENCOUNTER — TELEPHONE (OUTPATIENT)
Dept: FAMILY MEDICINE CLINIC | Age: 84
End: 2019-02-12

## 2019-02-12 NOTE — TELEPHONE ENCOUNTER
Bigg Gregory with [address] [facility] stated pt was admitted to facility.   Pt is taking simvastatin & digoxin and they interact with one another and the family is aware to keep eye on the pt to see if symptoms appear and to let facility know right away

## 2019-02-13 NOTE — TELEPHONE ENCOUNTER
Pt's family called about pt having lab work done and needs to know results of the tb test done.  Please fax results over to [facility] facility at  [phone] - attn: Belkys Bryant  Pt's family Eder Nguyen wants a call to confirm results at  [phone]

## 2019-02-14 LAB
ALBUMIN SERPL-MCNC: 4.2 G/DL (ref 3.2–4.6)
ALBUMIN/GLOB SERPL: 1.6 {RATIO} (ref 1.2–2.2)
ALP SERPL-CCNC: 84 IU/L (ref 39–117)
ALT SERPL-CCNC: 27 IU/L (ref 0–44)
AST SERPL-CCNC: 24 IU/L (ref 0–40)
BASOPHILS # BLD AUTO: 0 X10E3/UL (ref 0–0.2)
BASOPHILS NFR BLD AUTO: 0 %
BILIRUB SERPL-MCNC: 0.2 MG/DL (ref 0–1.2)
BUN SERPL-MCNC: 19 MG/DL (ref 10–36)
BUN/CREAT SERPL: 25 (ref 10–24)
CALCIUM SERPL-MCNC: 9.9 MG/DL (ref 8.6–10.2)
CHLORIDE SERPL-SCNC: 102 MMOL/L (ref 96–106)
CO2 SERPL-SCNC: 26 MMOL/L (ref 20–29)
CREAT SERPL-MCNC: 0.76 MG/DL (ref 0.76–1.27)
EOSINOPHIL # BLD AUTO: 0.2 X10E3/UL (ref 0–0.4)
EOSINOPHIL NFR BLD AUTO: 3 %
ERYTHROCYTE [DISTWIDTH] IN BLOOD BY AUTOMATED COUNT: 21.4 % (ref 12.3–15.4)
EST. AVERAGE GLUCOSE BLD GHB EST-MCNC: 169 MG/DL
GAMMA INTERFERON BACKGROUND BLD IA-ACNC: 0.03 IU/ML
GLOBULIN SER CALC-MCNC: 2.6 G/DL (ref 1.5–4.5)
GLUCOSE SERPL-MCNC: 221 MG/DL (ref 65–99)
HBA1C MFR BLD: 7.5 % (ref 4.8–5.6)
HCT VFR BLD AUTO: 35.9 % (ref 37.5–51)
HGB BLD-MCNC: 11.1 G/DL (ref 13–17.7)
IMM GRANULOCYTES # BLD AUTO: 0.1 X10E3/UL (ref 0–0.1)
IMM GRANULOCYTES NFR BLD AUTO: 1 %
LYMPHOCYTES # BLD AUTO: 1.4 X10E3/UL (ref 0.7–3.1)
LYMPHOCYTES NFR BLD AUTO: 20 %
M TB IFN-G BLD-IMP: NORMAL
M TB IFN-G CD4+ BCKGRND COR BLD-ACNC: 0.03 IU/ML
MCH RBC QN AUTO: 25.1 PG (ref 26.6–33)
MCHC RBC AUTO-ENTMCNC: 30.9 G/DL (ref 31.5–35.7)
MCV RBC AUTO: 81 FL (ref 79–97)
MITOGEN IGNF BLD-ACNC: 2.07 IU/ML
MONOCYTES # BLD AUTO: 1 X10E3/UL (ref 0.1–0.9)
MONOCYTES NFR BLD AUTO: 15 %
NEUTROPHILS # BLD AUTO: 4.2 X10E3/UL (ref 1.4–7)
NEUTROPHILS NFR BLD AUTO: 61 %
PLATELET # BLD AUTO: 150 X10E3/UL (ref 150–379)
POTASSIUM SERPL-SCNC: 5.2 MMOL/L (ref 3.5–5.2)
PROT SERPL-MCNC: 6.8 G/DL (ref 6–8.5)
QUANTIFERON TB2 AG: 0.03 IU/ML
RBC # BLD AUTO: 4.42 X10E6/UL (ref 4.14–5.8)
SERVICE CMNT-IMP: NORMAL
SODIUM SERPL-SCNC: 144 MMOL/L (ref 134–144)
WBC # BLD AUTO: 6.9 X10E3/UL (ref 3.4–10.8)

## 2019-02-14 NOTE — PROGRESS NOTES
Labs are stable and improving. Quatiferon TB is negative indicating no exposure to TB. Hemoglobin A1C is greatly improved. Down from 9.6 3 months ago to 7.5. I believe that the TB information needs to go to a program Mr. Toyin Sevilla will be involved with.   
Please talk to his daughter

## 2019-02-14 NOTE — PROGRESS NOTES
Spoke to daughter. TB test results faxed to [facility] per daughter's request at this time. Lab results to be mailed today. Voices appreciation for everything. Patient on his way to [facility] as we spoke; Confirmation for fax received.

## 2019-02-22 ENCOUNTER — TELEPHONE (OUTPATIENT)
Dept: CARDIOLOGY CLINIC | Age: 84
End: 2019-02-22

## 2019-02-22 NOTE — TELEPHONE ENCOUNTER
Patients son in law (zach) is asking for a temporary supply of metoprolol to be called in to [facility] on [facility] [address] [facility] [address].   Please reach back out    Phone: [phone]

## 2019-02-24 NOTE — PROGRESS NOTES
Chief Complaint: 
Chief Complaint Patient presents with  
[facility] Follow Up Follow up from hospital and rehab with diagnosis of C-diff. History of Present Illness: He is a 80 y.o. male who presents with his daughter for follow up of C.diff and hyperglycemia. Past Sandra had been suffering with chronic diarrhea for several weeks. Work up initially negative, but then he was found to have C.diff. He was hospitalized and also treated with appropriate antibiotics. Also, he has a h/o NHL and being followed by Oncology. The patient will be going to [facility] and needs TB evaluation and also repeat labs to evaluate his type II DM. He is better. Getting stronger. Daughter notes that his blood sugars at times have been running very high. We will review his medications and labs. Reviewed PmHx, RxHx, FmHx, SocHx, AllgHx and updated and dated in the chart. Patient Active Problem List  
 Diagnosis  Hypotension  C. difficile diarrhea  Non Hodgkin's lymphoma ([facility]  History of Clostridium difficile colitis  DM (diabetes mellitus) ([facility]  Premature atrial complexes Review of Systems - negative except as listed above in the HPI Objective:  
 
Vitals:  
 02/11/19 1403 BP: 112/64 Pulse: 64 Resp: 20 Temp: 98 °F (36.7 °C) TempSrc: Oral  
SpO2: 97% Weight: 133 lb 12.8 oz (60.7 kg) Height: 5' 8\" (1.727 m) Physical Examination:  
General appearance - chronically ill appearing Mental status - alert, oriented to person, place, and time Mouth - mucous membranes moist, pharynx normal without lesions Chest - clear to auscultation, no wheezes, rales or rhonchi, symmetric air entry Heart - normal rate, regular rhythm, normal S1, S2, no murmurs, rubs, clicks or gallops Abdomen - soft, nontender, nondistended, no masses or organomegaly Neurological - alert, oriented, normal speech, no focal findings or movement disorder noted Musculoskeletal - no joint tenderness, deformity or swelling Extremities - peripheral pulses normal, no pedal edema, no clubbing or cyanosis Assessment/ Plan:  
89M with Diffuse B-Cell Lymphoma and Type II DM. Found to have chronic diarrhea and subsequently diagnosed with C.diff. Now home and will be going to [facility] for rehab and to live. He is doing well, but noted to have elevated blood sugars. Needs labs as outlined below. Diagnoses and all orders for this visit: 
 
1. Type 2 diabetes mellitus with hyperglycemia, with long-term current use of insulin (HCC) 
-     HEMOGLOBIN A1C WITH EAG 2. 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 
-     CBC WITH AUTOMATED DIFF 
-     METABOLIC PANEL, COMPREHENSIVE 3. Screening for tuberculosis -     QUANTIFERON-TB PLUS(CLIENT INCUB.) 4. Bicipitoradial bursitis of left elbow 
-     diclofenac (VOLTAREN) 1 % gel; Apply 4 g to affected area four (4) times daily. I have discussed the diagnosis with the patient and the intended treatment plan as seen in the above orders. The patient has received an after-visit summary and questions were answered concerning future plans. Asked to return should symptoms worsen or not improve with treatment. Any pending labs and studies will be relayed to patient when they become available. Pt verbalizes understanding of plan of care and denies further questions or concerns at this time. Follow-up Disposition: 
Return if symptoms worsen or fail to improve.  
 
Miracle Rodriguez MD

## 2019-03-04 ENCOUNTER — TELEPHONE (OUTPATIENT)
Dept: FAMILY MEDICINE CLINIC | Age: 84
End: 2019-03-04

## 2019-03-04 RX ORDER — OSELTAMIVIR PHOSPHATE 75 MG/1
75 CAPSULE ORAL DAILY
Qty: 10 CAP | Refills: 0 | Status: SHIPPED | OUTPATIENT
Start: 2019-03-04 | End: 2019-03-14

## 2019-03-04 NOTE — TELEPHONE ENCOUNTER
Pt's daughter called and stated house hold has the flu and wants to know if tamiflu can be sent [facility] to prevent pt from getting the flu  Please call ryan at [phone] and advise

## 2019-03-06 ENCOUNTER — TELEPHONE (OUTPATIENT)
Dept: FAMILY MEDICINE CLINIC | Age: 84
End: 2019-03-06

## 2019-03-06 DIAGNOSIS — E11.65 TYPE 2 DIABETES MELLITUS WITH HYPERGLYCEMIA, WITH LONG-TERM CURRENT USE OF INSULIN (HCC): Primary | ICD-10-CM

## 2019-03-06 DIAGNOSIS — Z79.4 TYPE 2 DIABETES MELLITUS WITH HYPERGLYCEMIA, WITH LONG-TERM CURRENT USE OF INSULIN (HCC): Primary | ICD-10-CM

## 2019-03-06 NOTE — TELEPHONE ENCOUNTER
[address] [place] [address] Stanley w/ [facility] called and they are requesting a \"Nursing Home health order\" and instruction on how to give medications in home setting.     Phone: [phone]  Fax: [phone]

## 2019-03-06 NOTE — TELEPHONE ENCOUNTER
Order has been placed. Please fax to home care as requested.    Thanks,   Dr. Alka Douglas   (Routing comment)     Orders printed and faxed with conformation

## 2019-03-11 ENCOUNTER — TELEPHONE (OUTPATIENT)
Dept: FAMILY MEDICINE CLINIC | Age: 84
End: 2019-03-11

## 2019-03-11 NOTE — TELEPHONE ENCOUNTER
Betsy Rowell with [address] [facility] needs an order and has a stage two wound on butt and needs order to clean wound and treat it for twice a week   Call renate at [phone] for questions

## 2019-03-11 NOTE — TELEPHONE ENCOUNTER
Verbal order given. Please give verbal report to start wound care as soon as possible.    Thanks,   Dr. Oscar Vale   (Routing comment)

## 2019-03-19 ENCOUNTER — OFFICE VISIT (OUTPATIENT)
Dept: ONCOLOGY | Age: 84
End: 2019-03-19

## 2019-03-19 ENCOUNTER — HOSPITAL ENCOUNTER (OUTPATIENT)
Dept: INFUSION THERAPY | Age: 84
Discharge: HOME | End: 2019-03-19
Payer: MEDICARE

## 2019-03-19 VITALS
TEMPERATURE: 97.6 F | WEIGHT: 140.4 LBS | OXYGEN SATURATION: 96 % | DIASTOLIC BLOOD PRESSURE: 63 MMHG | HEART RATE: 87 BPM | BODY MASS INDEX: 21.28 KG/M2 | HEIGHT: 68 IN | RESPIRATION RATE: 18 BRPM | SYSTOLIC BLOOD PRESSURE: 138 MMHG

## 2019-03-19 VITALS
SYSTOLIC BLOOD PRESSURE: 138 MMHG | TEMPERATURE: 97.6 F | HEART RATE: 87 BPM | RESPIRATION RATE: 18 BRPM | DIASTOLIC BLOOD PRESSURE: 63 MMHG

## 2019-03-19 DIAGNOSIS — C83.39 DIFFUSE LARGE B-CELL LYMPHOMA OF EXTRANODAL SITE EXCLUDING SPLEEN AND OTHER SOLID ORGANS (HCC): Primary | ICD-10-CM

## 2019-03-19 DIAGNOSIS — D50.9 IRON DEFICIENCY ANEMIA, UNSPECIFIED IRON DEFICIENCY ANEMIA TYPE: ICD-10-CM

## 2019-03-19 DIAGNOSIS — D69.6 THROMBOCYTOPENIA (HCC): ICD-10-CM

## 2019-03-19 LAB
ALBUMIN SERPL-MCNC: 2.9 G/DL (ref 3.5–5)
ALBUMIN/GLOB SERPL: 0.7 {RATIO} (ref 1.1–2.2)
ALP SERPL-CCNC: 86 U/L (ref 45–117)
ALT SERPL-CCNC: 15 U/L (ref 12–78)
ANION GAP SERPL CALC-SCNC: 6 MMOL/L (ref 5–15)
AST SERPL-CCNC: 7 U/L (ref 15–37)
BASOPHILS # BLD: 0 K/UL (ref 0–0.1)
BASOPHILS NFR BLD: 0 % (ref 0–1)
BILIRUB SERPL-MCNC: 0.2 MG/DL (ref 0.2–1)
BUN SERPL-MCNC: 25 MG/DL (ref 6–20)
BUN/CREAT SERPL: 26 (ref 12–20)
CALCIUM SERPL-MCNC: 9.2 MG/DL (ref 8.5–10.1)
CHLORIDE SERPL-SCNC: 103 MMOL/L (ref 97–108)
CO2 SERPL-SCNC: 29 MMOL/L (ref 21–32)
CREAT SERPL-MCNC: 0.96 MG/DL (ref 0.7–1.3)
DIFFERENTIAL METHOD BLD: ABNORMAL
EOSINOPHIL # BLD: 0.5 K/UL (ref 0–0.4)
EOSINOPHIL NFR BLD: 4 % (ref 0–7)
ERYTHROCYTE [DISTWIDTH] IN BLOOD BY AUTOMATED COUNT: 17.3 % (ref 11.5–14.5)
FERRITIN SERPL-MCNC: 100 NG/ML (ref 26–388)
GLOBULIN SER CALC-MCNC: 3.9 G/DL (ref 2–4)
GLUCOSE SERPL-MCNC: 264 MG/DL (ref 65–100)
HCT VFR BLD AUTO: 31.2 % (ref 36.6–50.3)
HGB BLD-MCNC: 9.6 G/DL (ref 12.1–17)
IMM GRANULOCYTES # BLD AUTO: 0.1 K/UL (ref 0–0.04)
IMM GRANULOCYTES NFR BLD AUTO: 1 % (ref 0–0.5)
IRON SATN MFR SERPL: 12 % (ref 20–50)
IRON SERPL-MCNC: 29 UG/DL (ref 35–150)
LDH SERPL L TO P-CCNC: 290 U/L (ref 85–241)
LYMPHOCYTES # BLD: 1.5 K/UL (ref 0.8–3.5)
LYMPHOCYTES NFR BLD: 11 % (ref 12–49)
MCH RBC QN AUTO: 25.7 PG (ref 26–34)
MCHC RBC AUTO-ENTMCNC: 30.8 G/DL (ref 30–36.5)
MCV RBC AUTO: 83.4 FL (ref 80–99)
MONOCYTES # BLD: 1.3 K/UL (ref 0–1)
MONOCYTES NFR BLD: 10 % (ref 5–13)
NEUTS SEG # BLD: 9.7 K/UL (ref 1.8–8)
NEUTS SEG NFR BLD: 74 % (ref 32–75)
NRBC # BLD: 0 K/UL (ref 0–0.01)
NRBC BLD-RTO: 0 PER 100 WBC
PLATELET # BLD AUTO: 236 K/UL (ref 150–400)
PMV BLD AUTO: 11.6 FL (ref 8.9–12.9)
POTASSIUM SERPL-SCNC: 4.5 MMOL/L (ref 3.5–5.1)
PROT SERPL-MCNC: 6.8 G/DL (ref 6.4–8.2)
RBC # BLD AUTO: 3.74 M/UL (ref 4.1–5.7)
SODIUM SERPL-SCNC: 138 MMOL/L (ref 136–145)
TIBC SERPL-MCNC: 252 UG/DL (ref 250–450)
WBC # BLD AUTO: 13.2 K/UL (ref 4.1–11.1)

## 2019-03-19 PROCEDURE — 77030012965 HC NDL HUBR BBMI -A

## 2019-03-19 PROCEDURE — 85025 COMPLETE CBC W/AUTO DIFF WBC: CPT

## 2019-03-19 PROCEDURE — 82728 ASSAY OF FERRITIN: CPT

## 2019-03-19 PROCEDURE — 80053 COMPREHEN METABOLIC PANEL: CPT

## 2019-03-19 PROCEDURE — 74011250636 HC RX REV CODE- 250/636: Performed by: NURSE PRACTITIONER

## 2019-03-19 PROCEDURE — 36415 COLL VENOUS BLD VENIPUNCTURE: CPT

## 2019-03-19 PROCEDURE — 83615 LACTATE (LD) (LDH) ENZYME: CPT

## 2019-03-19 PROCEDURE — 83540 ASSAY OF IRON: CPT

## 2019-03-19 RX ORDER — SODIUM CHLORIDE 0.9 % (FLUSH) 0.9 %
5-10 SYRINGE (ML) INJECTION AS NEEDED
Status: CANCELLED | OUTPATIENT
Start: 2019-07-10

## 2019-03-19 RX ORDER — SODIUM CHLORIDE 0.9 % (FLUSH) 0.9 %
5-10 SYRINGE (ML) INJECTION AS NEEDED
Status: CANCELLED | OUTPATIENT
Start: 2019-09-04

## 2019-03-19 RX ORDER — HEPARIN 100 UNIT/ML
500 SYRINGE INTRAVENOUS AS NEEDED
Status: CANCELLED | OUTPATIENT
Start: 2019-07-10

## 2019-03-19 RX ORDER — SODIUM CHLORIDE 9 MG/ML
10 INJECTION INTRAMUSCULAR; INTRAVENOUS; SUBCUTANEOUS AS NEEDED
Status: DISCONTINUED | OUTPATIENT
Start: 2019-03-19 | End: 2019-03-20 | Stop reason: HOSPADM

## 2019-03-19 RX ORDER — HEPARIN 100 UNIT/ML
500 SYRINGE INTRAVENOUS AS NEEDED
Status: CANCELLED | OUTPATIENT
Start: 2019-09-04

## 2019-03-19 RX ORDER — SODIUM CHLORIDE 9 MG/ML
10 INJECTION INTRAMUSCULAR; INTRAVENOUS; SUBCUTANEOUS AS NEEDED
Status: CANCELLED | OUTPATIENT
Start: 2019-09-04

## 2019-03-19 RX ORDER — CEPHALEXIN 500 MG/1
500 CAPSULE ORAL 4 TIMES DAILY
COMMUNITY
Start: 2019-03-15 | End: 2019-03-22

## 2019-03-19 RX ORDER — METOPROLOL TARTRATE 50 MG/1
25 TABLET ORAL 2 TIMES DAILY
COMMUNITY
End: 2019-03-26

## 2019-03-19 RX ORDER — SODIUM CHLORIDE 9 MG/ML
10 INJECTION INTRAMUSCULAR; INTRAVENOUS; SUBCUTANEOUS AS NEEDED
Status: CANCELLED | OUTPATIENT
Start: 2019-07-10

## 2019-03-19 RX ORDER — HEPARIN 100 UNIT/ML
500 SYRINGE INTRAVENOUS AS NEEDED
Status: DISCONTINUED | OUTPATIENT
Start: 2019-03-19 | End: 2019-03-20 | Stop reason: HOSPADM

## 2019-03-19 RX ADMIN — SODIUM CHLORIDE, PRESERVATIVE FREE 500 UNITS: 5 INJECTION INTRAVENOUS at 14:00

## 2019-03-19 RX ADMIN — SODIUM CHLORIDE 10 ML: 9 INJECTION INTRAMUSCULAR; INTRAVENOUS; SUBCUTANEOUS at 13:50

## 2019-03-19 NOTE — PROGRESS NOTES
[facility] at [address], [address] [facility] [address]  W: [phone]  F: [phone]      Reason for Visit:   Mickey Padilla is a 80 y.o. 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  Rituximab, gemcitabine, oxaliplatin for 3 cycles, mixed response on PET  · 6/17/16 right neck mass excision:  Diffuse large B-cell lymphoma, with a germinal center phenotype  · Bendamustine and rituximab 7/18/16-12/7/16  · PET CR 11/18/16  · xgeva/zometa for bone mets in the past  · Single agent maintenance rituximab 225 mg/m2 q 3 weeks 12/7/16, last dose 5/2018, unclear how many doses in between, had 2 hospitalizations in 2018 (5 total)  · Admitted 9/26/18-9/29/18 for sepsis due to dental abscess, related to xgeva/zometa    History of Present Illness:   Mr Iva Lopez was admitted on 11/10/2018-11/12/18 from the ED with c/o concerns for recurrent c-diff. Recent dental work with abx following. Having diarrhea 2-3 loose BMs.      Hx of DM, HTN, NHL    Interval history:  In today for follow up. Complains of gr 1 cognition and concentration, gr 1 anxiety, 3/10 intermittent pain to left shoulder. Currently on prophylactic vanc since he is on keflex s/p Mohs procedure on 3/14/19. Was hospitalized in 12/2018 for C.diff.         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 ([facility]     Lower lip; removed    Neuropathy     Right bundle-branch block     Spinal stenosis     Type II diabetes mellitus ([facility] [address]     Venous insufficiency       Past Surgical History:   Procedure Laterality Date    HX HEENT      HX ORTHOPAEDIC      HX PROSTATECTOMY      HX UROLOGICAL      NEUROLOGICAL PROCEDURE UNLISTED        Social History     Tobacco Use    Smoking status: Former Smoker    Smokeless tobacco: Never Used   Substance Use Topics    Alcohol use: No     Frequency: Never      History reviewed. No pertinent family history. Current Outpatient Medications   Medication Sig    cephALEXin (KEFLEX) 500 mg capsule Take 500 mg by mouth four (4) times daily.  insulin NPH hum/reg insulin hm (HUMULIN 70/30 U-100 INSULIN SC) 24 Units by SubCUTAneous route daily.  metoprolol tartrate (LOPRESSOR) 50 mg tablet Take 25 mg by mouth two (2) times a day.  vancomycin (FIRVANQ) 50 mg/mL oral solution Take 25 mg by mouth daily.  diclofenac (VOLTAREN) 1 % gel Apply 4 g to affected area four (4) times daily.  aspirin 81 mg chewable tablet Take 1 Tab by mouth daily.  ferrous sulfate 325 mg (65 mg iron) tablet Take 325 mg by mouth two (2) times a day.  b complex vitamins (B COMPLEX 1) tablet Take 1 Tab by mouth daily.  chlorhexidine (PERIDEX) 0.12 % solution 15 mL by Swish and Spit route daily.  lactobacillus combo no.6 (PROBIOTIC COMPLEX PO) Take 1 Cap by mouth daily.  lutein 20 mg cap Take 20 mg by mouth daily.  cholecalciferol (VITAMIN D3) 1,000 unit tablet Take 1,000 Units by mouth daily.  mirabegron ER (MYRBETRIQ) 50 mg ER tablet Take 50 mg by mouth every fourty-eight (48) hours.  simvastatin (ZOCOR) 80 mg tablet Take 40 mg by mouth nightly. No current facility-administered medications for this visit.        No Known Allergies     Review of Systems: A complete review of systems was obtained, negative except as described above. Physical Exam:     Visit Vitals  /63   Pulse 87   Temp 97.6 °F (36.4 °C) (Temporal)   Resp 18   Ht 5' 8\" (1.727 m)   Wt 140 lb 6.4 oz (63.7 kg)   SpO2 96%   BMI 21.35 kg/m²     ECOG PS: 0  General: No distress  Eyes: PERRLA, anicteric sclerae  HENT: Atraumatic, OP clear  Neck: Supple  Lymphatic: No cervical, supraclavicular adenopathy  Respiratory: CTAB, normal respiratory effort  CV: Normal rate, regular rhythm, no murmurs, no peripheral edema  GI: Soft, nontender, nondistended, no masses, no hepatomegaly, no splenomegaly; + BS  MS:  Digits without clubbing or cyanosis. Skin: No rashes, ecchymoses, or petechiae. Normal temperature, turgor, and texture. Psych: Alert, oriented, appropriate affect, normal judgment/insight    Results:     Lab Results   Component Value Date/Time    WBC 13.2 (H) 03/19/2019 01:58 PM    HGB 9.6 (L) 03/19/2019 01:58 PM    HCT 31.2 (L) 03/19/2019 01:58 PM    PLATELET 023 75/88/6179 01:58 PM    MCV 83.4 03/19/2019 01:58 PM    ABS. NEUTROPHILS 9.7 (H) 03/19/2019 01:58 PM     Lab Results   Component Value Date/Time    Sodium 144 02/11/2019 02:48 PM    Potassium 5.2 02/11/2019 02:48 PM    Chloride 102 02/11/2019 02:48 PM    CO2 26 02/11/2019 02:48 PM    Glucose 221 (H) 02/11/2019 02:48 PM    BUN 19 02/11/2019 02:48 PM    Creatinine 0.76 02/11/2019 02:48 PM    GFR est AA 92 02/11/2019 02:48 PM    GFR est non-AA 80 02/11/2019 02:48 PM    Calcium 9.9 02/11/2019 02:48 PM    Glucose (POC) 284 (H) 12/22/2018 05:29 PM     Lab Results   Component Value Date/Time    Bilirubin, total 0.2 02/11/2019 02:48 PM    ALT (SGPT) 27 02/11/2019 02:48 PM    AST (SGOT) 24 02/11/2019 02:48 PM    Alk.  phosphatase 84 02/11/2019 02:48 PM    Protein, total 6.8 02/11/2019 02:48 PM    Albumin 4.2 02/11/2019 02:48 PM    Globulin 3.3 01/23/2019 01:42 PM     Lab Results   Component Value Date/Time    Reticulocyte count 0.4 (L) 12/16/2018 02:56 AM    Iron % saturation 45 01/23/2019 01:42 PM    TIBC 304 01/23/2019 01:42 PM    Ferritin 79 01/23/2019 01:42 PM    Vitamin B12 >2,000 (H) 12/17/2018 08:55 AM    Folate 29.0 (H) 12/17/2018 08:55 AM     (H) 01/23/2019 01:42 PM    TSH 0.96 12/16/2018 11:31 PM     Lab Results   Component Value Date/Time    INR 1.2 (H) 11/12/2018 01:22 AM         Records reviewed and summarized above. Pathology report(s) reviewed above. Radiology report(s) reviewed above. Assessment/plan:   1. Diffuse large B-cell lymphoma: In remission. He has had off/on rituximab maintenance for 2 years, unclear of the benefit in relapsed DLBCL, and unclear of benefit after 2 years. discontinued    Our approach to patient surveillance is to schedule patient visits every three months during the first year, every three to six months during the second year, and every six months starting two years after complete response. At these visits, we perform a history and physical examination, complete blood count, chemistries, and LDH. There is no role for routine PET or PET/CT imaging in the longitudinal follow-up of asymptomatic patients after response assessment. There are limited data to support performing CT scans at a given interval, and the decision regarding frequency of CT imaging should be individualized to the patient. For most patients, we do not routinely perform surveillance imaging. 2. Emotional well being:  he has excellent support and is coping well with her disease    3. Port maintenance: port flush q 8 weeks with cbc, cmp, ferritin, iron profile, ldh    4. Osteonecrosis of jaw: Has seen [facility] and has follow up on 3/26/19; no more bone agents, not clear of evidence in the first place for lymphoma    5. Anemia:  Maybe due to therapy, though is iron deficient; taking PO iron bid; may need to investigate further if continues    6. Thrombocytopenia:  Likely due to therapy, will follow    7.  DM2:  On insulin    This patient was seen in conjunction with Emma Dias NP        I appreciate the opportunity to participate in Mr. Louisa Flores's care. Signed By: Adina Spencer MD      No orders of the defined types were placed in this encounter.

## 2019-03-19 NOTE — PROGRESS NOTES
Problem: Knowledge Deficit  Goal: *Verbalizes understanding of procedures and medications  Outcome: Progressing Towards Goal  Pt receiving port flush/labs

## 2019-03-20 NOTE — PROGRESS NOTES
Let him know his labs are good except that his hgb is slightly more anemic.  Will monitor the labs again in 2 months

## 2019-03-22 ENCOUNTER — APPOINTMENT (OUTPATIENT)
Dept: GENERAL RADIOLOGY | Age: 84
DRG: 871 | End: 2019-03-22
Attending: STUDENT IN AN ORGANIZED HEALTH CARE EDUCATION/TRAINING PROGRAM
Payer: MEDICARE

## 2019-03-22 ENCOUNTER — HOSPITAL ENCOUNTER (INPATIENT)
Age: 84
LOS: 4 days | Discharge: NURSING, REHAB OR LONG-TERM CARE | DRG: 871 | End: 2019-03-26
Attending: STUDENT IN AN ORGANIZED HEALTH CARE EDUCATION/TRAINING PROGRAM | Admitting: FAMILY MEDICINE
Payer: MEDICARE

## 2019-03-22 DIAGNOSIS — R50.9 FEVER, UNSPECIFIED FEVER CAUSE: Primary | ICD-10-CM

## 2019-03-22 DIAGNOSIS — J06.9 UPPER RESPIRATORY TRACT INFECTION, UNSPECIFIED TYPE: ICD-10-CM

## 2019-03-22 DIAGNOSIS — L03.211 FACIAL CELLULITIS: ICD-10-CM

## 2019-03-22 PROBLEM — I48.91 ATRIAL FIBRILLATION WITH RAPID VENTRICULAR RESPONSE (HCC): Status: ACTIVE | Noted: 2019-03-22

## 2019-03-22 PROBLEM — R65.21 SEPTIC SHOCK (HCC): Status: ACTIVE | Noted: 2019-03-22

## 2019-03-22 PROBLEM — A41.9 SEPTIC SHOCK (HCC): Status: ACTIVE | Noted: 2019-03-22

## 2019-03-22 LAB
ALBUMIN SERPL-MCNC: 3 G/DL (ref 3.5–5)
ALBUMIN/GLOB SERPL: 0.7 {RATIO} (ref 1.1–2.2)
ALP SERPL-CCNC: 141 U/L (ref 45–117)
ALT SERPL-CCNC: 35 U/L (ref 12–78)
ANION GAP SERPL CALC-SCNC: 7 MMOL/L (ref 5–15)
AST SERPL-CCNC: 41 U/L (ref 15–37)
BASOPHILS # BLD: 0 K/UL (ref 0–0.1)
BASOPHILS NFR BLD: 0 % (ref 0–1)
BILIRUB SERPL-MCNC: 0.3 MG/DL (ref 0.2–1)
BUN SERPL-MCNC: 37 MG/DL (ref 6–20)
BUN/CREAT SERPL: 29 (ref 12–20)
CALCIUM SERPL-MCNC: 9.2 MG/DL (ref 8.5–10.1)
CHLORIDE SERPL-SCNC: 101 MMOL/L (ref 97–108)
CO2 SERPL-SCNC: 27 MMOL/L (ref 21–32)
COMMENT, HOLDF: NORMAL
CREAT SERPL-MCNC: 1.28 MG/DL (ref 0.7–1.3)
DIFFERENTIAL METHOD BLD: ABNORMAL
EOSINOPHIL # BLD: 0 K/UL (ref 0–0.4)
EOSINOPHIL NFR BLD: 0 % (ref 0–7)
ERYTHROCYTE [DISTWIDTH] IN BLOOD BY AUTOMATED COUNT: 17.1 % (ref 11.5–14.5)
FLUAV AG NPH QL IA: NEGATIVE
FLUBV AG NOSE QL IA: NEGATIVE
GLOBULIN SER CALC-MCNC: 4.4 G/DL (ref 2–4)
GLUCOSE SERPL-MCNC: 363 MG/DL (ref 65–100)
HCT VFR BLD AUTO: 33.6 % (ref 36.6–50.3)
HGB BLD-MCNC: 10.3 G/DL (ref 12.1–17)
IMM GRANULOCYTES # BLD AUTO: 0 K/UL
IMM GRANULOCYTES NFR BLD AUTO: 0 %
LACTATE BLD-SCNC: 1.69 MMOL/L (ref 0.4–2)
LACTATE SERPL-SCNC: 1.7 MMOL/L (ref 0.4–2)
LYMPHOCYTES # BLD: 1.3 K/UL (ref 0.8–3.5)
LYMPHOCYTES NFR BLD: 6 % (ref 12–49)
MCH RBC QN AUTO: 26 PG (ref 26–34)
MCHC RBC AUTO-ENTMCNC: 30.7 G/DL (ref 30–36.5)
MCV RBC AUTO: 84.8 FL (ref 80–99)
MONOCYTES # BLD: 1.9 K/UL (ref 0–1)
MONOCYTES NFR BLD: 9 % (ref 5–13)
NEUTS SEG # BLD: 17.7 K/UL (ref 1.8–8)
NEUTS SEG NFR BLD: 85 % (ref 32–75)
NRBC # BLD: 0 K/UL (ref 0–0.01)
NRBC BLD-RTO: 0 PER 100 WBC
PLATELET # BLD AUTO: 284 K/UL (ref 150–400)
PMV BLD AUTO: 11.9 FL (ref 8.9–12.9)
POTASSIUM SERPL-SCNC: 4.9 MMOL/L (ref 3.5–5.1)
PROT SERPL-MCNC: 7.4 G/DL (ref 6.4–8.2)
RBC # BLD AUTO: 3.96 M/UL (ref 4.1–5.7)
RBC MORPH BLD: ABNORMAL
SAMPLES BEING HELD,HOLD: NORMAL
SODIUM SERPL-SCNC: 135 MMOL/L (ref 136–145)
WBC # BLD AUTO: 20.9 K/UL (ref 4.1–11.1)

## 2019-03-22 PROCEDURE — 71045 X-RAY EXAM CHEST 1 VIEW: CPT

## 2019-03-22 PROCEDURE — 74011250637 HC RX REV CODE- 250/637: Performed by: STUDENT IN AN ORGANIZED HEALTH CARE EDUCATION/TRAINING PROGRAM

## 2019-03-22 PROCEDURE — 83605 ASSAY OF LACTIC ACID: CPT

## 2019-03-22 PROCEDURE — 74011250636 HC RX REV CODE- 250/636

## 2019-03-22 PROCEDURE — 74011000258 HC RX REV CODE- 258: Performed by: FAMILY MEDICINE

## 2019-03-22 PROCEDURE — 74011000250 HC RX REV CODE- 250: Performed by: STUDENT IN AN ORGANIZED HEALTH CARE EDUCATION/TRAINING PROGRAM

## 2019-03-22 PROCEDURE — 99285 EMERGENCY DEPT VISIT HI MDM: CPT

## 2019-03-22 PROCEDURE — 96361 HYDRATE IV INFUSION ADD-ON: CPT

## 2019-03-22 PROCEDURE — 74011250636 HC RX REV CODE- 250/636: Performed by: STUDENT IN AN ORGANIZED HEALTH CARE EDUCATION/TRAINING PROGRAM

## 2019-03-22 PROCEDURE — 85025 COMPLETE CBC W/AUTO DIFF WBC: CPT

## 2019-03-22 PROCEDURE — 87040 BLOOD CULTURE FOR BACTERIA: CPT

## 2019-03-22 PROCEDURE — 96375 TX/PRO/DX INJ NEW DRUG ADDON: CPT

## 2019-03-22 PROCEDURE — 80053 COMPREHEN METABOLIC PANEL: CPT

## 2019-03-22 PROCEDURE — 65270000029 HC RM PRIVATE

## 2019-03-22 PROCEDURE — 96365 THER/PROPH/DIAG IV INF INIT: CPT

## 2019-03-22 PROCEDURE — 36415 COLL VENOUS BLD VENIPUNCTURE: CPT

## 2019-03-22 PROCEDURE — 96360 HYDRATION IV INFUSION INIT: CPT

## 2019-03-22 PROCEDURE — 87804 INFLUENZA ASSAY W/OPTIC: CPT

## 2019-03-22 PROCEDURE — 93005 ELECTROCARDIOGRAM TRACING: CPT

## 2019-03-22 PROCEDURE — 74011250636 HC RX REV CODE- 250/636: Performed by: FAMILY MEDICINE

## 2019-03-22 PROCEDURE — 65610000006 HC RM INTENSIVE CARE

## 2019-03-22 RX ORDER — VANCOMYCIN HYDROCHLORIDE 250 MG/5ML
125 POWDER, FOR SOLUTION ORAL EVERY 6 HOURS
Status: DISCONTINUED | OUTPATIENT
Start: 2019-03-23 | End: 2019-03-26 | Stop reason: HOSPADM

## 2019-03-22 RX ORDER — LEVOFLOXACIN 5 MG/ML
750 INJECTION, SOLUTION INTRAVENOUS
Status: DISCONTINUED | OUTPATIENT
Start: 2019-03-22 | End: 2019-03-24

## 2019-03-22 RX ORDER — VANCOMYCIN HYDROCHLORIDE 1 G/20ML
INJECTION, POWDER, LYOPHILIZED, FOR SOLUTION INTRAVENOUS
Status: COMPLETED
Start: 2019-03-22 | End: 2019-03-22

## 2019-03-22 RX ORDER — METOPROLOL TARTRATE 25 MG/1
25 TABLET, FILM COATED ORAL 2 TIMES DAILY
Status: DISCONTINUED | OUTPATIENT
Start: 2019-03-23 | End: 2019-03-24

## 2019-03-22 RX ORDER — GUAIFENESIN 100 MG/5ML
81 LIQUID (ML) ORAL DAILY
Status: DISCONTINUED | OUTPATIENT
Start: 2019-03-23 | End: 2019-03-26 | Stop reason: HOSPADM

## 2019-03-22 RX ORDER — DEXTROSE 50 % IN WATER (D50W) INTRAVENOUS SYRINGE
12.5-25 AS NEEDED
Status: DISCONTINUED | OUTPATIENT
Start: 2019-03-22 | End: 2019-03-26 | Stop reason: HOSPADM

## 2019-03-22 RX ORDER — SODIUM CHLORIDE 0.9 % (FLUSH) 0.9 %
5-10 SYRINGE (ML) INJECTION AS NEEDED
Status: DISCONTINUED | OUTPATIENT
Start: 2019-03-22 | End: 2019-03-26 | Stop reason: HOSPADM

## 2019-03-22 RX ORDER — ACETAMINOPHEN 500 MG
500-1000 TABLET ORAL
COMMUNITY
End: 2020-01-01 | Stop reason: CLARIF

## 2019-03-22 RX ORDER — INSULIN LISPRO 100 [IU]/ML
INJECTION, SOLUTION INTRAVENOUS; SUBCUTANEOUS
Status: DISCONTINUED | OUTPATIENT
Start: 2019-03-23 | End: 2019-03-26 | Stop reason: HOSPADM

## 2019-03-22 RX ORDER — SODIUM CHLORIDE 900 MG/100ML
INJECTION INTRAVENOUS
Status: COMPLETED
Start: 2019-03-22 | End: 2019-03-22

## 2019-03-22 RX ORDER — INSULIN GLARGINE 100 [IU]/ML
15 INJECTION, SOLUTION SUBCUTANEOUS DAILY
Status: DISCONTINUED | OUTPATIENT
Start: 2019-03-23 | End: 2019-03-26 | Stop reason: HOSPADM

## 2019-03-22 RX ORDER — GUAIFENESIN 600 MG/1
600 TABLET, EXTENDED RELEASE ORAL EVERY 12 HOURS
Status: DISCONTINUED | OUTPATIENT
Start: 2019-03-22 | End: 2019-03-26 | Stop reason: HOSPADM

## 2019-03-22 RX ORDER — METOPROLOL TARTRATE 50 MG/1
50 TABLET ORAL
Status: COMPLETED | OUTPATIENT
Start: 2019-03-22 | End: 2019-03-22

## 2019-03-22 RX ORDER — SODIUM CHLORIDE 9 MG/ML
100 INJECTION, SOLUTION INTRAVENOUS CONTINUOUS
Status: DISCONTINUED | OUTPATIENT
Start: 2019-03-22 | End: 2019-03-24

## 2019-03-22 RX ORDER — METOPROLOL TARTRATE 5 MG/5ML
5 INJECTION INTRAVENOUS
Status: COMPLETED | OUTPATIENT
Start: 2019-03-22 | End: 2019-03-22

## 2019-03-22 RX ORDER — SIMVASTATIN 20 MG/1
40 TABLET, FILM COATED ORAL
Status: DISCONTINUED | OUTPATIENT
Start: 2019-03-23 | End: 2019-03-26 | Stop reason: HOSPADM

## 2019-03-22 RX ORDER — OMEPRAZOLE 20 MG/1
20 CAPSULE, DELAYED RELEASE ORAL AS NEEDED
COMMUNITY
End: 2020-01-01 | Stop reason: DRUGHIGH

## 2019-03-22 RX ORDER — MAGNESIUM SULFATE 100 %
4 CRYSTALS MISCELLANEOUS AS NEEDED
Status: DISCONTINUED | OUTPATIENT
Start: 2019-03-22 | End: 2019-03-26 | Stop reason: HOSPADM

## 2019-03-22 RX ORDER — VANCOMYCIN HYDROCHLORIDE 125 MG/1
125 CAPSULE ORAL 4 TIMES DAILY
COMMUNITY
End: 2019-04-12

## 2019-03-22 RX ORDER — ACETAMINOPHEN 325 MG/1
650 TABLET ORAL
Status: COMPLETED | OUTPATIENT
Start: 2019-03-22 | End: 2019-03-22

## 2019-03-22 RX ADMIN — SODIUM CHLORIDE 1000 ML: 900 INJECTION, SOLUTION INTRAVENOUS at 22:22

## 2019-03-22 RX ADMIN — SODIUM CHLORIDE 500 ML: 900 INJECTION, SOLUTION INTRAVENOUS at 21:33

## 2019-03-22 RX ADMIN — SODIUM CHLORIDE: 900 INJECTION, SOLUTION INTRAVENOUS at 21:17

## 2019-03-22 RX ADMIN — VANCOMYCIN HYDROCHLORIDE 125 MG: KIT at 23:51

## 2019-03-22 RX ADMIN — SODIUM CHLORIDE 974 ML: 900 INJECTION, SOLUTION INTRAVENOUS at 19:07

## 2019-03-22 RX ADMIN — VANCOMYCIN HYDROCHLORIDE: 1 INJECTION, POWDER, LYOPHILIZED, FOR SOLUTION INTRAVENOUS at 21:04

## 2019-03-22 RX ADMIN — LEVOFLOXACIN 750 MG: 5 INJECTION, SOLUTION INTRAVENOUS at 23:02

## 2019-03-22 RX ADMIN — SODIUM CHLORIDE 100 ML/HR: 900 INJECTION, SOLUTION INTRAVENOUS at 23:06

## 2019-03-22 RX ADMIN — ACETAMINOPHEN 650 MG: 325 TABLET ORAL at 19:11

## 2019-03-22 RX ADMIN — METOPROLOL TARTRATE 50 MG: 50 TABLET ORAL at 20:50

## 2019-03-22 RX ADMIN — VANCOMYCIN HYDROCHLORIDE 1000 MG: 1 INJECTION, POWDER, LYOPHILIZED, FOR SOLUTION INTRAVENOUS at 21:23

## 2019-03-22 RX ADMIN — PHENYLEPHRINE HYDROCHLORIDE 45 MCG/MIN: 10 INJECTION INTRAVENOUS at 22:52

## 2019-03-22 RX ADMIN — PIPERACILLIN SODIUM,TAZOBACTAM SODIUM 3.38 G: 3; .375 INJECTION, POWDER, FOR SOLUTION INTRAVENOUS at 22:28

## 2019-03-22 RX ADMIN — METOPROLOL TARTRATE 5 MG: 1 INJECTION, SOLUTION INTRAVENOUS at 20:50

## 2019-03-22 RX ADMIN — PHENYLEPHRINE HYDROCHLORIDE 30 MCG/MIN: 10 INJECTION INTRAVENOUS at 21:44

## 2019-03-22 RX ADMIN — SODIUM CHLORIDE 1000 ML: 900 INJECTION, SOLUTION INTRAVENOUS at 19:04

## 2019-03-22 NOTE — ED TRIAGE NOTES
Patient received via EMS from [facility]. He had made a complaint tonight of chest pain/dsicomfort, lethargy and was found to be febrile. He has a large incision on the side of his left face from a cancer removal that is red and warm to the touch.

## 2019-03-22 NOTE — ED PROVIDER NOTES
80 y.o. male with past medical history significant for lymphoma, arthritis, anemia, DM, HTN, hypercholesterolemia, venous insufficiency, hyperkalemia, spinal stenosis, melanoma (face), chronic back pain, s/p prostatectomy presents via EMS from [facility] and accompanied by children with chief complaint of fever, chest pain, and a cough that all started this afternoon. Pt's family explains that the pt had stitches removed from the right side of his face (had a melanoma removal) yesterday at a dermatology office, adding that they noticed some increased redness to the area today. Family reports that the pt appeared normal both after the procedure and this morning, but that he became confused and lethargic this afternoon. Of note, the family indicates that the pt just finished a round of Keflex, and that he was recently started on Vancomycin for his 4th episode of C. Diff. Also of note, the pt had 2 years of chemotherapy for his lymphoma, with last in June of 2018. Family includes that the pt is currently in remission and that he had his port flushed on 03/19/19. Pt and family deny any urinary symptoms or diarrhea at this time. There are no other acute medical concerns at this time. Social hx: Patient denies current Tobacco use. Denies EtOH use. Denies illicit drug abuse. PCP: Yaneth Couch MD 
GI: Dr. Gail Alston MD 
 
Note written by Vicki Hodges, as dictated by Laura Garcia MD 6:29 PM 
 
 
The history is provided by the patient and a relative. No  was used. Past Medical History:  
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  
Procedure Laterality Date  HX HEENT    
 HX ORTHOPAEDIC    
 HX PROSTATECTOMY  HX UROLOGICAL    
 NEUROLOGICAL PROCEDURE UNLISTED No family history on file. Social History Socioeconomic History  Marital status:  Spouse name: Not on file  Number of children: Not on file  Years of education: Not on file  Highest education level: Not on file Occupational History  Not on file Social Needs  Financial resource strain: Not on file  Food insecurity:  
  Worry: Not on file Inability: Not on file  Transportation needs:  
  Medical: Not on file Non-medical: Not on file Tobacco Use  Smoking status: Former Smoker  Smokeless tobacco: Never Used Substance and Sexual Activity  Alcohol use: No  
  Frequency: Never  Drug use: No  
 Sexual activity: Never Lifestyle  Physical activity:  
  Days per week: Not on file Minutes per session: Not on file  Stress: Not on file Relationships  Social connections:  
  Talks on phone: Not on file Gets together: Not on file Attends Gnosticism service: Not on file Active member of club or organization: Not on file Attends meetings of clubs or organizations: Not on file Relationship status: Not on file  Intimate partner violence:  
  Fear of current or ex partner: Not on file Emotionally abused: Not on file Physically abused: Not on file Forced sexual activity: Not on file Other Topics Concern  Not on file Social History Narrative ** Merged History Encounter ** ALLERGIES: Patient has no known allergies. Review of Systems Constitutional: Positive for fatigue and fever. Negative for chills. HENT: Negative for ear pain, sore throat and trouble swallowing. Eyes: Negative for visual disturbance. Respiratory: Positive for cough. Negative for shortness of breath. Cardiovascular: Negative for chest pain. Gastrointestinal: Negative for abdominal pain and diarrhea. Genitourinary: Negative for dysuria and hematuria. Musculoskeletal: Negative for back pain. Skin: Positive for color change (redness to right face alongside suture scar). Negative for rash. Neurological: Positive for weakness. Negative for light-headedness and headaches. Psychiatric/Behavioral: Positive for confusion. All other systems reviewed and are negative. Vitals:  
 03/22/19 1744 BP: 125/73 Pulse: (!) 126 Resp: 26 Temp: 100.4 °F (38 °C) SpO2: 94% Weight: 65.8 kg (145 lb) Height: 5' 8\" (1.727 m) Physical Exam  
Constitutional: He appears well-developed. Pt appears elderly, frail, and weak. HENT:  
Head: Normocephalic and atraumatic. Eyes: EOM are normal.  
Neck: No neck rigidity. Cardiovascular: Regular rhythm and intact distal pulses. Tachycardia present. No edema present. Pulmonary/Chest: Effort normal. Tachypnea noted. He has rales in the left lower field. Abdominal: Soft. He exhibits no distension. There is no tenderness. Musculoskeletal: He exhibits no edema. Neurological: He is alert. No cranial nerve deficit. Skin: Skin is warm. He is not diaphoretic. There is pallor. Psychiatric: He has a normal mood and affect. Nursing note and vitals reviewed. Note written by Pricilla Narayanan, as dictated by Maricarmen Rojas MD 6:29 PM  
 
 
 
 
MDM Number of Diagnoses or Management Options Facial cellulitis:  
Fever, unspecified fever cause:  
Upper respiratory tract infection, unspecified type:  
Diagnosis management comments: Gale Hickman is a 80 y.o. male presenting with fever, tachycardia. Differential includes focus of infection -- incisional infection, pna, pneumonitis, UTI, <<intrabdominal source, meningitis. Course: guarded, tachycardia improved with IVF, metop Dispo: admit Given vanco given hx of recurrent CDIFF. Procedures ED EKG interpretation: 
Rhythm: tachycardia; and irr irr. Rate (approx.): 122; RBBB; no ischemia Note written by Monty Leon, as dictated by Marten Apgar, MD 6:13 PM 
 
PROGRESS NOTE: 
7:05 PM 
Spoke to Urology, who requests CT. Will see the pt later tonight. 7:00 PM 
Considered starting empirir antibiotic therapy, but will hold secondary to long history of antibiotic treatments and associated complications.

## 2019-03-23 ENCOUNTER — APPOINTMENT (OUTPATIENT)
Dept: GENERAL RADIOLOGY | Age: 84
DRG: 871 | End: 2019-03-23
Attending: INTERNAL MEDICINE
Payer: MEDICARE

## 2019-03-23 ENCOUNTER — APPOINTMENT (OUTPATIENT)
Dept: GENERAL RADIOLOGY | Age: 84
DRG: 871 | End: 2019-03-23
Attending: STUDENT IN AN ORGANIZED HEALTH CARE EDUCATION/TRAINING PROGRAM
Payer: MEDICARE

## 2019-03-23 LAB
ALBUMIN SERPL-MCNC: 2.3 G/DL (ref 3.5–5)
ALBUMIN/GLOB SERPL: 0.6 {RATIO} (ref 1.1–2.2)
ALP SERPL-CCNC: 206 U/L (ref 45–117)
ALT SERPL-CCNC: 71 U/L (ref 12–78)
AMMONIA PLAS-SCNC: 21 UMOL/L
ANION GAP SERPL CALC-SCNC: 8 MMOL/L (ref 5–15)
APPEARANCE UR: CLEAR
AST SERPL-CCNC: 82 U/L (ref 15–37)
B PERT DNA SPEC QL NAA+PROBE: NOT DETECTED
BACTERIA URNS QL MICRO: NEGATIVE /HPF
BASOPHILS # BLD: 0 K/UL (ref 0–0.1)
BASOPHILS NFR BLD: 0 % (ref 0–1)
BILIRUB SERPL-MCNC: 0.5 MG/DL (ref 0.2–1)
BILIRUB UR QL: NEGATIVE
BUN SERPL-MCNC: 31 MG/DL (ref 6–20)
BUN/CREAT SERPL: 30 (ref 12–20)
C PNEUM DNA SPEC QL NAA+PROBE: NOT DETECTED
CALCIUM SERPL-MCNC: 7.7 MG/DL (ref 8.5–10.1)
CHLORIDE SERPL-SCNC: 110 MMOL/L (ref 97–108)
CO2 SERPL-SCNC: 23 MMOL/L (ref 21–32)
COLOR UR: ABNORMAL
CREAT SERPL-MCNC: 1.03 MG/DL (ref 0.7–1.3)
DIFFERENTIAL METHOD BLD: ABNORMAL
EOSINOPHIL # BLD: 0 K/UL (ref 0–0.4)
EOSINOPHIL NFR BLD: 0 % (ref 0–7)
EPITH CASTS URNS QL MICRO: ABNORMAL /LPF
ERYTHROCYTE [DISTWIDTH] IN BLOOD BY AUTOMATED COUNT: 17.1 % (ref 11.5–14.5)
FLUAV H1 2009 PAND RNA SPEC QL NAA+PROBE: NOT DETECTED
FLUAV H1 RNA SPEC QL NAA+PROBE: NOT DETECTED
FLUAV H3 RNA SPEC QL NAA+PROBE: NOT DETECTED
FLUAV SUBTYP SPEC NAA+PROBE: NOT DETECTED
FLUBV RNA SPEC QL NAA+PROBE: NOT DETECTED
GLOBULIN SER CALC-MCNC: 3.7 G/DL (ref 2–4)
GLUCOSE BLD STRIP.AUTO-MCNC: 165 MG/DL (ref 65–100)
GLUCOSE BLD STRIP.AUTO-MCNC: 182 MG/DL (ref 65–100)
GLUCOSE BLD STRIP.AUTO-MCNC: 190 MG/DL (ref 65–100)
GLUCOSE BLD STRIP.AUTO-MCNC: 197 MG/DL (ref 65–100)
GLUCOSE SERPL-MCNC: 237 MG/DL (ref 65–100)
GLUCOSE UR STRIP.AUTO-MCNC: NEGATIVE MG/DL
HADV DNA SPEC QL NAA+PROBE: NOT DETECTED
HCOV 229E RNA SPEC QL NAA+PROBE: NOT DETECTED
HCOV HKU1 RNA SPEC QL NAA+PROBE: NOT DETECTED
HCOV NL63 RNA SPEC QL NAA+PROBE: NOT DETECTED
HCOV OC43 RNA SPEC QL NAA+PROBE: NOT DETECTED
HCT VFR BLD AUTO: 32.4 % (ref 36.6–50.3)
HGB BLD-MCNC: 9.5 G/DL (ref 12.1–17)
HGB UR QL STRIP: NEGATIVE
HMPV RNA SPEC QL NAA+PROBE: NOT DETECTED
HPIV1 RNA SPEC QL NAA+PROBE: NOT DETECTED
HPIV2 RNA SPEC QL NAA+PROBE: NOT DETECTED
HPIV3 RNA SPEC QL NAA+PROBE: NOT DETECTED
HPIV4 RNA SPEC QL NAA+PROBE: NOT DETECTED
IMM GRANULOCYTES # BLD AUTO: 0 K/UL
IMM GRANULOCYTES NFR BLD AUTO: 0 %
KETONES UR QL STRIP.AUTO: NEGATIVE MG/DL
LEUKOCYTE ESTERASE UR QL STRIP.AUTO: NEGATIVE
LYMPHOCYTES # BLD: 2.3 K/UL (ref 0.8–3.5)
LYMPHOCYTES NFR BLD: 12 % (ref 12–49)
M PNEUMO DNA SPEC QL NAA+PROBE: NOT DETECTED
MAGNESIUM SERPL-MCNC: 2.1 MG/DL (ref 1.6–2.4)
MCH RBC QN AUTO: 25 PG (ref 26–34)
MCHC RBC AUTO-ENTMCNC: 29.3 G/DL (ref 30–36.5)
MCV RBC AUTO: 85.3 FL (ref 80–99)
METAMYELOCYTES NFR BLD MANUAL: 1 %
MONOCYTES # BLD: 1.5 K/UL (ref 0–1)
MONOCYTES NFR BLD: 8 % (ref 5–13)
NEUTS BAND NFR BLD MANUAL: 2 % (ref 0–6)
NEUTS SEG # BLD: 14.9 K/UL (ref 1.8–8)
NEUTS SEG NFR BLD: 77 % (ref 32–75)
NITRITE UR QL STRIP.AUTO: NEGATIVE
NRBC # BLD: 0 K/UL (ref 0–0.01)
NRBC BLD-RTO: 0 PER 100 WBC
OSMOLALITY UR: 574 MOSM/KG H2O
PH UR STRIP: 5 [PH] (ref 5–8)
PHOSPHATE SERPL-MCNC: 3.2 MG/DL (ref 2.6–4.7)
PLATELET # BLD AUTO: 262 K/UL (ref 150–400)
PMV BLD AUTO: 11.3 FL (ref 8.9–12.9)
POTASSIUM SERPL-SCNC: 4.1 MMOL/L (ref 3.5–5.1)
PROT SERPL-MCNC: 6 G/DL (ref 6.4–8.2)
PROT UR STRIP-MCNC: 30 MG/DL
RBC # BLD AUTO: 3.8 M/UL (ref 4.1–5.7)
RBC #/AREA URNS HPF: ABNORMAL /HPF (ref 0–5)
RBC MORPH BLD: ABNORMAL
RSV RNA SPEC QL NAA+PROBE: NOT DETECTED
RV+EV RNA SPEC QL NAA+PROBE: NOT DETECTED
SERVICE CMNT-IMP: ABNORMAL
SODIUM SERPL-SCNC: 141 MMOL/L (ref 136–145)
SODIUM UR-SCNC: 35 MMOL/L
SP GR UR REFRACTOMETRY: 1.02 (ref 1–1.03)
TSH SERPL DL<=0.05 MIU/L-ACNC: 1.49 UIU/ML (ref 0.36–3.74)
UROBILINOGEN UR QL STRIP.AUTO: 0.2 EU/DL (ref 0.2–1)
WBC # BLD AUTO: 18.8 K/UL (ref 4.1–11.1)
WBC URNS QL MICRO: ABNORMAL /HPF (ref 0–4)

## 2019-03-23 PROCEDURE — 81001 URINALYSIS AUTO W/SCOPE: CPT

## 2019-03-23 PROCEDURE — 74011636637 HC RX REV CODE- 636/637: Performed by: STUDENT IN AN ORGANIZED HEALTH CARE EDUCATION/TRAINING PROGRAM

## 2019-03-23 PROCEDURE — 82140 ASSAY OF AMMONIA: CPT

## 2019-03-23 PROCEDURE — 84443 ASSAY THYROID STIM HORMONE: CPT

## 2019-03-23 PROCEDURE — 84100 ASSAY OF PHOSPHORUS: CPT

## 2019-03-23 PROCEDURE — 80053 COMPREHEN METABOLIC PANEL: CPT

## 2019-03-23 PROCEDURE — 87899 AGENT NOS ASSAY W/OPTIC: CPT

## 2019-03-23 PROCEDURE — 74011250636 HC RX REV CODE- 250/636: Performed by: STUDENT IN AN ORGANIZED HEALTH CARE EDUCATION/TRAINING PROGRAM

## 2019-03-23 PROCEDURE — 77030027138 HC INCENT SPIROMETER -A

## 2019-03-23 PROCEDURE — 74011250637 HC RX REV CODE- 250/637: Performed by: FAMILY MEDICINE

## 2019-03-23 PROCEDURE — 36415 COLL VENOUS BLD VENIPUNCTURE: CPT

## 2019-03-23 PROCEDURE — 82962 GLUCOSE BLOOD TEST: CPT

## 2019-03-23 PROCEDURE — 83935 ASSAY OF URINE OSMOLALITY: CPT

## 2019-03-23 PROCEDURE — 74011250636 HC RX REV CODE- 250/636: Performed by: FAMILY MEDICINE

## 2019-03-23 PROCEDURE — 85025 COMPLETE CBC W/AUTO DIFF WBC: CPT

## 2019-03-23 PROCEDURE — 74011000258 HC RX REV CODE- 258: Performed by: FAMILY MEDICINE

## 2019-03-23 PROCEDURE — 84300 ASSAY OF URINE SODIUM: CPT

## 2019-03-23 PROCEDURE — 65270000029 HC RM PRIVATE

## 2019-03-23 PROCEDURE — 71045 X-RAY EXAM CHEST 1 VIEW: CPT

## 2019-03-23 PROCEDURE — 83735 ASSAY OF MAGNESIUM: CPT

## 2019-03-23 PROCEDURE — 87449 NOS EACH ORGANISM AG IA: CPT

## 2019-03-23 PROCEDURE — 74011250637 HC RX REV CODE- 250/637: Performed by: STUDENT IN AN ORGANIZED HEALTH CARE EDUCATION/TRAINING PROGRAM

## 2019-03-23 PROCEDURE — 87633 RESP VIRUS 12-25 TARGETS: CPT

## 2019-03-23 PROCEDURE — 65660000000 HC RM CCU STEPDOWN

## 2019-03-23 RX ORDER — SODIUM CHLORIDE 0.9 % (FLUSH) 0.9 %
5-40 SYRINGE (ML) INJECTION AS NEEDED
Status: DISCONTINUED | OUTPATIENT
Start: 2019-03-23 | End: 2019-03-26 | Stop reason: HOSPADM

## 2019-03-23 RX ORDER — HEPARIN SODIUM 5000 [USP'U]/ML
5000 INJECTION, SOLUTION INTRAVENOUS; SUBCUTANEOUS EVERY 8 HOURS
Status: DISCONTINUED | OUTPATIENT
Start: 2019-03-23 | End: 2019-03-26 | Stop reason: HOSPADM

## 2019-03-23 RX ORDER — PANTOPRAZOLE SODIUM 40 MG/1
40 TABLET, DELAYED RELEASE ORAL
Status: DISCONTINUED | OUTPATIENT
Start: 2019-03-23 | End: 2019-03-26 | Stop reason: HOSPADM

## 2019-03-23 RX ORDER — SODIUM CHLORIDE 0.9 % (FLUSH) 0.9 %
5-40 SYRINGE (ML) INJECTION EVERY 8 HOURS
Status: DISCONTINUED | OUTPATIENT
Start: 2019-03-23 | End: 2019-03-26 | Stop reason: HOSPADM

## 2019-03-23 RX ADMIN — SIMVASTATIN 40 MG: 20 TABLET, FILM COATED ORAL at 21:03

## 2019-03-23 RX ADMIN — GUAIFENESIN 600 MG: 600 TABLET, EXTENDED RELEASE ORAL at 09:48

## 2019-03-23 RX ADMIN — VANCOMYCIN HYDROCHLORIDE 1000 MG: 1 INJECTION, POWDER, LYOPHILIZED, FOR SOLUTION INTRAVENOUS at 21:11

## 2019-03-23 RX ADMIN — Medication 10 ML: at 06:17

## 2019-03-23 RX ADMIN — METOPROLOL TARTRATE 25 MG: 25 TABLET ORAL at 17:08

## 2019-03-23 RX ADMIN — VANCOMYCIN HYDROCHLORIDE 125 MG: KIT at 23:36

## 2019-03-23 RX ADMIN — HEPARIN SODIUM 5000 UNITS: 5000 INJECTION INTRAVENOUS; SUBCUTANEOUS at 06:41

## 2019-03-23 RX ADMIN — Medication 1 CAPSULE: at 09:47

## 2019-03-23 RX ADMIN — PANTOPRAZOLE SODIUM 40 MG: 40 TABLET, DELAYED RELEASE ORAL at 17:08

## 2019-03-23 RX ADMIN — GUAIFENESIN 600 MG: 600 TABLET, EXTENDED RELEASE ORAL at 02:42

## 2019-03-23 RX ADMIN — PANTOPRAZOLE SODIUM 40 MG: 40 TABLET, DELAYED RELEASE ORAL at 09:47

## 2019-03-23 RX ADMIN — Medication 10 ML: at 21:15

## 2019-03-23 RX ADMIN — INSULIN GLARGINE 15 UNITS: 100 INJECTION, SOLUTION SUBCUTANEOUS at 09:48

## 2019-03-23 RX ADMIN — ASPIRIN 81 MG 81 MG: 81 TABLET ORAL at 09:48

## 2019-03-23 RX ADMIN — Medication 10 ML: at 13:47

## 2019-03-23 RX ADMIN — PIPERACILLIN SODIUM,TAZOBACTAM SODIUM 3.38 G: 3; .375 INJECTION, POWDER, FOR SOLUTION INTRAVENOUS at 21:04

## 2019-03-23 RX ADMIN — HEPARIN SODIUM 5000 UNITS: 5000 INJECTION INTRAVENOUS; SUBCUTANEOUS at 21:03

## 2019-03-23 RX ADMIN — PIPERACILLIN SODIUM,TAZOBACTAM SODIUM 3.38 G: 3; .375 INJECTION, POWDER, FOR SOLUTION INTRAVENOUS at 04:03

## 2019-03-23 RX ADMIN — SODIUM CHLORIDE 100 ML/HR: 900 INJECTION, SOLUTION INTRAVENOUS at 17:34

## 2019-03-23 RX ADMIN — GUAIFENESIN 600 MG: 600 TABLET, EXTENDED RELEASE ORAL at 21:03

## 2019-03-23 RX ADMIN — VANCOMYCIN HYDROCHLORIDE 125 MG: KIT at 12:18

## 2019-03-23 RX ADMIN — METOPROLOL TARTRATE 25 MG: 25 TABLET ORAL at 09:47

## 2019-03-23 RX ADMIN — VANCOMYCIN HYDROCHLORIDE 125 MG: KIT at 06:41

## 2019-03-23 RX ADMIN — VANCOMYCIN HYDROCHLORIDE 125 MG: KIT at 17:34

## 2019-03-23 RX ADMIN — PIPERACILLIN SODIUM,TAZOBACTAM SODIUM 3.38 G: 3; .375 INJECTION, POWDER, FOR SOLUTION INTRAVENOUS at 12:18

## 2019-03-23 RX ADMIN — HEPARIN SODIUM 5000 UNITS: 5000 INJECTION INTRAVENOUS; SUBCUTANEOUS at 13:47

## 2019-03-23 NOTE — H&P
[facility] [address]  Office [phone] Fax [phone] Admission H&P Name: Kristy Biswas MRN: [id number]  Sex: Male YOB: 1927  Age: 80 y.o. PCP: Yaneth Couch MD  
 
Source of Information: patient, daughter medical records Chief complaint: Samantha Dus History of Present Illness Kristy Biswas is a 80 y.o. male with known Melanoma s/p Mohs surgery, DM, Large B- cell lymphoma, C. Diff colitis, RBBB, HTN  who presents to the ER complaining of lethargy. Patient's daughter was called earlier today by staff at [facility] where he currently resides and was told Mr. Jeannette Estrada not himself\" and was having increased thirst. Daughter then noticed he was lethargic so decided to bring Mr. Aubrey Murry to the ER. Patient recently moved from his daughter's house to [facility] two weeks ago and daughter has noticed Mr. Aubrey Murry has not been compliant with medications given he forgets to take them if she doesn't remind him. Patient has recently had Mohs surgery for melanoma and got the stitches removed two days ago and completed course of keflex. Of note patient has an IJ Port cath for the past three years which he got to receive treatment for Large B-cell lymphoma and is flushed regularly. Patient on thick liquid diet given she was Hospitalized in the past for aspiration pneumonia. In the ED: - Vitals - T 100.4 Pulse 126 /73 MAP 90 RR 26 SpO2 94% - Labs - WBC 20.9 Hgb 10.3 (BL 9.5-10.3)) Cr 1.28 (BL 0.8) glucose 364 
- Imaging - CXR (portable) was rotated and was suboptimal with no definite acute process. Second CXR por showed bibasilar atelectasis/airspace disease unchanged. - Treatment - 1L NS bolus x2 , vancomycin EKG: Afib with RVR, left axis deviation, right bundle branch block Patient Vitals for the past 12 hrs: 
 Temp Pulse Resp BP SpO2  
03/22/19 2144  84  (!) 84/47  03/22/19 1744 100.4 °F (38 °C) (!) 126 26 125/73 94 % Review of Systems Review of Systems Constitutional: Positive for activity change and fatigue. Negative for chills, diaphoresis and fever. HENT: Negative for congestion and sore throat. Eyes: Negative for visual disturbance. Respiratory: Negative for chest tightness and shortness of breath. Cardiovascular: Negative for chest pain. Gastrointestinal: Negative for abdominal pain, diarrhea, nausea and vomiting. Endocrine: Positive for polydipsia. Genitourinary: Negative for difficulty urinating and dysuria. Skin: Negative for color change. Allergic/Immunologic: Negative for immunocompromised state. Neurological: Negative for dizziness and headaches. Hematological: Does not bruise/bleed easily. Psychiatric/Behavioral: Negative for confusion. Home Medications Prior to Admission medications Medication Sig Start Date End Date Taking? Authorizing Provider  
cephALEXin (KEFLEX) 500 mg capsule Take 500 mg by mouth four (4) times daily. 3/15/19   Provider, Historical  
insulin NPH hum/reg insulin hm (HUMULIN 70/30 U-100 INSULIN SC) 24 Units by SubCUTAneous route daily. Provider, Historical  
metoprolol tartrate (LOPRESSOR) 50 mg tablet Take 25 mg by mouth two (2) times a day. Provider, Historical  
vancomycin (FIRVANQ) 50 mg/mL oral solution Take 25 mg by mouth daily. Provider, Historical  
diclofenac (VOLTAREN) 1 % gel Apply 4 g to affected area four (4) times daily. 2/11/19   Sharee Kim MD  
aspirin 81 mg chewable tablet Take 1 Tab by mouth daily. 1/11/19   Mario Reveles T, DO  
ferrous sulfate 325 mg (65 mg iron) tablet Take 325 mg by mouth two (2) times a day. Other, MD Garrison  
b complex vitamins (B COMPLEX 1) tablet Take 1 Tab by mouth daily. Provider, Historical  
chlorhexidine (PERIDEX) 0.12 % solution 15 mL by Swish and Spit route daily.     Provider, Historical  
 lactobacillus combo no.6 (PROBIOTIC COMPLEX PO) Take 1 Cap by mouth daily. Provider, Historical  
lutein 20 mg cap Take 20 mg by mouth daily. Other, MD Garrison  
cholecalciferol (VITAMIN D3) 1,000 unit tablet Take 1,000 Units by mouth daily. Other, MD Garrison  
mirabegron ER (MYRBETRIQ) 50 mg ER tablet Take 50 mg by mouth every fourty-eight (48) hours. Other, MD Garrison  
simvastatin (ZOCOR) 80 mg tablet Take 40 mg by mouth nightly. Provider, Historical  
 
 
Allergies No Known Allergies Past Medical History:  
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  
Procedure Laterality Date  HX HEENT    
 HX ORTHOPAEDIC    
 HX PROSTATECTOMY  HX UROLOGICAL    
 NEUROLOGICAL PROCEDURE UNLISTED No family history on file. Social History Social History Socioeconomic History  Marital status:  Spouse name: Not on file  Number of children: Not on file  Years of education: Not on file  Highest education level: Not on file Occupational History  Not on file Social Needs  Financial resource strain: Not on file  Food insecurity:  
  Worry: Not on file Inability: Not on file  Transportation needs:  
  Medical: Not on file Non-medical: Not on file Tobacco Use  Smoking status: Former Smoker  Smokeless tobacco: Never Used Substance and Sexual Activity  Alcohol use: No  
  Frequency: Never  Drug use: No  
 Sexual activity: Never Lifestyle  Physical activity:  
  Days per week: Not on file Minutes per session: Not on file  Stress: Not on file Relationships  Social connections:  
  Talks on phone: Not on file Gets together: Not on file Attends Jain service: Not on file Active member of club or organization: Not on file Attends meetings of clubs or organizations: Not on file Relationship status: Not on file  Intimate partner violence:  
  Fear of current or ex partner: Not on file Emotionally abused: Not on file Physically abused: Not on file Forced sexual activity: Not on file Other Topics Concern  Not on file Social History Narrative ** Merged History Encounter ** Alcohol history: Not at all Smoking history: Non-smoker Illicit drug history: Not at all Living arrangement: patient lives in an adult home. Ambulates: Assisted walker Physical Exam 
Visit Vitals BP (!) 84/47 Pulse 84 Temp 100.4 °F (38 °C) Resp 26 Ht 5' 8\" (1.727 m) Wt 145 lb (65.8 kg) SpO2 94% BMI 22.05 kg/m² General: Sleepy, cooperative,  alert and oriented to person place and time Head: Normocephalic. Atraumatic. Erythematous, warm linear lesion in left preauricular region. (please refer to image below) Eyes:              Conjunctiva pink. Sclera white. PERRL. Ears:              Ear canals patent. TM non-erythematous. Nose:             Septum midline. Mucosa pink. No drainage. Throat: Mucosa pink. Moist mucous membranes. No tonsillar exudates or erythema. Palate movement equal bilaterally. Neck: Supple. Normal ROM. No stiffness. Respiratory: CTAB. No w/r/r/c.  
Cardiovascular: Tachycardic, irregularly irregular. Normal S1,S2. No m/r/g. Pulses 2+ throughout. GI: + bowel sounds. Nontender. No rebound tenderness or guarding. Nondistended. Musculoskeletal: Full ROM in all extremities. No LE edema. Distal pulses intact. Skin: Warm, dry. No rashes. Neuro: CN II-XII grossly intact. Strength 5/5 in all extremities. Laboratory Data Recent Results (from the past 8 hour(s)) SAMPLES BEING HELD Collection Time: 03/22/19  5:59 PM  
Result Value Ref Range SAMPLES BEING HELD red,blue,gold COMMENT Add-on orders for these samples will be processed based on acceptable specimen integrity and analyte stability, which may vary by analyte. METABOLIC PANEL, COMPREHENSIVE Collection Time: 03/22/19  5:59 PM  
Result Value Ref Range Sodium 135 (L) 136 - 145 mmol/L Potassium 4.9 3.5 - 5.1 mmol/L Chloride 101 97 - 108 mmol/L  
 CO2 27 21 - 32 mmol/L Anion gap 7 5 - 15 mmol/L Glucose 363 (H) 65 - 100 mg/dL BUN 37 (H) 6 - 20 MG/DL Creatinine 1.28 0.70 - 1.30 MG/DL  
 BUN/Creatinine ratio 29 (H) 12 - 20 GFR est AA >60 >60 ml/min/1.73m2 GFR est non-AA 53 (L) >60 ml/min/1.73m2 Calcium 9.2 8.5 - 10.1 MG/DL Bilirubin, total 0.3 0.2 - 1.0 MG/DL  
 ALT (SGPT) 35 12 - 78 U/L  
 AST (SGOT) 41 (H) 15 - 37 U/L Alk. phosphatase 141 (H) 45 - 117 U/L Protein, total 7.4 6.4 - 8.2 g/dL Albumin 3.0 (L) 3.5 - 5.0 g/dL Globulin 4.4 (H) 2.0 - 4.0 g/dL A-G Ratio 0.7 (L) 1.1 - 2.2    
CBC WITH AUTOMATED DIFF Collection Time: 03/22/19  5:59 PM  
Result Value Ref Range WBC 20.9 (H) 4.1 - 11.1 K/uL  
 RBC 3.96 (L) 4.10 - 5.70 M/uL  
 HGB 10.3 (L) 12.1 - 17.0 g/dL HCT 33.6 (L) 36.6 - 50.3 % MCV 84.8 80.0 - 99.0 FL  
 MCH 26.0 26.0 - 34.0 PG  
 MCHC 30.7 30.0 - 36.5 g/dL  
 RDW 17.1 (H) 11.5 - 14.5 % PLATELET 244 347 - 777 K/uL MPV 11.9 8.9 - 12.9 FL  
 NRBC 0.0 0  WBC ABSOLUTE NRBC 0.00 0.00 - 0.01 K/uL NEUTROPHILS 85 (H) 32 - 75 % LYMPHOCYTES 6 (L) 12 - 49 % MONOCYTES 9 5 - 13 % EOSINOPHILS 0 0 - 7 % BASOPHILS 0 0 - 1 % IMMATURE GRANULOCYTES 0 %  
 ABS. NEUTROPHILS 17.7 (H) 1.8 - 8.0 K/UL  
 ABS. LYMPHOCYTES 1.3 0.8 - 3.5 K/UL  
 ABS. MONOCYTES 1.9 (H) 0.0 - 1.0 K/UL ABS. EOSINOPHILS 0.0 0.0 - 0.4 K/UL  
 ABS. BASOPHILS 0.0 0.0 - 0.1 K/UL  
 ABS. IMM. GRANS. 0.0 K/UL  
 DF MANUAL    
 RBC COMMENTS ANISOCYTOSIS 
1+ POC LACTIC ACID Collection Time: 03/22/19  6:03 PM  
Result Value Ref Range Lactic Acid (POC) 1.69 0.40 - 2.00 mmol/L  
LACTIC ACID Collection Time: 03/22/19  7:35 PM  
Result Value Ref Range Lactic acid 1.7 0.4 - 2.0 MMOL/L  
INFLUENZA A & B AG (RAPID TEST) Collection Time: 03/22/19  7:55 PM  
Result Value Ref Range Influenza A Antigen NEGATIVE  NEG Influenza B Antigen NEGATIVE  NEG    
EKG, 12 LEAD, INITIAL Collection Time: 03/22/19  8:22 PM  
Result Value Ref Range Ventricular Rate 129 BPM  
 Atrial Rate 100 BPM  
 QRS Duration 118 ms Q-T Interval 338 ms QTC Calculation (Bezet) 495 ms Calculated R Axis -43 degrees Calculated T Axis 3 degrees Diagnosis Atrial fibrillation with rapid ventricular response Left axis deviation Right bundle branch block Abnormal ECG When compared with ECG of 22-MAR-2019 18:13, 
MANUAL COMPARISON REQUIRED, DATA IS UNCONFIRMED Imaging CXR Results  (Last 48 hours) 03/22/19 2037  XR CHEST PORT Final result Impression:  IMPRESSION: Bibasilar atelectasis/airspace disease is unchanged. Narrative:  EXAM:  XR CHEST PORT INDICATION:  shortness of breath, cough COMPARISON:  3/22/2019 FINDINGS: Patient is rotated to the right. A portable AP radiograph of the chest  
was obtained at 2228 hours. Port-A-Cath tip overlies right atrium and is  
unchanged. .  Bibasilar atelectasis/airspace disease is unchanged. .  Arterial  
megaly is stable. Lorrain Passey Structures are unchanged. 03/22/19 1849  XR CHEST PORT Final result Impression:  IMPRESSION:  
Technical factors as above. No definite acute process, consider PA and lateral  
views when possible. Narrative:  INDICATION: meets SIRS criteria EXAM:  AP CHEST RADIOGRAPH  
   
 COMPARISON: December 21, 2018 FINDINGS:  
   
AP portable view of the chest demonstrates patient is markedly rotated toward  
the right accentuating the cardiomediastinal silhouette. No change in position  
of right IJ Port-A-Cath with tip over the distal SVC. The lungs are adequately  
expanded. There is no edema, effusion, consolidation, or pneumothorax. The  
osseous structures are unremarkable. CT Results  (Last 48 hours) None Assessment and Plan Shayy Boyer is a 80 y.o. male who is admitted for Septic Shock. 
  
Septic shock-  SIRS criteria met: 4/4 T 100.4  RR 26 pulse 126 WBC 20.9. Infectious source likely 2/2 Facial cellulitis vs Aspiration pneumonia. Has had hospitalizations in the past for aspiration pneumonia and has thick liquid diet.  CXR Bibasilar atelectasis/airspace disease is unchanged. Patient on pressors s/p 3L NS bolus. Has IJ central line in place. LA 1.69 
- Admit to stepdown - Broad spectrum antibiotics Vanc, Zosyn, Levaquin, will also continue prophylactic PO Vanc and probiotic   
- F/U Urine, respiratory and blood cultures - F/U RVP, legionella, S.pneumo - MIVF @100 MIVF 
- On hussein synephrine, wean as tolerated (goal MAP >65) - Stric I/O Afib with RVR- Follows Dr. Vicki Rodriguez Outpatient. - Continue home metoprolol (LOPRESSOR) 50 mg tab BID 
- Cardiac monitoring DMII - HgbA1c 7.5 (2/11/19). Glucose on admission 363. 
- Holding home medications of NPH 24 U daily. 
- Started on  Lantus 15 units at bedtime. 
- Insulin Sliding Scale normal sensitivity with AC&HS glucose checks. - Hypoglycemia protocols ordered. 
  
OSVALDO - Cr 1.28 POA (BL ~ 0.8).  Likely due to IVVD, will calculate FeNa. 
- NS @ 100 mL/hr 
- Urine sodium, creatinine 
- avoid and holding home nephrotoxic drugs Hypertension- BP on admission 125/73. In septic shock requiring pressors - Continuing home medications of metoprolol (LOPRESSOR) 50 mg tab given patient in Afib with RVR on admission.  
- Will continue to monitor at this time and readjust as BP's trend. 
  
Anemia 2/2 chronic disease:  POA Hgb 10.3 (BL 7.9-9.6)TIBC wnl, Iron % sat low, iron low, ferritin normal (3/19/19) - Daily CBC 
  
Symptomatic GERD 
- Hold home prilosec 
- Start IV protonix 40mg daily 
  
HLD No lipid panel on record. - Home zocor  
  
Large B- Cell lymphoma, on remission- Diagnosed 2014 has been on remission since 2016. Has had IJ Port in place since then. Follows with Dr. Jose Arm out patient.  
 
  
FEN/GI - NPO until passed speech eval. NS at 100 mL/hr. Activity - Ambulate with assistance DVT prophylaxis - Sub Q Heparin GI prophylaxis - protonix Fall prophylaxis - Fall precautions ordered. Disposition - Admit to stepdown. Plan to d/c TBD. Consulting PT/OT/CM Code Status - Full, discussed with patient / caregivers. Next of Kin Name and Contact Daughter: Ms. Roland Arcos ([phone]) 10:14 PM, 03/22/19 Srinath Madison MD 
Family Medicine Resident For Billing Chief Complaint Patient presents with  Fever  Lethargy  Chest Pain Hospital Problems  Date Reviewed: 3/19/2019 Codes Class Noted POA Atrial fibrillation with rapid ventricular response (HCC) ICD-10-CM: I48.91 
ICD-9-CM: 427.31  3/22/2019 Unknown Septic shock (HCC) ICD-10-CM: A41.9, R65.21 ICD-9-CM: 038.9, 785.52, 995.92  3/22/2019 Unknown [address]  Medicine Residency Attending Addendum: I discussed the findings, assessment and plan with the resident and agree with the resident's findings and plan as documented in the resident's note. Elise George MD, CAQSM, RMSK

## 2019-03-23 NOTE — CONSULTS
[facility] Name: Elvin Pang MRN: [id number] : 1927 Hospital: [address] Date: 3/23/2019 Impression Plan 1. Shock, resolved 2. Afib with RVR, HR improved 3. Sepsis, likely PNA as the source given history of aspiration and CXR findings 4. DM 
5. OSVALDO, improving · Broad spectrum abx · Follow-up cultures/PNA workup, anticipate vanc can be stopped tomorrow · Home beta blockers, monitor on tele · Off of hussein · Lantus as per primary team 
· Protonix · Heparin Stable for transfer to remote tele Radiology ( personally reviewed) CXR with bibasilar airspace disease vs atelectasis ABG No results for input(s): PHI, PO2I, PCO2I in the last 72 hours. Subjective Patient is a 80 y.o. male with a history of b-cell lymphoma, dysphagia and history of aspiration, HTN, DM, and history of C. Diff who presented with lethargy. Was in Afib with RVR, hypotensive after getting IV and PO metoprolol. Required hussein for a period of time, now off. CXR concerning for possible bibasilar airspace disease. Had a recently MOHS procedure. Review of Systems: A comprehensive review of systems was negative except for that written in the HPI. Past Medical History:  
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  
Procedure Laterality Date  HX HEENT    
 HX ORTHOPAEDIC    
 HX PROSTATECTOMY  HX UROLOGICAL    
 NEUROLOGICAL PROCEDURE UNLISTED    
  
 Prior to Admission medications Medication Sig Start Date End Date Taking? Authorizing Provider  
acetaminophen (TYLENOL) 500 mg tablet Take 500 mg by mouth nightly. Yes Provider, Historical  
omeprazole (PRILOSEC) 20 mg capsule Take 20 mg by mouth Daily (before breakfast). Yes Provider, Historical  
vancomycin (VANCOCIN) 125 mg capsule Take 125 mg by mouth four (4) times daily. Yes Provider, Historical  
insulin NPH hum/reg insulin hm (HUMULIN 70/30 U-100 INSULIN SC) 24 Units by SubCUTAneous route daily. Yes Provider, Historical  
metoprolol tartrate (LOPRESSOR) 50 mg tablet Take 25 mg by mouth two (2) times a day. Yes Provider, Historical  
aspirin 81 mg chewable tablet Take 1 Tab by mouth daily. 1/11/19  Yes Kassidy Curran,   
ferrous sulfate 325 mg (65 mg iron) tablet Take 325 mg by mouth two (2) times a day. Yes Other, MD Garrison  
b complex vitamins (B COMPLEX 1) tablet Take 1 Tab by mouth daily. Yes Provider, Historical  
chlorhexidine (PERIDEX) 0.12 % solution 15 mL by Swish and Spit route two (2) times a day. Yes Provider, Historical  
lactobacillus combo no.6 (PROBIOTIC COMPLEX PO) Take 1 Cap by mouth daily. Yes Provider, Historical  
lutein 20 mg cap Take 20 mg by mouth daily. Yes Other, MD Garrison  
cholecalciferol (VITAMIN D3) 1,000 unit tablet Take 1,000 Units by mouth daily. Yes Other, MD Garrison  
mirabegron ER (MYRBETRIQ) 50 mg ER tablet Take 50 mg by mouth every fourty-eight (48) hours. Yes Other, MD Garrison  
simvastatin (ZOCOR) 80 mg tablet Take 40 mg by mouth nightly. Yes Provider, Historical  
 
Current Facility-Administered Medications Medication Dose Route Frequency  sodium chloride (NS) flush 5-40 mL  5-40 mL IntraVENous Q8H  
 heparin (porcine) injection 5,000 Units  5,000 Units SubCUTAneous Q8H  
 pantoprazole (PROTONIX) tablet 40 mg  40 mg Oral ACB&D  PHENYLephrine (WILBERT-SYNEPHRINE) 30 mg in 0.9% sodium chloride 250 mL infusion   mcg/min IntraVENous TITRATE  
 0.9% sodium chloride infusion  100 mL/hr IntraVENous CONTINUOUS  
 levoFLOXacin (LEVAQUIN) 750 mg in D5W IVPB  750 mg IntraVENous Q48H  piperacillin-tazobactam (ZOSYN) 3.375 g in 0.9% sodium chloride (MBP/ADV) 100 mL  3.375 g IntraVENous Q8H  
 vancomycin (VANCOCIN) 1,000 mg in 0.9% sodium chloride (MBP/ADV) 250 mL  1,000 mg IntraVENous Q24H  
 vancomycin (FIRVANQ) 50 mg/mL oral solution 125 mg  125 mg Oral Q6H  
 simvastatin (ZOCOR) tablet 40 mg  40 mg Oral QHS  metoprolol tartrate (LOPRESSOR) tablet 25 mg  25 mg Oral BID  lactobac ac& pc-s.therm-b.anim (MIKE Q/RISAQUAD)  1 Cap Oral DAILY  aspirin chewable tablet 81 mg  81 mg Oral DAILY  insulin glargine (LANTUS) injection 15 Units  15 Units SubCUTAneous DAILY  insulin lispro (HUMALOG) injection   SubCUTAneous AC&HS  
 guaiFENesin ER (MUCINEX) tablet 600 mg  600 mg Oral Q12H No Known Allergies Social History Tobacco Use  Smoking status: Former Smoker  Smokeless tobacco: Never Used Substance Use Topics  Alcohol use: No  
  Frequency: Never No family history on file. Laboratory: I have personally reviewed the critical care flowsheet and labs. Recent Labs  
  03/23/19 0227 03/22/19 
1759 WBC 18.8* 20.9* HGB 9.5* 10.3* HCT 32.4* 33.6*  284 Recent Labs  
  03/23/19 0227 03/22/19 
1759  135* K 4.1 4.9 * 101 CO2 23 27 * 363* BUN 31* 37* CREA 1.03 1.28  
CA 7.7* 9.2 MG 2.1  --   
PHOS 3.2  --   
ALB 2.3* 3.0*  
SGOT 82* 41* ALT 71 35 Objective: Mode Rate Tidal Volume Pressure FiO2 PEEP Vital Signs:   
 TMAX(24) Intake/Output:  
Last shift:        
Last 3 shifts: 03/23 0701 - 03/23 1900 In: 276.3 [I.V.:276.3] Out: - [id number] Intake/Output Summary (Last 24 hours) at 3/23/2019 1029 Last data filed at 3/23/2019 6982 Gross per 24 hour Intake 3151.12 ml Output 225 ml  
 Net 2926.12 ml EXAM:  
GENERAL: well developed and in no distress, HEENT:  PERRL, EOMI, no alar flaring or epistaxis, oral mucosa moist without cyanosis, NECK:  no jugular vein distention, no retractions, no thyromegaly or masses, LUNGS CTAB, respirations non-labored , HEART:  Regular rate and rhythm with no MGR; no edema is present, ABDOMEN:  soft with no tenderness, bowel sounds present, EXTREMITIES:  warm with no cyanosis, SKIN:  no jaundice or ecchymosis and NEUROLOGIC:  alert and oriented, grossly non-focal 
 
Merlinda Connor, MD 
[facility] [place]

## 2019-03-23 NOTE — PROGRESS NOTES
[facility] Dosing Services: Antimicrobial Stewardship Daily Doc Consult for antibiotic dosing of vancomycin by Dr. Clayton Thomas Indication: sepsis Day of Therapy 1 Ht Readings from Last 1 Encounters:  
03/22/19 172.7 cm (68\") Wt Readings from Last 1 Encounters:  
03/22/19 65.8 kg (145 lb) Vancomycin therapy: 
Current maintenance dose: 1000(mg) every 24 hours Dose calculated to approximate a therapeutic trough of 15-20 mcg/mL. Plan for level / Adjustment in Therapy:prior to the 4th dose Dose administration notes:   Doses given appropriately as scheduled Date Dose & Interval Measured (mcg/mL) Extrapolated (mcg/mL) ? ? ? ?  
? ? ? ?  
? ? ? ? Other Antimicrobial  
(not dosed by pharmacist) Zosyn 3.375 g IV q 8 hours Levaquin 750 mg IV q 24 hours Cultures 3/22 bcx: NGTD P Serum Creatinine Lab Results Component Value Date/Time Creatinine 1.28 03/22/2019 05:59 PM  
 
  
  
Creatinine Clearance Estimated Creatinine Clearance: 35 mL/min (based on SCr of 1.28 mg/dL). Temp Temp: 100.4 °F (38 °C) WBC Lab Results Component Value Date/Time WBC 20.9 (H) 03/22/2019 05:59 PM  
 
 
  
H/H Lab Results Component Value Date/Time HGB 10.3 (L) 03/22/2019 05:59 PM  
 
 
  
Platelets Lab Results Component Value Date/Time PLATELET 127 55/06/0122 05:59 PM  
 
 
  
 
Pharmacist Sylwia Reilly, PHARMD Contact information: [phone]

## 2019-03-23 NOTE — ED NOTES
Pt Throughput: Charge Nurse on ICU  made aware of patient's bed assignment, room 3015. Margarito Oneal RN Emergency Dept Charge RN.

## 2019-03-23 NOTE — PROGRESS NOTES
[facility]  
Senior Resident Admission Note CC: Confusion and lethargy HPI: 
Lakhwinder Lopez is a 80 y.o. 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 Family states they noticed he was acting different when pt did not take his insulin this morning as scheduled,but pt lives at [facility] by himself. Pt had stitches removed yesterday after having a melanoma  MOH procedure. Pt was doing well until this afternoon. He was taking prophylactic keflex with po vanc for 14 days  given history of severe c diff. Pt has had several bouts of cdiff with most recent episode was 12/2018 requiring admission 12/15  12/22/2018, discharged on po vanc. Since then diarrhea has resolved. Pt is followed by Cardiologist Dr. Russell Martines after having episode of paroxysmal a fib during admission in 12/2018. Pt's most recent follow up visit was 1/11/2019, at that time pt was in sinus rhythm with PACs and was taken off of digoxin, with the recommendation to restart metoprolol if he has evidence of uncontrolled rates. It was also decided that pt remain off of AC given fall risk, but if a fib is recurrent AC should be considered. In the ED Vitals : 100.4 ,P: 126, RR:26, /73 with a drop to 60s/40s ( maps 50s) , 94%RA Labs : Lactic Acid 1.69,WBC 20.9, Hemoglobin 10.3, RQN917, , glucose 363, Cr.1.28 9 ( baseline 0.8-0.9) ,ALT 35, AST 41,Alk phos: 141 EKG : sinus tachy on repeat AFIB RVR with RBBB,rate 129, QTc 495 Imaging: Portable Chest Xray : negative, Rapid influenza A: negative Treatment : I,974L NS bolus , Tylenol 650mg , IV vancomycin,metoprolol 50mg po, IV metoprolol 5mg Physical Examination: General appearance - sleeping but easily arousable, thin , in no distress Head:Left side of face with erythema surrounding well healed scarring with no drainage Chest -  Rhonic localized to the LL lobe, no wheezes or rales , symmetric air entry Heart - irregular rate, irregular rhythm, normal S1, S2, no murmurs, rubs, clicks or gallops, Abdomen - soft, nontender, nondistended, no masses or organomegaly, +BS Neurological - alert & oriented x 4, normal speech, no focal findings,UE strength 5/5 , pt would not cooperative for the LE strength test or sensation Extremities - peripheral pulses normal, no pedal edema, no clubbing or cyanosis A/P: Mr. Efraín Childress is a 81 yo male with history of Afib and history of c diff, who presents with symptoms of confusion and lethargy found to be in septic shock with  Afib RVR. Septic Shock now requiring pressors :unsure of infectious sources,but c diff is unlikely given no GI symptoms of diarrhea or abdominal pain. Pt is meeting 4/4 SIRS criteria  (WBC 20.9,  , RR 26, T 100.4) - Admit to stepdown - Vital signs per unit protocol - IV Fluids: Received 3.9Lin ER. Continue MIVF to maintain a MAP >65, would consider pressors (Levophed) if MAP drops below goal.  
- Empiric IV Antibiotics: Broad spectrum Vancomycin, Zosyn and Levaquin - Will continue home dose of po vanc - Obtained blood cultures (prior to initiation of Abx), UA w/ Ucx 
- CXR  PA/LA 
- UA pending - PNA labs  Pending  
- RVP pending Afib RVR 
12/2018:Echo EF 55- 60%, CHADs Vasc score:4. S/p Metoprolol 5mg IV with home dose. - Will  Consider giving IV metoprolol2.5mg if HR remains uncontrolled - Will continue home metoprolol 50mg bid - Will consider head CT scan once stable for history of confusion IDDM type II Hemoglobin A1c 7.5 2/2019  
- Will give Lantus equivalent of home  NPH 70/30 dose  
- SSI 
- Glucose checks achs Patient seen, examined, and discussed with Dr. Anita Ken (PGY-1). For the remaining assessment and plan of other medical problems please refer to Dr. Brandie Atkins H&P for more details.  
 
Pt discussed with on-call attending physician, Yeni Morales MD 
Family Medicine Resident, PGY-3

## 2019-03-23 NOTE — PROGRESS NOTES
@1457 Bedside and Verbal shift change report given to Ishmael Forte RN (oncoming nurse) by Shante Jaimes RN (offgoing nurse). Report included the following information SBAR, Kardex, ED Summary, Intake/Output, MAR, Accordion, Recent Results, Med Rec Status, Cardiac Rhythm Sinus and Alarm Parameters . @1183 Dr. Channing Wilhelm and family resident team arrive and exam the patient. Plan of care is to perform bedside swallow and advance diet from NPO. 
 
@1000 Bedside swallow completed. Family at bedside. Review from previous admission disclose recommendation for a nectar thick diet. @8527 TRANSFER - OUT REPORT: 
 
Verbal report given to Mari TOLBERT(name) on [facility]  being transferred to Remote Telemetry(unit) for routine progression of care Report consisted of patients Situation, Background, Assessment and  
Recommendations(SBAR). Information from the following report(s) SBAR, Kardex, ED Summary, Intake/Output, MAR, Accordion, Recent Results, Med Rec Status, Cardiac Rhythm Sinus and Alarm Parameters  was reviewed with the receiving nurse. Lines:  
Venous Access Device Upper chest (subclavicular area, right (Active) Central Line Being Utilized No 3/23/2019  8:45 AM  
Criteria for Appropriate Use Hemodynamically unstable, requiring monitoring lines, vasopressors, or volume resuscitation 3/23/2019  8:45 AM  
Site Assessment Clean, dry, & intact 3/23/2019  8:45 AM  
Date of Last Dressing Change 03/23/19 3/23/2019  8:45 AM  
Dressing Status Clean, dry, & intact 3/23/2019  8:45 AM  
Dressing Type Disk with Chlorhexadine gluconate (CHG) 3/23/2019  8:45 AM  
Action Taken Open ports on tubing capped 3/23/2019  8:45 AM  
Date Accessed (Medial Site) 03/23/19 3/23/2019  8:45 AM  
Positive Blood Return (Medial Site) Yes 3/23/2019  8:45 AM  
   
Peripheral IV 03/22/19 Left;Upper Arm (Active) Site Assessment Clean, dry, & intact 3/23/2019  8:45 AM  
Phlebitis Assessment 0 3/23/2019  8:45 AM  
 Infiltration Assessment 0 3/23/2019  8:45 AM  
Dressing Status Clean, dry, & intact 3/23/2019  8:45 AM  
Dressing Type Transparent 3/23/2019  8:45 AM  
Hub Color/Line Status Pink;Capped;Flushed 3/23/2019  8:45 AM  
Action Taken Open ports on tubing capped 3/23/2019  8:45 AM  
Alcohol Cap Used Yes 3/23/2019  8:45 AM  
   
Peripheral IV 03/22/19 Right;Upper Forearm (Active) Site Assessment Clean, dry, & intact 3/23/2019  8:45 AM  
Phlebitis Assessment 0 3/23/2019  8:45 AM  
Infiltration Assessment 0 3/23/2019  8:45 AM  
Dressing Status Clean, dry, & intact 3/23/2019  8:45 AM  
Dressing Type Transparent 3/23/2019  8:45 AM  
Hub Color/Line Status Pink; Infusing;Flushed 3/23/2019  8:45 AM  
Action Taken Open ports on tubing capped 3/23/2019  8:45 AM  
Alcohol Cap Used Yes 3/23/2019  8:45 AM  
  
 
Opportunity for questions and clarification was provided. Patient transported with: 
 Monitor O2 @ 2 liters Registered Nurse Tech

## 2019-03-23 NOTE — PROGRESS NOTES
[facility]: MED RECONCILIATION Comments/Recommendations:  
Med rec completed via interview with patient's family members who manage medication. Of note, family states when patient goes on antibiotics, his GI doctor puts him on vancomycin oral since he has had c diff so many times. Patient recently finished a 5 day course of keflex yesterday and began vancomycin 125 mg po QID x 14 days on 3/15 (same day keflex was started). Family states patient is no longer receiving any chemo or injections at any clinic. Patient finished chemo last year. Medications added:  
 
omeprazole Medications adjusted: 
 
Changed vancomycin to 125 mg QID Information obtained from: family, rx query Significant PMH/Disease States:  
Past Medical History:  
Diagnosis Date Anemia Arthritis Cancer ([address] Constipation Diabetes ([address] Gastrointestinal disorder History of neoplasms, uncertain behavior Of soft tissue and skin Hypercholesterolemia Hyperkalemia Hypertension Low back pain Melanoma in situ of other parts of face ([address] Lower lip; removed Neuropathy Right bundle-branch block Spinal stenosis Type II diabetes mellitus ([address] Venous insufficiency Chief Complaint for this Admission: Chief Complaint Patient presents with Fever Lethargy Chest Pain Allergies: Patient has no known allergies. Prior to Admission Medications:  
Prior to Admission Medications Prescriptions Last Dose Informant Patient Reported? Taking?  
acetaminophen (TYLENOL) 500 mg tablet 3/21/2019 at PM Family Member Yes Yes Sig: Take 500 mg by mouth nightly. aspirin 81 mg chewable tablet 3/22/2019 at Unknown time Family Member No Yes Sig: Take 1 Tab by mouth daily. b complex vitamins (B COMPLEX 1) tablet 3/22/2019 at Unknown time Family Member Yes Yes Sig: Take 1 Tab by mouth daily.   
chlorhexidine (PERIDEX) 0.12 % solution 3/22/2019 at Unknown time Family Member Yes Yes Sig: 15 mL by Swish and Spit route two (2) times a day. cholecalciferol (VITAMIN D3) 1,000 unit tablet 3/22/2019 at Unknown time Family Member Yes Yes Sig: Take 1,000 Units by mouth daily. ferrous sulfate 325 mg (65 mg iron) tablet 3/22/2019 at Unknown time Family Member Yes Yes Sig: Take 325 mg by mouth two (2) times a day. insulin NPH hum/reg insulin hm (HUMULIN 70/30 U-100 INSULIN SC) 3/22/2019 at [address] [place] [address] Yes Yes Si Units by SubCUTAneous route daily. lactobacillus combo no.6 (PROBIOTIC COMPLEX PO) 3/22/2019 at Unknown time Family Member Yes Yes Sig: Take 1 Cap by mouth daily. lutein 20 mg cap 3/22/2019 at Unknown time Family Member Yes Yes Sig: Take 20 mg by mouth daily. metoprolol tartrate (LOPRESSOR) 50 mg tablet 3/22/2019 at Unknown time Family Member Yes Yes Sig: Take 25 mg by mouth two (2) times a day. mirabegron ER (MYRBETRIQ) 50 mg ER tablet 3/21/2019 at PM Family Member Yes Yes Sig: Take 50 mg by mouth every fourty-eight (48) hours. omeprazole (PRILOSEC) 20 mg capsule 3/22/2019 at am Family Member Yes Yes Sig: Take 20 mg by mouth Daily (before breakfast). simvastatin (ZOCOR) 80 mg tablet 3/21/2019 at PM Family Member Yes Yes Sig: Take 40 mg by mouth nightly. vancomycin MERY KATARINA [facility]) 50 mg/mL oral solution 3/22/2019 at Unknown time Family Member Yes Yes Sig: Take 125 mg by mouth four (4) times daily. Indications: 25 mg once every other day. To end on 19 Facility-Administered Medications: None Sherry Roy, PHARMD   Contact: [phone]

## 2019-03-23 NOTE — PROGRESS NOTES
Problem: Risk for Spread of Infection Goal: Prevent transmission of infectious organism to others Description Prevent the transmission of infectious organisms to other patients, staff members, and visitors. Outcome: Progressing Towards Goal 
  
Problem: Patient Education:  Go to Education Activity Goal: Patient/Family Education Outcome: Progressing Towards Goal

## 2019-03-23 NOTE — ED NOTES
TRANSFER - OUT REPORT: 
 
Verbal report given to Michelle(name) on Afia Nickerson  being transferred to ICU(unit) for routine progression of care Report consisted of patients Situation, Background, Assessment and  
Recommendations(SBAR). Information from the following report(s) SBAR, ED Summary, MAR, Recent Results and Cardiac Rhythm [facility] was reviewed with the receiving nurse. Lines:  
Venous Access Device Upper chest (subclavicular area, right (Active) Peripheral IV 03/22/19 Left;Upper Arm (Active) Site Assessment Clean, dry, & intact 3/22/2019  7:56 PM  
Phlebitis Assessment 0 3/22/2019  7:56 PM  
Infiltration Assessment 0 3/22/2019  7:56 PM  
Dressing Status Clean, dry, & intact 3/22/2019  7:56 PM  
Hub Color/Line Status Pink 3/22/2019  7:56 PM  
Alcohol Cap Used Yes 3/22/2019  7:56 PM  
   
Peripheral IV 03/22/19 Right;Upper Forearm (Active) Site Assessment Clean, dry, & intact 3/22/2019  9:43 PM  
Phlebitis Assessment 0 3/22/2019  9:43 PM  
Infiltration Assessment 0 3/22/2019  9:43 PM  
Dressing Status Clean, dry, & intact 3/22/2019  9:43 PM  
Dressing Type Tape;Transparent 3/22/2019  9:43 PM  
Hub Color/Line Status Pink;Flushed 3/22/2019  9:43 PM  
Action Taken Blood drawn 3/22/2019  9:43 PM  
Alcohol Cap Used Yes 3/22/2019  9:43 PM  
  
 
Opportunity for questions and clarification was provided. Patient transported with: 
 Monitor O2 @ 2 liters Registered Nurse

## 2019-03-23 NOTE — PROGRESS NOTES
TRANSFER - IN REPORT: 
 
Verbal report received from [facility] [place] (name) on Salbador March  being received from ED (unit) for routine progression of care Report consisted of patients Situation, Background, Assessment and  
Recommendations(SBAR). Information from the following report(s) SBAR, Kardex, ED Summary, Procedure Summary, Intake/Output and Recent Results was reviewed with the receiving nurse. Opportunity for questions and clarification was provided. Assessment completed upon patients arrival to unit and care assumed. 0100 Pt received from ED on portable monitoring, 2L/NC and transferred to ICU bed and monitoring. Admissions assessment performed. Pt is extremely hard of hearing without hearing aids. Carlos verified at the bedside. PIV's flushed. Port a cath accessed as pt is requiring pressors. 0230 Labs drawn and sent. Able to titrate down on carlos. Family practiced relayed that they would like pt to receive PO meds. Will administer mucinex at this time. 0500 Discussed order for PA LAT CXR with family practice. Agreed pt would not tolerate at this time. Reviewed with radiology as well.  
 
0605 Carlos titrated off at this time. MAP >65. Bedside and Verbal shift change report given to Sybil Johnson (oncoming nurse) by Servando Rodriguez RN (offgoing nurse). Report included the following information SBAR, Kardex, ED Summary, Procedure Summary, Intake/Output, MAR and Recent Results.

## 2019-03-23 NOTE — PROGRESS NOTES
[facility] [address] Office [phone] Fax [phone] [address] Medicine Residency Attending Addendum: I saw and evaluated the patient on the day of the encounter with Dr. Corina Graham, performing the key elements of the service. I discussed the findings, assessment and plan with the resident and agree with the resident's findings and plan as documented in the resident's note. More alert this morning. Off pressors. Continue broad-spectrum antibiotics. Pending cultures. Creatinine improved. Atrial/flutter overnight. In the low 100s-90s. Will restart metoprolol. Bob Jauregui MD, CAQSM, RMSK Assessment and Plan Jacinto Bah is a 80 y.o. male admitted for Septic Shock. He has spent 1 night(s) in the hospital. 
 
24 Hour Events:  
- Continues to be on hussein at 5 mcg Septic shock-  SIRS criteria met: 4/4 T 100.4  RR 26 pulse 126 WBC 20.9.  Infectious source likely 2/2 Facial cellulitis vs Aspiration pneumonia. Has had hospitalizations in the past for aspiration pneumonia and has thick liquid diet.  CXR Bibasilar atelectasis/airspace disease is unchanged. Patient on pressors s/p 3L NS bolus. Has IJ central line in place. LA 1.69 
- Continue Broad spectrum antibiotics Vanc, Zosyn, Levaquin, will also continue prophylactic PO Vanc and probiotic   
- F/U Urine, respiratory and blood cultures - F/U RVP, legionella, S.pneumo - MIVF @100 MIVF 
- On hussein synephrine, wean as tolerated (goal MAP >65)  
- Stric I/O, have not been charted this am given urinary incontinence, condom cath has been ordered 
  
Afib with RVR- Follows Dr. Diana Baird. - Continue home metoprolol (LOPRESSOR) 50 mg tab BID 
- Cardiac monitoring 
  
DMII - HgbA1c 7.5 (2/11/19). Glucose on admission 363. 
- Holding home medications of NPH 24 U daily. 
- Started on  Lantus 15 units at bedtime. 
- Insulin Sliding Scale normal sensitivity with AC&HS glucose checks. - Hypoglycemia protocols ordered. 
  
OSVALDO - Cr 1.28 POA (BL ~ 0.8).  Likely due to IVVD, will calculate FeNa. 
- NS @ 100 mL/hr 
- Urine sodium, creatinine 
- avoid and holding home nephrotoxic drugs 
  
Hypertension- BP on admission 125/73. In septic shock requiring pressors - Continuing home medications of metoprolol (LOPRESSOR) 50 mg tab given patient in Afib with RVR on admission.  
- Will continue to monitor at this time and readjust as BP's trend. 
  
Anemia 2/2 chronic disease:  POA Hgb 10.3 (BL 7.9-9.6)TIBC wnl, Iron % sat low, iron low, ferritin normal (3/19/19) - Daily CBC 
  
Symptomatic GERD 
- Hold home prilosec 
- Start IV protonix 40mg daily 
  
HLD No lipid panel on record. - Home zocor  
  
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 Has had IJ Port in place since then. Follows with Dr. Cem Abreu out patient.  
  
  
FEN/GI - NPO until passed speech eval. NS at 100 mL/hr. Activity - Ambulate with assistance DVT prophylaxis - Sub Q Heparin GI prophylaxis - protonix Fall prophylaxis - Fall precautions ordered. Disposition - Admit to stepdown. Plan to d/c TBD. Consulting PT/OT/CM Code Status - Full, discussed with patient / caregivers. Next of Kin Name and Contact Daughter: Ms. Kami Saeed ([phone]) David Coy MD 
Family Medicine Resident Subjective / Objective Subjective Patient is much improved this morning. He states he feels much better. Given urinary incontinence will have condom cath in place. Denies any SOB, chest pain, or pain at this time. Temp (24hrs), Av.1 °F (37.3 °C), Min:98.1 °F (36.7 °C), Max:100.4 °F (38 °C) Objective Respiratory: O2 Flow Rate (L/min): 2 l/min O2 Device: Nasal cannula Visit Vitals /70 Pulse (!) 105 Temp 98.8 °F (37.1 °C) Resp 24 Ht 5' 8\" (1.727 m) Wt 148 lb 5.9 oz (67.3 kg) SpO2 96% BMI 22.56 kg/m² General: No acute distress. Alert. Cooperative. Head: Normocephalic. Atraumatic. Left preauricular region continues to be erythematous. No crusting or discharge noted. Respiratory: CTAB. No w/r/r/c.  
Cardiovascular: Tachycardic, irregularly irregular. Normal S1,S2. No m/r/g. Pulses 2+ throughout. GI: + bowel sounds. Nontender. No rebound tenderness or guarding. Nondistended. Extremities:  No LE edema. Distal pulses intact. Musculoskeletal: Full ROM in all extremities. Skin: Warm, dry. No rashes. Neuro: CN II-XII grossly intact. Strength 5/5 in all extremities. Alert and oriented to person, place and time. I/O: 
Date 03/22/19 0700 - 03/23/19 7827 03/23/19 0700 - 03/24/19 5184 Shift 1250-6679 4377-3228 24 Hour Total 6742-1999 4327-7435 24 Hour Total  
INTAKE  
I.V.(mL/kg/hr)  2874.8 2874.8 Phenylephrine Volume  174.8 174.8 Volume (0.9% sodium chloride infusion)  600 600 Volume (sodium chloride 0.9 % bolus infusion 1,000 mL)  1000 1000 Volume (sodium chloride 0.9 % bolus infusion 500 mL)  500 500 Volume (levoFLOXacin (LEVAQUIN) 750 mg in D5W IVPB)  150 150 Volume (piperacillin-tazobactam (ZOSYN) 3.375 g in 0.9% sodium chloride (MBP/ADV) 100 mL)  100 100 Volume (piperacillin-tazobactam (ZOSYN) 3.375 g in 0.9% sodium chloride (MBP/ADV) 100 mL)  100 100 Volume (vancomycin (VANCOCIN) 1,000 mg in 0.9% sodium chloride (MBP/ADV) 250 mL)  0 0 Volume (vancomycin (VANCOCIN) 1,000 mg in 0.9% sodium chloride (MBP/ADV) 250 mL)  250 250 Shift Total(mL/kg)  2874. 8(42.7) 3531. 8(42.7) OUTPUT Urine(mL/kg/hr)  225 225 Urine Voided  225 225 Urine Occurrence(s)  2 x 2 x Shift Total(mL/kg)  225(3.3) 225(3.3) NET  2649.8 2649.8 Weight (kg) 65.8 67.3 67.3 67.3 67.3 67.3 CBC: 
Recent Labs  
  03/23/19 
0227 03/22/19 
1759 WBC 18.8* 20.9* HGB 9.5* 10.3* HCT 32.4* 33.6*  284 Metabolic Panel: 
Recent Labs  
  03/23/19 0227 03/22/19 
1759  135* K 4.1 4.9 * 101 CO2 23 27 BUN 31* 37* CREA 1.03 1.28  
* 363* CA 7.7* 9.2 MG 2.1  --   
PHOS 3.2  --   
ALB 2.3* 3.0*  
SGOT 82* 41* ALT 71 35 For Billing Chief Complaint Patient presents with  Fever  Lethargy  Chest Pain Hospital Problems  Date Reviewed: 3/19/2019 Codes Class Noted POA Atrial fibrillation with rapid ventricular response (HCC) ICD-10-CM: I48.91 
ICD-9-CM: 427.31  3/22/2019 Unknown Septic shock (HCC) ICD-10-CM: A41.9, R65.21 ICD-9-CM: 038.9, 785.52, 995.92  3/22/2019 Unknown

## 2019-03-23 NOTE — PROGRESS NOTES
SEVERE SEPSIS / SEPTIC SHOCK REASSESSMENT 
 
S: Brianna Ray is currently being treated for Severe Sepsis or Septic Shock. He is undergoing treatment, including Broad-Spectrum Antibiotics and Fluid Challenge. They have recently completed the initial 30 cc/kg fluid challenge. Patient currently on pressors. O:  
Sepsis Re-Assessment Documentation:  
Vital Signs Level of Consciousness: Alert Temp: 98.8 °F (37.1 °C) Temp Source: Oral 
Pulse (Heart Rate): 103! Heart Rate Source: Monitor Cardiac Rhythm: Sinus tachycardia Resp Rate: 23 BP: 124/72 MAP (Monitor): 86 MAP (Calculated): 90 BP 1 Location: Right arm BP 1 Method: Automatic 
BP Patient Position: At rest 
MEWS Score: 2 Recent Results (from the past 4 hour(s)) TSH 3RD GENERATION Collection Time: 03/23/19  2:27 AM  
Result Value Ref Range TSH 1.49 0.36 - 3.74 uIU/mL METABOLIC PANEL, COMPREHENSIVE Collection Time: 03/23/19  2:27 AM  
Result Value Ref Range Sodium 141 136 - 145 mmol/L Potassium 4.1 3.5 - 5.1 mmol/L Chloride 110 (H) 97 - 108 mmol/L  
 CO2 23 21 - 32 mmol/L Anion gap 8 5 - 15 mmol/L Glucose 237 (H) 65 - 100 mg/dL BUN 31 (H) 6 - 20 MG/DL Creatinine 1.03 0.70 - 1.30 MG/DL  
 BUN/Creatinine ratio 30 (H) 12 - 20 GFR est AA >60 >60 ml/min/1.73m2 GFR est non-AA >60 >60 ml/min/1.73m2 Calcium 7.7 (L) 8.5 - 10.1 MG/DL Bilirubin, total 0.5 0.2 - 1.0 MG/DL  
 ALT (SGPT) 71 12 - 78 U/L  
 AST (SGOT) 82 (H) 15 - 37 U/L Alk. phosphatase 206 (H) 45 - 117 U/L Protein, total 6.0 (L) 6.4 - 8.2 g/dL Albumin 2.3 (L) 3.5 - 5.0 g/dL Globulin 3.7 2.0 - 4.0 g/dL A-G Ratio 0.6 (L) 1.1 - 2.2    
CBC WITH AUTOMATED DIFF Collection Time: 03/23/19  2:27 AM  
Result Value Ref Range WBC 18.8 (H) 4.1 - 11.1 K/uL  
 RBC 3.80 (L) 4.10 - 5.70 M/uL HGB 9.5 (L) 12.1 - 17.0 g/dL HCT 32.4 (L) 36.6 - 50.3 % MCV 85.3 80.0 - 99.0 FL  
 MCH 25.0 (L) 26.0 - 34.0 PG  
 MCHC 29.3 (L) 30.0 - 36.5 g/dL RDW 17.1 (H) 11.5 - 14.5 % PLATELET 414 505 - 731 K/uL MPV 11.3 8.9 - 12.9 FL  
 NRBC 0.0 0  WBC ABSOLUTE NRBC 0.00 0.00 - 0.01 K/uL NEUTROPHILS 77 (H) 32 - 75 % BAND NEUTROPHILS 2 0 - 6 % LYMPHOCYTES 12 12 - 49 % MONOCYTES 8 5 - 13 % EOSINOPHILS 0 0 - 7 % BASOPHILS 0 0 - 1 % METAMYELOCYTES 1 (H) 0 % IMMATURE GRANULOCYTES 0 %  
 ABS. NEUTROPHILS 14.9 (H) 1.8 - 8.0 K/UL  
 ABS. LYMPHOCYTES 2.3 0.8 - 3.5 K/UL  
 ABS. MONOCYTES 1.5 (H) 0.0 - 1.0 K/UL  
 ABS. EOSINOPHILS 0.0 0.0 - 0.4 K/UL  
 ABS. BASOPHILS 0.0 0.0 - 0.1 K/UL  
 ABS. IMM. GRANS. 0.0 K/UL  
 DF MANUAL    
 RBC COMMENTS ANISOCYTOSIS 
1+ MAGNESIUM Collection Time: 03/23/19  2:27 AM  
Result Value Ref Range Magnesium 2.1 1.6 - 2.4 mg/dL PHOSPHORUS Collection Time: 03/23/19  2:27 AM  
Result Value Ref Range Phosphorus 3.2 2.6 - 4.7 MG/DL General appearance: alert, cooperative, no distress, appears stated age Lungs: clear to auscultation bilaterally, no wheezes, no increased work of breathing Heart: regular rate and rhythm, S1, S2 normal, no murmur, click, rub or gallop. Pulses: 2+ and symmetric Present Capillary Refill:<3 seconds normal  
Abdomen: soft, non-tender. Bowel sounds normal. No masses,  no organomegaly Extremities: extremities normal, atraumatic, no cyanosis or edema Skin:  Pale Neurologic: Alert and oriented X 3, normal strength and tone. Normal symmetric reflexes. Assessment and Plan: Mr. James Pacheco is an 80 y.o. male currently being treated for severe sepsis/septic shock - Patient on hussein, wean as tolerated (goal MAP >65) - continue MIVF 
- For complete assessment and plan please refer to H&P Adelaida Ramos MD 
3/23/2019 6:25 AM

## 2019-03-23 NOTE — PROGRESS NOTES
[facility] [address] Office [phone] Fax [phone] [address] Medicine Residency Attending Addendum: I saw and evaluated the patient on the day of the encounter with Dr. Mushtaq Chaves, performing the key elements of the service. I discussed the findings, assessment and plan with the resident and agree with the resident's findings and plan as documented in the resident's note. Noted to be sinus tach overnight on telemetry but now back in atrial fibrillation with increasedwith rate of around 110. Will take 50 mg twice a day. PT OT to eval.  Leukocytosis improving. Potential discharge tomorrow. Chu Milian MD, CAQSM, RMSK Assessment and Plan Afia Nickerson is a 80 y.o. male admitted for Septic Shock. He has spent 2 night(s) in the hospital. 
 
24 Hour Events: No acute events overnight Septic shock likely secondary to HAP:  SIRS criteria met: 4/4 T 100.4  RR 26 pulse 126 WBC 20.9.  Infectious source likely 2/2 Facial cellulitis vs aspiration pneumonia. Has had hospitalizations in the past for aspiration pneumonia and has thick liquid diet.  CXR Bibasilar atelectasis/airspace disease is unchanged. Patient was placed on pressors s/p 3L NS bolus and weaned off. Has IJ central line in place.  
- Continue Broad spectrum antibiotics Vanc, Zosyn, Levaquin. Also continue prophylactic PO Vanc and probiotic   
- Urine culture pending - Respiratory panel: negative - Blood cultures: No growth at 2 days - F/U Legionella, S.pneumo - Strict I/O 
  
Afib with RVR- Follows Dr. James Garcia. - Increased Metoprolol to 50mg BID for better rate control. 
- Cardiac monitoring 
  
DMII - HgbA1c 7.5 (2/11/19). Glucose on admission 363. 
- Holding home medications of NPH 24 U daily. - Continue Lantus 15 units at bedtime. 
- Insulin Sliding Scale normal sensitivity with AC&HS glucose checks.  
- Hypoglycemia protocols ordered. 
  
 OSVALDO - Cr 1.28 POA (BL ~ 0.8). - Monitor - Avoid and holding home nephrotoxic drugs 
  
Hypertension- BP on admission 125/73. In septic shock requiring pressors - Continuing home medications of metoprolol (LOPRESSOR) 50 mg tab given patient in Afib with RVR on admission.  
- Will continue to monitor at this time and readjust as BP's trend. 
  
Anemia 2/2 chronic disease:  POA Hgb 10.3 (BL 7.9-9.6)TIBC wnl, Iron % sat low, iron low, ferritin normal (3/19/19) - Daily CBC 
  
Symptomatic GERD 
- Hold home prilosec - PO Protonix 40mg BID 
  
HLD: No lipid panel on record. -Continue home Zocor  
  
Large B- Cell lymphoma, on remission- Diagnosed  has been on remission since . Has had IJ Port in place since then. Follows with Dr. Danielle Suggs out patient. FEN/GI - ABZEAVHLQ pureed.  
Activity - Ambulate with assistance DVT prophylaxis - Sub Q Heparin GI prophylaxis - protonix Fall prophylaxis - Fall precautions ordered. Disposition - Admit to stepdown. Plan to d/c TBD. Consulting PT/OT/CM Code Status - Full, discussed with patient / caregivers. Next of Kin Name and Contact Daughter: Ms. Gisele Nielsen ([phone]) Deirdre Brandon MD 
Family Medicine Resident Subjective / Objective Subjective Patient with no acute concerns today. He denies any SOB, chest pain, or pain at this time. Temp (24hrs), Av.2 °F (36.8 °C), Min:97.9 °F (36.6 °C), Max:98.5 °F (36.9 °C) Objective Respiratory: O2 Flow Rate (L/min): 2 l/min O2 Device: Nasal cannula Visit Vitals /70 (BP 1 Location: Left arm, BP Patient Position: At rest) Pulse (!) 114 Temp 98.3 °F (36.8 °C) Resp 24 Ht 5' 8\" (1.727 m) Wt 149 lb 7.6 oz (67.8 kg) SpO2 90% BMI 22.73 kg/m² General: No acute distress. Alert. Cooperative. Head: Normocephalic. Atraumatic. Left preauricular region continues to be erythematous. No crusting or discharge noted. Respiratory: CTAB. No w/r/r/c. Cardiovascular: Tachycardic, irregularly irregular. Normal S1,S2. No m/r/g. Pulses 2+ throughout. GI: + bowel sounds. Nontender. No rebound tenderness or guarding. Nondistended. Extremities:  No LE edema. Distal pulses intact. Musculoskeletal: Full ROM in all extremities. Skin: Warm, dry. No rashes. IJ in place in right upper chest.   
Neuro: CN II-XII grossly intact. I/O: 
Date 03/23/19 0700 - 03/24/19 6673 03/24/19 0700 - 03/25/19 0580 Shift [id number] 1900-0659 24 Hour Total 7678-2942 3499-5386 24 Hour Total  
INTAKE  
P.O.  240 240     
  P. O.  240 240     
I. V.(mL/kg/hr) 276.3(0.3)  276.3(0.2) Phenylephrine Volume 1.3  1.3 Volume (0.9% sodium chloride infusion) 275  275 Volume (levoFLOXacin (LEVAQUIN) 750 mg in D5W IVPB) 0  0 Volume (piperacillin-tazobactam (ZOSYN) 3.375 g in 0.9% sodium chloride (MBP/ADV) 100 mL) 0  0 Volume (vancomycin (VANCOCIN) 1,000 mg in 0.9% sodium chloride (MBP/ADV) 250 mL) 0  0 Shift Total(mL/kg) 276. 3(4.1) 240(3.5) 516.3(7.6) OUTPUT Urine(mL/kg/hr)  750(0.9) 750(0.5) 175  175 Urine Voided  750 750 175  175 Shift Total(mL/kg)  750(11.1) 750(11.1) 175(2.6)  175(2.6) .3 -510 -233.7 -175  -175 Weight (kg) 67.3 67.8 67.8 67.8 67.8 67.8  
 
 
CBC: 
Recent Labs  
  03/24/19 0319 03/23/19 0227 03/22/19 1759 WBC 14.6* 18.8* 20.9* HGB 8.6* 9.5* 10.3* HCT 29.2* 32.4* 33.6*  
 262 284 Metabolic Panel: 
Recent Labs  
  03/24/19 
0319 03/23/19 0227 03/22/19 
1759  141 135* K 4.0 4.1 4.9 * 110* 101 CO2 24 23 27 BUN 17 31* 37* CREA 0.79 1.03 1.28  
* 237* 363* CA 8.3* 7.7* 9.2 MG 2.0 2.1  --   
PHOS 2.2* 3.2  --   
ALB 2.1* 2.3* 3.0*  
SGOT 28 82* 41* ALT 47 71 35 For Billing Chief Complaint Patient presents with  Fever  Lethargy  Chest Pain Hospital Problems  Date Reviewed: 3/19/2019 Codes Class Noted POA Atrial fibrillation with rapid ventricular response (HCC) ICD-10-CM: I48.91 
ICD-9-CM: 427.31  3/22/2019 Unknown Septic shock (HCC) ICD-10-CM: A41.9, R65.21 ICD-9-CM: 038.9, 785.52, 995.92  3/22/2019 Unknown

## 2019-03-23 NOTE — PROGRESS NOTES
PT NOTE - Noted order for PT/OT. Chart reviewed. Patient came in yesterday for septic shock and on pressor support  in ICU on pressors at time chart reviewed. Patient does not appear ready to begin PT at this time. Will follow over the weekend for appropriateness of when to begin PT /evaluation and treatment.   
Yobani Reese, PT, DPT

## 2019-03-23 NOTE — H&P
[facility] [address]  Office [phone] Fax [phone] Admission H&P Name: Rissa Ray MRN: [id number]  Sex: Male YOB: 1927  Age: 80 y.o. PCP: Joao Boyd MD  
 
Source of Information: patient, medical records Chief complaint: *** History of Present Illness Rissa Ray is a 80 y.o. male with known *** who presents to the ER complaining of ***. In the ED: - Vitals - *** 
- Labs - *** 
- Imaging - ***  
- Treatment - *** 
 
EKG: *** Patient Vitals for the past 12 hrs: 
 Temp Pulse Resp BP SpO2  
03/22/19 2144  84  (!) 84/47   
03/22/19 1744 100.4 °F (38 °C) (!) 126 26 125/73 94 % Review of Systems Review of Systems Home Medications Prior to Admission medications Medication Sig Start Date End Date Taking? Authorizing Provider  
cephALEXin (KEFLEX) 500 mg capsule Take 500 mg by mouth four (4) times daily. 3/15/19   Provider, Historical  
insulin NPH hum/reg insulin hm (HUMULIN 70/30 U-100 INSULIN SC) 24 Units by SubCUTAneous route daily. Provider, Historical  
metoprolol tartrate (LOPRESSOR) 50 mg tablet Take 25 mg by mouth two (2) times a day. Provider, Historical  
vancomycin (FIRVANQ) 50 mg/mL oral solution Take 25 mg by mouth daily. Provider, Historical  
diclofenac (VOLTAREN) 1 % gel Apply 4 g to affected area four (4) times daily. 2/11/19   Joao Boyd MD  
aspirin 81 mg chewable tablet Take 1 Tab by mouth daily. 1/11/19   Junious Chiles T, DO  
ferrous sulfate 325 mg (65 mg iron) tablet Take 325 mg by mouth two (2) times a day. Other, MD Garrison  
b complex vitamins (B COMPLEX 1) tablet Take 1 Tab by mouth daily. Provider, Historical  
chlorhexidine (PERIDEX) 0.12 % solution 15 mL by Swish and Spit route daily. Provider, Historical  
lactobacillus combo no.6 (PROBIOTIC COMPLEX PO) Take 1 Cap by mouth daily.     Provider, Historical  
 lutein 20 mg cap Take 20 mg by mouth daily. Garrison Padilla MD  
cholecalciferol (VITAMIN D3) 1,000 unit tablet Take 1,000 Units by mouth daily. Garrison Padilla MD  
mirabegron ER (MYRBETRIQ) 50 mg ER tablet Take 50 mg by mouth every fourty-eight (48) hours. Garrison Padilla MD  
simvastatin (ZOCOR) 80 mg tablet Take 40 mg by mouth nightly. Provider, Historical  
 
 
Allergies No Known Allergies Past Medical History:  
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  
Procedure Laterality Date  HX HEENT    
 HX ORTHOPAEDIC    
 HX PROSTATECTOMY  HX UROLOGICAL    
 NEUROLOGICAL PROCEDURE UNLISTED No family history on file. Social History Social History Socioeconomic History  Marital status:  Spouse name: Not on file  Number of children: Not on file  Years of education: Not on file  Highest education level: Not on file Occupational History  Not on file Social Needs  Financial resource strain: Not on file  Food insecurity:  
  Worry: Not on file Inability: Not on file  Transportation needs:  
  Medical: Not on file Non-medical: Not on file Tobacco Use  Smoking status: Former Smoker  Smokeless tobacco: Never Used Substance and Sexual Activity  Alcohol use: No  
  Frequency: Never  Drug use: No  
 Sexual activity: Never Lifestyle  Physical activity:  
  Days per week: Not on file Minutes per session: Not on file  Stress: Not on file Relationships  Social connections:  
  Talks on phone: Not on file Gets together: Not on file Attends Voodoo service: Not on file Active member of club or organization: Not on file Attends meetings of clubs or organizations: Not on file Relationship status: Not on file  Intimate partner violence:  
  Fear of current or ex partner: Not on file Emotionally abused: Not on file Physically abused: Not on file Forced sexual activity: Not on file Other Topics Concern  Not on file Social History Narrative ** Merged History Encounter ** Alcohol history: {Blank Single Select Template:20061::\"Not at all\",\"Social\",\"Yes, *** on weekdays, *** on weekends of ***\",\"Rarely\"} Smoking history: {Quaero Single Select Template:20061::\"Smokes *** ppd x *** years\",\"Former smoker, smoked *** ppd x *** years, quit *** years ago\",\"Non-smoker\"} Illicit drug history: {Quaero Single Select Template:20061::\"Distant history of marijuana\",\"Distant history of cocaine\",\"Not at all\"} Living arrangement: patient {lives with:567725::\"lives with their family\"}. Ambulates: {Quaero Single Select Template:20061::\"Assisted walker\",\"With cane\",\"Independently \"} Physical Exam 
Visit Vitals BP (!) 84/47 Pulse 84 Temp 100.4 °F (38 °C) Resp 26 Ht 5' 8\" (1.727 m) Wt 145 lb (65.8 kg) SpO2 94% BMI 22.05 kg/m² General: *** acute distress. Alert. Cooperative. Head: Normocephalic. Atraumatic. Eyes:              Conjunctiva pink. Sclera white. PERRL. Ears:              Ear canals patent. TM non-erythematous. Nose:             Septum midline. Mucosa pink. No drainage. Throat: Mucosa pink. Moist mucous membranes. No tonsillar exudates or erythema. Palate movement equal bilaterally. Neck: Supple. Normal ROM. No stiffness. Respiratory: CTAB. No w/r/r/c.  
Cardiovascular: RRR. Normal S1,S2. No m/r/g. Pulses 2+ throughout. GI: + bowel sounds. Nontender. No rebound tenderness or guarding. Nondistended. Extremities: *** Musculoskeletal: Full ROM in all extremities. *** LE edema. Distal pulses intact. ***  
Skin: Warm, dry. No rashes. Neuro: CN II-XII grossly intact. Strength 5/5 in all extremities. *** Laboratory Data Recent Results (from the past 8 hour(s)) SAMPLES BEING HELD Collection Time: 03/22/19  5:59 PM  
Result Value Ref Range SAMPLES BEING HELD red,blue,gold COMMENT Add-on orders for these samples will be processed based on acceptable specimen integrity and analyte stability, which may vary by analyte. METABOLIC PANEL, COMPREHENSIVE Collection Time: 03/22/19  5:59 PM  
Result Value Ref Range Sodium 135 (L) 136 - 145 mmol/L Potassium 4.9 3.5 - 5.1 mmol/L Chloride 101 97 - 108 mmol/L  
 CO2 27 21 - 32 mmol/L Anion gap 7 5 - 15 mmol/L Glucose 363 (H) 65 - 100 mg/dL BUN 37 (H) 6 - 20 MG/DL Creatinine 1.28 0.70 - 1.30 MG/DL  
 BUN/Creatinine ratio 29 (H) 12 - 20 GFR est AA >60 >60 ml/min/1.73m2 GFR est non-AA 53 (L) >60 ml/min/1.73m2 Calcium 9.2 8.5 - 10.1 MG/DL Bilirubin, total 0.3 0.2 - 1.0 MG/DL  
 ALT (SGPT) 35 12 - 78 U/L  
 AST (SGOT) 41 (H) 15 - 37 U/L Alk. phosphatase 141 (H) 45 - 117 U/L Protein, total 7.4 6.4 - 8.2 g/dL Albumin 3.0 (L) 3.5 - 5.0 g/dL Globulin 4.4 (H) 2.0 - 4.0 g/dL A-G Ratio 0.7 (L) 1.1 - 2.2    
CBC WITH AUTOMATED DIFF Collection Time: 03/22/19  5:59 PM  
Result Value Ref Range WBC 20.9 (H) 4.1 - 11.1 K/uL  
 RBC 3.96 (L) 4.10 - 5.70 M/uL  
 HGB 10.3 (L) 12.1 - 17.0 g/dL HCT 33.6 (L) 36.6 - 50.3 % MCV 84.8 80.0 - 99.0 FL  
 MCH 26.0 26.0 - 34.0 PG  
 MCHC 30.7 30.0 - 36.5 g/dL  
 RDW 17.1 (H) 11.5 - 14.5 % PLATELET 584 632 - 704 K/uL MPV 11.9 8.9 - 12.9 FL  
 NRBC 0.0 0  WBC ABSOLUTE NRBC 0.00 0.00 - 0.01 K/uL NEUTROPHILS 85 (H) 32 - 75 % LYMPHOCYTES 6 (L) 12 - 49 % MONOCYTES 9 5 - 13 % EOSINOPHILS 0 0 - 7 % BASOPHILS 0 0 - 1 % IMMATURE GRANULOCYTES 0 %  
 ABS. NEUTROPHILS 17.7 (H) 1.8 - 8.0 K/UL  
 ABS. LYMPHOCYTES 1.3 0.8 - 3.5 K/UL  
 ABS. MONOCYTES 1.9 (H) 0.0 - 1.0 K/UL  
 ABS. EOSINOPHILS 0.0 0.0 - 0.4 K/UL  
 ABS. BASOPHILS 0.0 0.0 - 0.1 K/UL  
 ABS. IMM. GRANS. 0.0 K/UL  
 DF MANUAL    
 RBC COMMENTS ANISOCYTOSIS 
1+ POC LACTIC ACID Collection Time: 03/22/19  6:03 PM  
Result Value Ref Range Lactic Acid (POC) 1.69 0.40 - 2.00 mmol/L  
LACTIC ACID Collection Time: 03/22/19  7:35 PM  
Result Value Ref Range Lactic acid 1.7 0.4 - 2.0 MMOL/L  
INFLUENZA A & B AG (RAPID TEST) Collection Time: 03/22/19  7:55 PM  
Result Value Ref Range Influenza A Antigen NEGATIVE  NEG Influenza B Antigen NEGATIVE  NEG    
EKG, 12 LEAD, INITIAL Collection Time: 03/22/19  8:22 PM  
Result Value Ref Range Ventricular Rate 129 BPM  
 Atrial Rate 100 BPM  
 QRS Duration 118 ms Q-T Interval 338 ms QTC Calculation (Bezet) 495 ms Calculated R Axis -43 degrees Calculated T Axis 3 degrees Diagnosis Atrial fibrillation with rapid ventricular response Left axis deviation Right bundle branch block Abnormal ECG When compared with ECG of 22-MAR-2019 18:13, 
MANUAL COMPARISON REQUIRED, DATA IS UNCONFIRMED Imaging CXR Results  (Last 48 hours) 03/22/19 2037  XR CHEST PORT Final result Impression:  IMPRESSION: Bibasilar atelectasis/airspace disease is unchanged. Narrative:  EXAM:  XR CHEST PORT INDICATION:  shortness of breath, cough COMPARISON:  3/22/2019 FINDINGS: Patient is rotated to the right. A portable AP radiograph of the chest  
was obtained at 2228 hours. Port-A-Cath tip overlies right atrium and is  
unchanged. .  Bibasilar atelectasis/airspace disease is unchanged. .  Arterial  
megaly is stable. Gae Notch Structures are unchanged. 03/22/19 1849  XR CHEST PORT Final result Impression:  IMPRESSION:  
Technical factors as above. No definite acute process, consider PA and lateral  
views when possible. Narrative:  INDICATION: meets SIRS criteria EXAM:  AP CHEST RADIOGRAPH  
   
COMPARISON: December 21, 2018 FINDINGS:  
   
AP portable view of the chest demonstrates patient is markedly rotated toward  
the right accentuating the cardiomediastinal silhouette. No change in position  
of right IJ Port-A-Cath with tip over the distal SVC. The lungs are adequately  
expanded. There is no edema, effusion, consolidation, or pneumothorax. The  
osseous structures are unremarkable. CT Results  (Last 48 hours) None Assessment and Plan Charmaine Madconald is a 80 y.o. male who is admitted for ***. Septic shock-  SIRS criteria met: 4/4 T 100.4  RR 26 pulse 126 WBC 20.9. Infectious source likely 2/2 Facial cellulitis vs Aspiration pneumonia. CXR Bibasilar atelectasis/airspace disease is unchanged. Patient on pressors s/p 3L NS bolus. Has central line in place. LA 1.69 
- Admit to stepdown - Broad spectrum antibiotics Vanc, Zosyn, Levaquin, will also continue prophylactic PO Vanc and probiotic   
-F/U Urine, respiratory and blood cultures - F/u RVP, legionella, S.pneumo - MIVF @100 MIVF 
- On hussein synephrine, wean as tolerated ( goal MAP >65) - Stric I/O Hypertension- BP on admission 125/73. In septic shock requiring pressors - Continuing home medications of metoprolol (LOPRESSOR) 50 mg tab given patient in Afib with RVR on admission.  
- Will continue to monitor at this time and readjust as BP's trend. DMII - HgbA1c 7.5 (2/11/19). Glucose on admission 363. 
- Holding home medications of NPH 24 U daily. 
- Started on  Lantus 15 units at bedtime. 
- Insulin Sliding Scale normal sensitivity with AC&HS glucose checks.  
- Hypoglycemia protocols ordered. 
  
 Afib with RVR- Follows Dr. Layla Eid [facility]. - Continue home metoprolol (LOPRESSOR) 50 mg tab BID 
- Cardiac monitoring OSVALDO - Cr 1.28 POA (BL ~ 0.8). Likely due to IVVD, will calculate FeNa. 
- NS @ 100 mL/hr 
- Urine sodium, creatinine 
- avoid and holding home nephrotoxic drugs  
  
Anemia 2/2 chronic disease:  POA Hgb 10.3 (BL 7.9-9.6)TIBC wnl, Iron % sat low, iron low, ferritin normal (3/19/19) - Daily CBC Symptomatic GERD 
- Hold home prilosec 
- Start IV protonix 40mg daily 
  
HLD No lipid panel on record. - Home zocor FEN/GI - NPO until passed speech eval. NS at 100 mL/hr. Activity - Ambulate with assistance DVT prophylaxis - Sub Q Heparin GI prophylaxis - Not indicated at this time Fall prophylaxis - Fall precautions ordered. Disposition - Admit to stepdown. Plan to d/c TBD. Consulting PT/OT/CM Code Status - Full, discussed with patient / caregivers. Next of Kin Name and Contact Daughter: Ms. Alana Henry ([phone]) Patient Hiwot Teeer will be discussed with Dr.Timothy Raza 
10:14 PM, 03/22/19 Delia Dooley MD 
Family Medicine Resident For Billing Chief Complaint Patient presents with  Fever  Lethargy  Chest Pain Hospital Problems  Date Reviewed: 3/19/2019 Codes Class Noted POA Atrial fibrillation with rapid ventricular response (HCC) ICD-10-CM: I48.91 
ICD-9-CM: 427.31  3/22/2019 Unknown Septic shock (HCC) ICD-10-CM: A41.9, R65.21 ICD-9-CM: 038.9, 785.52, 995.92  3/22/2019 Unknown

## 2019-03-23 NOTE — PROGRESS NOTES
Pharmacy Dosing Services:  
 
Levaquin dose changed from  750 mg IV 24h to 750 mg IV Q48h per P&T protocol.  
- CrCl 35 ml/min Thank you, Irene Benitez, PharmD

## 2019-03-23 NOTE — PROGRESS NOTES
TRANSFER - IN REPORT: 
 
Verbal report received from TOMASZ Pinto(name) on [place]  being received from ICU(unit) for routine progression of care Report consisted of patients Situation, Background, Assessment and  
Recommendations(SBAR). Information from the following report(s) SBAR, Kardex, Intake/Output, MAR and Recent Results was reviewed with the receiving nurse. Opportunity for questions and clarification was provided. Assessment completed upon patients arrival to unit and care assumed. 9396- patient received on unit, vs obtained, denies any pain att this time, will assess. 1948- Bedside and Verbal shift change report given to TOMASZ Mcnally (oncoming nurse) by Abner Mosley (offgoing nurse). Report included the following information SBAR, Kardex, Intake/Output, MAR and Recent Results.

## 2019-03-24 ENCOUNTER — APPOINTMENT (OUTPATIENT)
Dept: GENERAL RADIOLOGY | Age: 84
DRG: 871 | End: 2019-03-24
Attending: STUDENT IN AN ORGANIZED HEALTH CARE EDUCATION/TRAINING PROGRAM
Payer: MEDICARE

## 2019-03-24 PROBLEM — J18.9 HAP (HOSPITAL-ACQUIRED PNEUMONIA): Status: ACTIVE | Noted: 2019-03-24

## 2019-03-24 PROBLEM — D64.9 CHRONIC ANEMIA: Status: ACTIVE | Noted: 2019-03-24
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
## 2019-03-24 PROBLEM — E78.5 DYSLIPIDEMIA: Status: ACTIVE | Noted: 2019-03-24

## 2019-03-24 PROBLEM — N17.9 ACUTE KIDNEY INJURY (HCC): Status: ACTIVE | Noted: 2019-03-24

## 2019-03-24 PROBLEM — I10 ESSENTIAL HYPERTENSION: Status: ACTIVE | Noted: 2019-03-24

## 2019-03-24 LAB
ALBUMIN SERPL-MCNC: 2.1 G/DL (ref 3.5–5)
ALBUMIN/GLOB SERPL: 0.6 {RATIO} (ref 1.1–2.2)
ALP SERPL-CCNC: 136 U/L (ref 45–117)
ALT SERPL-CCNC: 47 U/L (ref 12–78)
ANION GAP SERPL CALC-SCNC: 6 MMOL/L (ref 5–15)
AST SERPL-CCNC: 28 U/L (ref 15–37)
BASOPHILS # BLD: 0 K/UL (ref 0–0.1)
BASOPHILS NFR BLD: 0 % (ref 0–1)
BILIRUB SERPL-MCNC: 0.3 MG/DL (ref 0.2–1)
BUN SERPL-MCNC: 17 MG/DL (ref 6–20)
BUN/CREAT SERPL: 22 (ref 12–20)
CALCIUM SERPL-MCNC: 8.3 MG/DL (ref 8.5–10.1)
CHLORIDE SERPL-SCNC: 113 MMOL/L (ref 97–108)
CO2 SERPL-SCNC: 24 MMOL/L (ref 21–32)
CREAT SERPL-MCNC: 0.79 MG/DL (ref 0.7–1.3)
DATE LAST DOSE: NORMAL
DIFFERENTIAL METHOD BLD: ABNORMAL
EOSINOPHIL # BLD: 0.3 K/UL (ref 0–0.4)
EOSINOPHIL NFR BLD: 2 % (ref 0–7)
ERYTHROCYTE [DISTWIDTH] IN BLOOD BY AUTOMATED COUNT: 17.2 % (ref 11.5–14.5)
GLOBULIN SER CALC-MCNC: 3.4 G/DL (ref 2–4)
GLUCOSE BLD STRIP.AUTO-MCNC: 154 MG/DL (ref 65–100)
GLUCOSE BLD STRIP.AUTO-MCNC: 193 MG/DL (ref 65–100)
GLUCOSE BLD STRIP.AUTO-MCNC: 235 MG/DL (ref 65–100)
GLUCOSE BLD STRIP.AUTO-MCNC: 249 MG/DL (ref 65–100)
GLUCOSE SERPL-MCNC: 148 MG/DL (ref 65–100)
HCT VFR BLD AUTO: 29.2 % (ref 36.6–50.3)
HGB BLD-MCNC: 8.6 G/DL (ref 12.1–17)
IMM GRANULOCYTES # BLD AUTO: 0.1 K/UL (ref 0–0.04)
IMM GRANULOCYTES NFR BLD AUTO: 1 % (ref 0–0.5)
LYMPHOCYTES # BLD: 1.2 K/UL (ref 0.8–3.5)
LYMPHOCYTES NFR BLD: 8 % (ref 12–49)
MAGNESIUM SERPL-MCNC: 2 MG/DL (ref 1.6–2.4)
MCH RBC QN AUTO: 25.4 PG (ref 26–34)
MCHC RBC AUTO-ENTMCNC: 29.5 G/DL (ref 30–36.5)
MCV RBC AUTO: 86.4 FL (ref 80–99)
MONOCYTES # BLD: 1.8 K/UL (ref 0–1)
MONOCYTES NFR BLD: 13 % (ref 5–13)
NEUTS SEG # BLD: 11.1 K/UL (ref 1.8–8)
NEUTS SEG NFR BLD: 76 % (ref 32–75)
NRBC # BLD: 0 K/UL (ref 0–0.01)
NRBC BLD-RTO: 0 PER 100 WBC
PHOSPHATE SERPL-MCNC: 2.2 MG/DL (ref 2.6–4.7)
PLATELET # BLD AUTO: 182 K/UL (ref 150–400)
PMV BLD AUTO: 10.8 FL (ref 8.9–12.9)
POTASSIUM SERPL-SCNC: 4 MMOL/L (ref 3.5–5.1)
PROT SERPL-MCNC: 5.5 G/DL (ref 6.4–8.2)
RBC # BLD AUTO: 3.38 M/UL (ref 4.1–5.7)
REPORTED DOSE,DOSE: NORMAL UNITS
REPORTED DOSE/TIME,TMG: 2100
SERVICE CMNT-IMP: ABNORMAL
SODIUM SERPL-SCNC: 143 MMOL/L (ref 136–145)
VANCOMYCIN TROUGH SERPL-MCNC: 5 UG/ML (ref 5–10)
WBC # BLD AUTO: 14.6 K/UL (ref 4.1–11.1)

## 2019-03-24 PROCEDURE — 85025 COMPLETE CBC W/AUTO DIFF WBC: CPT

## 2019-03-24 PROCEDURE — 87086 URINE CULTURE/COLONY COUNT: CPT

## 2019-03-24 PROCEDURE — 74011250636 HC RX REV CODE- 250/636: Performed by: FAMILY MEDICINE

## 2019-03-24 PROCEDURE — 74011636637 HC RX REV CODE- 636/637: Performed by: STUDENT IN AN ORGANIZED HEALTH CARE EDUCATION/TRAINING PROGRAM

## 2019-03-24 PROCEDURE — 74011636637 HC RX REV CODE- 636/637: Performed by: FAMILY MEDICINE

## 2019-03-24 PROCEDURE — 77010033678 HC OXYGEN DAILY

## 2019-03-24 PROCEDURE — 97530 THERAPEUTIC ACTIVITIES: CPT | Performed by: PHYSICAL THERAPIST

## 2019-03-24 PROCEDURE — 74011250636 HC RX REV CODE- 250/636: Performed by: STUDENT IN AN ORGANIZED HEALTH CARE EDUCATION/TRAINING PROGRAM

## 2019-03-24 PROCEDURE — 74011250637 HC RX REV CODE- 250/637: Performed by: FAMILY MEDICINE

## 2019-03-24 PROCEDURE — 80202 ASSAY OF VANCOMYCIN: CPT

## 2019-03-24 PROCEDURE — 74011000258 HC RX REV CODE- 258: Performed by: FAMILY MEDICINE

## 2019-03-24 PROCEDURE — 74011250637 HC RX REV CODE- 250/637: Performed by: STUDENT IN AN ORGANIZED HEALTH CARE EDUCATION/TRAINING PROGRAM

## 2019-03-24 PROCEDURE — 83735 ASSAY OF MAGNESIUM: CPT

## 2019-03-24 PROCEDURE — 82962 GLUCOSE BLOOD TEST: CPT

## 2019-03-24 PROCEDURE — 84100 ASSAY OF PHOSPHORUS: CPT

## 2019-03-24 PROCEDURE — 80053 COMPREHEN METABOLIC PANEL: CPT

## 2019-03-24 PROCEDURE — 71045 X-RAY EXAM CHEST 1 VIEW: CPT

## 2019-03-24 PROCEDURE — 36415 COLL VENOUS BLD VENIPUNCTURE: CPT

## 2019-03-24 PROCEDURE — 97161 PT EVAL LOW COMPLEX 20 MIN: CPT | Performed by: PHYSICAL THERAPIST

## 2019-03-24 PROCEDURE — 94760 N-INVAS EAR/PLS OXIMETRY 1: CPT

## 2019-03-24 PROCEDURE — 65660000000 HC RM CCU STEPDOWN

## 2019-03-24 RX ORDER — METOPROLOL TARTRATE 50 MG/1
50 TABLET ORAL 2 TIMES DAILY
Status: DISCONTINUED | OUTPATIENT
Start: 2019-03-24 | End: 2019-03-26 | Stop reason: HOSPADM

## 2019-03-24 RX ORDER — ACETAMINOPHEN 325 MG/1
650 TABLET ORAL
Status: DISCONTINUED | OUTPATIENT
Start: 2019-03-24 | End: 2019-03-26 | Stop reason: HOSPADM

## 2019-03-24 RX ORDER — LEVOFLOXACIN 5 MG/ML
750 INJECTION, SOLUTION INTRAVENOUS DAILY
Status: DISCONTINUED | OUTPATIENT
Start: 2019-03-24 | End: 2019-03-25

## 2019-03-24 RX ADMIN — PIPERACILLIN SODIUM,TAZOBACTAM SODIUM 3.38 G: 3; .375 INJECTION, POWDER, FOR SOLUTION INTRAVENOUS at 03:54

## 2019-03-24 RX ADMIN — METOPROLOL TARTRATE 50 MG: 50 TABLET ORAL at 17:31

## 2019-03-24 RX ADMIN — Medication 10 ML: at 13:29

## 2019-03-24 RX ADMIN — INSULIN LISPRO 2 UNITS: 100 INJECTION, SOLUTION INTRAVENOUS; SUBCUTANEOUS at 16:27

## 2019-03-24 RX ADMIN — PIPERACILLIN SODIUM,TAZOBACTAM SODIUM 3.38 G: 3; .375 INJECTION, POWDER, FOR SOLUTION INTRAVENOUS at 13:16

## 2019-03-24 RX ADMIN — VANCOMYCIN HYDROCHLORIDE 125 MG: KIT at 23:31

## 2019-03-24 RX ADMIN — SODIUM CHLORIDE 100 ML/HR: 900 INJECTION, SOLUTION INTRAVENOUS at 05:42

## 2019-03-24 RX ADMIN — HEPARIN SODIUM 5000 UNITS: 5000 INJECTION INTRAVENOUS; SUBCUTANEOUS at 13:16

## 2019-03-24 RX ADMIN — GUAIFENESIN 600 MG: 600 TABLET, EXTENDED RELEASE ORAL at 08:30

## 2019-03-24 RX ADMIN — Medication 1 CAPSULE: at 08:30

## 2019-03-24 RX ADMIN — VANCOMYCIN HYDROCHLORIDE 125 MG: KIT at 05:40

## 2019-03-24 RX ADMIN — VANCOMYCIN HYDROCHLORIDE 125 MG: KIT at 13:29

## 2019-03-24 RX ADMIN — ACETAMINOPHEN 650 MG: 325 TABLET ORAL at 21:39

## 2019-03-24 RX ADMIN — HEPARIN SODIUM 5000 UNITS: 5000 INJECTION INTRAVENOUS; SUBCUTANEOUS at 21:39

## 2019-03-24 RX ADMIN — PANTOPRAZOLE SODIUM 40 MG: 40 TABLET, DELAYED RELEASE ORAL at 05:41

## 2019-03-24 RX ADMIN — PANTOPRAZOLE SODIUM 40 MG: 40 TABLET, DELAYED RELEASE ORAL at 16:27

## 2019-03-24 RX ADMIN — HEPARIN SODIUM 5000 UNITS: 5000 INJECTION INTRAVENOUS; SUBCUTANEOUS at 05:40

## 2019-03-24 RX ADMIN — PIPERACILLIN SODIUM,TAZOBACTAM SODIUM 3.38 G: 3; .375 INJECTION, POWDER, FOR SOLUTION INTRAVENOUS at 19:58

## 2019-03-24 RX ADMIN — VANCOMYCIN HYDROCHLORIDE 1000 MG: 1 INJECTION, POWDER, LYOPHILIZED, FOR SOLUTION INTRAVENOUS at 21:39

## 2019-03-24 RX ADMIN — ASPIRIN 81 MG 81 MG: 81 TABLET ORAL at 08:30

## 2019-03-24 RX ADMIN — GUAIFENESIN 600 MG: 600 TABLET, EXTENDED RELEASE ORAL at 21:39

## 2019-03-24 RX ADMIN — SIMVASTATIN 40 MG: 20 TABLET, FILM COATED ORAL at 21:39

## 2019-03-24 RX ADMIN — Medication 10 ML: at 21:40

## 2019-03-24 RX ADMIN — LEVOFLOXACIN 750 MG: 5 INJECTION, SOLUTION INTRAVENOUS at 16:27

## 2019-03-24 RX ADMIN — METOPROLOL TARTRATE 50 MG: 50 TABLET ORAL at 08:30

## 2019-03-24 RX ADMIN — VANCOMYCIN HYDROCHLORIDE 125 MG: KIT at 17:36

## 2019-03-24 RX ADMIN — Medication 10 ML: at 05:40

## 2019-03-24 RX ADMIN — INSULIN GLARGINE 15 UNITS: 100 INJECTION, SOLUTION SUBCUTANEOUS at 08:29

## 2019-03-24 RX ADMIN — INSULIN LISPRO 2 UNITS: 100 INJECTION, SOLUTION INTRAVENOUS; SUBCUTANEOUS at 21:39

## 2019-03-24 NOTE — PROGRESS NOTES
Bedside and Verbal shift change report given to Mell Jimenes  (oncoming nurse) by Mika eDjesus  (offgoing nurse). Report included the following information SBAR and Kardex.

## 2019-03-24 NOTE — PROGRESS NOTES
Spoke with family practice doctor leana regarding patient elevated heart rate order to call if sustained above 160 patient metoprolol evening dose was given

## 2019-03-24 NOTE — PROGRESS NOTES
Problem: Mobility Impaired (Adult and Pediatric) Goal: *Acute Goals and Plan of Care (Insert Text) Note:  
Physical Therapy Goals Initiated 3/24/2019 1. Patient will move from supine to sit and sit to supine  in bed with supervision/set-up within 7 day(s). 2.  Patient will transfer from bed to chair and chair to bed with minimal assistance/contact guard assist using the least restrictive device within 7 day(s). 3.  Patient will perform sit to stand with minimal assistance/contact guard assist within 7 day(s). 4.  Patient will ambulate with supervision/set-up for 150 feet with the least restrictive device within 7 day(s). PHYSICAL THERAPY EVALUATION Patient: Joanne Dennis (40 y.o. male) Date: 3/24/2019 Primary Diagnosis: Septic shock ([address] [A41.9, R65.21] Atrial fibrillation with rapid ventricular response ([address] [I48.91] Precautions: [place], Contact, Fall ASSESSMENT : 
Based on the objective data described below, the patient presents with deconditioning, and activity intolerance due to A-fib with RVR. Pt has history of Large B Cell Lymphoma 2014, remission since 2016. Pt is [place], but seemingly very cognitive despite advanced age. Daughter provides historical events dating back to November, where he had an acute hospitalization, rehab, then [facility]. He recently moved to [facility] within the last 2 weeks, and per daughter, was doing well, ambulating to dining beverly, etc. Admitted with afib and RVR. Pt appeared to be in NSR with rate control. However, overnight converted back afib, with 's. During today's PT session with just minimal activity ( supine--> sit EOB--> marching in place) his HR soared from 125--> 150's bpm,  
/77 ( spine) 114/60 seated EOB, and 105/64 post marching in place. SpO2 remained acceptable at 94% on 2L/min He denies L/D but we deferred transfer to chair.  Pt/daughter pleased with some activity, and anticipates he may be able to transfer to chair/walk earlier in the day tomorrow as tolerated. Anticipate return to assisted living facility upon discharge. Will determine need for further skilled services at that time if functional capacity not at baseline. Patient will benefit from skilled intervention to address the above impairments. Patient?s rehabilitation potential is considered to be Fair Factors which may influence rehabilitation potential include:  
? None noted ? Mental ability/status ? Medical condition ? Home/family situation and support systems ? Safety awareness 
? Pain tolerance/management 
? Other: PLAN : 
Recommendations and Planned Interventions: 
?           Bed Mobility Training             ? Neuromuscular Re-Education ? Transfer Training                   ? Orthotic/Prosthetic Training 
? Gait Training                         ? Modalities ? Therapeutic Exercises           ? Edema Management/Control ? Therapeutic Activities            ? Patient and Family Training/Education ? Other (comment): Frequency/Duration: Patient will be followed by physical therapy  5 times a week to address goals. [facility]ations[facility] remains slow due to HR response Further Equipment Recommendations for Discharge: None SUBJECTIVE:  
Patient stated ? Sure we can do that.? OBJECTIVE DATA SUMMARY:  
HISTORY:   
Past Medical History:  
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  
Procedure Laterality Date HX HEENT    
 HX ORTHOPAEDIC    
 HX PROSTATECTOMY HX UROLOGICAL    
 NEUROLOGICAL PROCEDURE UNLISTED Prior Level of Function/Home Situation: per daughter Personal factors and/or comorbidities impacting plan of care: BP/HR response in the setting of afib Home Situation Home Environment: Private residence # Steps to Enter: 0 One/Two Story Residence: One story Living Alone: Yes Support Systems: Assisted living, Child(dee) Patient Expects to be Discharged to[de-identified] Assisted living Current DME Used/Available at Home: Suleiman Soriano, rollator, Shower chair, Grab bars Tub or Shower Type: Shower EXAMINATION/PRESENTATION/DECISION MAKING:  
Critical Behavior: 
Neurologic State: Alert Orientation Level: Appropriate for age, Oriented X4 Cognition: Follows commands Safety/Judgement: Insight into deficits Hearing: Auditory Auditory Impairment: Hard of hearing, bilateral 
Hearing Aids/Status: With patient Skin:  abrasions/ lesions Edema: none Range Of Motion: 
AROM: Generally decreased, functional 
  
 Strength:   
Strength: Generally decreased, functional 
  
Tone & Sensation:  
Tone: Normal 
Sensation: Intact Coordination: 
Coordination: Generally decreased, functional 
Functional Mobility: 
Bed Mobility: 
Rolling: Minimum assistance Supine to Sit: Moderate assistance Sit to Supine: Minimum assistance;Assist x2 Scooting: Minimum assistance; Additional time Transfers: 
Sit to Stand: Assist x2; Moderate assistance Stand to Sit: Minimum assistance;Assist x2 Balance:  
Sitting: Intact Standing: Intact; With support Ambulation/Gait Training: Deferred Functional Measure: 
Barthel Index: 
Bathin Bladder: 10 Bowels: 10 
Groomin Dressin Feeding: 10 Mobility: 0 Stairs: 0 Toilet Use: 5 Transfer (Bed to Chair and Back): 10 Total: 60/100 Percentage of impairment 0%  
1-19% 20-39% 40-59% 60-79% 80-99% 100% Barthel Score 0-100 100 99-80 79-60 59-40 20-39 1-19 
 0 The Barthel ADL Index: Guidelines 1. The index should be used as a record of what a patient does, not as a record of what a patient could do. 2. The main aim is to establish degree of independence from any help, physical or verbal, however minor and for whatever reason. 3. The need for supervision renders the patient not independent. 4. A patient's performance should be established using the best available evidence. Asking the patient, friends/relatives and nurses are the usual sources, but direct observation and common sense are also important. However direct testing is not needed. 5. Usually the patient's performance over the preceding 24-48 hours is important, but occasionally longer periods will be relevant. 6. Middle categories imply that the patient supplies over 50 per cent of the effort. 7. Use of aids to be independent is allowed. Mika Retana., Barthel, D.W. ([id number]). Functional evaluation: the Barthel Index. [facility] (14)2. Ryan Matthews brigitte MARIS Kat, Helio Up., Brent Bone., [place], [address] [place] [address] (1999). Measuring the change indisability after inpatient rehabilitation; comparison of the responsiveness of the Barthel Index and Functional [place] Measure. Journal of Neurology, Neurosurgery, and Psychiatry, 66(4), 303-956. Hellen Feldman, N.J.A, YAMILE Sanderson, & Elie De La Vega MLucíaA. (2004.) Assessment of post-stroke quality of life in cost-effectiveness studies: The usefulness of the Barthel Index and the EuroQoL-5D. [facility], 13, [phone] Physical Therapy Evaluation Charge Determination History Examination Presentation Decision-Making LOW Complexity : Zero comorbidities / personal factors that will impact the outcome / POC MEDIUM Complexity : 3 Standardized tests and measures addressing body structure, function, activity limitation and / or participation in recreation  MEDIUM Complexity : Evolving with changing characteristics  MEDIUM Complexity : FOTO score of 26-74 Based on the above components, the patient evaluation is determined to be of the following complexity level: MEDIUM Pain: 
Patient denies pain with activity today. Activity Tolerance:  
Pt able to complete standing activities with close monitoring of hemodynamics Please refer to the flowsheet for vital signs taken during this treatment. After treatment:  
?         Patient left in no apparent distress sitting up in chair ? Patient left in no apparent distress in bed 
? Call bell left within reach ? Nursing notified ? Caregiver present ? Bed alarm activated COMMUNICATION/EDUCATION:  
The patient?s plan of care was discussed with: Registered Nurse. ?         Fall prevention education was provided and the patient/caregiver indicated understanding. ? Patient/family have participated as able in goal setting and plan of care. ?         Patient/family agree to work toward stated goals and plan of care. ?         Patient understands intent and goals of therapy, but is neutral about his/her participation. ? Patient is unable to participate in goal setting and plan of care. Thank you for this referral. 
Talia Paulino, PT Time Calculation: 28 mins

## 2019-03-24 NOTE — PROGRESS NOTES
[facility] Dosing Services:  
 
Pharmacist Renal Dosing Progress Note for Levaquin Physician Dr. Harrell Snellen The following medication: Levaquin was automatically dose-adjusted per [facility] P&T Committee Protocol, with respect to renal function. Pt Weight:  
Wt Readings from Last 1 Encounters:  
03/24/19 67.8 kg (149 lb 7.6 oz) Previous Regimen Levaquin 750 mg every 48 hours Serum Creatinine Lab Results Component Value Date/Time Creatinine 0.79 03/24/2019 03:19 AM  
 
   
Creatinine Clearance Estimated Creatinine Clearance: 58.4 mL/min (based on SCr of 0.79 mg/dL). BUN Lab Results Component Value Date/Time BUN 17 03/24/2019 03:19 AM  
 
   
Dosage changed to:  Levaquin was changed to 750 mg every 24 hours Pharmacy to continue to monitor patient daily. Will make dosage adjustments based upon changing renal function. [phone] information:  [phone]

## 2019-03-24 NOTE — PROGRESS NOTES
Charge RN notified of patient's elevated HR. Attending provider notified. Patient at rest, and in no apparent distress. RN to notify attending physician if HR sustains in the 160's. No new orders at this time.

## 2019-03-25 LAB
ALBUMIN SERPL-MCNC: 2.1 G/DL (ref 3.5–5)
ALBUMIN/GLOB SERPL: 0.6 {RATIO} (ref 1.1–2.2)
ALP SERPL-CCNC: 180 U/L (ref 45–117)
ALT SERPL-CCNC: 79 U/L (ref 12–78)
ANION GAP SERPL CALC-SCNC: 5 MMOL/L (ref 5–15)
AST SERPL-CCNC: 75 U/L (ref 15–37)
ATRIAL RATE: 100 BPM
ATRIAL RATE: 122 BPM
BACTERIA SPEC CULT: ABNORMAL
BACTERIA SPEC CULT: ABNORMAL
BASOPHILS # BLD: 0 K/UL (ref 0–0.1)
BASOPHILS NFR BLD: 0 % (ref 0–1)
BILIRUB SERPL-MCNC: 0.3 MG/DL (ref 0.2–1)
BUN SERPL-MCNC: 15 MG/DL (ref 6–20)
BUN/CREAT SERPL: 17 (ref 12–20)
CALCIUM SERPL-MCNC: 8.3 MG/DL (ref 8.5–10.1)
CALCULATED P AXIS, ECG09: 39 DEGREES
CALCULATED R AXIS, ECG10: -24 DEGREES
CALCULATED R AXIS, ECG10: -43 DEGREES
CALCULATED T AXIS, ECG11: 18 DEGREES
CALCULATED T AXIS, ECG11: 3 DEGREES
CHLORIDE SERPL-SCNC: 112 MMOL/L (ref 97–108)
CO2 SERPL-SCNC: 26 MMOL/L (ref 21–32)
CREAT SERPL-MCNC: 0.86 MG/DL (ref 0.7–1.3)
DIAGNOSIS, 93000: NORMAL
DIAGNOSIS, 93000: NORMAL
DIFFERENTIAL METHOD BLD: ABNORMAL
EOSINOPHIL # BLD: 0.2 K/UL (ref 0–0.4)
EOSINOPHIL NFR BLD: 1 % (ref 0–7)
ERYTHROCYTE [DISTWIDTH] IN BLOOD BY AUTOMATED COUNT: 16.9 % (ref 11.5–14.5)
FLUID CULTURE, SPNG2: NORMAL
GLOBULIN SER CALC-MCNC: 3.6 G/DL (ref 2–4)
GLUCOSE BLD STRIP.AUTO-MCNC: 115 MG/DL (ref 65–100)
GLUCOSE BLD STRIP.AUTO-MCNC: 169 MG/DL (ref 65–100)
GLUCOSE BLD STRIP.AUTO-MCNC: 178 MG/DL (ref 65–100)
GLUCOSE BLD STRIP.AUTO-MCNC: 74 MG/DL (ref 65–100)
GLUCOSE BLD STRIP.AUTO-MCNC: 92 MG/DL (ref 65–100)
GLUCOSE SERPL-MCNC: 141 MG/DL (ref 65–100)
HCT VFR BLD AUTO: 28.6 % (ref 36.6–50.3)
HGB BLD-MCNC: 8.5 G/DL (ref 12.1–17)
IMM GRANULOCYTES # BLD AUTO: 0.1 K/UL (ref 0–0.04)
IMM GRANULOCYTES NFR BLD AUTO: 1 % (ref 0–0.5)
L PNEUMO1 AG UR QL IA: NEGATIVE
LYMPHOCYTES # BLD: 1.3 K/UL (ref 0.8–3.5)
LYMPHOCYTES NFR BLD: 9 % (ref 12–49)
MAGNESIUM SERPL-MCNC: 2.2 MG/DL (ref 1.6–2.4)
MCH RBC QN AUTO: 25.6 PG (ref 26–34)
MCHC RBC AUTO-ENTMCNC: 29.7 G/DL (ref 30–36.5)
MCV RBC AUTO: 86.1 FL (ref 80–99)
MONOCYTES # BLD: 1.7 K/UL (ref 0–1)
MONOCYTES NFR BLD: 12 % (ref 5–13)
NEUTS SEG # BLD: 10.8 K/UL (ref 1.8–8)
NEUTS SEG NFR BLD: 77 % (ref 32–75)
NRBC # BLD: 0 K/UL (ref 0–0.01)
NRBC BLD-RTO: 0 PER 100 WBC
ORGANISM ID, SPNG3: NORMAL
P-R INTERVAL, ECG05: 150 MS
PHOSPHATE SERPL-MCNC: 2.8 MG/DL (ref 2.6–4.7)
PLATELET # BLD AUTO: 220 K/UL (ref 150–400)
PLEASE NOTE, SPNG4: NORMAL
PMV BLD AUTO: 11.2 FL (ref 8.9–12.9)
POTASSIUM SERPL-SCNC: 4.1 MMOL/L (ref 3.5–5.1)
PROT SERPL-MCNC: 5.7 G/DL (ref 6.4–8.2)
Q-T INTERVAL, ECG07: 318 MS
Q-T INTERVAL, ECG07: 338 MS
QRS DURATION, ECG06: 118 MS
QRS DURATION, ECG06: 118 MS
QTC CALCULATION (BEZET), ECG08: 453 MS
QTC CALCULATION (BEZET), ECG08: 495 MS
RBC # BLD AUTO: 3.32 M/UL (ref 4.1–5.7)
S PNEUM AG SPEC QL LA: NEGATIVE
SERVICE CMNT-IMP: ABNORMAL
SERVICE CMNT-IMP: NORMAL
SERVICE CMNT-IMP: NORMAL
SODIUM SERPL-SCNC: 143 MMOL/L (ref 136–145)
SPECIMEN SOURCE: NORMAL
SPECIMEN SOURCE: NORMAL
SPECIMEN, SPNG1: NORMAL
VENTRICULAR RATE, ECG03: 122 BPM
VENTRICULAR RATE, ECG03: 129 BPM
WBC # BLD AUTO: 14.2 K/UL (ref 4.1–11.1)

## 2019-03-25 PROCEDURE — 74011250637 HC RX REV CODE- 250/637: Performed by: FAMILY MEDICINE

## 2019-03-25 PROCEDURE — 77010033678 HC OXYGEN DAILY

## 2019-03-25 PROCEDURE — 97165 OT EVAL LOW COMPLEX 30 MIN: CPT

## 2019-03-25 PROCEDURE — 82962 GLUCOSE BLOOD TEST: CPT

## 2019-03-25 PROCEDURE — 74011250636 HC RX REV CODE- 250/636: Performed by: FAMILY MEDICINE

## 2019-03-25 PROCEDURE — 74011250637 HC RX REV CODE- 250/637: Performed by: STUDENT IN AN ORGANIZED HEALTH CARE EDUCATION/TRAINING PROGRAM

## 2019-03-25 PROCEDURE — 97535 SELF CARE MNGMENT TRAINING: CPT

## 2019-03-25 PROCEDURE — 65660000000 HC RM CCU STEPDOWN

## 2019-03-25 PROCEDURE — 84100 ASSAY OF PHOSPHORUS: CPT

## 2019-03-25 PROCEDURE — 85025 COMPLETE CBC W/AUTO DIFF WBC: CPT

## 2019-03-25 PROCEDURE — 74011636637 HC RX REV CODE- 636/637: Performed by: STUDENT IN AN ORGANIZED HEALTH CARE EDUCATION/TRAINING PROGRAM

## 2019-03-25 PROCEDURE — 80053 COMPREHEN METABOLIC PANEL: CPT

## 2019-03-25 PROCEDURE — 94760 N-INVAS EAR/PLS OXIMETRY 1: CPT

## 2019-03-25 PROCEDURE — 74011636637 HC RX REV CODE- 636/637: Performed by: FAMILY MEDICINE

## 2019-03-25 PROCEDURE — 83735 ASSAY OF MAGNESIUM: CPT

## 2019-03-25 PROCEDURE — 36415 COLL VENOUS BLD VENIPUNCTURE: CPT

## 2019-03-25 PROCEDURE — 74011000258 HC RX REV CODE- 258: Performed by: FAMILY MEDICINE

## 2019-03-25 PROCEDURE — 74011250636 HC RX REV CODE- 250/636: Performed by: STUDENT IN AN ORGANIZED HEALTH CARE EDUCATION/TRAINING PROGRAM

## 2019-03-25 RX ORDER — LEVOFLOXACIN 750 MG/1
750 TABLET ORAL EVERY 24 HOURS
Qty: 3 TAB | Refills: 0 | Status: SHIPPED | OUTPATIENT
Start: 2019-03-26 | End: 2019-03-26

## 2019-03-25 RX ORDER — METOPROLOL TARTRATE 5 MG/5ML
2.5 INJECTION INTRAVENOUS ONCE
Status: DISPENSED | OUTPATIENT
Start: 2019-03-25 | End: 2019-03-25

## 2019-03-25 RX ORDER — VANCOMYCIN HYDROCHLORIDE 250 MG/5ML
125 POWDER, FOR SOLUTION ORAL EVERY 6 HOURS
Qty: 300 ML | Refills: 0 | Status: SHIPPED | OUTPATIENT
Start: 2019-03-25 | End: 2019-03-25

## 2019-03-25 RX ORDER — METOPROLOL TARTRATE 50 MG/1
50 TABLET ORAL 2 TIMES DAILY
Qty: 60 TAB | Refills: 0 | Status: SHIPPED | OUTPATIENT
Start: 2019-03-25 | End: 2019-10-11 | Stop reason: SDUPTHER

## 2019-03-25 RX ORDER — LEVOFLOXACIN 750 MG/1
750 TABLET ORAL EVERY 24 HOURS
Status: DISCONTINUED | OUTPATIENT
Start: 2019-03-25 | End: 2019-03-26 | Stop reason: HOSPADM

## 2019-03-25 RX ADMIN — Medication 10 ML: at 21:00

## 2019-03-25 RX ADMIN — PIPERACILLIN SODIUM,TAZOBACTAM SODIUM 3.38 G: 3; .375 INJECTION, POWDER, FOR SOLUTION INTRAVENOUS at 03:01

## 2019-03-25 RX ADMIN — INSULIN GLARGINE 15 UNITS: 100 INJECTION, SOLUTION SUBCUTANEOUS at 09:11

## 2019-03-25 RX ADMIN — PANTOPRAZOLE SODIUM 40 MG: 40 TABLET, DELAYED RELEASE ORAL at 15:59

## 2019-03-25 RX ADMIN — VANCOMYCIN HYDROCHLORIDE 125 MG: KIT at 17:03

## 2019-03-25 RX ADMIN — ASPIRIN 81 MG 81 MG: 81 TABLET ORAL at 09:11

## 2019-03-25 RX ADMIN — HEPARIN SODIUM 5000 UNITS: 5000 INJECTION INTRAVENOUS; SUBCUTANEOUS at 06:11

## 2019-03-25 RX ADMIN — ACETAMINOPHEN 650 MG: 325 TABLET ORAL at 21:00

## 2019-03-25 RX ADMIN — VANCOMYCIN HYDROCHLORIDE 125 MG: KIT at 06:10

## 2019-03-25 RX ADMIN — INSULIN LISPRO 2 UNITS: 100 INJECTION, SOLUTION INTRAVENOUS; SUBCUTANEOUS at 17:03

## 2019-03-25 RX ADMIN — Medication 10 ML: at 13:41

## 2019-03-25 RX ADMIN — HEPARIN SODIUM 5000 UNITS: 5000 INJECTION INTRAVENOUS; SUBCUTANEOUS at 13:36

## 2019-03-25 RX ADMIN — VANCOMYCIN HYDROCHLORIDE 125 MG: KIT at 23:28

## 2019-03-25 RX ADMIN — LEVOFLOXACIN 750 MG: 750 TABLET, FILM COATED ORAL at 15:59

## 2019-03-25 RX ADMIN — SIMVASTATIN 40 MG: 20 TABLET, FILM COATED ORAL at 21:00

## 2019-03-25 RX ADMIN — METOPROLOL TARTRATE 50 MG: 50 TABLET ORAL at 17:03

## 2019-03-25 RX ADMIN — GUAIFENESIN 600 MG: 600 TABLET, EXTENDED RELEASE ORAL at 21:00

## 2019-03-25 RX ADMIN — VANCOMYCIN HYDROCHLORIDE 125 MG: KIT at 13:36

## 2019-03-25 RX ADMIN — HEPARIN SODIUM 5000 UNITS: 5000 INJECTION INTRAVENOUS; SUBCUTANEOUS at 21:00

## 2019-03-25 RX ADMIN — METOPROLOL TARTRATE 50 MG: 50 TABLET ORAL at 09:11

## 2019-03-25 RX ADMIN — INSULIN LISPRO 2 UNITS: 100 INJECTION, SOLUTION INTRAVENOUS; SUBCUTANEOUS at 13:36

## 2019-03-25 RX ADMIN — Medication 1 CAPSULE: at 09:11

## 2019-03-25 RX ADMIN — PANTOPRAZOLE SODIUM 40 MG: 40 TABLET, DELAYED RELEASE ORAL at 06:10

## 2019-03-25 RX ADMIN — GUAIFENESIN 600 MG: 600 TABLET, EXTENDED RELEASE ORAL at 09:11

## 2019-03-25 RX ADMIN — Medication 10 ML: at 06:11

## 2019-03-25 NOTE — PROGRESS NOTES
Occupational Therapy Note:  
 
Orders received and acknowledged, chart reviewed, spoke with RN who cleared patient for therapy, attempted to see patient this AM. Patient received sitting up in chair eating breakfast (what looks to be pureed/mechanical diet) with family present. Patient/family requesting deferral of OT eval until later this AM and requested to have upgraded diet. Per chart, patient on thickened liquids, and noted \"regular diet\" order in chart with specification for nectar thick liquids placed this AM. RN notified of same and indicated she would update family. Will follow up for OT eval as able. Thank you.  
 
Federica Bauman OTDARLIN/L

## 2019-03-25 NOTE — PROGRESS NOTES
[address] [address] Pharmacy Dosing Services: Antimicrobial Stewardship Daily Doc Consult for antibiotic dosing of vancomycin by Dr. Ludy Molina Indication: sepsis sec to asp pna vs facial cellulitis Day of Therapy 3 Ht Readings from Last 1 Encounters:  
03/22/19 172.7 cm (68\") Wt Readings from Last 1 Encounters:  
03/24/19 67.8 kg (149 lb 7.6 oz) Vancomycin therapy: 
Current maintenance dose: 1000(mg) every 24 hours Dose calculated to approximate a therapeutic trough of 15 to 20 mcg/mL. Plan for level / Adjustment in Therapy: trough prior to the 3rd dose was 5.0. Will increase patient to 1000 mg IV q 12 hours. Patient to likely discharge tomorrow. Monitor for de-escalation. Dose administration notes:   Doses given appropriately as scheduled Date Dose & Interval Measured (mcg/mL) Extrapolated (mcg/mL) ? ? ? ?  
? ? ? ?  
? ? ? ? Other Antimicrobial  
(not dosed by pharmacist)  mg 24 hours Zosyn 3.375 gm EI every 8 hour Cultures 3/23 MRSA screen: in process 3/22 Resp panel: neg 
3/22 Blood x 2: NGsf Serum Creatinine Lab Results Component Value Date/Time Creatinine 0.79 03/24/2019 03:19 AM  
  
  
Creatinine Clearance Estimated Creatinine Clearance: 58.4 mL/min (based on SCr of 0.79 mg/dL). Temp Temp: 99.2 °F (37.3 °C) WBC Lab Results Component Value Date/Time WBC 14.6 (H) 03/24/2019 03:19 AM  
 
  
H/H Lab Results Component Value Date/Time HGB 8.6 (L) 03/24/2019 03:19 AM  
 
  
Platelets Lab Results Component Value Date/Time PLATELET 508 66/45/9832 03:19 AM  
 
  
 
 
 
Pharmacist KASSANDRA AbelD Contact information: [phone]

## 2019-03-25 NOTE — PROGRESS NOTES
Problem: Self Care Deficits Care Plan (Adult) Goal: *Acute Goals and Plan of Care (Insert Text) Description Occupational Therapy Goals Initiated 3/25/2019 1. Patient will perform lower body dressing with independence within 7 day(s). 2.  Patient will perform bathing with modified independence within 7 day(s). 3.  Patient will perform shower transfer with modified independence within 7 day(s). 4.  Patient will perform toilet transfers with modified independence within 7 day(s). 5.  Patient will perform all aspects of toileting with modified independence within 7 day(s). 6.  Patient will participate in upper extremity therapeutic exercise/activities with independence for 10 minutes within 7 day(s). 7.  Patient will utilize energy conservation techniques during functional activities with verba, visuall cues within 7 day(s). Outcome: Progressing Towards Goal 
 OCCUPATIONAL THERAPY EVALUATION Patient: Hiwot Elias (41 y.o. male) Date: 3/25/2019 Primary Diagnosis: Septic shock ([facility] [address] [A41.9, R65.21] Atrial fibrillation with rapid ventricular response ([address] [I48.91] Precautions:   Contact, Fall ASSESSMENT : 
Based on the objective data described below, the patient presents at [facility] to min assist level with bed mobility,  bathroom mobility, LB self-care and toileting. Family present in the room. Per family, pt was independent caring for self at his independent senior apt and was not on O2. Currently, pt is on 2L at 95%. Removed O2 and performed LB dressing, bathroom mobility and toileting. Pt de-stated to 87-89%, however, recovered to 93% within 30 sec. Feel pt is not at baseline and unsafe to care for self. Family agreed. Recommend further therapy from his facility or assistance from family/hire aide if he wants to stay in his apt. Will con't to follow and make further recommendations prn. Patient will benefit from skilled intervention to address the above impairments. Patient?s rehabilitation potential is considered to be Excellent Factors which may influence rehabilitation potential include: ? None noted ? Mental ability/status ? Medical condition ? Home/family situation and support systems ? Safety awareness ? Pain tolerance/management ? Other: PLAN : 
Recommendations and Planned Interventions: 
?               Self Care Training                  ? Therapeutic Activities ? Functional Mobility Training    ? Cognitive Retraining 
? Therapeutic Exercises           ? Endurance Activities ? Balance Training                   ? Neuromuscular Re-Education ? Visual/Perceptual Training     ? Home Safety Training 
? Patient Education                 ? Family Training/Education ? Other (comment): Frequency/Duration: Patient will be followed by occupational therapy 5 times a week to address goals. Discharge Recommendations: Home Health Further Equipment Recommendations for Discharge: none noted SUBJECTIVE:  
Patient stated ? Can you help me with this. ? OBJECTIVE DATA SUMMARY:  
HISTORY:  
Past Medical History:  
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  
Procedure Laterality Date HX HEENT    
 HX ORTHOPAEDIC    
 HX PROSTATECTOMY  HX UROLOGICAL    
 NEUROLOGICAL PROCEDURE UNLISTED    
 
 
 Prior Level of Function/Environment/Context: independent, used rollator Occupations in which the patient is/was successful, what are the barriers preventing that success:  
Performance Patterns (routines, roles, habits, and rituals):  
Personal Interests and/or values:  
Expanded or extensive additional review of patient history:  
 
Home Situation Home Environment: Independent living # Steps to Enter: 0 One/Two Story Residence: One story Living Alone: Yes Support Systems: Assisted living, Child(dee) Patient Expects to be Discharged to[de-identified] Assisted living Current DME Used/Available at Home: Lucero Molina, rollator Tub or Shower Type: Shower EXAMINATION OF PERFORMANCE DEFICITS: 
Cognitive/Behavioral Status: 
Neurologic State: Alert Orientation Level: Oriented X4 Cognition: Appropriate decision making; Follows commands Skin: intact Edema: none noted Hearing: Auditory Auditory Impairment: Hard of hearing, bilateral 
Hearing Aids/Status: With patient Vision/Perceptual:   
    
    
    
  
    
    
Corrective Lenses: Glasses Range of Motion: 
AROM: Generally decreased, functional 
  
  
  
  
  
  
  
Strength: 
Strength: Generally decreased, functional 
  
  
  
  
Coordination: 
Coordination: Within functional limits Fine Motor Skills-Upper: Left Intact; Right Intact Gross Motor Skills-Upper: Left Intact; Right Intact Tone & Sensation: 
Tone: Normal 
Sensation: Intact Balance: 
Sitting: Intact Standing: Intact; With support Functional Mobility and Transfers for ADLs: 
Bed Mobility: 
Supine to Sit: Minimum assistance Transfers: 
Sit to Stand: Contact guard assistance Stand to Sit: Contact guard assistance Bed to Chair: Contact guard assistance Bathroom Mobility: Contact guard assistance ADL Assessment: 
Feeding: Setup Oral Facial Hygiene/Grooming: Setup Upper Body Dressing: Setup Lower Body Dressing: Minimum assistance, assistance with donning depends over feet Toileting: Minimum assistance, assistance needed pulling depends up in back ADL Intervention and task modifications: 
  
 
  
 
  
 
  
 
  
 
 
 
Functional Measure: 
Barthel Index: 
Bathin Bladder: 5 Bowels: 10 
Groomin Dressin Feeding: 10 Mobility: 5 Stairs: 0 Toilet Use: 5 Transfer (Bed to Chair and Back): 10 Total: 55/100 Percentage of impairment  
0% 1-19% 20-39% 40-59% 60-79% 80-99% 100% Barthel Score 0-100 100 99-80 79-60 59-40 20-39 1-19 
 0 The Barthel ADL Index: Guidelines 1. The index should be used as a record of what a patient does, not as a record of what a patient could do. 2. The main aim is to establish degree of independence from any help, physical or verbal, however minor and for whatever reason. 3. The need for supervision renders the patient not independent. 4. A patient's performance should be established using the best available evidence. Asking the patient, friends/relatives and nurses are the usual sources, but direct observation and common sense are also important. However direct testing is not needed. 5. Usually the patient's performance over the preceding 24-48 hours is important, but occasionally longer periods will be relevant. 6. Middle categories imply that the patient supplies over 50 per cent of the effort. 7. Use of aids to be independent is allowed. Elida Baxter., BarthelKARLY. (6314). Functional evaluation: the Barthel Index. [facility] (14)2. MARIS Vincent, Rebekah Valentine., Rafael Vásquez.[facility], [address] [place] [address] (). Measuring the change indisability after inpatient rehabilitation; comparison of the responsiveness of the Barthel Index and Functional Baker Measure. Journal of Neurology, Neurosurgery, and Psychiatry, 66(4), 340-628.  
Susan Rutherford, N.J.A, YAMILE Sanderson, Maycol Guerrero M.A. (2004.) Assessment of post-stroke quality of life in cost-effectiveness studies: The usefulness of the Barthel Index and the EuroQoL-5D. [facility], 13, 915-79 Occupational Therapy Evaluation Charge Determination History Examination Decision-Making LOW Complexity : Brief history review  LOW Complexity : 1-3 performance deficits relating to physical, cognitive , or psychosocial skils that result in activity limitations and / or participation restrictions  LOW Complexity : No comorbidities that affect functional and no verbal or physical assistance needed to complete eval tasks Based on the above components, the patient evaluation is determined to be of the following complexity level: LOW Pain: 
  
  
  
  
  
  
Activity Tolerance:  
Good Please refer to the flowsheet for vital signs taken during this treatment. After treatment:  
? Patient left in no apparent distress sitting up in chair ? Patient left in no apparent distress in bed 
? Call bell left within reach ? Nursing notified ? Caregiver present ? Bed alarm activated COMMUNICATION/EDUCATION:  
The patient?s plan of care was discussed with: Registered Nurse. 
? Home safety education was provided and the patient/caregiver indicated understanding. ? Patient/family have participated as able in goal setting and plan of care. ? Patient/family agree to work toward stated goals and plan of care. ? Patient understands intent and goals of therapy, but is neutral about his/her participation. ? Patient is unable to participate in goal setting and plan of care. This patient?s plan of care is appropriate for delegation to [facility]. Thank you for this referral. 
Argentina Shah OTR/L Time Calculation: 24 mins

## 2019-03-25 NOTE — PROGRESS NOTES
[address] [facility], [address] with [address] [facility] Resident Progress Note in Brief S: RN paged that patient has speech difficulty. Patient seen and examined at bedside. Family also at bedside O: 
Visit Vitals /70 (BP 1 Location: Right arm, BP Patient Position: At rest;Sitting) Comment (BP Patient Position): sitting up in bed Pulse 100 Temp 98.2 °F (36.8 °C) Resp 17 Ht 5' 8\" (1.727 m) Wt 150 lb (68 kg) SpO2 93% Comment: O2 cannula @ 2L BMI 22.81 kg/m² Physical Examination:  
General appearance - alert, well appearing, and in no distress Chest - Normal work of breathing on 2L Neurological - alert, oriented, normal speech, no focal findings. Patient able to recite \"Pledge of Allegiance\" without difficulty. A/P:  
89yoM admitted for Septic shock likely secondary to HAP 
 
- Neuro exam with no acute findings, normal speech 
- PT to evaluate pt for home oxygen needs - Plan to d/c home with home health today Please see daily progress note for full details Jess Barrera MD 
Family Medicine Resident 
 
===================================================== 
5:39 PM ADDENDUM 
 
- Plan to discharge patient tomorrow after evaluation with PT for home oxygen needs - Case management to pursue [facility] (pt's assisted living facility) for home health needs for PT/OT/RN since it is at his home facility - Pt and family in agreement for plan to d/c [place]

## 2019-03-25 NOTE — CONSULTS
[facility] Name: Joanne Dennis MRN: [id number] : 1927 Hospital: [address] Date: 3/25/2019 Impression Plan 1. Shock, resolved 2. Afib with RVR, HR improved 3. Sepsis secondary to pneumonia 4. R lower lung opacity:  ? Pneumonia w effusion · Making good progress - likely home soon · Appears to have a R effusion but likely with some pneumonia/atx as well. Comfortable on RA. Don't need to intervene now but family encouraged to call if he has dyspnea after d/c that persists. Radiology ( personally reviewed) CXR with bibasilar airspace disease vs atelectasis vs effusion ABG No results for input(s): PHI, PO2I, PCO2I in the last 72 hours. Subjective Pt alert. Feels near baseline. Up in chair. On RA Past Medical History:  
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  
Procedure Laterality Date  HX HEENT    
 HX ORTHOPAEDIC    
 HX PROSTATECTOMY  HX UROLOGICAL    
 NEUROLOGICAL PROCEDURE UNLISTED Prior to Admission medications Medication Sig Start Date End Date Taking? Authorizing Provider  
acetaminophen (TYLENOL) 500 mg tablet Take 500 mg by mouth nightly. Yes Provider, Historical  
omeprazole (PRILOSEC) 20 mg capsule Take 20 mg by mouth Daily (before breakfast). Yes Provider, Historical  
vancomycin (VANCOCIN) 125 mg capsule Take 125 mg by mouth four (4) times daily.    Yes Provider, Historical  
 insulin NPH hum/reg insulin hm (HUMULIN 70/30 U-100 INSULIN SC) 24 Units by SubCUTAneous route daily. Yes Provider, Historical  
metoprolol tartrate (LOPRESSOR) 50 mg tablet Take 25 mg by mouth two (2) times a day. Yes Provider, Historical  
aspirin 81 mg chewable tablet Take 1 Tab by mouth daily. 1/11/19  Yes Yarely Hernandez,   
ferrous sulfate 325 mg (65 mg iron) tablet Take 325 mg by mouth two (2) times a day. Yes Other, MD Garrison  
b complex vitamins (B COMPLEX 1) tablet Take 1 Tab by mouth daily. Yes Provider, Historical  
chlorhexidine (PERIDEX) 0.12 % solution 15 mL by Swish and Spit route two (2) times a day. Yes Provider, Historical  
lactobacillus combo no.6 (PROBIOTIC COMPLEX PO) Take 1 Cap by mouth daily. Yes Provider, Historical  
lutein 20 mg cap Take 20 mg by mouth daily. Yes Other, MD Garrison  
cholecalciferol (VITAMIN D3) 1,000 unit tablet Take 1,000 Units by mouth daily. Yes Other, MD Garrison  
mirabegron ER (MYRBETRIQ) 50 mg ER tablet Take 50 mg by mouth every fourty-eight (48) hours. Yes Other, MD Garrison  
simvastatin (ZOCOR) 80 mg tablet Take 40 mg by mouth nightly. Yes Provider, Historical  
 
Current Facility-Administered Medications Medication Dose Route Frequency  levoFLOXacin (LEVAQUIN) tablet 750 mg  750 mg Oral Q24H  
 metoprolol (LOPRESSOR) injection 2.5 mg  2.5 mg IntraVENous ONCE  
 metoprolol tartrate (LOPRESSOR) tablet 50 mg  50 mg Oral BID  sodium chloride (NS) flush 5-40 mL  5-40 mL IntraVENous Q8H  
 heparin (porcine) injection 5,000 Units  5,000 Units SubCUTAneous Q8H  
 pantoprazole (PROTONIX) tablet 40 mg  40 mg Oral ACB&D  
 vancomycin (FIRVANQ) 50 mg/mL oral solution 125 mg  125 mg Oral Q6H  
 simvastatin (ZOCOR) tablet 40 mg  40 mg Oral QHS  lactobac ac& pc-s.therm-b.anim (MIKE Q/RISAQUAD)  1 Cap Oral DAILY  aspirin chewable tablet 81 mg  81 mg Oral DAILY  insulin glargine (LANTUS) injection 15 Units  15 Units SubCUTAneous DAILY  insulin lispro (HUMALOG) injection   SubCUTAneous AC&HS  
 guaiFENesin ER (MUCINEX) tablet 600 mg  600 mg Oral Q12H No Known Allergies Social History Tobacco Use  Smoking status: Former Smoker  Smokeless tobacco: Never Used Substance Use Topics  Alcohol use: No  
  Frequency: Never No family history on file. Laboratory: I have personally reviewed the critical care flowsheet and labs. Recent Labs  
  03/25/19 
0120 03/24/19 0319 03/23/19 
4484 WBC 14.2* 14.6* 18.8* HGB 8.5* 8.6* 9.5* HCT 28.6* 29.2* 32.4*  
 182 262 Recent Labs  
  03/25/19 
0120 03/24/19 0319 03/23/19 
2887  143 141  
K 4.1 4.0 4.1 * 113* 110* CO2 26 24 23 * 148* 237* BUN 15 17 31* CREA 0.86 0.79 1.03  
CA 8.3* 8.3* 7.7* MG 2.2 2.0 2.1 PHOS 2.8 2.2* 3.2 ALB 2.1* 2.1* 2.3*  
SGOT 75* 28 82* ALT 79* 47 71 Objective: Mode Rate Tidal Volume Pressure FiO2 PEEP Vital Signs:   
 TMAX(24) Intake/Output:  
Last shift:        
Last 3 shifts: No intake/output data recorded. Sarita Luisa Intake/Output Summary (Last 24 hours) at 3/25/2019 3433 Last data filed at 3/25/2019 1690 Gross per 24 hour Intake 450 ml Output 400 ml Net 50 ml EXAM: 
GENERAL: well developed and in no distress, HEENT:  PERRL, EOMI, no alar flaring or epistaxis, oral mucosa moist without cyanosis, NECK:  no jugular vein distention, no retractions, no thyromegaly or masses, LUNGS decreased bs over the lower R 1/3 lung field, HEART:  Regular rate and rhythm with no MGR; no edema is present, ABDOMEN:  soft with no tenderness, bowel sounds present, EXTREMITIES:  warm with no cyanosis, SKIN:  no jaundice or ecchymosis and NEUROLOGIC:  alert and oriented, grossly non-focal 
 
Juan Lopez MD 
[facility]

## 2019-03-25 NOTE — PROGRESS NOTES
CM Note: 
Reason for Admission:   Septic shock RRAT Score:     27 Resources/supports as identified by patient/family:   Supportive family, senior living apt Top Challenges facing patient (as identified by patient/family and CM): Finances/Medication cost?     Has Rx coverage and gets his medications at [facility] and from [facility].  Has Good Rx card. Transportation? Family drives Support system or lack thereof? See above Living arrangements? He lives on the second level with elevator access. Self-care/ADLs/Cognition? Pt is independent with ADL's and is A&O. Current Advanced Directive/Advance Care Plan:  Yes and is on file at [address] [place] [address] Plan for utilizing home health:    [facility] Likelihood of readmission: moderate to high Transition of Care Plan:              When medically stable he will d/c to home transported by his daughter, Neftali Deleon ([phone]). TOMASZ Rick Care Management Interventions PCP Verified by CM: Yes(Dr. Nils Nicole. No NN.) Palliative Care Criteria Met (RRAT>21 & CHF Dx)?: No 
Mode of Transport at Discharge: Self(family) Transition of Care Consult (CM Consult): Home Health [facility] - INPATIENT: (S) No 
Reason Outside [place]: Patient already serviced by other home care/hospice agency MyChart Signup: No 
Discharge Durable Medical Equipment: No(Has a rollator, shower chair and grab bars) Physical Therapy Consult: Yes Occupational Therapy Consult: Yes Speech Therapy Consult: No 
Current Support Network: Lives Alone Plan discussed with Pt/Family/Caregiver: Yes Discharge Location Discharge Placement: Home with one level

## 2019-03-25 NOTE — PROGRESS NOTES
Chart reviewed. Received clearance from nursing to work with pt. Son/daughter present in the room. Started asking pt questions and pt was having difficulty getting his words out. Family reported this was new. Immediately contacted pt's nurse. Nurse noted a delay in his language skills, too.  at rest, 95% 2L, /68. Nurse contacted MD.  Will hold therapy at this time.

## 2019-03-25 NOTE — PROGRESS NOTES
[facility] Syl[facility]anks [address] Office [phone] Fax [phone] [address] Medicine Residency Attending Addendum: I saw and evaluated the patient on the day of the encounter with Dr. Mckinley Cedillo, performing the key elements of the service. I discussed the findings, assessment and plan with the resident and agree with the resident's findings and plan as documented in the resident's note. Pending urine cultures. Patient noted to be sinus tachycardia into the 130s. Was in atrial fibrillation for about an hour. Continue to monitor electrolytes. Joan Forrest MD, CAQSM, RMSK Assessment and Plan Sadaf Rivera is a 80 y.o. male admitted for Septic Shock. He has spent 3 night(s) in the hospital. 
 
24 Hour Events: No acute events overnight Septic shock likely secondary to HAP:  IMPROVED. Infectious source likely 2/2 Facial cellulitis vs aspiration pneumonia. Has had hospitalizations in the past for aspiration pneumonia and has thick liquid diet.  CXR Bibasilar atelectasis/airspace disease is unchanged. Patient was placed on pressors s/p 3L NS bolus and weaned off. Has IJ central line in place.  
- De-escalate Abx to PO Levaquin (3/25). Prophylactic PO Vanc and probiotic   
- Urine culture pending - Respiratory panel: negative - Blood cultures: No growth at 3 days - F/U Legionella, S.pneumo pending - Strict I/O 
  
Afib with RVR- IMPROVED. Follows Dr. [facility]. - Continue Metoprolol to 50mg BID for better rate control. 
- Cardiac monitoring 
  
DMII -STABLE. HgbA1c 7.5 (2/11/19). - Holding home medications of NPH 24 U daily. - Continue Lantus 15 units AM. 
- Insulin Sliding Scale normal sensitivity with AC&HS glucose checks. - Hypoglycemia protocols ordered. 
  
OSVALDO - RESOLVED. Cr 1.28 POA (BL ~ 0.8). - Monitor - Avoid and holding home nephrotoxic drugs 
  
Hypertension- STABLE. - Continuing home medications of metoprolol (LOPRESSOR) 50 mg tab given patient in Afib with RVR on admission.  
- Will continue to monitor at this time and readjust as BP's trend. 
  
Anemia 2/2 chronic disease-STABLE.  POA Hgb 10.3 (BL 7.9-9.6). TIBC wnl, Iron % sat low, iron low, ferritin normal (3/19/19) - Daily CBC 
  
Symptomatic GERD- STABLE 
- Hold home prilosec - PO Protonix 40mg BID 
  
HLD: No lipid panel on record. -Continue home Zocor  
  
Large B- Cell lymphoma, on remission- Diagnosed  has been on remission since . Has had IJ Port in place since then. Follows with Dr. Cem Abreu out patient. FEN/GI - VULXHUMXU pureed.  
Activity - Ambulate with assistance DVT prophylaxis - Sub Q Heparin GI prophylaxis - protonix Fall prophylaxis - Fall precautions ordered. Disposition - Admit to stepdown. Plan to d/c TBD. Consulting PT/OT/CM Code Status - Full, discussed with patient / caregivers. Next of Kin Name and Contact Daughter: Ms. Kami Saeed ([phone]) Braulio Ingram MD 
Family Medicine Resident Subjective / Objective Subjective Patient with no acute concerns today. He denies any SOB, chest pain, or pain at this time. Temp (24hrs), Av.7 °F (37.1 °C), Min:97.8 °F (36.6 °C), Max:99.4 °F (37.4 °C) Objective: 
Vital signs: (most recent): Blood pressure 156/77, pulse 100, temperature 99.1 °F (37.3 °C), resp. rate 20, height 5' 8\" (1.727 m), weight 149 lb 7.6 oz (67.8 kg), SpO2 93 %. Respiratory: O2 Flow Rate (L/min): 2 l/min O2 Device: Nasal cannula Visit Vitals /77 (BP 1 Location: Left arm, BP Patient Position: At rest) Pulse 100 Temp 99.1 °F (37.3 °C) Resp 20 Ht 5' 8\" (1.727 m) Wt 149 lb 7.6 oz (67.8 kg) SpO2 93% BMI 22.73 kg/m² General: No acute distress. Alert. Cooperative. Head: Normocephalic. Atraumatic. Left preauricular region continues to be erythematous. No crusting or discharge noted. Respiratory: CTAB. No w/r/r/c.  
Cardiovascular: Tachycardic, irregularly irregular. Normal S1,S2. No m/r/g. Pulses 2+ throughout. GI: + bowel sounds. Nontender. No rebound tenderness or guarding. Nondistended. Extremities:  No LE edema. Distal pulses intact. Musculoskeletal: Full ROM in all extremities. Skin: Warm, dry. No rashes. IJ in place in right upper chest.   
Neuro: CN II-XII grossly intact. I/O: 
Date 03/24/19 0700 - 03/25/19 4248 03/25/19 0700 - 03/26/19 5483 Shift 7756-8830 1532-0357 24 Hour Total 7163-2493 3365-2590 24 Hour Total  
INTAKE  
I.V.(mL/kg/hr)  450 450 Volume (piperacillin-tazobactam (ZOSYN) 3.375 g in 0.9% sodium chloride (MBP/ADV) 100 mL)  200 200 Volume (vancomycin (VANCOCIN) 1,000 mg in 0.9% sodium chloride (MBP/ADV) 250 mL)  250 250 Shift Total(mL/kg)  450(6.6) 450(6.6) OUTPUT Urine(mL/kg/hr) 175(0.2)  175 Urine Voided 175  175 Stool Stool Occurrence(s)  1 x 1 x Shift Total(mL/kg) 175(2.6)  175(2.6) NET -175 450 275 Weight (kg) 67.8 67.8 67.8 67.8 67.8 67.8  
 
 
CBC: 
Recent Labs  
  03/25/19 
0120 03/24/19 
0319 03/23/19 2035 WBC 14.2* 14.6* 18.8* HGB 8.5* 8.6* 9.5* HCT 28.6* 29.2* 32.4*  
 182 262 Metabolic Panel: 
Recent Labs  
  03/25/19 
0120 03/24/19 
0319 03/23/19 
8013  143 141  
K 4.1 4.0 4.1 * 113* 110* CO2 26 24 23 BUN 15 17 31* CREA 0.86 0.79 1.03  
* 148* 237* CA 8.3* 8.3* 7.7* MG 2.2 2.0 2.1 PHOS 2.8 2.2* 3.2 ALB 2.1* 2.1* 2.3*  
SGOT 75* 28 82* ALT 79* 47 71 For Billing Chief Complaint Patient presents with  Fever  Lethargy  Chest Pain Hospital Problems  Date Reviewed: 3/19/2019 Codes Class Noted POA  
 HAP (hospital-acquired pneumonia) ICD-10-CM: J18.9 ICD-9-CM: 888  3/24/2019 Unknown Essential hypertension ICD-10-CM: I10 
ICD-9-CM: 401.9  3/24/2019 Unknown Acute kidney injury ([facility] ICD-10-CM: N17.9 ICD-9-CM: 584.9  3/24/2019 Unknown Chronic anemia ICD-10-CM: D64.9 ICD-9-CM: 285.9  3/24/2019 Unknown Dyslipidemia ICD-10-CM: E78.5 ICD-9-CM: 272.4  3/24/2019 Unknown Atrial fibrillation with rapid ventricular response (HCC) ICD-10-CM: I48.91 
ICD-9-CM: 427.31  3/22/2019 Unknown Septic shock (HCC) ICD-10-CM: A41.9, R65.21 ICD-9-CM: 038.9, 785.52, 995.92  3/22/2019 Unknown DM (diabetes mellitus) ([facility] ICD-10-CM: E11.9 ICD-9-CM: 250.00  11/10/2018 Yes

## 2019-03-25 NOTE — PROGRESS NOTES
1407: 
Pt accepted by [facility]. TOMASZ Rick 
 
CM Note: 
Order for home health noted. A referral was sent in [facility] to [facility].  
TOMASZ Rick

## 2019-03-25 NOTE — PROGRESS NOTES
Bedside shift change report given to Horton Bamberger, RN (oncoming nurse) by Cassidy North RN (offgoing nurse). Report included the following information SBAR, Intake/Output, MAR, Accordion and Recent Results.

## 2019-03-25 NOTE — DIABETES MGMT
DTC Progress Note Recommendations/ Comments: chart review for varied BG's - FBG in range; however BG's rise to > 200 during the day She is on Humulin 70/30 at home and may benefit from consideration of addition of meal time lispro If appropriate, please consider Change diet to carb consistent Add lispro 2 units AC TID Document po intake Current hospital DM medication:  
Lantus 15 units daily Lispro high sensitivity correction scale Chart reviewed on Sadaf Rivera. Patient is a 80 y.o. male with known DM on Humulin 70/30, 24 units each day PTA 
 
 
A1c:  
Lab Results Component Value Date/Time Hemoglobin A1c 7.5 (H) 02/11/2019 02:48 PM  
 Hemoglobin A1c 9.6 (H) 11/10/2018 12:40 PM  
 
 
Recent Glucose Results:  
Lab Results Component Value Date/Time  (H) 03/25/2019 01:20 AM  
 GLUCPOC 115 (H) 03/25/2019 07:13 AM  
 GLUCPOC 249 (H) 03/24/2019 08:38 PM  
 GLUCPOC 235 (H) 03/24/2019 03:59 PM  
  
 
Lab Results Component Value Date/Time Creatinine 0.86 03/25/2019 01:20 AM  
 
Estimated Creatinine Clearance: 53.7 mL/min (based on SCr of 0.86 mg/dL). Active Orders Diet DIET REGULAR  
  
 
PO intake: No data found. Will continue to follow as needed. Thank you Chuck Granados RN, CDE Pager [phone] Time spent: 6 min

## 2019-03-25 NOTE — PROGRESS NOTES
Spoke with hospitalist about IJ to see if we should obtain another iv source. Hospitalist ok to keep IJ in. Site in intact with no signs infection noted. Patient stable. RN will continue to monitor.

## 2019-03-26 VITALS
DIASTOLIC BLOOD PRESSURE: 71 MMHG | BODY MASS INDEX: 22.73 KG/M2 | HEART RATE: 101 BPM | HEIGHT: 68 IN | OXYGEN SATURATION: 94 % | SYSTOLIC BLOOD PRESSURE: 124 MMHG | RESPIRATION RATE: 16 BRPM | TEMPERATURE: 98.2 F | WEIGHT: 150 LBS

## 2019-03-26 LAB
ANION GAP SERPL CALC-SCNC: 8 MMOL/L (ref 5–15)
BACTERIA SPEC CULT: NORMAL
BASOPHILS # BLD: 0 K/UL (ref 0–0.1)
BASOPHILS NFR BLD: 0 % (ref 0–1)
BUN SERPL-MCNC: 10 MG/DL (ref 6–20)
BUN/CREAT SERPL: 15 (ref 12–20)
CALCIUM SERPL-MCNC: 8.7 MG/DL (ref 8.5–10.1)
CC UR VC: NORMAL
CHLORIDE SERPL-SCNC: 109 MMOL/L (ref 97–108)
CO2 SERPL-SCNC: 26 MMOL/L (ref 21–32)
CREAT SERPL-MCNC: 0.67 MG/DL (ref 0.7–1.3)
DIFFERENTIAL METHOD BLD: ABNORMAL
EOSINOPHIL # BLD: 0.2 K/UL (ref 0–0.4)
EOSINOPHIL NFR BLD: 1 % (ref 0–7)
ERYTHROCYTE [DISTWIDTH] IN BLOOD BY AUTOMATED COUNT: 16.6 % (ref 11.5–14.5)
GLUCOSE BLD STRIP.AUTO-MCNC: 116 MG/DL (ref 65–100)
GLUCOSE BLD STRIP.AUTO-MCNC: 96 MG/DL (ref 65–100)
GLUCOSE SERPL-MCNC: 90 MG/DL (ref 65–100)
HCT VFR BLD AUTO: 30.2 % (ref 36.6–50.3)
HGB BLD-MCNC: 8.9 G/DL (ref 12.1–17)
IMM GRANULOCYTES # BLD AUTO: 0.1 K/UL (ref 0–0.04)
IMM GRANULOCYTES NFR BLD AUTO: 0 % (ref 0–0.5)
LYMPHOCYTES # BLD: 1.2 K/UL (ref 0.8–3.5)
LYMPHOCYTES NFR BLD: 9 % (ref 12–49)
MAGNESIUM SERPL-MCNC: 2 MG/DL (ref 1.6–2.4)
MCH RBC QN AUTO: 25.3 PG (ref 26–34)
MCHC RBC AUTO-ENTMCNC: 29.5 G/DL (ref 30–36.5)
MCV RBC AUTO: 85.8 FL (ref 80–99)
MONOCYTES # BLD: 1.5 K/UL (ref 0–1)
MONOCYTES NFR BLD: 11 % (ref 5–13)
NEUTS SEG # BLD: 10.9 K/UL (ref 1.8–8)
NEUTS SEG NFR BLD: 79 % (ref 32–75)
NRBC # BLD: 0 K/UL (ref 0–0.01)
NRBC BLD-RTO: 0 PER 100 WBC
PHOSPHATE SERPL-MCNC: 3.2 MG/DL (ref 2.6–4.7)
PLATELET # BLD AUTO: 220 K/UL (ref 150–400)
PMV BLD AUTO: 11.3 FL (ref 8.9–12.9)
POTASSIUM SERPL-SCNC: 4.1 MMOL/L (ref 3.5–5.1)
RBC # BLD AUTO: 3.52 M/UL (ref 4.1–5.7)
SERVICE CMNT-IMP: ABNORMAL
SERVICE CMNT-IMP: NORMAL
SERVICE CMNT-IMP: NORMAL
SODIUM SERPL-SCNC: 143 MMOL/L (ref 136–145)
WBC # BLD AUTO: 13.8 K/UL (ref 4.1–11.1)

## 2019-03-26 PROCEDURE — 82962 GLUCOSE BLOOD TEST: CPT

## 2019-03-26 PROCEDURE — 74011636637 HC RX REV CODE- 636/637: Performed by: STUDENT IN AN ORGANIZED HEALTH CARE EDUCATION/TRAINING PROGRAM

## 2019-03-26 PROCEDURE — 94760 N-INVAS EAR/PLS OXIMETRY 1: CPT

## 2019-03-26 PROCEDURE — 97530 THERAPEUTIC ACTIVITIES: CPT

## 2019-03-26 PROCEDURE — 74011250637 HC RX REV CODE- 250/637: Performed by: STUDENT IN AN ORGANIZED HEALTH CARE EDUCATION/TRAINING PROGRAM

## 2019-03-26 PROCEDURE — 74011250636 HC RX REV CODE- 250/636: Performed by: STUDENT IN AN ORGANIZED HEALTH CARE EDUCATION/TRAINING PROGRAM

## 2019-03-26 PROCEDURE — 97116 GAIT TRAINING THERAPY: CPT

## 2019-03-26 PROCEDURE — 74011250637 HC RX REV CODE- 250/637: Performed by: FAMILY MEDICINE

## 2019-03-26 PROCEDURE — 36415 COLL VENOUS BLD VENIPUNCTURE: CPT

## 2019-03-26 PROCEDURE — 84100 ASSAY OF PHOSPHORUS: CPT

## 2019-03-26 PROCEDURE — 77010033678 HC OXYGEN DAILY

## 2019-03-26 PROCEDURE — 80048 BASIC METABOLIC PNL TOTAL CA: CPT

## 2019-03-26 PROCEDURE — 83735 ASSAY OF MAGNESIUM: CPT

## 2019-03-26 PROCEDURE — 85025 COMPLETE CBC W/AUTO DIFF WBC: CPT

## 2019-03-26 RX ADMIN — Medication 10 ML: at 15:02

## 2019-03-26 RX ADMIN — Medication 10 ML: at 05:52

## 2019-03-26 RX ADMIN — GUAIFENESIN 600 MG: 600 TABLET, EXTENDED RELEASE ORAL at 08:06

## 2019-03-26 RX ADMIN — ASPIRIN 81 MG 81 MG: 81 TABLET ORAL at 08:06

## 2019-03-26 RX ADMIN — SODIUM CHLORIDE 500 ML: 900 INJECTION, SOLUTION INTRAVENOUS at 11:53

## 2019-03-26 RX ADMIN — LEVOFLOXACIN 750 MG: 750 TABLET, FILM COATED ORAL at 16:16

## 2019-03-26 RX ADMIN — METOPROLOL TARTRATE 50 MG: 50 TABLET ORAL at 08:06

## 2019-03-26 RX ADMIN — PANTOPRAZOLE SODIUM 40 MG: 40 TABLET, DELAYED RELEASE ORAL at 05:52

## 2019-03-26 RX ADMIN — VANCOMYCIN HYDROCHLORIDE 125 MG: KIT at 12:38

## 2019-03-26 RX ADMIN — HEPARIN SODIUM 5000 UNITS: 5000 INJECTION INTRAVENOUS; SUBCUTANEOUS at 05:52

## 2019-03-26 RX ADMIN — INSULIN GLARGINE 15 UNITS: 100 INJECTION, SOLUTION SUBCUTANEOUS at 08:06

## 2019-03-26 RX ADMIN — VANCOMYCIN HYDROCHLORIDE 125 MG: KIT at 05:52

## 2019-03-26 RX ADMIN — HEPARIN SODIUM 5000 UNITS: 5000 INJECTION INTRAVENOUS; SUBCUTANEOUS at 15:01

## 2019-03-26 RX ADMIN — Medication 1 CAPSULE: at 08:06

## 2019-03-26 NOTE — PROGRESS NOTES
PHYSICAL THERAPY TREATMENT Patient: Brianna Ray (81 y.o. male) Date: 3/26/2019 Diagnosis: Septic shock ([facility] [A41.9, R65.21] Atrial fibrillation with rapid ventricular response (HCC) [I48.91] Septic shock ([facility] Precautions: Contact, Fall Chart, physical therapy assessment, plan of care and goals were reviewed. ASSESSMENT: 
Patient received semi-reclined in the bed with his oxygen off. Daughter present throughout the treatment and asking appropriate questions. Performed transfer training for out of bed and sit to stand. Patient needs assistance and cues for hand placement. On room air patient's oxygen sats decrease at rest and with activity. Improved sats with sitting up in the chair, sats best with 2 L/min at rest and with mobility. HR 90s to low 100s bpm throughout treatment. Performed gait training in the [place] with the rolling walker with min assist and one standing break. Spoke at length with family and patient. They intend to have assistance at home for him 2-3 hours/day. At this time the patient would need assistance any time he wants to stand up and walk. Also oxygen needs are new, he will need training with the oxygen lines. Recommend SNF at discharge. Spoke with , MDs, and RN regarding DC planning. HR  Ox 
97    84  room air rest 
98    95  2 l/min rest 
109  98  2 l/min ambulating 106  84  room air ambulating 103  95  room air sitting after ambulating 94    91  room air sitting talking. Progression toward goals: 
?    Improving appropriately and progressing toward goals ? Improving slowly and progressing toward goals ? Not making progress toward goals and plan of care will be adjusted PLAN: 
Patient continues to benefit from skilled intervention to address the above impairments. Continue treatment per established plan of care. Discharge Recommendations:  Bam Rosario Further Equipment Recommendations for Discharge:  already has a rollator SUBJECTIVE:  
Patient stated ? What do you think I should do? .? re: DC planning OBJECTIVE DATA SUMMARY:  
Critical Behavior: 
Neurologic State: Alert Orientation Level: Oriented X4 Cognition: Appropriate decision making, Appropriate for age attention/concentration, Appropriate safety awareness, Follows commands Safety/Judgement: Insight into deficits Functional Mobility Training: 
Bed Mobility: 
  
Supine to Sit: Minimum assistance;Assist x1;Additional time Transfers: 
Sit to Stand: Minimum assistance;Assist x1;Additional time Stand to Sit: Contact guard assistance;Assist x1;Additional time Balance: 
Sitting: Intact Standing: Intact; With support Ambulation/Gait Training: 
Distance (ft): 90 Feet (ft) Assistive Device: Walker, rolling;Gait belt Ambulation - Level of Assistance: Minimal assistance;Assist x1;Additional time Gait Abnormalities: Decreased step clearance Stairs: 
  
  
   
 
Pain:  No complaints of pain Activity Tolerance:  
Limited by weakness, decreased endurance, and general fatigue Please refer to the flowsheet for vital signs taken during this treatment. After treatment:  
?    Patient left in no apparent distress sitting up in chair ? Patient left in no apparent distress in bed 
? Call bell left within reach ? Nursing notified ? Caregiver present ? Chair alarm activated COMMUNICATION/COLLABORATION:  
The patient?s plan of care was discussed with: Registered Nurse, Physician and  Jesus Johnson PT Time Calculation: 45 mins

## 2019-03-26 NOTE — PROGRESS NOTES
1618: 
[facility] notified. He will d/c today by w/c van at 6 pm.  Cost with O2 is $77.  His family can pay for the transport. Nursing to call report to [facility] at [phone]. AVS sent in [facility]. TOMASZ Rick 
 
CM Note: 
Pt choices for snf were: #1 [facility], #2 [facility] and #3 [facility]. Referrals were sent in [facility].  
TOMASZ Rick

## 2019-03-26 NOTE — PROGRESS NOTES
HYPOGLYCEMIC EPISODE DOCUMENTATION Patient with hypoglycemic episode(s) at 2120 (time) on 3-25-19 (date). BG value(s) pre-treatment 74 Was patient symptomatic? [] yes, [x] no Patient was treated with the following rescue medications/treatments: 4oz juiceBG value post-treatment: 92

## 2019-03-26 NOTE — PROGRESS NOTES
[facility] [address] Office [phone] Fax [phone] [address] Medicine Residency Attending Addendum: I saw and evaluated the patient on the day of the encounter with Dr. Samina Sal, performing the key elements of the service. I discussed the findings, assessment and plan with the resident and agree with the resident's findings and plan as documented in the resident's note. Improving. Plan to DC to SNF Chu Milian MD, CAQSM, RMSK Assessment and Plan Afia Nickerson is a 80 y.o. male admitted for Septic Shock. He has spent 4 night(s) in the hospital. 
 
24 Hour Events: No acute events overnight  
-  PT for home oxygen needs - Case management to pursue [facility] (pt's assisted living facility) for home health needs for PT/OT/RN Septic shock likely secondary to HAP:  IMPROVED. Infectious source likely 2/2 Facial cellulitis vs aspiration pneumonia. Has had hospitalizations in the past for aspiration pneumonia and has thick liquid diet.  CXR Bibasilar atelectasis/airspace disease is unchanged. Patient was placed on pressors s/p 3L NS bolus and weaned off. Has IJ central line in place.  
- Continue PO Levaquin (3/25). Prophylactic PO Vanc and probiotic   
- Urine culture pending - Respiratory panel: negative - Blood cultures: No growth at 4 days - Legionella, S.pneumo negative - Strict I/O 
  
Afib with RVR- IMPROVED. Follows Dr. [facility]. - Continue Metoprolol to 50mg BID for better rate control. 
- Cardiac monitoring 
  
DMII -STABLE. HgbA1c 7.5 (2/11/19). - Holding home medications of NPH 24 U daily. - Continue Lantus 15 units AM. 
- Insulin Sliding Scale normal sensitivity with AC&HS glucose checks. - Hypoglycemia protocols ordered. 
  
OSVALDO - RESOLVED. Cr 1.28 POA (BL ~ 0.8). - Monitor - Avoid and holding home nephrotoxic drugs 
  
Hypertension- STABLE. - Continuing home medications of metoprolol (LOPRESSOR) 50 mg tab given patient in Afib with RVR on admission.  
- Will continue to monitor at this time and readjust as BP's trend. 
  
Anemia 2/2 chronic disease-STABLE.  POA Hgb 10.3 (BL 7.9-9.6). TIBC wnl, Iron % sat low, iron low, ferritin normal (3/19/19) - Daily CBC 
  
Symptomatic GERD- STABLE 
- Hold home prilosec - PO Protonix 40mg BID 
  
HLD: No lipid panel on record. -Continue home Zocor  
  
Large B- Cell lymphoma, on remission- Diagnosed  has been on remission since . Has had IJ Port in place since then. Follows with Dr. Valencia Castellon out patient. FEN/GI - [id number] pureed.  
Activity - Ambulate with assistance DVT prophylaxis - Sub Q Heparin GI prophylaxis - protonix Fall prophylaxis - Fall precautions ordered. Disposition - Admit to stepdown. Plan to d/c TBD. Consulting PT/OT/CM Code Status - Full, discussed with patient / caregivers. Next of Kin Name and Contact Daughter: Ms. Ilene Woodard ([phone]) Nimisha Perkins MD 
Family Medicine Resident Subjective / Objective Subjective Patient with no acute concerns today. He denies any SOB, chest pain, or pain at this time. Temp (24hrs), Av.7 °F (36.5 °C), Min:97.3 °F (36.3 °C), Max:98.2 °F (36.8 °C) Objective: 
Vital signs: (most recent): Blood pressure 157/74, pulse 95, temperature 98 °F (36.7 °C), resp. rate 17, height 5' 8\" (1.727 m), weight 150 lb (68 kg), SpO2 95 %. Respiratory: O2 Flow Rate (L/min): 2 l/min O2 Device: Nasal cannula Visit Vitals /74 (BP 1 Location: Left arm, BP Patient Position: At rest) Pulse 95 Temp 98 °F (36.7 °C) Resp 17 Ht 5' 8\" (1.727 m) Wt 150 lb (68 kg) SpO2 95% BMI 22.81 kg/m² General: No acute distress. Alert. Cooperative. Head: Normocephalic. Atraumatic. Left preauricular region continues to be erythematous. No crusting or discharge noted. Respiratory: CTAB. No w/r/r/c. Cardiovascular: Tachycardic, irregularly irregular. Normal S1,S2. No m/r/g. Pulses 2+ throughout. GI: + bowel sounds. Nontender. No rebound tenderness or guarding. Nondistended. Extremities:  No LE edema. Distal pulses intact. Musculoskeletal: Full ROM in all extremities. Skin: Warm, dry. No rashes. IJ in place in right upper chest.   
Neuro: CN II-XII grossly intact. I/O: 
Date 03/25/19 0700 - 03/26/19 4125 03/26/19 0700 - 03/27/19 3492 Shift [id number] 1900-0659 24 Hour Total 0243-6388 0098-7037 24 Hour Total  
INTAKE Shift Total(mL/kg) OUTPUT Urine(mL/kg/hr)  300(0.4) 300(0.2) Urine Voided  300 300 Urine Occurrence(s) 1 x  1 x Shift Total(mL/kg)  300(4.4) 300(4.4) NET  -300 -300 Weight (kg) 68 68 68 68 68 68 CBC: 
Recent Labs  
  03/26/19 
0405 03/25/19 
0120 03/24/19 
0319 WBC 13.8* 14.2* 14.6* HGB 8.9* 8.5* 8.6* HCT 30.2* 28.6* 29.2*  
 220 182 Metabolic Panel: 
Recent Labs  
  03/26/19 
0405 03/25/19 
0120 03/24/19 
0319  143 143  
K 4.1 4.1 4.0  
* 112* 113* CO2 26 26 24 BUN 10 15 17 CREA 0.67* 0.86 0.79 GLU 90 141* 148* CA 8.7 8.3* 8.3*  
MG 2.0 2.2 2.0 PHOS 3.2 2.8 2.2* ALB  --  2.1* 2.1*  
SGOT  --  75* 28 ALT  --  79* 47 For Billing Chief Complaint Patient presents with  Fever  Lethargy  Chest Pain Hospital Problems  Date Reviewed: 3/19/2019 Codes Class Noted POA  
 HAP (hospital-acquired pneumonia) ICD-10-CM: J18.9 ICD-9-CM: 753  3/24/2019 Unknown Essential hypertension ICD-10-CM: I10 
ICD-9-CM: 401.9  3/24/2019 Unknown Acute kidney injury ([facility] ICD-10-CM: N17.9 ICD-9-CM: 584.9  3/24/2019 Unknown Chronic anemia ICD-10-CM: D64.9 ICD-9-CM: 285.9  3/24/2019 Unknown Dyslipidemia ICD-10-CM: E78.5 ICD-9-CM: 272.4  3/24/2019 Unknown  Atrial fibrillation with rapid ventricular response (HCC) ICD-10-CM: I48.91 
 ICD-9-CM: 427.31  3/22/2019 Unknown * (Principal) Septic shock ([facility] ICD-10-CM: A41.9, R65.21 ICD-9-CM: 038.9, 785.52, 995.92  3/22/2019 Unknown History of Clostridium difficile colitis ICD-10-CM: Z86.19 ICD-9-CM: V12.79  11/10/2018 Yes DM (diabetes mellitus) ([facility] ICD-10-CM: E11.9 ICD-9-CM: 250.00  11/10/2018 Yes

## 2019-03-26 NOTE — PROGRESS NOTES
Spiritual Care Partner Volunteer visited patient in 5 Med Surg on 3/26/19. Documented by: Peyton Menezes., MS., [address] ([id number])

## 2019-03-27 ENCOUNTER — PATIENT OUTREACH (OUTPATIENT)
Dept: FAMILY MEDICINE CLINIC | Age: 84
End: 2019-03-27

## 2019-03-27 NOTE — DISCHARGE SUMMARY
[facility] FAMILY MEDICINE RESIDENCY PROGRAM  
Discharge/Transfer/Off-Service Note Date: March 26, 2019 Efraín Robert MRN: [id number] YOB: 1927 Age: 80 y.o. Date of admission: 3/22/2019 Date of discharge/transfer: 3/26/2019 Primary Care Physician: Lisa John MD  
 
Attending physician at admission: Dr. Jessica Chavarria MD 
  
Attending physician at discharge/transfer: Dr. Jessica Chavarria MD 
  
Resident physician at discharge/transfer: Chely Sequeira MD 
  
Consultants during hospitalization Pulmonary Admission diagnoses Septic shock ([facility] [A41.9, R65.21] Atrial fibrillation with rapid ventricular response ([facility] [I48.91] Discharge diagnoses Hospital Problems  Date Reviewed: 3/19/2019 Codes Class Noted POA  
 HAP (hospital-acquired pneumonia) ICD-10-CM: J18.9 ICD-9-CM: 063  3/24/2019 Unknown Essential hypertension ICD-10-CM: I10 
ICD-9-CM: 401.9  3/24/2019 Unknown Acute kidney injury ([facility] ICD-10-CM: N17.9 ICD-9-CM: 584.9  3/24/2019 Unknown Chronic anemia ICD-10-CM: D64.9 ICD-9-CM: 285.9  3/24/2019 Unknown Dyslipidemia ICD-10-CM: E78.5 ICD-9-CM: 272.4  3/24/2019 Unknown Atrial fibrillation with rapid ventricular response (HCC) ICD-10-CM: I48.91 
ICD-9-CM: 427.31  3/22/2019 Unknown * (Principal) Septic shock ([facility] ICD-10-CM: A41.9, R65.21 ICD-9-CM: 038.9, 785.52, 995.92  3/22/2019 Unknown History of Clostridium difficile colitis ICD-10-CM: Z86.19 ICD-9-CM: V12.79  11/10/2018 Yes DM (diabetes mellitus) ([facility] ICD-10-CM: E11.9 ICD-9-CM: 250.00  11/10/2018 Yes History of Present Illness Efraín Robert is a 80 y.o. male with known Melanoma s/p Mohs surgery, DM, Large B- cell lymphoma, C. Diff colitis, RBBB, HTN  who presents to the ER complaining of lethargy.   Patient's daughter was called earlier today by staff at [facility] where he currently resides and was told . Bennett Vazquez \"was not himself\" and was having increased thirst. Daughter then noticed he was lethargic so decided to bring Mr. Bennett Vazquez to the ER. Patient recently moved from his daughter's house to [facility] two weeks ago and daughter has noticed Mr. Bennett Vazquez has not been compliant with medications given he forgets to take them if she doesn't remind him. Patient has recently had Mohs surgery for melanoma and got the stitches removed two days ago and completed course of keflex. Of note patient has an IJ Port cath for the past three years which he got to receive treatment for Large B-cell lymphoma and is flushed regularly. Patient on thick liquid diet given she was Hospitalized in the past for aspiration pneumonia.  
  
In the ED: - Vitals - T 100.4 Pulse 126 /73 MAP 90 RR 26 SpO2 94% - Labs - WBC 20.9 Hgb 10.3 (BL 9.5-10.3)) Cr 1.28 (BL 0.8) glucose 364 
- Imaging - CXR (portable) was rotated and was suboptimal with no definite acute process. Second CXR por showed bibasilar atelectasis/airspace disease unchanged. - Treatment - 1L NS bolus x2 , vancomycin 
  
EKG: Afib with RVR, left axis deviation, right bundle branch block HOSPITAL COURSE: 
Radha Ramirez is a 80 y.o. male admitted for Septic Shock 2/2 pneumonia. He has spent 4 night(s) in the hospital. He will be discharged to SNF. Septic shock likely secondary to HAP:  IMPROVED. Infectious source likely 2/2 Facial cellulitis vs pneumonia (HAP vs. Asp PNA). Has had hospitalizations in the past for aspiration pneumonia and has thick liquid diet.  CXR Bibasilar atelectasis/airspace disease is unchanged. Patient was inititally placed on pressors s/p 3L NS bolus and weaned off. Has IJ central line in place. Urine culture NGx1D. Blood cultures: NGx 4D. RVP negative. Legionella and S.pneumo negative. - Patient finished 5 day course of Levaquin. 
  
Afib with RVR- IMPROVED.  Follows Dr. Evia Severs 
 - Continue Metoprolol to 50mg BID for better rate control. 
  
DMII -STABLE. HgbA1c 7.5 (2/11/19). - Continue home medications of NPH 24 U daily. 
  
Hypertension- STABLE. 
- Continue new dose of Metoprolol (LOPRESSOR) 50 mg tab Anemia 2/2 chronic disease-STABLE.  POA Hgb 10.3 (BL 7.9-9.6). TIBC wnl, Iron % sat low, iron low, ferritin normal (3/19/19). On day of discharge, Hg 8.9 
  
Symptomatic GERD- STABLE 
- Continue home prilosec HLD: No lipid panel on record. -Continue home Zocor  
  
Large B- Cell lymphoma, on remission- Diagnosed 2014 has been on remission since 2016. Has had IJ Port in place since then. Follows with Dr. Rocio Ennis out patient.  OSVALDO - RESOLVED. Cr 1.28 POA (BL ~ 0.8). - Avoid and holding home nephrotoxic drugs Condition at discharge: Stable Labs Recent Labs  
  03/26/19 
0405 03/25/19 
0120 03/24/19 0319 WBC 13.8* 14.2* 14.6* HGB 8.9* 8.5* 8.6* HCT 30.2* 28.6* 29.2*  
 220 182 Recent Labs  
  03/26/19 
0405 03/25/19 
0120 03/24/19 0319  143 143  
K 4.1 4.1 4.0  
* 112* 113* CO2 26 26 24 BUN 10 15 17 CREA 0.67* 0.86 0.79 GLU 90 141* 148* CA 8.7 8.3* 8.3*  
MG 2.0 2.2 2.0 PHOS 3.2 2.8 2.2* Recent Labs  
  03/25/19 
0120 03/24/19 0319 SGOT 75* 28 ALT 79* 47 * 136* TBILI 0.3 0.3 TP 5.7* 5.5* ALB 2.1* 2.1*  
GLOB 3.6 3.4 No results for input(s): INR, PTP, APTT in the last 72 hours. No lab exists for component: INREXT No results for input(s): FE, TIBC, PSAT, FERR in the last 72 hours. No results for input(s): PH, PCO2, PO2 in the last 72 hours. No results for input(s): CPK, CKMB in the last 72 hours. No lab exists for component: TROPONINI Lab Results Component Value Date/Time  Glucose (POC) 116 (H) 03/26/2019 11:03 AM  
 Glucose (POC) 96 03/26/2019 07:07 AM  
 Glucose (POC) 92 03/25/2019 09:38 PM  
 Glucose (POC) 74 03/25/2019 09:10 PM  
 Glucose (POC) 169 (H) 03/25/2019 04:01 PM  
 
  
 There has been no growth for blood culture in the last > 48 hours Disposition:  [facility] Discharge/Transfer Medications Discharge Medication List as of 3/26/2019  5:23 PM  
  
CONTINUE these medications which have CHANGED Details  
metoprolol tartrate (LOPRESSOR) 50 mg tablet Take 1 Tab by mouth two (2) times a day., Print, Disp-60 Tab, R-0  
  
  
CONTINUE these medications which have NOT CHANGED Details  
acetaminophen (TYLENOL) 500 mg tablet Take 500 mg by mouth nightly., Historical Med  
  
omeprazole (PRILOSEC) 20 mg capsule Take 20 mg by mouth Daily (before breakfast). , Historical Med  
  
vancomycin (VANCOCIN) 125 mg capsule Take 125 mg by mouth four (4) times daily. , Historical Med  
  
insulin NPH hum/reg insulin hm (HUMULIN 70/30 U-100 INSULIN SC) 24 Units by SubCUTAneous route daily. , Historical Med  
  
aspirin 81 mg chewable tablet Take 1 Tab by mouth daily. , Normal, Disp-81 Tab, R-30  
  
ferrous sulfate 325 mg (65 mg iron) tablet Take 325 mg by mouth two (2) times a day., Historical Med  
  
b complex vitamins (B COMPLEX 1) tablet Take 1 Tab by mouth daily. , Historical Med  
  
chlorhexidine (PERIDEX) 0.12 % solution 15 mL by Swish and Spit route two (2) times a day., Historical Med  
  
lactobacillus combo no.6 (PROBIOTIC COMPLEX PO) Take 1 Cap by mouth daily. , Historical Med  
  
lutein 20 mg cap Take 20 mg by mouth daily. , Historical Med  
  
cholecalciferol (VITAMIN D3) 1,000 unit tablet Take 1,000 Units by mouth daily. , Historical Med  
  
mirabegron ER (MYRBETRIQ) 50 mg ER tablet Take 50 mg by mouth every fourty-eight (48) hours. , Historical Med  
  
simvastatin (ZOCOR) 80 mg tablet Take 40 mg by mouth nightly., Historical Med  
  
  
STOP taking these medications  
  
 levoFLOXacin (LEVAQUIN) 750 mg tablet Comments:  
Reason for Stopping:   
   
 vancomycin (FIRVANQ) 50 mg/mL oral solution Comments:  
Reason for Stopping: vancomycin (FIRVANQ) 50 mg/mL oral solution Comments:  
Reason for Stopping:   
   
  
 
 
Recommended follow-up tests after discharge · None Diet:  Diabetic diet with [place] Thick Activity:  As tolerated Discharge instructions to patient/family Please seek medical attention for any new or worsening symptoms particularly fever, chest pain, shortness of breath, abdominal pain, nausea, vomiting Follow up plans/appointments Follow-up Information Follow up With Specialties Details Why Contact Info Bakari Simmons NP Nurse Practitioner Go on 3/27/2019 Hospital follow up at [address] [facility] 
[phone] Eloisa Perry NP Nurse Practitioner Schedule an appointment as soon as possible for a visit in 1 week GI follow up [address] [facility] [place] [address] [facility] 
[phone] Keisha Castellanos DO Cardiology Schedule an appointment as soon as possible for a visit in 1 month Cardiology follow up [address] [facility] [address] [place] [address] 
[phone] [address] [place] [address] 
[phone] Zayda Stewart MD Pediatrics, Family Practice   [place] [address] [address] [facility] 
[phone] [address] [facility] Medicine Residency Attending Addendum: I saw and evaluated the patient on the day of the encounter with Dr. Fallon Lynn, performing the levi elements of the service. I discussed the findings, assessment and plan with the resident and agree with the resident's findings and plan as documented in the resident's note. Ry Davis MD, CAM, [facility] Marty Eason MD 
Family Medicine Resident Cc: Zayda Stewart MD

## 2019-03-27 NOTE — DISCHARGE INSTRUCTIONS
[facility] / Tulio Carroll INSTRUCTIONS    Mayra Reyes / [id number] : 1927    Admission date: 3/22/2019 Discharge date: 3/26/2019       Primary care provider: Yon Golden MD    Discharging provider:  Coley Gottron, MD  - Family Medicine Resident  Rafi Aguilar MD- Attending, Family Medicine   . . . . . . . . . . . . . . . . . . . . . . . . . . . . . . . . . . . . . . . . . . . . . . . . . . . . . . . . . . . . . . . . . . . . . . . Lavada Saucer New medication:  - TAKE METOPROLOL 50MG [address] COURSE:  Mayra Reyes is a 80 y.o. male admitted for Septic Shock 2/2 pneumonia. He has spent 4 night(s) in the hospital. He will be discharged to SNF. Septic shock likely secondary to HAP:  IMPROVED. Infectious source likely 2/2 Facial cellulitis vs aspiration pneumonia. Has had hospitalizations in the past for aspiration pneumonia and has thick liquid diet.  CXR Bibasilar atelectasis/airspace disease is unchanged. Patient was placed on pressors s/p 3L NS bolus and weaned off. Has IJ central line in place. Urine culture NGx1D. Blood cultures: NGx 4D. RVP negative. Legionella and S.pneumo negative. - Patient finished 5 day course of Levaquin.     Afib with RVR- IMPROVED. Follows Dr. Sean Yu Outpatient.    - Continue Metoprolol to 50mg BID for better rate control.     DMII -STABLE. HgbA1c 7.5 (19). - Continue home medications of NPH 24 U daily.     Hypertension- STABLE.  - Continue new dose of Metoprolol (LOPRESSOR) 50 mg tab       Anemia 2/2 chronic disease-STABLE.  POA Hgb 10.3 (BL 7.9-9.6). TIBC wnl, Iron % sat low, iron low, ferritin normal (3/19/19)  - Monitor     Symptomatic GERD- STABLE  - Continue home prilosec       HLD: No lipid panel on record. -Continue home Zocor      Large B- Cell lymphoma, on remission- Diagnosed  has been on remission since 2016. Has had IJ Port in place since then. Follows with Dr. Elena Goode out patient.     V[address] 329.  Cr 1.28 POA (BL ~ 0.8). - Avoid and holding home nephrotoxic drugs    FOLLOW-UP CARE RECOMMENDATIONS:  Follow-up Information     Follow up With Specialties Details Why Contact Zayda Belle NP Nurse Practitioner Go on 3/27/2019 Hospital follow up at [address] [facility] [address] [facility]      Lev Bautista NP Nurse Practitioner Schedule an appointment as soon as possible for a visit in 1 week GI follow up [facility] [address] [address]  [phone]      [address] [facility]  PT/OT and nursing [address] [facility], [address]    [facility], DO Cardiology Schedule an appointment as soon as possible for a visit in 1 month Cardiology follow up [place]   [address] [facility]  [phone]              It is very important that you keep follow-up appointment(s). Bring discharge papers, medication list (and/or medication bottles) to follow-up appointments for review by outpatient provider(s). FOLLOW-UP TESTS RECOMMENDED:   · None. ONGOING TREATMENT PLAN: see above    PENDING TEST RESULTS:  At the time of discharge the following test results are still pending: final urine and blood culture. Please review these results as they become available. Specific symptoms to watch for: chest pain, shortness of breath, fever, chills, nausea, vomiting, diarrhea, change in mentation, falling, weakness, bleeding. DIET:  Diabetic Diet    ACTIVITY:  Activity as tolerated    WOUND CARE: None    EQUIPMENT needed:  None.  Patient has rollator    INCIDENTAL FINDINGS:  None    GOALS OF CARE:  x  Eventual return to home/independent/assisted living     Long term SNF      Hospice     No rehospitalization     Patient condition at discharge:   Functional status    Poor    x  Deconditioned      Independent   Cognition    Lucid   x  Forgetful (some sensescence)     Dementia   Catheters/lines (plus indication)    Lackey     PICC      PEG    x     Code status    Full code    x  DNR    . . . . . . . . . . . . . . . . . . . . . . . . . . . . . . . . . . . . . . . . . . . . . . . . . . . . . . . . . . . . . . . . . . . . . . . Tor Pettit CHRONIC MEDICAL CONDITIONS:  Problem List as of 3/26/2019 Date Reviewed: 3/19/2019          Codes Class Noted - Resolved    HAP (hospital-acquired pneumonia) ICD-10-CM: J18.9  ICD-9-CM: 486  3/24/2019 - Present        Essential hypertension ICD-10-CM: I10  ICD-9-CM: 401.9  3/24/2019 - Present        Acute kidney injury ([facility] ICD-10-CM: N17.9  ICD-9-CM: 584.9  3/24/2019 - Present        Chronic anemia ICD-10-CM: D64.9  ICD-9-CM: 285.9  3/24/2019 - Present        Dyslipidemia ICD-10-CM: E78.5  ICD-9-CM: 272.4  3/24/2019 - Present        Atrial fibrillation with rapid ventricular response ([facility] ICD-10-CM: I48.91  ICD-9-CM: 427.31  3/22/2019 - Present        * (Principal) Septic shock ([facility] ICD-10-CM: A41.9, R65.21  ICD-9-CM: 038.9, 785.52, 995.92  3/22/2019 - Present        Hypotension ICD-10-CM: I95.9  ICD-9-CM: 458.9  12/15/2018 - Present        C. difficile diarrhea ICD-10-CM: A04.72  ICD-9-CM: 008.45  11/13/2018 - Present        Non Hodgkin's lymphoma ([facility] ICD-10-CM: C85.90  ICD-9-CM: 202.80  11/12/2018 - Present        History of Clostridium difficile colitis ICD-10-CM: Z86.19  ICD-9-CM: V12.79  11/10/2018 - Present        DM (diabetes mellitus) ([address] ICD-10-CM: E11.9  ICD-9-CM: 250.00  11/10/2018 - Present        Premature atrial complexes ICD-10-CM: I49.1  ICD-9-CM: 427.61  11/10/2018 - Present        RESOLVED: Bandemia ICD-10-CM: P34.280  ICD-9-CM: 288.66  11/10/2018 - 11/12/2018        RESOLVED: Lactic acidosis ICD-10-CM: E87.2  ICD-9-CM: 276.2  11/10/2018 - 11/12/2018              Information obtained by :   I understand that if any problems occur once I am at home I am to contact my physician. I understand and acknowledge receipt of the instructions indicated above. Physician's or R.N.'s Signature                                                                  Date/Time                                                                                                                                              Patient or Representative Signature                                                          Date/Time

## 2019-03-28 LAB
BACTERIA SPEC CULT: NORMAL
BACTERIA SPEC CULT: NORMAL
SERVICE CMNT-IMP: NORMAL
SERVICE CMNT-IMP: NORMAL

## 2019-04-12 ENCOUNTER — OFFICE VISIT (OUTPATIENT)
Dept: CARDIOLOGY CLINIC | Age: 84
End: 2019-04-12

## 2019-04-12 ENCOUNTER — TELEPHONE (OUTPATIENT)
Dept: FAMILY MEDICINE CLINIC | Age: 84
End: 2019-04-12

## 2019-04-12 VITALS
DIASTOLIC BLOOD PRESSURE: 62 MMHG | RESPIRATION RATE: 16 BRPM | HEIGHT: 68 IN | SYSTOLIC BLOOD PRESSURE: 120 MMHG | BODY MASS INDEX: 20.4 KG/M2 | WEIGHT: 134.6 LBS | OXYGEN SATURATION: 96 % | HEART RATE: 91 BPM

## 2019-04-12 DIAGNOSIS — I48.91 ATRIAL FIBRILLATION WITH RAPID VENTRICULAR RESPONSE (HCC): Primary | ICD-10-CM

## 2019-04-12 NOTE — TELEPHONE ENCOUNTER
[address] [facility] called and wanted to let Dr. Augustina Julio know that they started patient with home health yesterday. There will be skilled nursing, PT and home health.   Any questions they can be reached at [phone]

## 2019-04-12 NOTE — PROGRESS NOTES
Chief Complaint   Patient presents with    Follow-up     Patient presents today for a follow up visit for AFIB    Irregular Heart Beat     Visit Vitals  /62 (BP 1 Location: Left arm, BP Patient Position: Sitting)   Pulse 91   Resp 16   Ht 5' 8\" (1.727 m)   Wt 134 lb 9.6 oz (61.1 kg)   SpO2 96%   BMI 20.47 kg/m²     Chest pain - denied  SOB - denied  Dizziness - denied  Swelling/Edema - in lower extremities  Recent hospital visit 3/22/ to 3/26  Refills - none at this time

## 2019-04-12 NOTE — PROGRESS NOTES
[facility] [address], [address] [facility], [address] [place] [address]    Office [phone],RNM [phone]           Mary Anne Sarabia is a 80 y.o. male who presents to the office for hospital follow-up for atrial fibrillation      Assessment/Recommendations:     paroxysmal atrial fibrillation. Currently on aspirin therapy. Rates are controlled on metoprolol therapy. We will continue metoprolol therapy. We discussed again today and previously have discussed anticoagulation therapy. Given advanced age and history of falling family wishes not to pursue anticoagulation. Premature atrial contractions-on beta-blocker therapy     Right bundle branch block-chronic, stable      History of lymphoma-appears to be in remission followed by oncology    [facility]    Hyperlipidemia-family was asking about statin therapy today given advanced age and muscle weakness they are wondering if we can discontinue his statin therapy. Explained to family that is not unreasonable giving his advanced age to discontinue statin therapy at this time. Carotid artery bruit-continue aspirin 81 mg.        Primary Care Physician- Juan Reynolds MD    Follow-up 6 months    Subjective:    Presents to the office today for follow-up visit from recent hospitalization. He was admitted in January for Clostridium difficile infection. He was subsequently admitted again in March for sepsis without clear source. He appears to have improved. He has a history of atrial fibrillation. His atrial fibrillation is exacerbated in the setting of infection. Last hospitalization he was discharged on low-dose metoprolol therapy. Since hospital discharge he continues to do better. He was discharged to rehabilitation facility and there was some question that arose about discontinuing his statin therapy due to long history of weakness and lack of strength.   He was without chest pain shortness of breath orthopnea PND or palpitations. 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 , Disp: , Rfl:     chlorhexidine (PERIDEX) 0.12 % solution, 15 mL by Swish and Spit route three (3) times daily. , Disp: , Rfl:     lactobacillus combo no.6 (PROBIOTIC COMPLEX PO), Take 1 Cap by mouth daily. , Disp: , Rfl:     insulin degludec (TRESIBA FLEXTOUCH U-100) 100 unit/mL (3 mL) inpn, 10 Units by SubCUTAneous route nightly. (Patient taking differently: 20 Units by SubCUTAneous route nightly.), Disp: 1 Pen, Rfl: 0    metFORMIN (GLUMETZA ER) 500 mg TG24 24 hour tablet, Take 1,000 mg by mouth two (2) times a day., Disp: , Rfl:     lutein 20 mg cap, Take 20 mg by mouth daily. , Disp: , Rfl:     cholecalciferol (VITAMIN D3) 1,000 unit tablet, Take 1,000 Units by mouth daily. , Disp: , Rfl:     mirabegron ER (MYRBETRIQ) 50 mg ER tablet, Take 50 mg by mouth every fourty-eight (48) hours. , Disp: , Rfl:     metoprolol tartrate (LOPRESSOR) 50 mg tablet, Take 25 mg by mouth two (2) times a day., Disp: , Rfl:     simvastatin (ZOCOR) 80 mg tablet, Take 40 mg by mouth nightly., Disp: , Rfl:     ferrous fumarate/vit Bcomp,C (SUPER B COMPLEX PO), Take 1 Cap by mouth daily. , Disp: , Rfl:     OTHER,NON-FORMULARY,, Unknown OTC antacid: take one by mouth every morning, Disp: , Rfl:     No Known Allergies     History noncontributory due to advanced age    Social History     Tobacco Use    Smoking status: Former Smoker    Smokeless tobacco: Never Used   Substance Use Topics    Alcohol use: No     Frequency: Never    Drug use: No       Review of Symptoms:  Pertinent Positive: Negative  Pertinent Negative: Chest pain shortness of breath orthopnea PND  All Other systems reviewed and are negative for a Comprehensive ROS (10+)    Physical Exam    Blood pressure 110/72, pulse (!) 54, resp. rate 12, height 5' 8\" (1.727 m), weight 144 lb (65.3 kg), SpO2 96 %. Constitutional: Elderly no distress. HENT: Normocephalic. Eyes: No scleral icterus. Neck:  Neck supple. No JVD present. Pulmonary/Chest: Effort normal and breath sounds normal. No respiratory distress, wheezes or rales. Cardiovascular: Normal rate, regular rhythm, S1 S2 . Exam reveals no gallop and no friction rub. 2/6 karolina  Extremities:  Normal muscle tone  Abdominal:   No abnormal distension. Neurological:  Moving all extremities, cranial nerves appear grossly intact. Skin: Skin is not cold. Not diaphoretic. No erythema. Psychiatric:  Grossly normal mood and affect. Intact insight. Objective Data:    ECG: personally reviewed and  intrepreted  12/5/2018 shows sinus rhythm right bundle branch block PACs       Echocardiogram 12/2018-normal LVEF, mild AS, mild AI       event monitor 2/20 to 3/5 showed 4 episodes of atrial fibrillation with a total duration of 15 hours.       Pau Flattery, DO

## 2019-04-25 ENCOUNTER — TELEPHONE (OUTPATIENT)
Dept: CARDIOLOGY CLINIC | Age: 84
End: 2019-04-25

## 2019-04-25 NOTE — TELEPHONE ENCOUNTER
Patient's daughter Christo Frances called [phone] patient was seen today at his dermatologist office and has fungus under his toe nails. He was prescribed Lamisil and will be taking it over a 3 month period. She wanted to know if this would interfere with any of his medications she had read it might interfere with Metoprolol. Please advise.

## 2019-04-26 NOTE — TELEPHONE ENCOUNTER
Called patient's daughter and advised per Dr Sirisha Garnett to decrease dose of Metoprolol to 1/2 tablet twice daily, 25 mg twice daily. Verbalized understanding.

## 2019-05-15 ENCOUNTER — HOSPITAL ENCOUNTER (OUTPATIENT)
Dept: INFUSION THERAPY | Age: 84
Discharge: HOME | End: 2019-05-15
Payer: MEDICARE

## 2019-05-15 VITALS
RESPIRATION RATE: 18 BRPM | DIASTOLIC BLOOD PRESSURE: 63 MMHG | TEMPERATURE: 98 F | HEART RATE: 83 BPM | SYSTOLIC BLOOD PRESSURE: 132 MMHG

## 2019-05-15 LAB
ALBUMIN SERPL-MCNC: 3.1 G/DL (ref 3.5–5)
ALBUMIN/GLOB SERPL: 0.8 {RATIO} (ref 1.1–2.2)
ALP SERPL-CCNC: 81 U/L (ref 45–117)
ALT SERPL-CCNC: 34 U/L (ref 12–78)
ANION GAP SERPL CALC-SCNC: 6 MMOL/L (ref 5–15)
AST SERPL-CCNC: 25 U/L (ref 15–37)
BASOPHILS # BLD: 0 K/UL (ref 0–0.1)
BASOPHILS NFR BLD: 0 % (ref 0–1)
BILIRUB SERPL-MCNC: 0.2 MG/DL (ref 0.2–1)
BUN SERPL-MCNC: 30 MG/DL (ref 6–20)
BUN/CREAT SERPL: 31 (ref 12–20)
CALCIUM SERPL-MCNC: 9.1 MG/DL (ref 8.5–10.1)
CHLORIDE SERPL-SCNC: 107 MMOL/L (ref 97–108)
CO2 SERPL-SCNC: 28 MMOL/L (ref 21–32)
CREAT SERPL-MCNC: 0.96 MG/DL (ref 0.7–1.3)
DIFFERENTIAL METHOD BLD: ABNORMAL
EOSINOPHIL # BLD: 0.2 K/UL (ref 0–0.4)
EOSINOPHIL NFR BLD: 2 % (ref 0–7)
ERYTHROCYTE [DISTWIDTH] IN BLOOD BY AUTOMATED COUNT: 18.9 % (ref 11.5–14.5)
FERRITIN SERPL-MCNC: 160 NG/ML (ref 26–388)
GLOBULIN SER CALC-MCNC: 3.8 G/DL (ref 2–4)
GLUCOSE SERPL-MCNC: 153 MG/DL (ref 65–100)
HCT VFR BLD AUTO: 34.3 % (ref 36.6–50.3)
HGB BLD-MCNC: 10.4 G/DL (ref 12.1–17)
IMM GRANULOCYTES # BLD AUTO: 0.1 K/UL (ref 0–0.04)
IMM GRANULOCYTES NFR BLD AUTO: 1 % (ref 0–0.5)
IRON SATN MFR SERPL: 6 % (ref 20–50)
IRON SERPL-MCNC: 15 UG/DL (ref 35–150)
LDH SERPL L TO P-CCNC: 136 U/L (ref 85–241)
LYMPHOCYTES # BLD: 1.8 K/UL (ref 0.8–3.5)
LYMPHOCYTES NFR BLD: 15 % (ref 12–49)
MCH RBC QN AUTO: 25.9 PG (ref 26–34)
MCHC RBC AUTO-ENTMCNC: 30.3 G/DL (ref 30–36.5)
MCV RBC AUTO: 85.5 FL (ref 80–99)
MONOCYTES # BLD: 1.6 K/UL (ref 0–1)
MONOCYTES NFR BLD: 12 % (ref 5–13)
NEUTS SEG # BLD: 8.8 K/UL (ref 1.8–8)
NEUTS SEG NFR BLD: 70 % (ref 32–75)
NRBC # BLD: 0 K/UL (ref 0–0.01)
NRBC BLD-RTO: 0 PER 100 WBC
PLATELET # BLD AUTO: 156 K/UL (ref 150–400)
PMV BLD AUTO: 12.1 FL (ref 8.9–12.9)
POTASSIUM SERPL-SCNC: 4.3 MMOL/L (ref 3.5–5.1)
PROT SERPL-MCNC: 6.9 G/DL (ref 6.4–8.2)
RBC # BLD AUTO: 4.01 M/UL (ref 4.1–5.7)
SODIUM SERPL-SCNC: 141 MMOL/L (ref 136–145)
TIBC SERPL-MCNC: 252 UG/DL (ref 250–450)
WBC # BLD AUTO: 12.5 K/UL (ref 4.1–11.1)

## 2019-05-15 PROCEDURE — 77030012965 HC NDL HUBR BBMI -A

## 2019-05-15 PROCEDURE — 83540 ASSAY OF IRON: CPT

## 2019-05-15 PROCEDURE — 80053 COMPREHEN METABOLIC PANEL: CPT

## 2019-05-15 PROCEDURE — 82728 ASSAY OF FERRITIN: CPT

## 2019-05-15 PROCEDURE — 85025 COMPLETE CBC W/AUTO DIFF WBC: CPT

## 2019-05-15 PROCEDURE — 36591 DRAW BLOOD OFF VENOUS DEVICE: CPT

## 2019-05-15 PROCEDURE — 36415 COLL VENOUS BLD VENIPUNCTURE: CPT

## 2019-05-15 PROCEDURE — 83615 LACTATE (LD) (LDH) ENZYME: CPT

## 2019-05-15 NOTE — PROGRESS NOTES
Outpatient Infusion Center Progress Note 1435 Pt admit to [facility] for port flush and labs ambulatory in stable condition. Assessment completed. No new concerns voiced. Port accessed with positive blood. Labs drawn and sent. Port flushed, heparinized and de-accessed. Pt states he was in the hospital recently with pneumonia. MD notified. Visit Vitals /63 Pulse 83 Temp 98 °F (36.7 °C) Resp 18  
 
 
 
 
1450 Pt tolerated treatment well. D/c home ambulatory in no distress. Pt aware of next appointment scheduled for 7/10/19. Labs have not resulted at the time of this note.

## 2019-05-23 ENCOUNTER — TELEPHONE (OUTPATIENT)
Dept: ONCOLOGY | Age: 84
End: 2019-05-23

## 2019-05-23 NOTE — PROGRESS NOTES
Let him know his hgb is improved from March.   Let's please repeat these labs (cbc, cmp, ferritin, iron profile) a week before he returns in Sept

## 2019-05-23 NOTE — TELEPHONE ENCOUNTER
Called patient. Spoke with his daugther, [place]. Notified her of his labs results. Also discussed with her that in September, we would like to have his labs 1 week prior to his appointment so we will move his follow up appointment to 9/11/19 at 10 am.  She states understanding and denies any further questions.

## 2019-05-29 ENCOUNTER — OFFICE VISIT (OUTPATIENT)
Dept: FAMILY MEDICINE CLINIC | Age: 84
End: 2019-05-29

## 2019-05-29 ENCOUNTER — HOSPITAL ENCOUNTER (OUTPATIENT)
Dept: GENERAL RADIOLOGY | Age: 84
Discharge: HOME | End: 2019-05-29
Attending: INTERNAL MEDICINE
Payer: MEDICARE

## 2019-05-29 VITALS
HEIGHT: 68 IN | TEMPERATURE: 97.5 F | BODY MASS INDEX: 20.49 KG/M2 | WEIGHT: 135.2 LBS | HEART RATE: 70 BPM | OXYGEN SATURATION: 94 % | RESPIRATION RATE: 18 BRPM | DIASTOLIC BLOOD PRESSURE: 56 MMHG | SYSTOLIC BLOOD PRESSURE: 110 MMHG

## 2019-05-29 DIAGNOSIS — R06.89 DECREASED BREATH SOUNDS: ICD-10-CM

## 2019-05-29 DIAGNOSIS — R05.9 COUGH: Primary | ICD-10-CM

## 2019-05-29 DIAGNOSIS — R05.9 COUGH: ICD-10-CM

## 2019-05-29 PROCEDURE — 71046 X-RAY EXAM CHEST 2 VIEWS: CPT

## 2019-05-29 RX ORDER — AZITHROMYCIN 250 MG/1
TABLET, FILM COATED ORAL
Qty: 6 TAB | Refills: 0 | Status: SHIPPED | OUTPATIENT
Start: 2019-05-29 | End: 2019-06-03

## 2019-05-29 NOTE — PROGRESS NOTES
Patient complains of coughing too much; Home Health Nurse listened to lungs and heard some congestion. Patient states he forgets to use thickner in his liquids sometimes and that what makes him cough.

## 2019-05-30 ENCOUNTER — TELEPHONE (OUTPATIENT)
Dept: FAMILY MEDICINE CLINIC | Age: 84
End: 2019-05-30

## 2019-05-30 NOTE — PROGRESS NOTES
Lung xray was clear. No evidence of pneumonia or aspirate. However, given the episode, should finish the antibiotics anyway, especially since coughing quite a bit.

## 2019-05-30 NOTE — PROGRESS NOTES
Chief Complaint:  Chief Complaint   Patient presents with    Nasal Congestion     Complains of cough and  Nurse noted some congestion in lung sounds. History of Present Illness:  89M who presents with his daughter. He lives in an assisted living complex. About 2-3 days ago, he was drinking water at dinner. Typically, he mixes fluids with a thickener to prevent aspiration, but this time he did not. He may have accidentally inhaled some of the water into his lungs. Started to feel congested and coughing a lot. The [facility] nurse listened to his chest the next day and there was a question of some congestion on mild SOB. The patient has been coughing, but non-productive. No fever or chills. The cough is worse than his baseline. He does have some adventitious BS as well. Reviewed PmHx, RxHx, FmHx, SocHx, AllgHx and updated and dated in the chart. Patient Active Problem List    Diagnosis    HAP (hospital-acquired pneumonia)    Essential hypertension    Acute kidney [facility] ([facility]    Chronic anemia    Dyslipidemia    Atrial fibrillation with rapid ventricular response (HCC)    Septic shock (HCC)    Hypotension    C. difficile diarrhea    [facility]'s [facility]    History of Clostridium difficile colitis    DM (diabetes mellitus) ([facility]    Premature atrial complexes       Review of Systems -   Review of Systems   HENT: Positive for congestion. Respiratory: Positive for cough and shortness of breath. Negative for sputum production. All other systems reviewed and are negative. Objective:     Vitals:    05/29/19 1647   BP: 110/56   Pulse: 70   Resp: 18   Temp: 97.5 °F (36.4 °C)   TempSrc: Oral   SpO2: 94%   Weight: 135 lb 3.2 oz (61.3 kg)   Height: 5' 8\" (1.727 m)     Physical Examination:   General appearance - alert, well appearing, and in no distress  Mental status - alert, oriented to person, place, and time  Chest - adventitious breath sounds.  R>>L  Heart - normal rate, regular rhythm, normal S1, S2, no murmurs, rubs, clicks or gallops  Musculoskeletal - no joint tenderness, deformity or swelling  Extremities - peripheral pulses normal, no pedal edema, no clubbing or cyanosis  Skin - normal coloration and turgor, no rashes, no suspicious skin lesions noted    Assessment/ Plan:   89M who may have aspirated some liquid about 2-days ago. Still with persistent cough and adventitious BS. Will empirically treat with Z-veda. Also, asked to take a probiotic whilst on the medication. Will send for a CXR and will advise when the results return. Diagnoses and all orders for this visit:    1. Cough  -     XR CHEST PA LAT; Future  -     azithromycin (ZITHROMAX) 250 mg tablet; Take 2 tablets today, then take 1 tablet daily    2. Decreased breath sounds  -     XR CHEST PA LAT; Future  -     azithromycin (ZITHROMAX) 250 mg tablet; Take 2 tablets today, then take 1 tablet daily    I have discussed the diagnosis with the patient and the intended treatment plan as seen in the above orders. The patient has received an after-visit summary and questions were answered concerning future plans. Asked to return should symptoms worsen or not improve with treatment. Any pending labs and studies will be relayed to patient when they become available. Pt verbalizes understanding of plan of care and denies further questions or concerns at this time. Follow-up and Dispositions    · Return if symptoms worsen or fail to improve.          Delonte Jane MD

## 2019-05-30 NOTE — TELEPHONE ENCOUNTER
Rosanna Red, returning the nurse's call about pt's Xray results and would like a call back.  Daughter's Best Contact: [phone]

## 2019-05-31 NOTE — TELEPHONE ENCOUNTER
Spoke with patient's daughter and made her aware of result note and MD's recommendation to continue the antibiotic. Also to remind patient not to forget to use thicket in his drinks. Daughter voiced understanding and happiness that the xray was clear.

## 2019-07-10 ENCOUNTER — HOSPITAL ENCOUNTER (OUTPATIENT)
Dept: INFUSION THERAPY | Age: 84
Discharge: HOME | End: 2019-07-10
Payer: MEDICARE

## 2019-07-10 VITALS — DIASTOLIC BLOOD PRESSURE: 68 MMHG | HEART RATE: 68 BPM | SYSTOLIC BLOOD PRESSURE: 151 MMHG | TEMPERATURE: 97.9 F

## 2019-07-10 DIAGNOSIS — C83.39 DIFFUSE LARGE B-CELL LYMPHOMA OF EXTRANODAL SITE EXCLUDING SPLEEN AND OTHER SOLID ORGANS (HCC): Primary | ICD-10-CM

## 2019-07-10 LAB
ALBUMIN SERPL-MCNC: 3.4 G/DL (ref 3.5–5)
ALBUMIN/GLOB SERPL: 1.1 {RATIO} (ref 1.1–2.2)
ALP SERPL-CCNC: 78 U/L (ref 45–117)
ALT SERPL-CCNC: 35 U/L (ref 12–78)
ANION GAP SERPL CALC-SCNC: 6 MMOL/L (ref 5–15)
AST SERPL-CCNC: 19 U/L (ref 15–37)
BASOPHILS # BLD: 0 K/UL (ref 0–0.1)
BASOPHILS NFR BLD: 0 % (ref 0–1)
BILIRUB SERPL-MCNC: 0.2 MG/DL (ref 0.2–1)
BUN SERPL-MCNC: 31 MG/DL (ref 6–20)
BUN/CREAT SERPL: 28 (ref 12–20)
CALCIUM SERPL-MCNC: 9 MG/DL (ref 8.5–10.1)
CHLORIDE SERPL-SCNC: 109 MMOL/L (ref 97–108)
CO2 SERPL-SCNC: 27 MMOL/L (ref 21–32)
CREAT SERPL-MCNC: 1.09 MG/DL (ref 0.7–1.3)
DIFFERENTIAL METHOD BLD: ABNORMAL
EOSINOPHIL # BLD: 0.3 K/UL (ref 0–0.4)
EOSINOPHIL NFR BLD: 3 % (ref 0–7)
ERYTHROCYTE [DISTWIDTH] IN BLOOD BY AUTOMATED COUNT: 17.7 % (ref 11.5–14.5)
FERRITIN SERPL-MCNC: 57 NG/ML (ref 26–388)
GLOBULIN SER CALC-MCNC: 3.2 G/DL (ref 2–4)
GLUCOSE SERPL-MCNC: 154 MG/DL (ref 65–100)
HCT VFR BLD AUTO: 34.5 % (ref 36.6–50.3)
HGB BLD-MCNC: 11.2 G/DL (ref 12.1–17)
IMM GRANULOCYTES # BLD AUTO: 0.1 K/UL (ref 0–0.04)
IMM GRANULOCYTES NFR BLD AUTO: 1 % (ref 0–0.5)
IRON SATN MFR SERPL: 13 % (ref 20–50)
IRON SERPL-MCNC: 38 UG/DL (ref 35–150)
LDH SERPL L TO P-CCNC: 156 U/L (ref 85–241)
LYMPHOCYTES # BLD: 1.7 K/UL (ref 0.8–3.5)
LYMPHOCYTES NFR BLD: 18 % (ref 12–49)
MCH RBC QN AUTO: 29.2 PG (ref 26–34)
MCHC RBC AUTO-ENTMCNC: 32.5 G/DL (ref 30–36.5)
MCV RBC AUTO: 89.8 FL (ref 80–99)
MONOCYTES # BLD: 1.1 K/UL (ref 0–1)
MONOCYTES NFR BLD: 12 % (ref 5–13)
NEUTS SEG # BLD: 6.3 K/UL (ref 1.8–8)
NEUTS SEG NFR BLD: 66 % (ref 32–75)
NRBC # BLD: 0 K/UL (ref 0–0.01)
NRBC BLD-RTO: 0 PER 100 WBC
PLATELET # BLD AUTO: 160 K/UL (ref 150–400)
PMV BLD AUTO: 11.8 FL (ref 8.9–12.9)
POTASSIUM SERPL-SCNC: 4.2 MMOL/L (ref 3.5–5.1)
PROT SERPL-MCNC: 6.6 G/DL (ref 6.4–8.2)
RBC # BLD AUTO: 3.84 M/UL (ref 4.1–5.7)
SODIUM SERPL-SCNC: 142 MMOL/L (ref 136–145)
TIBC SERPL-MCNC: 300 UG/DL (ref 250–450)
WBC # BLD AUTO: 9.6 K/UL (ref 4.1–11.1)

## 2019-07-10 PROCEDURE — 80053 COMPREHEN METABOLIC PANEL: CPT

## 2019-07-10 PROCEDURE — 36591 DRAW BLOOD OFF VENOUS DEVICE: CPT

## 2019-07-10 PROCEDURE — 85025 COMPLETE CBC W/AUTO DIFF WBC: CPT

## 2019-07-10 PROCEDURE — 83540 ASSAY OF IRON: CPT

## 2019-07-10 PROCEDURE — 36415 COLL VENOUS BLD VENIPUNCTURE: CPT

## 2019-07-10 PROCEDURE — 77030012965 HC NDL HUBR BBMI -A

## 2019-07-10 PROCEDURE — 82728 ASSAY OF FERRITIN: CPT

## 2019-07-10 PROCEDURE — 83615 LACTATE (LD) (LDH) ENZYME: CPT

## 2019-07-10 RX ORDER — HEPARIN 100 UNIT/ML
500 SYRINGE INTRAVENOUS AS NEEDED
Status: DISCONTINUED | OUTPATIENT
Start: 2019-07-10 | End: 2019-07-11 | Stop reason: HOSPADM

## 2019-07-10 RX ORDER — SODIUM CHLORIDE 0.9 % (FLUSH) 0.9 %
5-10 SYRINGE (ML) INJECTION AS NEEDED
Status: DISCONTINUED | OUTPATIENT
Start: 2019-07-10 | End: 2019-07-11 | Stop reason: HOSPADM

## 2019-07-10 RX ORDER — SODIUM CHLORIDE 9 MG/ML
10 INJECTION INTRAMUSCULAR; INTRAVENOUS; SUBCUTANEOUS AS NEEDED
Status: DISCONTINUED | OUTPATIENT
Start: 2019-07-10 | End: 2019-07-11 | Stop reason: HOSPADM

## 2019-07-10 NOTE — PROGRESS NOTES
[facility] VISIT NOTE Pt arrived at [facility] ambulatory with walker and in no distress for [facility] Flush/Labs. Assessment completed, pt had no complaints. Patient Vitals for the past 12 hrs: 
 Temp Pulse BP  
07/10/19 1546 97.9 °F (36.6 °C) 68 151/68 Right chest port accessed with I.75 n coyne no difficulty. Positive blood return noted and labs drawn and sent. Tolerated treatment well, no adverse reaction noted. Port de-accessed and flushed per protocol. Positive blood return noted. D/C'd from [facility] ambulatory with walker and in no distress. Next appointment is 9/4/19 at 1:30.

## 2019-07-18 ENCOUNTER — PATIENT MESSAGE (OUTPATIENT)
Dept: ONCOLOGY | Age: 84
End: 2019-07-18

## 2019-08-09 ENCOUNTER — TELEPHONE (OUTPATIENT)
Dept: ONCOLOGY | Age: 84
End: 2019-08-09

## 2019-08-09 NOTE — TELEPHONE ENCOUNTER
8/9/19 5 PM: Called patient's EC in attempt to provide lab results again. No answer; left voicemail requesting call back.

## 2019-08-15 ENCOUNTER — TELEPHONE (OUTPATIENT)
Dept: ONCOLOGY | Age: 84
End: 2019-08-15

## 2019-09-04 ENCOUNTER — HOSPITAL ENCOUNTER (OUTPATIENT)
Dept: INFUSION THERAPY | Age: 84
Discharge: HOME | End: 2019-09-04
Payer: MEDICARE

## 2019-09-04 VITALS
RESPIRATION RATE: 20 BRPM | DIASTOLIC BLOOD PRESSURE: 60 MMHG | SYSTOLIC BLOOD PRESSURE: 128 MMHG | TEMPERATURE: 98.7 F | HEART RATE: 67 BPM

## 2019-09-04 DIAGNOSIS — C83.39 DIFFUSE LARGE B-CELL LYMPHOMA OF EXTRANODAL SITE EXCLUDING SPLEEN AND OTHER SOLID ORGANS (HCC): Primary | ICD-10-CM

## 2019-09-04 LAB
ALBUMIN SERPL-MCNC: 3.4 G/DL (ref 3.5–5)
ALBUMIN/GLOB SERPL: 1.1 {RATIO} (ref 1.1–2.2)
ALP SERPL-CCNC: 74 U/L (ref 45–117)
ALT SERPL-CCNC: 19 U/L (ref 12–78)
ANION GAP SERPL CALC-SCNC: 6 MMOL/L (ref 5–15)
AST SERPL-CCNC: 12 U/L (ref 15–37)
BASOPHILS # BLD: 0 K/UL (ref 0–0.1)
BASOPHILS NFR BLD: 0 % (ref 0–1)
BILIRUB SERPL-MCNC: 0.3 MG/DL (ref 0.2–1)
BUN SERPL-MCNC: 19 MG/DL (ref 6–20)
BUN/CREAT SERPL: 21 (ref 12–20)
CALCIUM SERPL-MCNC: 8.7 MG/DL (ref 8.5–10.1)
CHLORIDE SERPL-SCNC: 109 MMOL/L (ref 97–108)
CO2 SERPL-SCNC: 28 MMOL/L (ref 21–32)
CREAT SERPL-MCNC: 0.89 MG/DL (ref 0.7–1.3)
DIFFERENTIAL METHOD BLD: ABNORMAL
EOSINOPHIL # BLD: 0.2 K/UL (ref 0–0.4)
EOSINOPHIL NFR BLD: 2 % (ref 0–7)
ERYTHROCYTE [DISTWIDTH] IN BLOOD BY AUTOMATED COUNT: 13.8 % (ref 11.5–14.5)
FERRITIN SERPL-MCNC: 51 NG/ML (ref 26–388)
GLOBULIN SER CALC-MCNC: 3.2 G/DL (ref 2–4)
GLUCOSE SERPL-MCNC: 103 MG/DL (ref 65–100)
HCT VFR BLD AUTO: 37.4 % (ref 36.6–50.3)
HGB BLD-MCNC: 12 G/DL (ref 12.1–17)
IMM GRANULOCYTES # BLD AUTO: 0.1 K/UL (ref 0–0.04)
IMM GRANULOCYTES NFR BLD AUTO: 1 % (ref 0–0.5)
IRON SATN MFR SERPL: 14 % (ref 20–50)
IRON SERPL-MCNC: 40 UG/DL (ref 35–150)
LDH SERPL L TO P-CCNC: 150 U/L (ref 85–241)
LYMPHOCYTES # BLD: 1.9 K/UL (ref 0.8–3.5)
LYMPHOCYTES NFR BLD: 18 % (ref 12–49)
MCH RBC QN AUTO: 30.8 PG (ref 26–34)
MCHC RBC AUTO-ENTMCNC: 32.1 G/DL (ref 30–36.5)
MCV RBC AUTO: 95.9 FL (ref 80–99)
MONOCYTES # BLD: 1.2 K/UL (ref 0–1)
MONOCYTES NFR BLD: 11 % (ref 5–13)
NEUTS SEG # BLD: 7.2 K/UL (ref 1.8–8)
NEUTS SEG NFR BLD: 68 % (ref 32–75)
NRBC # BLD: 0 K/UL (ref 0–0.01)
NRBC BLD-RTO: 0 PER 100 WBC
PLATELET # BLD AUTO: 152 K/UL (ref 150–400)
PMV BLD AUTO: 12.2 FL (ref 8.9–12.9)
POTASSIUM SERPL-SCNC: 3.9 MMOL/L (ref 3.5–5.1)
PROT SERPL-MCNC: 6.6 G/DL (ref 6.4–8.2)
RBC # BLD AUTO: 3.9 M/UL (ref 4.1–5.7)
SODIUM SERPL-SCNC: 143 MMOL/L (ref 136–145)
TIBC SERPL-MCNC: 276 UG/DL (ref 250–450)
WBC # BLD AUTO: 10.6 K/UL (ref 4.1–11.1)

## 2019-09-04 PROCEDURE — 83540 ASSAY OF IRON: CPT

## 2019-09-04 PROCEDURE — 74011250636 HC RX REV CODE- 250/636: Performed by: NURSE PRACTITIONER

## 2019-09-04 PROCEDURE — 77030012965 HC NDL HUBR BBMI -A

## 2019-09-04 PROCEDURE — 82728 ASSAY OF FERRITIN: CPT

## 2019-09-04 PROCEDURE — 36591 DRAW BLOOD OFF VENOUS DEVICE: CPT

## 2019-09-04 PROCEDURE — 80053 COMPREHEN METABOLIC PANEL: CPT

## 2019-09-04 PROCEDURE — 85025 COMPLETE CBC W/AUTO DIFF WBC: CPT

## 2019-09-04 PROCEDURE — 83615 LACTATE (LD) (LDH) ENZYME: CPT

## 2019-09-04 PROCEDURE — 74011000250 HC RX REV CODE- 250: Performed by: NURSE PRACTITIONER

## 2019-09-04 PROCEDURE — 36415 COLL VENOUS BLD VENIPUNCTURE: CPT

## 2019-09-04 RX ORDER — SODIUM CHLORIDE 0.9 % (FLUSH) 0.9 %
5-10 SYRINGE (ML) INJECTION AS NEEDED
Status: DISCONTINUED | OUTPATIENT
Start: 2019-09-04 | End: 2019-09-05 | Stop reason: HOSPADM

## 2019-09-04 RX ORDER — SODIUM CHLORIDE 9 MG/ML
10 INJECTION INTRAMUSCULAR; INTRAVENOUS; SUBCUTANEOUS AS NEEDED
Status: DISCONTINUED | OUTPATIENT
Start: 2019-09-04 | End: 2019-09-05 | Stop reason: HOSPADM

## 2019-09-04 RX ORDER — HEPARIN 100 UNIT/ML
500 SYRINGE INTRAVENOUS AS NEEDED
Status: DISCONTINUED | OUTPATIENT
Start: 2019-09-04 | End: 2019-09-05 | Stop reason: HOSPADM

## 2019-09-04 RX ADMIN — Medication 10 ML: at 14:00

## 2019-09-04 RX ADMIN — SODIUM CHLORIDE 10 ML: 9 INJECTION, SOLUTION INTRAMUSCULAR; INTRAVENOUS; SUBCUTANEOUS at 14:00

## 2019-09-04 RX ADMIN — Medication 500 UNITS: at 14:00

## 2019-09-04 NOTE — PROGRESS NOTES
Carol López \[facility]\""ELEAZAR SHORT VISIT NOTE    1340 Pt arrived to [facility] ambulatory and in no distress for Port flush and labs. Denies any new complaints. /60   Pulse 67   Temp 98.7 °F (37.1 °C)   Resp 20     Medication given:  Medications Administered     heparin (porcine) pf 500 Units     Admin Date  09/04/2019 Action  Given Dose  500 Units Route  IntraVENous Administered By  Danika Lamb RN          sodium chloride (NS) flush 5-10 mL     Admin Date  09/04/2019 Action  Given Dose  10 mL Route  IntraVENous Administered By  Danika Lamb, RN          sodium chloride 0.9% injection 10 mL     Admin Date  09/04/2019 Action  Given Dose  10 mL Route  IntraVENous Administered By  Danika Lamb, RN                5554 Discharged home ambulatory and in no distress. Tolerated treatment well. Next appointment on 10/30/19.

## 2019-09-11 ENCOUNTER — OFFICE VISIT (OUTPATIENT)
Dept: ONCOLOGY | Age: 84
End: 2019-09-11

## 2019-09-11 VITALS
DIASTOLIC BLOOD PRESSURE: 47 MMHG | HEIGHT: 68 IN | BODY MASS INDEX: 22.43 KG/M2 | RESPIRATION RATE: 18 BRPM | SYSTOLIC BLOOD PRESSURE: 94 MMHG | TEMPERATURE: 98 F | OXYGEN SATURATION: 94 % | WEIGHT: 148 LBS | HEART RATE: 58 BPM

## 2019-09-11 DIAGNOSIS — D50.9 IRON DEFICIENCY ANEMIA, UNSPECIFIED IRON DEFICIENCY ANEMIA TYPE: ICD-10-CM

## 2019-09-11 DIAGNOSIS — Z79.4 TYPE 2 DIABETES MELLITUS WITHOUT COMPLICATION, WITH LONG-TERM CURRENT USE OF INSULIN (HCC): ICD-10-CM

## 2019-09-11 DIAGNOSIS — D69.6 THROMBOCYTOPENIA (HCC): ICD-10-CM

## 2019-09-11 DIAGNOSIS — C83.39 DIFFUSE LARGE B-CELL LYMPHOMA OF EXTRANODAL SITE EXCLUDING SPLEEN AND OTHER SOLID ORGANS (HCC): Primary | ICD-10-CM

## 2019-09-11 DIAGNOSIS — E11.9 TYPE 2 DIABETES MELLITUS WITHOUT COMPLICATION, WITH LONG-TERM CURRENT USE OF INSULIN (HCC): ICD-10-CM

## 2019-09-11 DIAGNOSIS — M87.10 OSTEONECROSIS DUE TO DRUG (HCC): ICD-10-CM

## 2019-09-11 RX ORDER — SODIUM CHLORIDE 9 MG/ML
10 INJECTION INTRAMUSCULAR; INTRAVENOUS; SUBCUTANEOUS AS NEEDED
Status: CANCELLED | OUTPATIENT
Start: 2019-11-22

## 2019-09-11 RX ORDER — SODIUM CHLORIDE 0.9 % (FLUSH) 0.9 %
5-10 SYRINGE (ML) INJECTION AS NEEDED
Status: CANCELLED | OUTPATIENT
Start: 2019-11-22

## 2019-09-11 RX ORDER — HEPARIN 100 UNIT/ML
500 SYRINGE INTRAVENOUS AS NEEDED
Status: CANCELLED | OUTPATIENT
Start: 2019-11-22

## 2019-09-11 NOTE — PROGRESS NOTES
[facility] at [address], [address] [facility] [address] [facility]  W: [phone]  F: [phone]      Reason for Visit:   Sumeet Sal is a 80 y.o. 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  Rituximab, gemcitabine, oxaliplatin for 3 cycles, mixed response on PET  · 6/17/16 right neck mass excision:  Diffuse large B-cell lymphoma, with a germinal center phenotype  · Bendamustine and rituximab 7/18/16-12/7/16  · PET CR 11/18/16  · xgeva/zometa for bone mets in the past  · Single agent maintenance rituximab 225 mg/m2 q 3 weeks 12/7/16, last dose 5/2018, unclear how many doses in between, had 2 hospitalizations in 2018 (5 total)  · Admitted 9/26/18-9/29/18 for sepsis due to dental abscess, related to xgeva/zometa    History of Present Illness:   Mr Alejandro Hammond was admitted on 11/10/2018-11/12/18 from the ED with c/o concerns for recurrent c-diff. Recent dental work with abx following. Having diarrhea 2-3 loose BMs.      Hx of DM, HTN, NHL    Interval history:  In today for follow up. Complains of gr 1 constipation, gr 1 itching, gr 1 swelling, gr 1 urinary frequency. Was hospitalized in 12/2018 for C.diff.         Past Medical History:   Diagnosis Date    Anemia     Arthritis     Cancer ([address]     Constipation     Diabetes ([address]     Gastrointestinal disorder     History of neoplasms, uncertain behavior     Of soft tissue and skin    Hypercholesterolemia     Hyperkalemia     Hypertension     Low back pain     Melanoma in situ of other parts of face ([facility]     Lower lip; removed    Neuropathy     Right bundle-branch block     Spinal stenosis     Type II diabetes mellitus ([facility]     Venous insufficiency       Past Surgical History:   Procedure Laterality Date    HX HEENT      HX ORTHOPAEDIC      HX PROSTATECTOMY      HX UROLOGICAL      NEUROLOGICAL PROCEDURE UNLISTED        Social History     Tobacco Use    Smoking status: Former Smoker    Smokeless tobacco: Never Used   Substance Use Topics    Alcohol use: No     Frequency: Never      No family history on file. Current Outpatient Medications   Medication Sig    dulaglutide (TRULICITY SC) by SubCUTAneous route every seven (7) days.  metoprolol tartrate (LOPRESSOR) 50 mg tablet Take 1 Tab by mouth two (2) times a day.  acetaminophen (TYLENOL) 500 mg tablet Take 500 mg by mouth nightly.  omeprazole (PRILOSEC) 20 mg capsule Take 20 mg by mouth Daily (before breakfast).  insulin NPH hum/reg insulin hm (HUMULIN 70/30 U-100 INSULIN SC) 24 Units by SubCUTAneous route daily.  aspirin 81 mg chewable tablet Take 1 Tab by mouth daily.  ferrous sulfate 325 mg (65 mg iron) tablet Take 325 mg by mouth two (2) times a day.  b complex vitamins (B COMPLEX 1) tablet Take 1 Tab by mouth daily.  chlorhexidine (PERIDEX) 0.12 % solution 15 mL by Swish and Spit route two (2) times a day.  lactobacillus combo no.6 (PROBIOTIC COMPLEX PO) Take 1 Cap by mouth daily.  lutein 20 mg cap Take 20 mg by mouth daily.  cholecalciferol (VITAMIN D3) 1,000 unit tablet Take 1,000 Units by mouth daily.  mirabegron ER (MYRBETRIQ) 50 mg ER tablet Take 50 mg by mouth every fourty-eight (48) hours. No current facility-administered medications for this visit. No Known Allergies     Review of Systems: A complete review of systems was obtained, negative except as described above.     Physical Exam:     Visit Vitals  BP 94/47   Pulse (!) 58   Temp 98 °F (36.7 °C) (Temporal) Resp 18   Ht 5' 8\" (1.727 m)   Wt 148 lb (67.1 kg)   SpO2 94%   BMI 22.50 kg/m²     ECOG PS: 0  General: No distress  Eyes: PERRLA, anicteric sclerae  HENT: Atraumatic, OP clear  Neck: Supple  Lymphatic: No cervical, supraclavicular adenopathy  Respiratory: CTAB, normal respiratory effort  CV: Normal rate, regular rhythm, no murmurs, no peripheral edema  GI: Soft, nontender, nondistended, no masses, no hepatomegaly, no splenomegaly; + BS  MS:  Digits without clubbing or cyanosis. Skin: No rashes, ecchymoses, or petechiae. Normal temperature, turgor, and texture. Psych: Alert, oriented, appropriate affect, normal judgment/insight    Results:     Lab Results   Component Value Date/Time    WBC 10.6 09/04/2019 02:02 PM    HGB 12.0 (L) 09/04/2019 02:02 PM    HCT 37.4 09/04/2019 02:02 PM    PLATELET 870 61/39/3619 02:02 PM    MCV 95.9 09/04/2019 02:02 PM    ABS. NEUTROPHILS 7.2 09/04/2019 02:02 PM     Lab Results   Component Value Date/Time    Sodium 143 09/04/2019 02:02 PM    Potassium 3.9 09/04/2019 02:02 PM    Chloride 109 (H) 09/04/2019 02:02 PM    CO2 28 09/04/2019 02:02 PM    Glucose 103 (H) 09/04/2019 02:02 PM    BUN 19 09/04/2019 02:02 PM    Creatinine 0.89 09/04/2019 02:02 PM    GFR est AA >60 09/04/2019 02:02 PM    GFR est non-AA >60 09/04/2019 02:02 PM    Calcium 8.7 09/04/2019 02:02 PM    Glucose (POC) 116 (H) 03/26/2019 11:03 AM     Lab Results   Component Value Date/Time    Bilirubin, total 0.3 09/04/2019 02:02 PM    ALT (SGPT) 19 09/04/2019 02:02 PM    AST (SGOT) 12 (L) 09/04/2019 02:02 PM    Alk.  phosphatase 74 09/04/2019 02:02 PM    Protein, total 6.6 09/04/2019 02:02 PM    Albumin 3.4 (L) 09/04/2019 02:02 PM    Globulin 3.2 09/04/2019 02:02 PM     Lab Results   Component Value Date/Time    Reticulocyte count 0.4 (L) 12/16/2018 02:56 AM    Iron % saturation 14 (L) 09/04/2019 02:02 PM    TIBC 276 09/04/2019 02:02 PM    Ferritin 51 09/04/2019 02:02 PM    Vitamin B12 >2,000 (H) 12/17/2018 08:55 AM Folate 29.0 (H) 12/17/2018 08:55 AM     09/04/2019 02:02 PM    TSH 1.49 03/23/2019 02:27 AM     Lab Results   Component Value Date/Time    INR 1.2 (H) 11/12/2018 01:22 AM         Records reviewed and summarized above. Pathology report(s) reviewed above. Radiology report(s) reviewed above. Assessment/plan:   1. Diffuse large B-cell lymphoma: In remission. He has had off/on rituximab maintenance for 2 years, unclear of the benefit in relapsed DLBCL, and unclear of benefit after 2 years. discontinued    Our approach to patient surveillance is to schedule patient visits every three months during the first year, every three to six months during the second year, and every six months starting two years after complete response. At these visits, we perform a history and physical examination, complete blood count, chemistries, and LDH. There is no role for routine PET or PET/CT imaging in the longitudinal follow-up of asymptomatic patients after response assessment. There are limited data to support performing CT scans at a given interval, and the decision regarding frequency of CT imaging should be individualized to the patient. For most patients, we do not routinely perform surveillance imaging. 2. Emotional well being:  he has excellent support and is coping well with her disease    3. Port maintenance: port flush q 8 weeks with cbc, cmp, ferritin, iron profile, ldh    4. Osteonecrosis of jaw: Has seen [facility] dent[facility] and has follow up on 3/26/19; no more bone agents, not clear of evidence in the first place for lymphoma    5. Anemia:  Maybe due to therapy, though is iron deficient; taking PO iron bid; may need to investigate further if continues. Hgb continues to improve, Hgb 12 on 9/4/19. 6. Thrombocytopenia:  Likely due to therapy, will follow.   Stable at 152 on 9/4/19.     7. DM2:  On insulin    This patient was seen in conjunction with Margaux Holley NP        I appreciate the opportunity to participate in Mr. Phoenix Pinkley's care. Signed By: Jared Velez NP      No orders of the defined types were placed in this encounter.

## 2019-10-11 ENCOUNTER — OFFICE VISIT (OUTPATIENT)
Dept: CARDIOLOGY CLINIC | Age: 84
End: 2019-10-11

## 2019-10-11 VITALS
BODY MASS INDEX: 22.76 KG/M2 | HEART RATE: 92 BPM | WEIGHT: 150.2 LBS | HEIGHT: 68 IN | DIASTOLIC BLOOD PRESSURE: 68 MMHG | OXYGEN SATURATION: 94 % | SYSTOLIC BLOOD PRESSURE: 116 MMHG

## 2019-10-11 DIAGNOSIS — I48.91 ATRIAL FIBRILLATION, UNSPECIFIED TYPE (HCC): Primary | ICD-10-CM

## 2019-10-11 RX ORDER — METOPROLOL TARTRATE 25 MG/1
25 TABLET, FILM COATED ORAL 2 TIMES DAILY
Qty: 90 TAB | Refills: 3 | Status: SHIPPED | OUTPATIENT
Start: 2019-10-11 | End: 2020-01-01 | Stop reason: CLARIF

## 2019-10-11 NOTE — LETTER
10/11/19 Patient: Kaylene Mello YOB: 1927 Date of Visit: 10/11/2019 Marshall Pascual MD 
[place] [address] [address] [facility] [id number] VIA In Basket Dear Marshall Pascual MD, Thank you for referring Mr. Kaylene Mello to [address] for evaluation. My notes for this consultation are attached. If you have questions, please do not hesitate to call me. I look forward to following your patient along with you.  
 
 
Sincerely, 
 
Don Reagan, DO

## 2019-10-11 NOTE — PROGRESS NOTES
[facility] [address], [address] [facility], [address] [place] [address]    Office [phone],[facility] [phone]           Fabian Shepard is a 80 y.o. male who presents to the office for follow-up for atrial fibrillation      Assessment/Recommendations:     paroxysmal atrial fibrillation. Currently on aspirin therapy. Rates fairly well controlled on metoprolol therapy. Continue metoprolol 25mg twice daily, dose reduced form 50mg daily due to use of antifungal therapy. Previously have discussed anticoagulation therapy. Given advanced age and history of falling family wishes not to pursue anticoagulation. Premature atrial contractions-on beta-blocker therapy     Right bundle branch block-chronic, stable    History of lymphoma-appears to be in remission followed by oncology    [facility]    Hyperlipidemia-family was asking about statin therapy today given advanced age and muscle weakness they are wondering if we can discontinue his statin therapy. Explained to family that is not unreasonable giving his advanced age to discontinue statin therapy at this time. Carotid artery bruit-continue aspirin 81 mg.        Primary Care Physician- Monty Dean MD    Follow-up 1 year    Subjective:    Doing well since last visit. No hospitalization. No cp, sob, james, palpitations. Has not had metoprolol for the past several weeks due to mail order pharmacy with [facility].       Past Medical History:   Diagnosis Date    Anemia     Arthritis     Cancer ([address]     Constipation     Diabetes ([address]     Gastrointestinal disorder     History of neoplasms, uncertain behavior     Of soft tissue and skin    Hypercholesterolemia     Hyperkalemia     Hypertension     Low back pain     Melanoma in situ of other parts of face ([address]     Lower lip; removed    Neuropathy     Right bundle-branch block     Spinal stenosis     Type II diabetes mellitus ([address]  Venous insufficiency         Past Surgical History:   Procedure Laterality Date    HX HEENT      HX ORTHOPAEDIC      HX PROSTATECTOMY      HX UROLOGICAL      NEUROLOGICAL PROCEDURE UNLISTED           Current Outpatient Medications:     b complex vitamins (B COMPLEX 1) tablet, Take 1 Tab by mouth daily. , Disp: , Rfl:     chlorhexidine (PERIDEX) 0.12 % solution, 15 mL by Swish and Spit route three (3) times daily. , Disp: , Rfl:     lactobacillus combo no.6 (PROBIOTIC COMPLEX PO), Take 1 Cap by mouth daily. , Disp: , Rfl:     insulin degludec (TRESIBA FLEXTOUCH U-100) 100 unit/mL (3 mL) inpn, 10 Units by SubCUTAneous route nightly. (Patient taking differently: 20 Units by SubCUTAneous route nightly.), Disp: 1 Pen, Rfl: 0    metFORMIN (GLUMETZA ER) 500 mg TG24 24 hour tablet, Take 1,000 mg by mouth two (2) times a day., Disp: , Rfl:     lutein 20 mg cap, Take 20 mg by mouth daily. , Disp: , Rfl:     cholecalciferol (VITAMIN D3) 1,000 unit tablet, Take 1,000 Units by mouth daily. , Disp: , Rfl:     mirabegron ER (MYRBETRIQ) 50 mg ER tablet, Take 50 mg by mouth every fourty-eight (48) hours. , Disp: , Rfl:     metoprolol tartrate (LOPRESSOR) 50 mg tablet, Take 25 mg by mouth two (2) times a day., Disp: , Rfl:     simvastatin (ZOCOR) 80 mg tablet, Take 40 mg by mouth nightly., Disp: , Rfl:     ferrous fumarate/vit Bcomp,C (SUPER B COMPLEX PO), Take 1 Cap by mouth daily. , Disp: , Rfl:     OTHER,NON-FORMULARY,, Unknown OTC antacid: take one by mouth every morning, Disp: , Rfl:     No Known Allergies     History noncontributory due to advanced age    Social History     Tobacco Use    Smoking status: Former Smoker    Smokeless tobacco: Never Used   Substance Use Topics    Alcohol use: No     Frequency: Never    Drug use: No       Review of Symptoms:  Pertinent Positive: Negative  Pertinent Negative: Chest pain shortness of breath orthopnea PND  All Other systems reviewed and are negative for a Comprehensive ROS (10+)    Physical Exam    Blood pressure 110/72, pulse (!) 54, resp. rate 12, height 5' 8\" (1.727 m), weight 144 lb (65.3 kg), SpO2 96 %. Constitutional: Elderly no distress. HENT: Normocephalic. Eyes: No scleral icterus. Neck:  Neck supple. No JVD present. Pulmonary/Chest: Effort normal and breath sounds normal. No respiratory distress, wheezes or rales. Cardiovascular: Normal rate, regular rhythm, S1 S2 . Exam reveals no gallop and no friction rub. 2/6 karolina  Extremities:  Normal muscle tone  Abdominal:   No abnormal distension. Neurological:  Moving all extremities, cranial nerves appear grossly intact. Skin: Skin is not cold. Not diaphoretic. No erythema. Psychiatric:  Grossly normal mood and affect. Intact insight. Objective Data:    ECG: personally reviewed and  intrepreted  12/5/2018 shows sinus rhythm right bundle branch block PACs       Echocardiogram 12/2018-normal LVEF, mild AS, mild AI       event monitor 2/20 to 3/5 showed 4 episodes of atrial fibrillation with a total duration of 15 hours.       Soco Markham DO

## 2019-10-11 NOTE — PROGRESS NOTES
Kvng Dodge is a 80 y.o. male    Chief Complaint   Patient presents with    Irregular Heart Beat    Diabetes       Chest pain No    SOB No    Dizziness No    Swelling patient states swelling in his feet. Refills No    Visit Vitals  /68 (BP 1 Location: Left arm, BP Patient Position: Sitting)   Pulse 92   Ht 5' 8\" (1.727 m)   Wt 150 lb 3.2 oz (68.1 kg)   SpO2 94%   BMI 22.84 kg/m²       1. Have you been to the ER, urgent care clinic since your last visit? Hospitalized since your last visit? No    2. Have you seen or consulted any other health care providers outside of the [address] [place] [address] since your last visit? Include any pap smears or colon screening.  No

## 2019-11-22 ENCOUNTER — HOSPITAL ENCOUNTER (OUTPATIENT)
Dept: INFUSION THERAPY | Age: 84
Discharge: HOME | End: 2019-11-22
Payer: MEDICARE

## 2019-11-22 VITALS
SYSTOLIC BLOOD PRESSURE: 147 MMHG | RESPIRATION RATE: 18 BRPM | BODY MASS INDEX: 22.93 KG/M2 | HEART RATE: 73 BPM | TEMPERATURE: 98.2 F | DIASTOLIC BLOOD PRESSURE: 64 MMHG | OXYGEN SATURATION: 97 % | WEIGHT: 150.8 LBS

## 2019-11-22 DIAGNOSIS — C83.39 DIFFUSE LARGE B-CELL LYMPHOMA OF EXTRANODAL SITE EXCLUDING SPLEEN AND OTHER SOLID ORGANS (HCC): Primary | ICD-10-CM

## 2019-11-22 LAB
ALBUMIN SERPL-MCNC: 3.3 G/DL (ref 3.5–5)
ALBUMIN/GLOB SERPL: 1 {RATIO} (ref 1.1–2.2)
ALP SERPL-CCNC: 67 U/L (ref 45–117)
ALT SERPL-CCNC: 24 U/L (ref 12–78)
ANION GAP SERPL CALC-SCNC: 5 MMOL/L (ref 5–15)
APPEARANCE UR: CLEAR
AST SERPL-CCNC: 15 U/L (ref 15–37)
BASOPHILS # BLD: 0 K/UL (ref 0–0.1)
BASOPHILS NFR BLD: 0 % (ref 0–1)
BILIRUB SERPL-MCNC: 0.3 MG/DL (ref 0.2–1)
BILIRUB UR QL: NEGATIVE
BUN SERPL-MCNC: 19 MG/DL (ref 6–20)
BUN/CREAT SERPL: 18 (ref 12–20)
CALCIUM SERPL-MCNC: 9 MG/DL (ref 8.5–10.1)
CHLORIDE SERPL-SCNC: 105 MMOL/L (ref 97–108)
CHOLEST SERPL-MCNC: 151 MG/DL
CO2 SERPL-SCNC: 28 MMOL/L (ref 21–32)
COLOR UR: NORMAL
COMMENT, HOLDF: NORMAL
CREAT SERPL-MCNC: 1.07 MG/DL (ref 0.7–1.3)
DIFFERENTIAL METHOD BLD: ABNORMAL
EOSINOPHIL # BLD: 0.3 K/UL (ref 0–0.4)
EOSINOPHIL NFR BLD: 3 % (ref 0–7)
ERYTHROCYTE [DISTWIDTH] IN BLOOD BY AUTOMATED COUNT: 13 % (ref 11.5–14.5)
EST. AVERAGE GLUCOSE BLD GHB EST-MCNC: 189 MG/DL
FERRITIN SERPL-MCNC: 77 NG/ML (ref 26–388)
FOLATE SERPL-MCNC: 25 NG/ML (ref 5–21)
GLOBULIN SER CALC-MCNC: 3.2 G/DL (ref 2–4)
GLUCOSE SERPL-MCNC: 184 MG/DL (ref 65–100)
GLUCOSE UR STRIP.AUTO-MCNC: NEGATIVE MG/DL
HBA1C MFR BLD: 8.2 % (ref 4–5.6)
HCT VFR BLD AUTO: 37.8 % (ref 36.6–50.3)
HDLC SERPL-MCNC: 42 MG/DL
HDLC SERPL: 3.6 {RATIO} (ref 0–5)
HGB BLD-MCNC: 12.2 G/DL (ref 12.1–17)
HGB UR QL STRIP: NEGATIVE
IMM GRANULOCYTES # BLD AUTO: 0.1 K/UL (ref 0–0.04)
IMM GRANULOCYTES NFR BLD AUTO: 1 % (ref 0–0.5)
IRON SATN MFR SERPL: 23 % (ref 20–50)
IRON SERPL-MCNC: 58 UG/DL (ref 35–150)
KETONES UR QL STRIP.AUTO: NEGATIVE MG/DL
LDH SERPL L TO P-CCNC: 177 U/L (ref 85–241)
LDLC SERPL CALC-MCNC: 80.6 MG/DL (ref 0–100)
LEUKOCYTE ESTERASE UR QL STRIP.AUTO: NEGATIVE
LIPID PROFILE,FLP: NORMAL
LYMPHOCYTES # BLD: 1.5 K/UL (ref 0.8–3.5)
LYMPHOCYTES NFR BLD: 15 % (ref 12–49)
MCH RBC QN AUTO: 31.2 PG (ref 26–34)
MCHC RBC AUTO-ENTMCNC: 32.3 G/DL (ref 30–36.5)
MCV RBC AUTO: 96.7 FL (ref 80–99)
MONOCYTES # BLD: 1 K/UL (ref 0–1)
MONOCYTES NFR BLD: 10 % (ref 5–13)
NEUTS SEG # BLD: 7.6 K/UL (ref 1.8–8)
NEUTS SEG NFR BLD: 71 % (ref 32–75)
NITRITE UR QL STRIP.AUTO: NEGATIVE
NRBC # BLD: 0 K/UL (ref 0–0.01)
NRBC BLD-RTO: 0 PER 100 WBC
PH UR STRIP: 5.5 [PH] (ref 5–8)
PLATELET # BLD AUTO: 160 K/UL (ref 150–400)
PMV BLD AUTO: 12 FL (ref 8.9–12.9)
POTASSIUM SERPL-SCNC: 4.1 MMOL/L (ref 3.5–5.1)
PROT SERPL-MCNC: 6.5 G/DL (ref 6.4–8.2)
PROT UR STRIP-MCNC: NEGATIVE MG/DL
RBC # BLD AUTO: 3.91 M/UL (ref 4.1–5.7)
SAMPLES BEING HELD,HOLD: NORMAL
SODIUM SERPL-SCNC: 138 MMOL/L (ref 136–145)
SP GR UR REFRACTOMETRY: 1.01 (ref 1–1.03)
TIBC SERPL-MCNC: 257 UG/DL (ref 250–450)
TRIGL SERPL-MCNC: 142 MG/DL (ref ?–150)
UROBILINOGEN UR QL STRIP.AUTO: 0.2 EU/DL (ref 0.2–1)
VIT B12 SERPL-MCNC: 581 PG/ML (ref 193–986)
VLDLC SERPL CALC-MCNC: 28.4 MG/DL
WBC # BLD AUTO: 10.5 K/UL (ref 4.1–11.1)

## 2019-11-22 PROCEDURE — 82728 ASSAY OF FERRITIN: CPT

## 2019-11-22 PROCEDURE — 36415 COLL VENOUS BLD VENIPUNCTURE: CPT

## 2019-11-22 PROCEDURE — 82607 VITAMIN B-12: CPT

## 2019-11-22 PROCEDURE — 83615 LACTATE (LD) (LDH) ENZYME: CPT

## 2019-11-22 PROCEDURE — 36591 DRAW BLOOD OFF VENOUS DEVICE: CPT

## 2019-11-22 PROCEDURE — 83540 ASSAY OF IRON: CPT

## 2019-11-22 PROCEDURE — 80053 COMPREHEN METABOLIC PANEL: CPT

## 2019-11-22 PROCEDURE — 82746 ASSAY OF FOLIC ACID SERUM: CPT

## 2019-11-22 PROCEDURE — 81003 URINALYSIS AUTO W/O SCOPE: CPT

## 2019-11-22 PROCEDURE — 83036 HEMOGLOBIN GLYCOSYLATED A1C: CPT

## 2019-11-22 PROCEDURE — 74011250636 HC RX REV CODE- 250/636: Performed by: NURSE PRACTITIONER

## 2019-11-22 PROCEDURE — 85025 COMPLETE CBC W/AUTO DIFF WBC: CPT

## 2019-11-22 PROCEDURE — 80061 LIPID PANEL: CPT

## 2019-11-22 PROCEDURE — A9270 NON-COVERED ITEM OR SERVICE: HCPCS

## 2019-11-22 RX ORDER — SODIUM CHLORIDE 9 MG/ML
10 INJECTION INTRAMUSCULAR; INTRAVENOUS; SUBCUTANEOUS AS NEEDED
Status: ACTIVE | OUTPATIENT
Start: 2019-11-22 | End: 2019-11-22

## 2019-11-22 RX ORDER — SODIUM CHLORIDE 0.9 % (FLUSH) 0.9 %
5-10 SYRINGE (ML) INJECTION AS NEEDED
Status: ACTIVE | OUTPATIENT
Start: 2019-11-22 | End: 2019-11-22

## 2019-11-22 RX ORDER — HEPARIN 100 UNIT/ML
500 SYRINGE INTRAVENOUS AS NEEDED
Status: ACTIVE | OUTPATIENT
Start: 2019-11-22 | End: 2019-11-22

## 2019-11-22 RX ADMIN — Medication 10 ML: at 11:27

## 2019-11-22 RX ADMIN — Medication 10 ML: at 11:31

## 2019-11-22 RX ADMIN — Medication 500 UNITS: at 11:31

## 2019-11-22 RX ADMIN — Medication 10 ML: at 11:26

## 2019-11-22 RX ADMIN — Medication 10 ML: at 11:38

## 2019-11-22 NOTE — PROGRESS NOTES
[facility]fusion [facility] Short Visit Progress Note    1100 Patient admitted to [facility] for Port flush ambulatory with strolator asssit in stable condition. Assessment completed. No new concerns voiced. Vital Signs:  Visit Vitals  /64   Pulse 73   Temp 98.2 °F (36.8 °C)   Resp 18   Wt 68.4 kg (150 lb 12.8 oz)   SpO2 97%   BMI 22.93 kg/m²         PAC with positive blood return. Lab Results: To be resulted in CC        Medications:  Medications Administered     heparin (porcine) pf 500 Units     Admin Date  11/22/2019 Action  Given Dose  500 Units Route  IntraVENous Administered By  Kath Christianson, TOMASZ          sodium chloride (NS) flush 5-10 mL     Admin Date  11/22/2019 Action  Given Dose  10 mL Route  IntraVENous Administered By  Kath Christianson, [address] [facility] Date  11/22/2019 Action  Given Dose  10 mL Route  IntraVENous Administered By  Kath Christianson, [address] [place] [address] Date  11/22/2019 Action  Given Dose  10 mL Route  IntraVENous Administered By  Kath Christianson, RN           Admin Date  11/22/2019 Action  Given Dose  10 mL Route  IntraVENous Administered By  Kath Christianson, TOMASZ                1200 Patient tolerated treatment well. Patient discharged from [place] [address] ambulatory with strolator assistin no distress [id number]. Patient aware of next appointment.     Future Appointments   Date Time Provider Irma Hong   1/14[facility]2020 11:30 [facility]   3/11/2020 11:00 AM Ronaldo Bee  [facility], [address]   10/12/2020 10:40 AM Sonia Verdugo DO [address] [facility]

## 2019-11-22 NOTE — PROGRESS NOTES
[facility] Lab Visit:    7520:  Pt arrived ambulatory with stolator assist and in no distress, labs drawn   Departed [facility] ambulatory with strolator assist and in no distress. Visit Vitals  /64   Pulse 73   Temp 98.2 °F (36.8 °C)   Resp 18   Wt 68.4 kg (150 lb 12.8 oz)   SpO2 97%   BMI 22.93 kg/m²       Labs available in CC once resulted.

## 2020-01-01 ENCOUNTER — APPOINTMENT (OUTPATIENT)
Dept: GENERAL RADIOLOGY | Age: 85
End: 2020-01-01
Attending: STUDENT IN AN ORGANIZED HEALTH CARE EDUCATION/TRAINING PROGRAM
Payer: MEDICARE

## 2020-01-01 ENCOUNTER — APPOINTMENT (OUTPATIENT)
Dept: CT IMAGING | Age: 85
End: 2020-01-01
Attending: EMERGENCY MEDICINE
Payer: MEDICARE

## 2020-01-01 ENCOUNTER — OFFICE VISIT (OUTPATIENT)
Dept: ONCOLOGY | Age: 85
End: 2020-01-01

## 2020-01-01 ENCOUNTER — HOSPITAL ENCOUNTER (OUTPATIENT)
Dept: INFUSION THERAPY | Age: 85
Discharge: HOME | End: 2020-06-17
Payer: MEDICARE

## 2020-01-01 ENCOUNTER — VIRTUAL VISIT (OUTPATIENT)
Dept: FAMILY MEDICINE CLINIC | Age: 85
End: 2020-01-01

## 2020-01-01 ENCOUNTER — HOSPITAL ENCOUNTER (INPATIENT)
Age: 85
LOS: 1 days | Discharge: HOME | DRG: 153 | End: 2020-02-07
Attending: EMERGENCY MEDICINE | Admitting: STUDENT IN AN ORGANIZED HEALTH CARE EDUCATION/TRAINING PROGRAM
Payer: MEDICARE

## 2020-01-01 ENCOUNTER — HOSPITAL ENCOUNTER (EMERGENCY)
Age: 85
Discharge: HOME | End: 2020-07-24
Attending: EMERGENCY MEDICINE
Payer: MEDICARE

## 2020-01-01 ENCOUNTER — APPOINTMENT (OUTPATIENT)
Dept: VASCULAR SURGERY | Age: 85
DRG: 153 | End: 2020-01-01
Attending: HOSPITALIST
Payer: MEDICARE

## 2020-01-01 ENCOUNTER — HOSPITAL ENCOUNTER (EMERGENCY)
Age: 85
Discharge: HOME | End: 2020-01-03
Attending: EMERGENCY MEDICINE
Payer: MEDICARE

## 2020-01-01 ENCOUNTER — TELEPHONE (OUTPATIENT)
Dept: FAMILY MEDICINE CLINIC | Age: 85
End: 2020-01-01

## 2020-01-01 ENCOUNTER — APPOINTMENT (OUTPATIENT)
Dept: INFUSION THERAPY | Age: 85
End: 2020-01-01

## 2020-01-01 ENCOUNTER — APPOINTMENT (OUTPATIENT)
Dept: CT IMAGING | Age: 85
End: 2020-01-01
Attending: STUDENT IN AN ORGANIZED HEALTH CARE EDUCATION/TRAINING PROGRAM
Payer: MEDICARE

## 2020-01-01 ENCOUNTER — PATIENT OUTREACH (OUTPATIENT)
Dept: CARDIOLOGY CLINIC | Age: 85
End: 2020-01-01

## 2020-01-01 ENCOUNTER — APPOINTMENT (OUTPATIENT)
Dept: CT IMAGING | Age: 85
DRG: 842 | End: 2020-01-01
Attending: STUDENT IN AN ORGANIZED HEALTH CARE EDUCATION/TRAINING PROGRAM
Payer: MEDICARE

## 2020-01-01 ENCOUNTER — HOSPITAL ENCOUNTER (EMERGENCY)
Age: 85
Discharge: HOME | End: 2020-03-03
Attending: EMERGENCY MEDICINE
Payer: MEDICARE

## 2020-01-01 ENCOUNTER — HOSPITAL ENCOUNTER (OUTPATIENT)
Dept: INFUSION THERAPY | Age: 85
Discharge: HOME | End: 2020-03-11
Payer: MEDICARE

## 2020-01-01 ENCOUNTER — HOSPITAL ENCOUNTER (EMERGENCY)
Age: 85
Discharge: ANOTHER HOSPITAL | End: 2020-02-05
Attending: STUDENT IN AN ORGANIZED HEALTH CARE EDUCATION/TRAINING PROGRAM
Payer: MEDICARE

## 2020-01-01 ENCOUNTER — APPOINTMENT (OUTPATIENT)
Dept: GENERAL RADIOLOGY | Age: 85
End: 2020-01-01
Attending: EMERGENCY MEDICINE
Payer: MEDICARE

## 2020-01-01 ENCOUNTER — HOSPITAL ENCOUNTER (INPATIENT)
Age: 85
LOS: 3 days | Discharge: HOSPICE | DRG: 842 | End: 2020-07-31
Attending: STUDENT IN AN ORGANIZED HEALTH CARE EDUCATION/TRAINING PROGRAM | Admitting: FAMILY MEDICINE
Payer: MEDICARE

## 2020-01-01 ENCOUNTER — OFFICE VISIT (OUTPATIENT)
Dept: FAMILY MEDICINE CLINIC | Age: 85
End: 2020-01-01

## 2020-01-01 VITALS
BODY MASS INDEX: 23.7 KG/M2 | OXYGEN SATURATION: 93 % | HEIGHT: 67 IN | HEART RATE: 57 BPM | SYSTOLIC BLOOD PRESSURE: 151 MMHG | WEIGHT: 151 LBS | DIASTOLIC BLOOD PRESSURE: 76 MMHG | RESPIRATION RATE: 18 BRPM | TEMPERATURE: 98.3 F

## 2020-01-01 VITALS
SYSTOLIC BLOOD PRESSURE: 112 MMHG | DIASTOLIC BLOOD PRESSURE: 68 MMHG | OXYGEN SATURATION: 95 % | BODY MASS INDEX: 22.55 KG/M2 | RESPIRATION RATE: 18 BRPM | WEIGHT: 148.8 LBS | HEIGHT: 68 IN | HEART RATE: 69 BPM | TEMPERATURE: 98 F

## 2020-01-01 VITALS
OXYGEN SATURATION: 96 % | DIASTOLIC BLOOD PRESSURE: 60 MMHG | TEMPERATURE: 98.2 F | SYSTOLIC BLOOD PRESSURE: 118 MMHG | HEART RATE: 89 BPM | RESPIRATION RATE: 18 BRPM

## 2020-01-01 VITALS
OXYGEN SATURATION: 97 % | BODY MASS INDEX: 22.49 KG/M2 | HEIGHT: 68 IN | TEMPERATURE: 97.3 F | HEART RATE: 76 BPM | WEIGHT: 148.4 LBS | DIASTOLIC BLOOD PRESSURE: 63 MMHG | RESPIRATION RATE: 26 BRPM | SYSTOLIC BLOOD PRESSURE: 166 MMHG

## 2020-01-01 VITALS
OXYGEN SATURATION: 96 % | BODY MASS INDEX: 23.25 KG/M2 | SYSTOLIC BLOOD PRESSURE: 132 MMHG | HEIGHT: 68 IN | RESPIRATION RATE: 16 BRPM | DIASTOLIC BLOOD PRESSURE: 67 MMHG | WEIGHT: 153.4 LBS | TEMPERATURE: 97.3 F | HEART RATE: 102 BPM

## 2020-01-01 VITALS
RESPIRATION RATE: 18 BRPM | BODY MASS INDEX: 22.05 KG/M2 | OXYGEN SATURATION: 92 % | WEIGHT: 145 LBS | DIASTOLIC BLOOD PRESSURE: 71 MMHG | HEART RATE: 83 BPM | SYSTOLIC BLOOD PRESSURE: 147 MMHG | TEMPERATURE: 98.3 F

## 2020-01-01 VITALS
RESPIRATION RATE: 14 BRPM | TEMPERATURE: 97.9 F | OXYGEN SATURATION: 94 % | SYSTOLIC BLOOD PRESSURE: 148 MMHG | HEART RATE: 93 BPM | DIASTOLIC BLOOD PRESSURE: 71 MMHG

## 2020-01-01 VITALS
OXYGEN SATURATION: 96 % | HEART RATE: 86 BPM | SYSTOLIC BLOOD PRESSURE: 160 MMHG | RESPIRATION RATE: 18 BRPM | DIASTOLIC BLOOD PRESSURE: 74 MMHG | TEMPERATURE: 96.5 F

## 2020-01-01 VITALS
RESPIRATION RATE: 18 BRPM | BODY MASS INDEX: 23.49 KG/M2 | HEIGHT: 68 IN | TEMPERATURE: 99.6 F | OXYGEN SATURATION: 92 % | DIASTOLIC BLOOD PRESSURE: 92 MMHG | SYSTOLIC BLOOD PRESSURE: 169 MMHG | HEART RATE: 117 BPM | WEIGHT: 154.98 LBS

## 2020-01-01 VITALS
TEMPERATURE: 98 F | SYSTOLIC BLOOD PRESSURE: 144 MMHG | HEART RATE: 101 BPM | RESPIRATION RATE: 20 BRPM | DIASTOLIC BLOOD PRESSURE: 83 MMHG | OXYGEN SATURATION: 95 %

## 2020-01-01 DIAGNOSIS — C83.39 DIFFUSE LARGE B-CELL LYMPHOMA OF EXTRANODAL SITE EXCLUDING SPLEEN AND OTHER SOLID ORGANS (HCC): Primary | ICD-10-CM

## 2020-01-01 DIAGNOSIS — Z20.822 PERSON UNDER INVESTIGATION FOR COVID-19: ICD-10-CM

## 2020-01-01 DIAGNOSIS — M25.559 HIP PAIN: Primary | ICD-10-CM

## 2020-01-01 DIAGNOSIS — E11.65 TYPE 2 DIABETES MELLITUS WITH HYPERGLYCEMIA, WITH LONG-TERM CURRENT USE OF INSULIN (HCC): Primary | ICD-10-CM

## 2020-01-01 DIAGNOSIS — Z71.89 GOALS OF CARE, COUNSELING/DISCUSSION: ICD-10-CM

## 2020-01-01 DIAGNOSIS — G89.3 CANCER ASSOCIATED PAIN: ICD-10-CM

## 2020-01-01 DIAGNOSIS — Z71.1 CONCERN ABOUT END OF LIFE: ICD-10-CM

## 2020-01-01 DIAGNOSIS — R41.3 MEMORY CHANGES: ICD-10-CM

## 2020-01-01 DIAGNOSIS — C85.90 RECURRENT LYMPHOMA (HCC): ICD-10-CM

## 2020-01-01 DIAGNOSIS — J02.9 SORE THROAT: ICD-10-CM

## 2020-01-01 DIAGNOSIS — J38.4 SUPRAGLOTTIC EDEMA: Primary | ICD-10-CM

## 2020-01-01 DIAGNOSIS — J02.9 PHARYNGITIS, UNSPECIFIED ETIOLOGY: ICD-10-CM

## 2020-01-01 DIAGNOSIS — Z86.19 HISTORY OF CLOSTRIDIUM DIFFICILE COLITIS: Primary | ICD-10-CM

## 2020-01-01 DIAGNOSIS — Z79.4 TYPE 2 DIABETES MELLITUS WITH HYPERGLYCEMIA, WITH LONG-TERM CURRENT USE OF INSULIN (HCC): Primary | ICD-10-CM

## 2020-01-01 DIAGNOSIS — M54.50 ACUTE MIDLINE LOW BACK PAIN, UNSPECIFIED WHETHER SCIATICA PRESENT: ICD-10-CM

## 2020-01-01 DIAGNOSIS — R53.81 DEBILITY: ICD-10-CM

## 2020-01-01 DIAGNOSIS — J05.10 EPIGLOTTITIS: Primary | ICD-10-CM

## 2020-01-01 DIAGNOSIS — R41.89 COGNITIVE CHANGES: ICD-10-CM

## 2020-01-01 DIAGNOSIS — J18.9 PNEUMONIA OF RIGHT LOWER LOBE DUE TO INFECTIOUS ORGANISM: Primary | ICD-10-CM

## 2020-01-01 DIAGNOSIS — J06.9 URI WITH COUGH AND CONGESTION: Primary | ICD-10-CM

## 2020-01-01 DIAGNOSIS — M54.50 ACUTE MIDLINE LOW BACK PAIN WITHOUT SCIATICA: Primary | ICD-10-CM

## 2020-01-01 DIAGNOSIS — Z71.89 ADVANCED CARE PLANNING/COUNSELING DISCUSSION: ICD-10-CM

## 2020-01-01 DIAGNOSIS — R52 PAIN: ICD-10-CM

## 2020-01-01 LAB
ALBUMIN SERPL-MCNC: 3.5 G/DL (ref 3.5–5)
ALBUMIN SERPL-MCNC: 3.5 G/DL (ref 3.5–5)
ALBUMIN SERPL-MCNC: 3.6 G/DL (ref 3.5–5)
ALBUMIN SERPL-MCNC: 3.6 G/DL (ref 3.5–5)
ALBUMIN SERPL-MCNC: 3.7 G/DL (ref 3.5–5)
ALBUMIN UR QL STRIP: 30 MG/L
ALBUMIN/GLOB SERPL: 0.9 {RATIO} (ref 1.1–2.2)
ALBUMIN/GLOB SERPL: 1.1 {RATIO} (ref 1.1–2.2)
ALP SERPL-CCNC: 64 U/L (ref 45–117)
ALP SERPL-CCNC: 68 U/L (ref 45–117)
ALP SERPL-CCNC: 69 U/L (ref 45–117)
ALP SERPL-CCNC: 69 U/L (ref 45–117)
ALP SERPL-CCNC: 72 U/L (ref 45–117)
ALT SERPL-CCNC: 20 U/L (ref 12–78)
ALT SERPL-CCNC: 21 U/L (ref 12–78)
ALT SERPL-CCNC: 22 U/L (ref 12–78)
ALT SERPL-CCNC: 24 U/L (ref 12–78)
ALT SERPL-CCNC: 28 U/L (ref 12–78)
ANION GAP SERPL CALC-SCNC: 2 MMOL/L (ref 5–15)
ANION GAP SERPL CALC-SCNC: 4 MMOL/L (ref 5–15)
ANION GAP SERPL CALC-SCNC: 6 MMOL/L (ref 5–15)
ANION GAP SERPL CALC-SCNC: 7 MMOL/L (ref 5–15)
ANION GAP SERPL CALC-SCNC: 8 MMOL/L (ref 5–15)
AST SERPL-CCNC: 12 U/L (ref 15–37)
AST SERPL-CCNC: 13 U/L (ref 15–37)
AST SERPL-CCNC: 14 U/L (ref 15–37)
AST SERPL-CCNC: 16 U/L (ref 15–37)
AST SERPL-CCNC: 18 U/L (ref 15–37)
BASO+EOS+MONOS # BLD AUTO: 1.1 K/UL (ref 0.2–1.2)
BASO+EOS+MONOS # BLD AUTO: 1.2 K/UL (ref 0.2–1.2)
BASO+EOS+MONOS NFR BLD AUTO: 11 % (ref 3.2–16.9)
BASO+EOS+MONOS NFR BLD AUTO: 8 % (ref 3.2–16.9)
BASOPHILS # BLD: 0 K/UL (ref 0–0.1)
BASOPHILS NFR BLD: 0 % (ref 0–1)
BILIRUB SERPL-MCNC: 0.2 MG/DL (ref 0.2–1)
BILIRUB SERPL-MCNC: 0.3 MG/DL (ref 0.2–1)
BILIRUB SERPL-MCNC: 0.4 MG/DL (ref 0.2–1)
BILIRUB SERPL-MCNC: 0.4 MG/DL (ref 0.2–1)
BILIRUB SERPL-MCNC: 0.6 MG/DL (ref 0.2–1)
BNP SERPL-MCNC: 576 PG/ML (ref 0–450)
BUN SERPL-MCNC: 17 MG/DL (ref 6–20)
BUN SERPL-MCNC: 17 MG/DL (ref 6–20)
BUN SERPL-MCNC: 20 MG/DL (ref 6–20)
BUN SERPL-MCNC: 21 MG/DL (ref 6–20)
BUN SERPL-MCNC: 21 MG/DL (ref 6–20)
BUN SERPL-MCNC: 23 MG/DL (ref 6–20)
BUN SERPL-MCNC: 26 MG/DL (ref 6–20)
BUN/CREAT SERPL: 14 (ref 12–20)
BUN/CREAT SERPL: 17 (ref 12–20)
BUN/CREAT SERPL: 17 (ref 12–20)
BUN/CREAT SERPL: 18 (ref 12–20)
BUN/CREAT SERPL: 18 (ref 12–20)
BUN/CREAT SERPL: 23 (ref 12–20)
BUN/CREAT SERPL: 24 (ref 12–20)
CALCIUM SERPL-MCNC: 8.8 MG/DL (ref 8.5–10.1)
CALCIUM SERPL-MCNC: 8.9 MG/DL (ref 8.5–10.1)
CALCIUM SERPL-MCNC: 8.9 MG/DL (ref 8.5–10.1)
CALCIUM SERPL-MCNC: 9 MG/DL (ref 8.5–10.1)
CALCIUM SERPL-MCNC: 9.2 MG/DL (ref 8.5–10.1)
CALCIUM SERPL-MCNC: 9.3 MG/DL (ref 8.5–10.1)
CALCIUM SERPL-MCNC: 9.5 MG/DL (ref 8.5–10.1)
CHLORIDE SERPL-SCNC: 104 MMOL/L (ref 97–108)
CHLORIDE SERPL-SCNC: 105 MMOL/L (ref 97–108)
CHLORIDE SERPL-SCNC: 107 MMOL/L (ref 97–108)
CO2 SERPL-SCNC: 25 MMOL/L (ref 21–32)
CO2 SERPL-SCNC: 27 MMOL/L (ref 21–32)
CO2 SERPL-SCNC: 28 MMOL/L (ref 21–32)
CO2 SERPL-SCNC: 29 MMOL/L (ref 21–32)
CO2 SERPL-SCNC: 31 MMOL/L (ref 21–32)
COMMENT, HOLDF: NORMAL
CREAT SERPL-MCNC: 0.96 MG/DL (ref 0.7–1.3)
CREAT SERPL-MCNC: 1.02 MG/DL (ref 0.7–1.3)
CREAT SERPL-MCNC: 1.07 MG/DL (ref 0.7–1.3)
CREAT SERPL-MCNC: 1.14 MG/DL (ref 0.7–1.3)
CREAT SERPL-MCNC: 1.18 MG/DL (ref 0.7–1.3)
CREAT SERPL-MCNC: 1.21 MG/DL (ref 0.7–1.3)
CREAT SERPL-MCNC: 1.27 MG/DL (ref 0.7–1.3)
CREATININE, URINE POC: 200 MG/DL
DIFFERENTIAL METHOD BLD: ABNORMAL
EOSINOPHIL # BLD: 0.1 K/UL (ref 0–0.4)
EOSINOPHIL # BLD: 0.1 K/UL (ref 0–0.4)
EOSINOPHIL # BLD: 0.4 K/UL (ref 0–0.4)
EOSINOPHIL NFR BLD: 1 % (ref 0–7)
EOSINOPHIL NFR BLD: 1 % (ref 0–7)
EOSINOPHIL NFR BLD: 2 % (ref 0–7)
ERYTHROCYTE [DISTWIDTH] IN BLOOD BY AUTOMATED COUNT: 12.8 % (ref 11.5–14.5)
ERYTHROCYTE [DISTWIDTH] IN BLOOD BY AUTOMATED COUNT: 12.9 % (ref 11.5–14.5)
ERYTHROCYTE [DISTWIDTH] IN BLOOD BY AUTOMATED COUNT: 13.3 % (ref 11.5–14.5)
ERYTHROCYTE [DISTWIDTH] IN BLOOD BY AUTOMATED COUNT: 13.4 % (ref 11.5–14.5)
ERYTHROCYTE [DISTWIDTH] IN BLOOD BY AUTOMATED COUNT: 13.7 % (ref 11.8–15.8)
ERYTHROCYTE [DISTWIDTH] IN BLOOD BY AUTOMATED COUNT: 14 % (ref 11.8–15.8)
EST. AVERAGE GLUCOSE BLD GHB EST-MCNC: 194 MG/DL
FERRITIN SERPL-MCNC: 103 NG/ML (ref 26–388)
FERRITIN SERPL-MCNC: 150 NG/ML (ref 26–388)
FLUAV AG NPH QL IA: NEGATIVE
FLUBV AG NOSE QL IA: NEGATIVE
GLOBULIN SER CALC-MCNC: 3.3 G/DL (ref 2–4)
GLOBULIN SER CALC-MCNC: 3.5 G/DL (ref 2–4)
GLOBULIN SER CALC-MCNC: 3.7 G/DL (ref 2–4)
GLUCOSE BLD STRIP.AUTO-MCNC: 147 MG/DL (ref 65–100)
GLUCOSE BLD STRIP.AUTO-MCNC: 191 MG/DL (ref 65–100)
GLUCOSE BLD STRIP.AUTO-MCNC: 204 MG/DL (ref 65–100)
GLUCOSE BLD STRIP.AUTO-MCNC: 209 MG/DL (ref 65–100)
GLUCOSE BLD STRIP.AUTO-MCNC: 212 MG/DL (ref 65–100)
GLUCOSE BLD STRIP.AUTO-MCNC: 228 MG/DL (ref 65–100)
GLUCOSE BLD STRIP.AUTO-MCNC: 233 MG/DL (ref 65–100)
GLUCOSE BLD STRIP.AUTO-MCNC: 240 MG/DL (ref 65–100)
GLUCOSE BLD STRIP.AUTO-MCNC: 261 MG/DL (ref 65–100)
GLUCOSE BLD STRIP.AUTO-MCNC: 268 MG/DL (ref 65–100)
GLUCOSE BLD STRIP.AUTO-MCNC: 271 MG/DL (ref 65–100)
GLUCOSE BLD STRIP.AUTO-MCNC: 272 MG/DL (ref 65–100)
GLUCOSE BLD STRIP.AUTO-MCNC: 275 MG/DL (ref 65–100)
GLUCOSE BLD STRIP.AUTO-MCNC: 276 MG/DL (ref 65–100)
GLUCOSE BLD STRIP.AUTO-MCNC: 280 MG/DL (ref 65–100)
GLUCOSE BLD STRIP.AUTO-MCNC: 288 MG/DL (ref 65–100)
GLUCOSE BLD STRIP.AUTO-MCNC: 288 MG/DL (ref 65–100)
GLUCOSE BLD STRIP.AUTO-MCNC: 293 MG/DL (ref 65–100)
GLUCOSE BLD STRIP.AUTO-MCNC: 310 MG/DL (ref 65–100)
GLUCOSE BLD STRIP.AUTO-MCNC: 312 MG/DL (ref 65–100)
GLUCOSE BLD STRIP.AUTO-MCNC: 379 MG/DL (ref 65–100)
GLUCOSE SERPL-MCNC: 121 MG/DL (ref 65–100)
GLUCOSE SERPL-MCNC: 168 MG/DL (ref 65–100)
GLUCOSE SERPL-MCNC: 194 MG/DL (ref 65–100)
GLUCOSE SERPL-MCNC: 215 MG/DL (ref 65–100)
GLUCOSE SERPL-MCNC: 224 MG/DL (ref 65–100)
GLUCOSE SERPL-MCNC: 245 MG/DL (ref 65–100)
GLUCOSE SERPL-MCNC: 324 MG/DL (ref 65–100)
HBA1C MFR BLD: 8.4 % (ref 4–5.6)
HCT VFR BLD AUTO: 38.5 % (ref 36.6–50.3)
HCT VFR BLD AUTO: 38.6 % (ref 36.6–50.3)
HCT VFR BLD AUTO: 39.3 % (ref 36.6–50.3)
HCT VFR BLD AUTO: 39.4 % (ref 36.6–50.3)
HCT VFR BLD AUTO: 40 % (ref 36.6–50.3)
HCT VFR BLD AUTO: 44.2 % (ref 36.6–50.3)
HGB BLD-MCNC: 12.4 G/DL (ref 12.1–17)
HGB BLD-MCNC: 12.5 G/DL (ref 12.1–17)
HGB BLD-MCNC: 12.6 G/DL (ref 12.1–17)
HGB BLD-MCNC: 13.2 G/DL (ref 12.1–17)
HGB BLD-MCNC: 13.2 G/DL (ref 12.1–17)
HGB BLD-MCNC: 14.6 G/DL (ref 12.1–17)
IMM GRANULOCYTES # BLD AUTO: 0.1 K/UL (ref 0–0.04)
IMM GRANULOCYTES NFR BLD AUTO: 1 % (ref 0–0.5)
IRON SATN MFR SERPL: 17 % (ref 20–50)
IRON SATN MFR SERPL: 20 % (ref 20–50)
IRON SERPL-MCNC: 45 UG/DL (ref 35–150)
IRON SERPL-MCNC: 57 UG/DL (ref 35–150)
LDH SERPL L TO P-CCNC: 161 U/L (ref 85–241)
LDH SERPL L TO P-CCNC: 183 U/L (ref 85–241)
LEFT CCA DIST DIAS: 7.9 CM/S
LEFT CCA DIST SYS: 54.4 CM/S
LEFT CCA PROX DIAS: 5.3 CM/S
LEFT CCA PROX SYS: 52.6 CM/S
LEFT ECA DIAS: 0 CM/S
LEFT ECA SYS: 173.3 CM/S
LEFT ICA DIST DIAS: 9 CM/S
LEFT ICA DIST SYS: 64.2 CM/S
LEFT ICA MID DIAS: 16.3 CM/S
LEFT ICA MID SYS: 86.2 CM/S
LEFT ICA PROX DIAS: 8.5 CM/S
LEFT ICA PROX SYS: 70.7 CM/S
LEFT ICA/CCA SYS: 1.59
LEFT VERTEBRAL DIAS: 7.25 CM/S
LEFT VERTEBRAL SYS: 51.2 CM/S
LYMPHOCYTES # BLD: 1.3 K/UL
LYMPHOCYTES # BLD: 1.6 K/UL
LYMPHOCYTES # BLD: 1.7 K/UL (ref 0.8–3.5)
LYMPHOCYTES # BLD: 1.8 K/UL (ref 0.8–3.5)
LYMPHOCYTES # BLD: 1.9 K/UL (ref 0.8–3.5)
LYMPHOCYTES NFR BLD: 10 % (ref 12–49)
LYMPHOCYTES NFR BLD: 14 % (ref 12–49)
LYMPHOCYTES NFR BLD: 14 % (ref 12–49)
LYMPHOCYTES NFR BLD: 15 % (ref 12–49)
LYMPHOCYTES NFR BLD: 15 % (ref 12–49)
MCH RBC QN AUTO: 30.4 PG (ref 26–34)
MCH RBC QN AUTO: 30.8 PG (ref 26–34)
MCH RBC QN AUTO: 31.1 PG (ref 26–34)
MCH RBC QN AUTO: 31.1 PG (ref 26–34)
MCH RBC QN AUTO: 31.5 PG (ref 26–34)
MCH RBC QN AUTO: 31.8 PG (ref 26–34)
MCHC RBC AUTO-ENTMCNC: 31.5 G/DL (ref 30–36.5)
MCHC RBC AUTO-ENTMCNC: 31.8 G/DL (ref 30–36.5)
MCHC RBC AUTO-ENTMCNC: 32.7 G/DL (ref 30–36.5)
MCHC RBC AUTO-ENTMCNC: 33 G/DL (ref 30–36.5)
MCHC RBC AUTO-ENTMCNC: 33 G/DL (ref 30–36.5)
MCHC RBC AUTO-ENTMCNC: 34.2 G/DL (ref 30–36.5)
MCV RBC AUTO: 93 FL (ref 80–99)
MCV RBC AUTO: 94 FL (ref 80–99)
MCV RBC AUTO: 94.1 FL (ref 80–99)
MCV RBC AUTO: 95.5 FL (ref 80–99)
MCV RBC AUTO: 96.6 FL (ref 80–99)
MCV RBC AUTO: 97.8 FL (ref 80–99)
MICROALBUMIN/CREAT RATIO POC: <30 MG/G
MONOCYTES # BLD: 1.1 K/UL (ref 0–1)
MONOCYTES # BLD: 1.6 K/UL (ref 0–1)
MONOCYTES # BLD: 2 K/UL (ref 0–1)
MONOCYTES NFR BLD: 12 % (ref 5–13)
MONOCYTES NFR BLD: 17 % (ref 5–13)
MONOCYTES NFR BLD: 9 % (ref 5–13)
NEUTS SEG # BLD: 10 K/UL (ref 1.8–8)
NEUTS SEG # BLD: 12.2 K/UL (ref 1.8–8)
NEUTS SEG # BLD: 6.7 K/UL (ref 1.8–8)
NEUTS SEG # BLD: 8.1 K/UL (ref 1.8–8)
NEUTS SEG # BLD: 9.5 K/UL (ref 1.8–8)
NEUTS SEG NFR BLD: 68 % (ref 32–75)
NEUTS SEG NFR BLD: 74 % (ref 32–75)
NEUTS SEG NFR BLD: 74 % (ref 32–75)
NEUTS SEG NFR BLD: 76 % (ref 32–75)
NEUTS SEG NFR BLD: 78 % (ref 32–75)
NRBC # BLD: 0 K/UL (ref 0–0.01)
NRBC # BLD: 0 K/UL (ref 0–0.01)
NRBC BLD-RTO: 0 PER 100 WBC
NRBC BLD-RTO: 0 PER 100 WBC
PLATELET # BLD AUTO: 132 K/UL (ref 150–400)
PLATELET # BLD AUTO: 153 K/UL (ref 150–400)
PLATELET # BLD AUTO: 166 K/UL (ref 150–400)
PLATELET # BLD AUTO: 170 K/UL (ref 150–400)
PLATELET # BLD AUTO: 173 K/UL (ref 150–400)
PLATELET # BLD AUTO: 204 K/UL (ref 150–400)
PMV BLD AUTO: 11.4 FL (ref 8.9–12.9)
PMV BLD AUTO: 11.9 FL (ref 8.9–12.9)
PMV BLD AUTO: 12.1 FL (ref 8.9–12.9)
PMV BLD AUTO: 12.1 FL (ref 8.9–12.9)
POTASSIUM SERPL-SCNC: 3.9 MMOL/L (ref 3.5–5.1)
POTASSIUM SERPL-SCNC: 4.1 MMOL/L (ref 3.5–5.1)
POTASSIUM SERPL-SCNC: 4.2 MMOL/L (ref 3.5–5.1)
POTASSIUM SERPL-SCNC: 4.3 MMOL/L (ref 3.5–5.1)
POTASSIUM SERPL-SCNC: 4.4 MMOL/L (ref 3.5–5.1)
POTASSIUM SERPL-SCNC: 4.5 MMOL/L (ref 3.5–5.1)
POTASSIUM SERPL-SCNC: 4.8 MMOL/L (ref 3.5–5.1)
PROT SERPL-MCNC: 6.8 G/DL (ref 6.4–8.2)
PROT SERPL-MCNC: 6.9 G/DL (ref 6.4–8.2)
PROT SERPL-MCNC: 6.9 G/DL (ref 6.4–8.2)
PROT SERPL-MCNC: 7.2 G/DL (ref 6.4–8.2)
PROT SERPL-MCNC: 7.2 G/DL (ref 6.4–8.2)
RBC # BLD AUTO: 4.02 M/UL (ref 4.1–5.7)
RBC # BLD AUTO: 4.08 M/UL (ref 4.1–5.7)
RBC # BLD AUTO: 4.09 M/UL (ref 4.1–5.7)
RBC # BLD AUTO: 4.15 M/UL (ref 4.1–5.7)
RBC # BLD AUTO: 4.19 M/UL (ref 4.1–5.7)
RBC # BLD AUTO: 4.7 M/UL (ref 4.1–5.7)
RIGHT CCA DIST DIAS: 0 CM/S
RIGHT CCA DIST SYS: 52.6 CM/S
RIGHT CCA PROX DIAS: 0 CM/S
RIGHT CCA PROX SYS: 60.1 CM/S
RIGHT ECA DIAS: 0 CM/S
RIGHT ECA SYS: 128.7 CM/S
RIGHT ICA DIST DIAS: 5.8 CM/S
RIGHT ICA DIST SYS: 46.3 CM/S
RIGHT ICA MID DIAS: 17 CM/S
RIGHT ICA MID SYS: 154 CM/S
RIGHT ICA PROX DIAS: 19.3 CM/S
RIGHT ICA PROX SYS: 200.5 CM/S
RIGHT ICA/CCA SYS: 3.8
RIGHT SUBCLAVIAN DIAS: 0 CM/S
RIGHT SUBCLAVIAN SYS: 361.4 CM/S
RIGHT VERTEBRAL DIAS: 6.69 CM/S
RIGHT VERTEBRAL SYS: 17.4 CM/S
SAMPLES BEING HELD,HOLD: NORMAL
SARS-COV-2, COV2: NOT DETECTED
SERVICE CMNT-IMP: ABNORMAL
SODIUM SERPL-SCNC: 136 MMOL/L (ref 136–145)
SODIUM SERPL-SCNC: 136 MMOL/L (ref 136–145)
SODIUM SERPL-SCNC: 138 MMOL/L (ref 136–145)
SODIUM SERPL-SCNC: 139 MMOL/L (ref 136–145)
SODIUM SERPL-SCNC: 140 MMOL/L (ref 136–145)
SODIUM SERPL-SCNC: 141 MMOL/L (ref 136–145)
SODIUM SERPL-SCNC: 141 MMOL/L (ref 136–145)
SOURCE, COVRS: NORMAL
SPECIMEN SOURCE, FCOV2M: NORMAL
TIBC SERPL-MCNC: 262 UG/DL (ref 250–450)
TIBC SERPL-MCNC: 286 UG/DL (ref 250–450)
TROPONIN I SERPL-MCNC: <0.05 NG/ML
WBC # BLD AUTO: 11 K/UL (ref 4.1–11.1)
WBC # BLD AUTO: 12.8 K/UL (ref 4.1–11.1)
WBC # BLD AUTO: 12.9 K/UL (ref 4.1–11.1)
WBC # BLD AUTO: 16.2 K/UL (ref 4.1–11.1)
WBC # BLD AUTO: 25.1 K/UL (ref 4.1–11.1)
WBC # BLD AUTO: 9.8 K/UL (ref 4.1–11.1)

## 2020-01-01 PROCEDURE — 82728 ASSAY OF FERRITIN: CPT

## 2020-01-01 PROCEDURE — 71046 X-RAY EXAM CHEST 2 VIEWS: CPT

## 2020-01-01 PROCEDURE — 77030016057 HC NDL HUBR APOL -B

## 2020-01-01 PROCEDURE — 74011250637 HC RX REV CODE- 250/637: Performed by: STUDENT IN AN ORGANIZED HEALTH CARE EDUCATION/TRAINING PROGRAM

## 2020-01-01 PROCEDURE — 74011636637 HC RX REV CODE- 636/637: Performed by: STUDENT IN AN ORGANIZED HEALTH CARE EDUCATION/TRAINING PROGRAM

## 2020-01-01 PROCEDURE — 36415 COLL VENOUS BLD VENIPUNCTURE: CPT

## 2020-01-01 PROCEDURE — 99283 EMERGENCY DEPT VISIT LOW MDM: CPT

## 2020-01-01 PROCEDURE — 74011250637 HC RX REV CODE- 250/637: Performed by: EMERGENCY MEDICINE

## 2020-01-01 PROCEDURE — 94760 N-INVAS EAR/PLS OXIMETRY 1: CPT

## 2020-01-01 PROCEDURE — 83036 HEMOGLOBIN GLYCOSYLATED A1C: CPT

## 2020-01-01 PROCEDURE — 96375 TX/PRO/DX INJ NEW DRUG ADDON: CPT

## 2020-01-01 PROCEDURE — 74011250637 HC RX REV CODE- 250/637: Performed by: NURSE PRACTITIONER

## 2020-01-01 PROCEDURE — 99233 SBSQ HOSP IP/OBS HIGH 50: CPT | Performed by: NURSE PRACTITIONER

## 2020-01-01 PROCEDURE — 80053 COMPREHEN METABOLIC PANEL: CPT

## 2020-01-01 PROCEDURE — 96374 THER/PROPH/DIAG INJ IV PUSH: CPT

## 2020-01-01 PROCEDURE — 80048 BASIC METABOLIC PNL TOTAL CA: CPT

## 2020-01-01 PROCEDURE — 65660000000 HC RM CCU STEPDOWN

## 2020-01-01 PROCEDURE — 74011000250 HC RX REV CODE- 250: Performed by: EMERGENCY MEDICINE

## 2020-01-01 PROCEDURE — 71250 CT THORAX DX C-: CPT

## 2020-01-01 PROCEDURE — 74011000250 HC RX REV CODE- 250: Performed by: STUDENT IN AN ORGANIZED HEALTH CARE EDUCATION/TRAINING PROGRAM

## 2020-01-01 PROCEDURE — 74011250636 HC RX REV CODE- 250/636: Performed by: HOSPITALIST

## 2020-01-01 PROCEDURE — 92610 EVALUATE SWALLOWING FUNCTION: CPT

## 2020-01-01 PROCEDURE — 82962 GLUCOSE BLOOD TEST: CPT

## 2020-01-01 PROCEDURE — 74011250637 HC RX REV CODE- 250/637: Performed by: HOSPITALIST

## 2020-01-01 PROCEDURE — 74011250636 HC RX REV CODE- 250/636: Performed by: STUDENT IN AN ORGANIZED HEALTH CARE EDUCATION/TRAINING PROGRAM

## 2020-01-01 PROCEDURE — 85025 COMPLETE CBC W/AUTO DIFF WBC: CPT

## 2020-01-01 PROCEDURE — 87804 INFLUENZA ASSAY W/OPTIC: CPT

## 2020-01-01 PROCEDURE — 74011636320 HC RX REV CODE- 636/320: Performed by: STUDENT IN AN ORGANIZED HEALTH CARE EDUCATION/TRAINING PROGRAM

## 2020-01-01 PROCEDURE — 65270000029 HC RM PRIVATE

## 2020-01-01 PROCEDURE — 84484 ASSAY OF TROPONIN QUANT: CPT

## 2020-01-01 PROCEDURE — 74011250636 HC RX REV CODE- 250/636: Performed by: NURSE PRACTITIONER

## 2020-01-01 PROCEDURE — 97116 GAIT TRAINING THERAPY: CPT

## 2020-01-01 PROCEDURE — 77030012965 HC NDL HUBR BBMI -A

## 2020-01-01 PROCEDURE — 65660000001 HC RM ICU INTERMED STEPDOWN

## 2020-01-01 PROCEDURE — 72192 CT PELVIS W/O DYE: CPT

## 2020-01-01 PROCEDURE — 99231 SBSQ HOSP IP/OBS SF/LOW 25: CPT | Performed by: INTERNAL MEDICINE

## 2020-01-01 PROCEDURE — 83615 LACTATE (LD) (LDH) ENZYME: CPT

## 2020-01-01 PROCEDURE — 83540 ASSAY OF IRON: CPT

## 2020-01-01 PROCEDURE — 87635 SARS-COV-2 COVID-19 AMP PRB: CPT

## 2020-01-01 PROCEDURE — 36591 DRAW BLOOD OFF VENOUS DEVICE: CPT

## 2020-01-01 PROCEDURE — 97530 THERAPEUTIC ACTIVITIES: CPT

## 2020-01-01 PROCEDURE — 97162 PT EVAL MOD COMPLEX 30 MIN: CPT

## 2020-01-01 PROCEDURE — 99284 EMERGENCY DEPT VISIT MOD MDM: CPT

## 2020-01-01 PROCEDURE — 74011636637 HC RX REV CODE- 636/637: Performed by: HOSPITALIST

## 2020-01-01 PROCEDURE — 74011000258 HC RX REV CODE- 258: Performed by: STUDENT IN AN ORGANIZED HEALTH CARE EDUCATION/TRAINING PROGRAM

## 2020-01-01 PROCEDURE — 99282 EMERGENCY DEPT VISIT SF MDM: CPT

## 2020-01-01 PROCEDURE — 97535 SELF CARE MNGMENT TRAINING: CPT

## 2020-01-01 PROCEDURE — 72100 X-RAY EXAM L-S SPINE 2/3 VWS: CPT

## 2020-01-01 PROCEDURE — 93880 EXTRACRANIAL BILAT STUDY: CPT

## 2020-01-01 PROCEDURE — 83880 ASSAY OF NATRIURETIC PEPTIDE: CPT

## 2020-01-01 PROCEDURE — 96367 TX/PROPH/DG ADDL SEQ IV INF: CPT

## 2020-01-01 PROCEDURE — 70491 CT SOFT TISSUE NECK W/DYE: CPT

## 2020-01-01 PROCEDURE — 97165 OT EVAL LOW COMPLEX 30 MIN: CPT

## 2020-01-01 PROCEDURE — 92526 ORAL FUNCTION THERAPY: CPT

## 2020-01-01 PROCEDURE — 96365 THER/PROPH/DIAG IV INF INIT: CPT

## 2020-01-01 PROCEDURE — 99285 EMERGENCY DEPT VISIT HI MDM: CPT

## 2020-01-01 PROCEDURE — 85027 COMPLETE CBC AUTOMATED: CPT

## 2020-01-01 PROCEDURE — 72131 CT LUMBAR SPINE W/O DYE: CPT

## 2020-01-01 PROCEDURE — 96523 IRRIG DRUG DELIVERY DEVICE: CPT

## 2020-01-01 RX ORDER — ONDANSETRON 2 MG/ML
4 INJECTION INTRAMUSCULAR; INTRAVENOUS
Status: DISCONTINUED | OUTPATIENT
Start: 2020-01-01 | End: 2020-01-01 | Stop reason: HOSPADM

## 2020-01-01 RX ORDER — VANCOMYCIN HYDROCHLORIDE 250 MG/5ML
125 POWDER, FOR SOLUTION ORAL EVERY 6 HOURS
Status: DISCONTINUED | OUTPATIENT
Start: 2020-01-01 | End: 2020-01-01 | Stop reason: HOSPADM

## 2020-01-01 RX ORDER — DEXTROSE MONOHYDRATE 100 MG/ML
0-250 INJECTION, SOLUTION INTRAVENOUS AS NEEDED
Status: DISCONTINUED | OUTPATIENT
Start: 2020-01-01 | End: 2020-01-01 | Stop reason: SDUPTHER

## 2020-01-01 RX ORDER — INSULIN LISPRO 100 [IU]/ML
INJECTION, SOLUTION INTRAVENOUS; SUBCUTANEOUS
Status: DISCONTINUED | OUTPATIENT
Start: 2020-01-01 | End: 2020-01-01 | Stop reason: HOSPADM

## 2020-01-01 RX ORDER — HEPARIN 100 UNIT/ML
500 SYRINGE INTRAVENOUS AS NEEDED
Status: CANCELLED | OUTPATIENT
Start: 2020-01-01

## 2020-01-01 RX ORDER — VANCOMYCIN HYDROCHLORIDE 125 MG/1
125 CAPSULE ORAL 4 TIMES DAILY
Qty: 56 CAP | Refills: 0 | Status: SHIPPED | OUTPATIENT
Start: 2020-01-01 | End: 2020-01-01

## 2020-01-01 RX ORDER — METHOCARBAMOL 750 MG/1
750 TABLET, FILM COATED ORAL 4 TIMES DAILY
Qty: 30 TAB | Refills: 0 | Status: SHIPPED | OUTPATIENT
Start: 2020-01-01 | End: 2020-01-01

## 2020-01-01 RX ORDER — LABETALOL HCL 20 MG/4 ML
20 SYRINGE (ML) INTRAVENOUS
Status: DISCONTINUED | OUTPATIENT
Start: 2020-01-01 | End: 2020-01-01 | Stop reason: HOSPADM

## 2020-01-01 RX ORDER — DEXAMETHASONE 4 MG/1
4 TABLET ORAL EVERY 12 HOURS
Status: DISCONTINUED | OUTPATIENT
Start: 2020-01-01 | End: 2020-01-01 | Stop reason: HOSPADM

## 2020-01-01 RX ORDER — SODIUM CHLORIDE 0.9 % (FLUSH) 0.9 %
5-40 SYRINGE (ML) INJECTION EVERY 8 HOURS
Status: DISCONTINUED | OUTPATIENT
Start: 2020-01-01 | End: 2020-01-01 | Stop reason: HOSPADM

## 2020-01-01 RX ORDER — SODIUM CHLORIDE 0.9 % (FLUSH) 0.9 %
5-10 SYRINGE (ML) INJECTION AS NEEDED
Status: CANCELLED | OUTPATIENT
Start: 2020-01-01

## 2020-01-01 RX ORDER — MORPHINE SULFATE 2 MG/ML
1 INJECTION, SOLUTION INTRAMUSCULAR; INTRAVENOUS
Status: DISCONTINUED | OUTPATIENT
Start: 2020-01-01 | End: 2020-01-01 | Stop reason: HOSPADM

## 2020-01-01 RX ORDER — MAGNESIUM SULFATE 100 %
4 CRYSTALS MISCELLANEOUS AS NEEDED
Status: DISCONTINUED | OUTPATIENT
Start: 2020-01-01 | End: 2020-01-01 | Stop reason: SDUPTHER

## 2020-01-01 RX ORDER — MORPHINE SULFATE 15 MG/1
15 TABLET, FILM COATED, EXTENDED RELEASE ORAL EVERY 12 HOURS
Status: DISCONTINUED | OUTPATIENT
Start: 2020-01-01 | End: 2020-01-01 | Stop reason: HOSPADM

## 2020-01-01 RX ORDER — INSULIN GLARGINE 100 [IU]/ML
16 INJECTION, SOLUTION SUBCUTANEOUS DAILY
Status: DISCONTINUED | OUTPATIENT
Start: 2020-01-01 | End: 2020-01-01 | Stop reason: HOSPADM

## 2020-01-01 RX ORDER — LIDOCAINE 4 G/100G
1 PATCH TOPICAL
Status: DISCONTINUED | OUTPATIENT
Start: 2020-01-01 | End: 2020-01-01 | Stop reason: HOSPADM

## 2020-01-01 RX ORDER — VANCOMYCIN HYDROCHLORIDE 250 MG/5ML
125 POWDER, FOR SOLUTION ORAL EVERY 6 HOURS
Qty: 50 ML | Refills: 0 | Status: SHIPPED | OUTPATIENT
Start: 2020-01-01 | End: 2020-01-01

## 2020-01-01 RX ORDER — SODIUM CHLORIDE 0.9 % (FLUSH) 0.9 %
5-10 SYRINGE (ML) INJECTION AS NEEDED
Status: DISCONTINUED | OUTPATIENT
Start: 2020-01-01 | End: 2020-01-01 | Stop reason: HOSPADM

## 2020-01-01 RX ORDER — MORPHINE SULFATE 2 MG/ML
2 INJECTION, SOLUTION INTRAMUSCULAR; INTRAVENOUS
Status: DISCONTINUED | OUTPATIENT
Start: 2020-01-01 | End: 2020-01-01

## 2020-01-01 RX ORDER — SODIUM CHLORIDE 9 MG/ML
10 INJECTION INTRAMUSCULAR; INTRAVENOUS; SUBCUTANEOUS AS NEEDED
Status: CANCELLED | OUTPATIENT
Start: 2020-01-01

## 2020-01-01 RX ORDER — AZITHROMYCIN 250 MG/1
250 TABLET, FILM COATED ORAL DAILY
Qty: 4 TAB | Refills: 0 | Status: SHIPPED | OUTPATIENT
Start: 2020-01-01 | End: 2020-01-01

## 2020-01-01 RX ORDER — DEXAMETHASONE SODIUM PHOSPHATE 4 MG/ML
10 INJECTION, SOLUTION INTRA-ARTICULAR; INTRALESIONAL; INTRAMUSCULAR; INTRAVENOUS; SOFT TISSUE
Status: COMPLETED | OUTPATIENT
Start: 2020-01-01 | End: 2020-01-01

## 2020-01-01 RX ORDER — DEXTROSE 50 % IN WATER (D50W) INTRAVENOUS SYRINGE
25-50 AS NEEDED
Status: DISCONTINUED | OUTPATIENT
Start: 2020-01-01 | End: 2020-01-01 | Stop reason: HOSPADM

## 2020-01-01 RX ORDER — ALBUTEROL SULFATE 90 UG/1
2 AEROSOL, METERED RESPIRATORY (INHALATION)
Qty: 1 INHALER | Refills: 0 | Status: SHIPPED | OUTPATIENT
Start: 2020-01-01 | End: 2020-01-01 | Stop reason: CLARIF

## 2020-01-01 RX ORDER — INSULIN LISPRO 100 [IU]/ML
INJECTION, SOLUTION INTRAVENOUS; SUBCUTANEOUS
Status: DISCONTINUED | OUTPATIENT
Start: 2020-01-01 | End: 2020-01-01

## 2020-01-01 RX ORDER — AZITHROMYCIN 250 MG/1
500 TABLET, FILM COATED ORAL
Status: COMPLETED | OUTPATIENT
Start: 2020-01-01 | End: 2020-01-01

## 2020-01-01 RX ORDER — MAGNESIUM SULFATE 100 %
4 CRYSTALS MISCELLANEOUS AS NEEDED
Status: DISCONTINUED | OUTPATIENT
Start: 2020-01-01 | End: 2020-01-01 | Stop reason: HOSPADM

## 2020-01-01 RX ORDER — METOPROLOL TARTRATE 25 MG/1
25 TABLET, FILM COATED ORAL 2 TIMES DAILY
Status: DISCONTINUED | OUTPATIENT
Start: 2020-01-01 | End: 2020-01-01 | Stop reason: HOSPADM

## 2020-01-01 RX ORDER — AMOXICILLIN 250 MG
1 CAPSULE ORAL 2 TIMES DAILY
Status: DISCONTINUED | OUTPATIENT
Start: 2020-01-01 | End: 2020-01-01

## 2020-01-01 RX ORDER — METOPROLOL TARTRATE 50 MG/1
25 TABLET ORAL 2 TIMES DAILY
COMMUNITY
End: 2020-01-01

## 2020-01-01 RX ORDER — PROMETHAZINE HYDROCHLORIDE 25 MG/1
12.5 TABLET ORAL
Status: DISCONTINUED | OUTPATIENT
Start: 2020-01-01 | End: 2020-01-01 | Stop reason: HOSPADM

## 2020-01-01 RX ORDER — POLYETHYLENE GLYCOL 3350 17 G/17G
17 POWDER, FOR SOLUTION ORAL DAILY
Status: DISCONTINUED | OUTPATIENT
Start: 2020-01-01 | End: 2020-01-01 | Stop reason: HOSPADM

## 2020-01-01 RX ORDER — DEXTROMETHORPHAN HYDROBROMIDE, GUAIFENESIN 5; 100 MG/5ML; MG/5ML
650 LIQUID ORAL 3 TIMES DAILY
COMMUNITY

## 2020-01-01 RX ORDER — SAME BUTANEDISULFONATE/BETAINE 400-600 MG
250 POWDER IN PACKET (EA) ORAL 2 TIMES DAILY
Qty: 20 CAP | Refills: 0 | Status: SHIPPED
Start: 2020-01-01 | End: 2020-01-01

## 2020-01-01 RX ORDER — POLYETHYLENE GLYCOL 3350 17 G/17G
17 POWDER, FOR SOLUTION ORAL DAILY PRN
Status: DISCONTINUED | OUTPATIENT
Start: 2020-01-01 | End: 2020-01-01 | Stop reason: HOSPADM

## 2020-01-01 RX ORDER — AMOXICILLIN AND CLAVULANATE POTASSIUM 875; 125 MG/1; MG/1
1 TABLET, FILM COATED ORAL 2 TIMES DAILY
Qty: 10 TAB | Refills: 0 | Status: SHIPPED | OUTPATIENT
Start: 2020-01-01 | End: 2020-01-01

## 2020-01-01 RX ORDER — GUAIFENESIN 100 MG/5ML
81 LIQUID (ML) ORAL DAILY
Status: DISCONTINUED | OUTPATIENT
Start: 2020-01-01 | End: 2020-01-01 | Stop reason: HOSPADM

## 2020-01-01 RX ORDER — DEXTROSE MONOHYDRATE 100 MG/ML
0-250 INJECTION, SOLUTION INTRAVENOUS AS NEEDED
Status: DISCONTINUED | OUTPATIENT
Start: 2020-01-01 | End: 2020-01-01 | Stop reason: HOSPADM

## 2020-01-01 RX ORDER — HYDROMORPHONE HYDROCHLORIDE 2 MG/1
2 TABLET ORAL
Status: DISCONTINUED | OUTPATIENT
Start: 2020-01-01 | End: 2020-01-01 | Stop reason: HOSPADM

## 2020-01-01 RX ORDER — SODIUM CHLORIDE 9 MG/ML
100 INJECTION, SOLUTION INTRAVENOUS CONTINUOUS
Status: DISCONTINUED | OUTPATIENT
Start: 2020-01-01 | End: 2020-01-01

## 2020-01-01 RX ORDER — DEXTROSE 50 % IN WATER (D50W) INTRAVENOUS SYRINGE
12.5-25 AS NEEDED
Status: DISCONTINUED | OUTPATIENT
Start: 2020-01-01 | End: 2020-01-01 | Stop reason: HOSPADM

## 2020-01-01 RX ORDER — LANOLIN ALCOHOL/MO/W.PET/CERES
325 CREAM (GRAM) TOPICAL 2 TIMES DAILY
Status: DISCONTINUED | OUTPATIENT
Start: 2020-01-01 | End: 2020-01-01 | Stop reason: HOSPADM

## 2020-01-01 RX ORDER — HEPARIN 100 UNIT/ML
500 SYRINGE INTRAVENOUS AS NEEDED
Status: DISCONTINUED | OUTPATIENT
Start: 2020-01-01 | End: 2020-01-01 | Stop reason: HOSPADM

## 2020-01-01 RX ORDER — SODIUM CHLORIDE 0.9 % (FLUSH) 0.9 %
5-40 SYRINGE (ML) INJECTION AS NEEDED
Status: DISCONTINUED | OUTPATIENT
Start: 2020-01-01 | End: 2020-01-01 | Stop reason: HOSPADM

## 2020-01-01 RX ORDER — ACETAMINOPHEN 500 MG
1000 TABLET ORAL EVERY 8 HOURS
Status: DISCONTINUED | OUTPATIENT
Start: 2020-01-01 | End: 2020-01-01 | Stop reason: HOSPADM

## 2020-01-01 RX ORDER — ACETAMINOPHEN 325 MG/1
650 TABLET ORAL EVERY 6 HOURS
Status: DISCONTINUED | OUTPATIENT
Start: 2020-01-01 | End: 2020-01-01

## 2020-01-01 RX ORDER — MULTIVIT WITH MINERALS/HERBS
1 TABLET ORAL DAILY
Status: DISCONTINUED | OUTPATIENT
Start: 2020-01-01 | End: 2020-01-01 | Stop reason: HOSPADM

## 2020-01-01 RX ORDER — ACETAMINOPHEN 650 MG/1
650 SUPPOSITORY RECTAL EVERY 6 HOURS
Status: DISCONTINUED | OUTPATIENT
Start: 2020-01-01 | End: 2020-01-01

## 2020-01-01 RX ORDER — FACIAL-BODY WIPES
10 EACH TOPICAL
Status: COMPLETED | OUTPATIENT
Start: 2020-01-01 | End: 2020-01-01

## 2020-01-01 RX ORDER — AZITHROMYCIN 250 MG/1
TABLET, FILM COATED ORAL
Qty: 6 TAB | Refills: 0 | Status: SHIPPED | OUTPATIENT
Start: 2020-01-01 | End: 2020-01-01

## 2020-01-01 RX ORDER — SODIUM CHLORIDE 9 MG/ML
10 INJECTION INTRAMUSCULAR; INTRAVENOUS; SUBCUTANEOUS AS NEEDED
Status: DISCONTINUED | OUTPATIENT
Start: 2020-01-01 | End: 2020-01-01 | Stop reason: HOSPADM

## 2020-01-01 RX ORDER — PANTOPRAZOLE SODIUM 40 MG/1
40 TABLET, DELAYED RELEASE ORAL DAILY
Status: DISCONTINUED | OUTPATIENT
Start: 2020-01-01 | End: 2020-01-01 | Stop reason: HOSPADM

## 2020-01-01 RX ORDER — ENOXAPARIN SODIUM 100 MG/ML
40 INJECTION SUBCUTANEOUS DAILY
Status: DISCONTINUED | OUTPATIENT
Start: 2020-01-01 | End: 2020-01-01 | Stop reason: HOSPADM

## 2020-01-01 RX ORDER — DEXAMETHASONE SODIUM PHOSPHATE 4 MG/ML
4 INJECTION, SOLUTION INTRA-ARTICULAR; INTRALESIONAL; INTRAMUSCULAR; INTRAVENOUS; SOFT TISSUE EVERY 8 HOURS
Status: DISCONTINUED | OUTPATIENT
Start: 2020-01-01 | End: 2020-01-01

## 2020-01-01 RX ORDER — DEXAMETHASONE SODIUM PHOSPHATE 4 MG/ML
4 INJECTION, SOLUTION INTRA-ARTICULAR; INTRALESIONAL; INTRAMUSCULAR; INTRAVENOUS; SOFT TISSUE EVERY 6 HOURS
Status: DISCONTINUED | OUTPATIENT
Start: 2020-01-01 | End: 2020-01-01

## 2020-01-01 RX ORDER — PREDNISONE 20 MG/1
20 TABLET ORAL
COMMUNITY
Start: 2020-01-01 | End: 2020-01-01

## 2020-01-01 RX ORDER — OMEPRAZOLE 20 MG/1
20 CAPSULE, DELAYED RELEASE ORAL DAILY
COMMUNITY

## 2020-01-01 RX ORDER — PREDNISONE 20 MG/1
20 TABLET ORAL DAILY
Qty: 5 TAB | Refills: 0 | Status: ON HOLD | OUTPATIENT
Start: 2020-01-01 | End: 2020-01-01 | Stop reason: CLARIF

## 2020-01-01 RX ORDER — LIDOCAINE 50 MG/G
PATCH TOPICAL
Qty: 1 PACKAGE | Refills: 1 | Status: SHIPPED | OUTPATIENT
Start: 2020-01-01 | End: 2020-01-01

## 2020-01-01 RX ORDER — MELATONIN
1000 DAILY
Status: DISCONTINUED | OUTPATIENT
Start: 2020-01-01 | End: 2020-01-01 | Stop reason: HOSPADM

## 2020-01-01 RX ORDER — DEXTROMETHORPHAN POLISTIREX 30 MG/5ML
60 SUSPENSION ORAL 2 TIMES DAILY
COMMUNITY
End: 2020-01-01 | Stop reason: CLARIF

## 2020-01-01 RX ORDER — DEXTROMETHORPHAN POLISTIREX 30 MG/5 ML
SUSPENSION, EXTENDED RELEASE 12 HR ORAL
Status: COMPLETED | OUTPATIENT
Start: 2020-01-01 | End: 2020-01-01

## 2020-01-01 RX ORDER — DEXAMETHASONE 4 MG/1
4 TABLET ORAL DAILY
Status: DISCONTINUED | OUTPATIENT
Start: 2020-01-01 | End: 2020-01-01

## 2020-01-01 RX ORDER — AMOXICILLIN 250 MG
2 CAPSULE ORAL 2 TIMES DAILY
Status: DISCONTINUED | OUTPATIENT
Start: 2020-01-01 | End: 2020-01-01 | Stop reason: HOSPADM

## 2020-01-01 RX ORDER — ACETAMINOPHEN 325 MG/1
650 TABLET ORAL
Status: COMPLETED | OUTPATIENT
Start: 2020-01-01 | End: 2020-01-01

## 2020-01-01 RX ORDER — INSULIN GLARGINE 100 [IU]/ML
15 INJECTION, SOLUTION SUBCUTANEOUS DAILY
Status: DISCONTINUED | OUTPATIENT
Start: 2020-01-01 | End: 2020-01-01

## 2020-01-01 RX ORDER — HYDRALAZINE HYDROCHLORIDE 20 MG/ML
20 INJECTION INTRAMUSCULAR; INTRAVENOUS
Status: DISCONTINUED | OUTPATIENT
Start: 2020-01-01 | End: 2020-01-01 | Stop reason: HOSPADM

## 2020-01-01 RX ORDER — AMOXICILLIN 250 MG
1 CAPSULE ORAL
Status: DISCONTINUED | OUTPATIENT
Start: 2020-01-01 | End: 2020-01-01

## 2020-01-01 RX ADMIN — FERROUS SULFATE TAB 325 MG (65 MG ELEMENTAL FE) 325 MG: 325 (65 FE) TAB at 08:17

## 2020-01-01 RX ADMIN — INSULIN LISPRO 2 UNITS: 100 INJECTION, SOLUTION INTRAVENOUS; SUBCUTANEOUS at 17:01

## 2020-01-01 RX ADMIN — HUMAN INSULIN 7 UNITS: 100 INJECTION, SUSPENSION SUBCUTANEOUS at 17:31

## 2020-01-01 RX ADMIN — METOPROLOL TARTRATE 25 MG: 25 TABLET, FILM COATED ORAL at 16:40

## 2020-01-01 RX ADMIN — ACETAMINOPHEN 1000 MG: 500 TABLET ORAL at 15:02

## 2020-01-01 RX ADMIN — MORPHINE SULFATE 15 MG: 15 TABLET, FILM COATED, EXTENDED RELEASE ORAL at 20:57

## 2020-01-01 RX ADMIN — METOPROLOL TARTRATE 25 MG: 25 TABLET, FILM COATED ORAL at 17:02

## 2020-01-01 RX ADMIN — INSULIN LISPRO 10 UNITS: 100 INJECTION, SOLUTION INTRAVENOUS; SUBCUTANEOUS at 17:31

## 2020-01-01 RX ADMIN — HUMAN INSULIN 7 UNITS: 100 INJECTION, SOLUTION SUBCUTANEOUS at 08:34

## 2020-01-01 RX ADMIN — DEXAMETHASONE 4 MG: 4 TABLET ORAL at 08:17

## 2020-01-01 RX ADMIN — ASPIRIN 81 MG 81 MG: 81 TABLET ORAL at 09:16

## 2020-01-01 RX ADMIN — INSULIN LISPRO 3 UNITS: 100 INJECTION, SOLUTION INTRAVENOUS; SUBCUTANEOUS at 08:11

## 2020-01-01 RX ADMIN — SODIUM CHLORIDE 100 ML/HR: 900 INJECTION, SOLUTION INTRAVENOUS at 00:15

## 2020-01-01 RX ADMIN — FERROUS SULFATE TAB 325 MG (65 MG ELEMENTAL FE) 325 MG: 325 (65 FE) TAB at 17:17

## 2020-01-01 RX ADMIN — MINERAL OIL: 100 ENEMA RECTAL at 11:52

## 2020-01-01 RX ADMIN — METOPROLOL TARTRATE 25 MG: 25 TABLET ORAL at 18:00

## 2020-01-01 RX ADMIN — Medication 10 ML: at 08:39

## 2020-01-01 RX ADMIN — ACETAMINOPHEN 1000 MG: 500 TABLET ORAL at 06:05

## 2020-01-01 RX ADMIN — DEXAMETHASONE SODIUM PHOSPHATE 4 MG: 4 INJECTION, SOLUTION INTRAMUSCULAR; INTRAVENOUS at 17:30

## 2020-01-01 RX ADMIN — METOPROLOL TARTRATE 25 MG: 25 TABLET, FILM COATED ORAL at 08:17

## 2020-01-01 RX ADMIN — Medication 10 ML: at 02:33

## 2020-01-01 RX ADMIN — DEXAMETHASONE 4 MG: 4 TABLET ORAL at 20:57

## 2020-01-01 RX ADMIN — INSULIN LISPRO 5 UNITS: 100 INJECTION, SOLUTION INTRAVENOUS; SUBCUTANEOUS at 11:29

## 2020-01-01 RX ADMIN — ACETAMINOPHEN 650 MG: 325 TABLET ORAL at 12:23

## 2020-01-01 RX ADMIN — MORPHINE SULFATE 2 MG: 2 INJECTION, SOLUTION INTRAMUSCULAR; INTRAVENOUS at 08:17

## 2020-01-01 RX ADMIN — FERROUS SULFATE TAB 325 MG (65 MG ELEMENTAL FE) 325 MG: 325 (65 FE) TAB at 17:02

## 2020-01-01 RX ADMIN — POLYETHYLENE GLYCOL 3350 17 G: 17 POWDER, FOR SOLUTION ORAL at 08:11

## 2020-01-01 RX ADMIN — POLYETHYLENE GLYCOL 3350 17 G: 17 POWDER, FOR SOLUTION ORAL at 17:22

## 2020-01-01 RX ADMIN — Medication 10 ML: at 13:04

## 2020-01-01 RX ADMIN — PANTOPRAZOLE SODIUM 40 MG: 40 TABLET, DELAYED RELEASE ORAL at 08:17

## 2020-01-01 RX ADMIN — ASPIRIN 81 MG 81 MG: 81 TABLET ORAL at 08:45

## 2020-01-01 RX ADMIN — MELATONIN 1 TABLET: at 09:16

## 2020-01-01 RX ADMIN — INSULIN LISPRO 3 UNITS: 100 INJECTION, SOLUTION INTRAVENOUS; SUBCUTANEOUS at 21:57

## 2020-01-01 RX ADMIN — INSULIN LISPRO 5 UNITS: 100 INJECTION, SOLUTION INTRAVENOUS; SUBCUTANEOUS at 07:14

## 2020-01-01 RX ADMIN — CEFTRIAXONE 2 G: 2 INJECTION, POWDER, FOR SOLUTION INTRAMUSCULAR; INTRAVENOUS at 19:52

## 2020-01-01 RX ADMIN — Medication 10 ML: at 21:34

## 2020-01-01 RX ADMIN — FERROUS SULFATE TAB 325 MG (65 MG ELEMENTAL FE) 325 MG: 325 (65 FE) TAB at 17:30

## 2020-01-01 RX ADMIN — INSULIN HUMAN 12 UNITS: 100 INJECTION, SUSPENSION SUBCUTANEOUS at 08:18

## 2020-01-01 RX ADMIN — DOCUSATE SODIUM 50MG AND SENNOSIDES 8.6MG 2 TABLET: 8.6; 5 TABLET, FILM COATED ORAL at 08:45

## 2020-01-01 RX ADMIN — INSULIN LISPRO 2 UNITS: 100 INJECTION, SOLUTION INTRAVENOUS; SUBCUTANEOUS at 08:44

## 2020-01-01 RX ADMIN — INSULIN LISPRO 1 UNITS: 100 INJECTION, SOLUTION INTRAVENOUS; SUBCUTANEOUS at 21:33

## 2020-01-01 RX ADMIN — HUMAN INSULIN 7 UNITS: 100 INJECTION, SUSPENSION SUBCUTANEOUS at 15:52

## 2020-01-01 RX ADMIN — VANCOMYCIN HYDROCHLORIDE 125 MG: KIT at 18:00

## 2020-01-01 RX ADMIN — ACETAMINOPHEN 1000 MG: 500 TABLET ORAL at 05:55

## 2020-01-01 RX ADMIN — MORPHINE SULFATE 15 MG: 15 TABLET, FILM COATED, EXTENDED RELEASE ORAL at 08:45

## 2020-01-01 RX ADMIN — INSULIN LISPRO 7 UNITS: 100 INJECTION, SOLUTION INTRAVENOUS; SUBCUTANEOUS at 11:52

## 2020-01-01 RX ADMIN — ASPIRIN 81 MG 81 MG: 81 TABLET ORAL at 08:10

## 2020-01-01 RX ADMIN — HUMAN INSULIN 7 UNITS: 100 INJECTION, SUSPENSION SUBCUTANEOUS at 09:14

## 2020-01-01 RX ADMIN — DEXAMETHASONE 4 MG: 4 TABLET ORAL at 08:45

## 2020-01-01 RX ADMIN — VANCOMYCIN HYDROCHLORIDE 125 MG: KIT at 00:10

## 2020-01-01 RX ADMIN — CEFTRIAXONE SODIUM 2 G: 2 INJECTION, POWDER, FOR SOLUTION INTRAMUSCULAR; INTRAVENOUS at 20:58

## 2020-01-01 RX ADMIN — MORPHINE SULFATE 2 MG: 2 INJECTION, SOLUTION INTRAMUSCULAR; INTRAVENOUS at 02:36

## 2020-01-01 RX ADMIN — PANTOPRAZOLE SODIUM 40 MG: 40 TABLET, DELAYED RELEASE ORAL at 09:00

## 2020-01-01 RX ADMIN — ASPIRIN 81 MG 81 MG: 81 TABLET ORAL at 08:17

## 2020-01-01 RX ADMIN — FERROUS SULFATE TAB 325 MG (65 MG ELEMENTAL FE) 325 MG: 325 (65 FE) TAB at 08:45

## 2020-01-01 RX ADMIN — DEXAMETHASONE SODIUM PHOSPHATE 4 MG: 4 INJECTION, SOLUTION INTRAMUSCULAR; INTRAVENOUS at 08:36

## 2020-01-01 RX ADMIN — DOCUSATE SODIUM 50MG AND SENNOSIDES 8.6MG 2 TABLET: 8.6; 5 TABLET, FILM COATED ORAL at 21:10

## 2020-01-01 RX ADMIN — ACETAMINOPHEN 1000 MG: 500 TABLET ORAL at 06:24

## 2020-01-01 RX ADMIN — DEXAMETHASONE 4 MG: 4 TABLET ORAL at 16:46

## 2020-01-01 RX ADMIN — FAMOTIDINE 20 MG: 10 INJECTION, SOLUTION INTRAVENOUS at 02:31

## 2020-01-01 RX ADMIN — INSULIN GLARGINE 16 UNITS: 100 INJECTION, SOLUTION SUBCUTANEOUS at 08:11

## 2020-01-01 RX ADMIN — Medication 10 ML: at 21:11

## 2020-01-01 RX ADMIN — INSULIN LISPRO 3 UNITS: 100 INJECTION, SOLUTION INTRAVENOUS; SUBCUTANEOUS at 08:18

## 2020-01-01 RX ADMIN — DEXAMETHASONE SODIUM PHOSPHATE 4 MG: 4 INJECTION, SOLUTION INTRAMUSCULAR; INTRAVENOUS at 07:14

## 2020-01-01 RX ADMIN — DEXAMETHASONE SODIUM PHOSPHATE 10 MG: 4 INJECTION, SOLUTION INTRAMUSCULAR; INTRAVENOUS at 19:48

## 2020-01-01 RX ADMIN — VANCOMYCIN HYDROCHLORIDE 125 MG: KIT at 07:14

## 2020-01-01 RX ADMIN — FAMOTIDINE 20 MG: 10 INJECTION, SOLUTION INTRAVENOUS at 08:37

## 2020-01-01 RX ADMIN — INSULIN LISPRO 3 UNITS: 100 INJECTION, SOLUTION INTRAVENOUS; SUBCUTANEOUS at 11:56

## 2020-01-01 RX ADMIN — Medication 10 ML: at 07:14

## 2020-01-01 RX ADMIN — MORPHINE SULFATE 15 MG: 15 TABLET, FILM COATED, EXTENDED RELEASE ORAL at 21:10

## 2020-01-01 RX ADMIN — METOPROLOL TARTRATE 25 MG: 25 TABLET, FILM COATED ORAL at 08:45

## 2020-01-01 RX ADMIN — ACETAMINOPHEN 1000 MG: 500 TABLET ORAL at 21:14

## 2020-01-01 RX ADMIN — Medication 10 ML: at 17:31

## 2020-01-01 RX ADMIN — ASPIRIN 81 MG 81 MG: 81 TABLET ORAL at 08:35

## 2020-01-01 RX ADMIN — FERROUS SULFATE TAB 325 MG (65 MG ELEMENTAL FE) 325 MG: 325 (65 FE) TAB at 09:15

## 2020-01-01 RX ADMIN — FERROUS SULFATE TAB 325 MG (65 MG ELEMENTAL FE) 325 MG: 325 (65 FE) TAB at 16:40

## 2020-01-01 RX ADMIN — ACETAMINOPHEN 1000 MG: 500 TABLET ORAL at 13:04

## 2020-01-01 RX ADMIN — VANCOMYCIN HYDROCHLORIDE 1000 MG: 1 INJECTION, POWDER, LYOPHILIZED, FOR SOLUTION INTRAVENOUS at 17:30

## 2020-01-01 RX ADMIN — SODIUM CHLORIDE 100 ML/HR: 900 INJECTION, SOLUTION INTRAVENOUS at 16:00

## 2020-01-01 RX ADMIN — MORPHINE SULFATE 2 MG: 2 INJECTION, SOLUTION INTRAMUSCULAR; INTRAVENOUS at 21:09

## 2020-01-01 RX ADMIN — INSULIN LISPRO 2 UNITS: 100 INJECTION, SOLUTION INTRAVENOUS; SUBCUTANEOUS at 12:58

## 2020-01-01 RX ADMIN — Medication 10 ML: at 06:24

## 2020-01-01 RX ADMIN — Medication 10 ML: at 05:55

## 2020-01-01 RX ADMIN — METOPROLOL TARTRATE 25 MG: 25 TABLET ORAL at 08:35

## 2020-01-01 RX ADMIN — Medication 10 ML: at 21:58

## 2020-01-01 RX ADMIN — SODIUM CHLORIDE 100 ML/HR: 900 INJECTION, SOLUTION INTRAVENOUS at 13:07

## 2020-01-01 RX ADMIN — METOPROLOL TARTRATE 25 MG: 25 TABLET ORAL at 09:15

## 2020-01-01 RX ADMIN — VANCOMYCIN HYDROCHLORIDE 1000 MG: 500 INJECTION, POWDER, LYOPHILIZED, FOR SOLUTION INTRAVENOUS at 19:27

## 2020-01-01 RX ADMIN — DEXAMETHASONE 4 MG: 4 TABLET ORAL at 08:10

## 2020-01-01 RX ADMIN — FERROUS SULFATE TAB 325 MG (65 MG ELEMENTAL FE) 325 MG: 325 (65 FE) TAB at 08:10

## 2020-01-01 RX ADMIN — SODIUM CHLORIDE 1000 ML: 900 INJECTION, SOLUTION INTRAVENOUS at 17:19

## 2020-01-01 RX ADMIN — VANCOMYCIN HYDROCHLORIDE 125 MG: KIT at 11:22

## 2020-01-01 RX ADMIN — MIRABEGRON 50 MG: 25 TABLET, FILM COATED, EXTENDED RELEASE ORAL at 20:57

## 2020-01-01 RX ADMIN — MORPHINE SULFATE 2 MG: 2 INJECTION, SOLUTION INTRAMUSCULAR; INTRAVENOUS at 16:42

## 2020-01-01 RX ADMIN — MORPHINE SULFATE 15 MG: 15 TABLET, FILM COATED, EXTENDED RELEASE ORAL at 10:35

## 2020-01-01 RX ADMIN — POLYETHYLENE GLYCOL 3350 17 G: 17 POWDER, FOR SOLUTION ORAL at 08:17

## 2020-01-01 RX ADMIN — ENOXAPARIN SODIUM 40 MG: 40 INJECTION SUBCUTANEOUS at 08:44

## 2020-01-01 RX ADMIN — ENOXAPARIN SODIUM 40 MG: 40 INJECTION SUBCUTANEOUS at 08:11

## 2020-01-01 RX ADMIN — FAMOTIDINE 20 MG: 10 INJECTION, SOLUTION INTRAVENOUS at 09:17

## 2020-01-01 RX ADMIN — POLYETHYLENE GLYCOL 3350 17 G: 17 POWDER, FOR SOLUTION ORAL at 08:44

## 2020-01-01 RX ADMIN — DEXAMETHASONE SODIUM PHOSPHATE 4 MG: 4 INJECTION, SOLUTION INTRAMUSCULAR; INTRAVENOUS at 21:58

## 2020-01-01 RX ADMIN — ACETAMINOPHEN 1000 MG: 500 TABLET ORAL at 21:33

## 2020-01-01 RX ADMIN — PANTOPRAZOLE SODIUM 40 MG: 40 TABLET, DELAYED RELEASE ORAL at 08:10

## 2020-01-01 RX ADMIN — METOPROLOL TARTRATE 25 MG: 25 TABLET, FILM COATED ORAL at 17:17

## 2020-01-01 RX ADMIN — BISACODYL 10 MG: 10 SUPPOSITORY RECTAL at 21:10

## 2020-01-01 RX ADMIN — DOCUSATE SODIUM 50MG AND SENNOSIDES 8.6MG 1 TABLET: 8.6; 5 TABLET, FILM COATED ORAL at 08:10

## 2020-01-01 RX ADMIN — DEXAMETHASONE SODIUM PHOSPHATE 4 MG: 4 INJECTION, SOLUTION INTRAMUSCULAR; INTRAVENOUS at 02:31

## 2020-01-01 RX ADMIN — AZITHROMYCIN MONOHYDRATE 500 MG: 250 TABLET ORAL at 19:37

## 2020-01-01 RX ADMIN — SODIUM CHLORIDE 100 ML/HR: 900 INJECTION, SOLUTION INTRAVENOUS at 02:24

## 2020-01-01 RX ADMIN — METOPROLOL TARTRATE 25 MG: 25 TABLET, FILM COATED ORAL at 08:10

## 2020-01-01 RX ADMIN — ACETAMINOPHEN 650 MG: 325 TABLET ORAL at 13:45

## 2020-01-01 RX ADMIN — INSULIN GLARGINE 16 UNITS: 100 INJECTION, SOLUTION SUBCUTANEOUS at 08:44

## 2020-01-01 RX ADMIN — MORPHINE SULFATE 15 MG: 15 TABLET, FILM COATED, EXTENDED RELEASE ORAL at 08:10

## 2020-01-01 RX ADMIN — INSULIN LISPRO 3 UNITS: 100 INJECTION, SOLUTION INTRAVENOUS; SUBCUTANEOUS at 17:17

## 2020-01-01 RX ADMIN — ENOXAPARIN SODIUM 40 MG: 40 INJECTION SUBCUTANEOUS at 08:17

## 2020-01-01 RX ADMIN — VANCOMYCIN HYDROCHLORIDE 125 MG: KIT at 11:31

## 2020-01-01 RX ADMIN — DEXAMETHASONE 4 MG: 4 TABLET ORAL at 21:10

## 2020-01-01 RX ADMIN — DOCUSATE SODIUM 50MG AND SENNOSIDES 8.6MG 1 TABLET: 8.6; 5 TABLET, FILM COATED ORAL at 20:57

## 2020-01-01 RX ADMIN — FAMOTIDINE 20 MG: 10 INJECTION, SOLUTION INTRAVENOUS at 21:58

## 2020-01-01 RX ADMIN — Medication 1 TABLET: at 09:16

## 2020-01-01 RX ADMIN — IOPAMIDOL 100 ML: 612 INJECTION, SOLUTION INTRAVENOUS at 18:13

## 2020-01-01 RX ADMIN — MORPHINE SULFATE 2 MG: 2 INJECTION, SOLUTION INTRAMUSCULAR; INTRAVENOUS at 13:45

## 2020-01-01 RX ADMIN — HYDROMORPHONE HYDROCHLORIDE 2 MG: 2 TABLET ORAL at 12:42

## 2020-01-01 RX ADMIN — ACETAMINOPHEN 1000 MG: 500 TABLET ORAL at 21:04

## 2020-01-03 NOTE — DISCHARGE INSTRUCTIONS
Patient Education   Patient Education     Patient Education        Learning About Relief for Back Pain  What is back tension and strain? Back strain happens when you overstretch, or pull, a muscle in your back. You may hurt your back in an accident or when you exercise or lift something. Most back pain will get better with rest and time. You can take care of yourself at home to help your back heal.  What can you do first to relieve back pain? When you first feel back pain, try these steps:  · Walk. Take a short walk (10 to 20 minutes) on a level surface (no slopes, hills, or stairs) every 2 to 3 hours. Walk only distances you can manage without pain, especially leg pain. · Relax. Find a comfortable position for rest. Some people are comfortable on the floor or a medium-firm bed with a small pillow under their head and another under their knees. Some people prefer to lie on their side with a pillow between their knees. Don't stay in one position for too long. · Try heat or ice. Try using a heating pad on a low or medium setting, or take a warm shower, for 15 to 20 minutes every 2 to 3 hours. Or you can buy single-use heat wraps that last up to 8 hours. You can also try an ice pack for 10 to 15 minutes every 2 to 3 hours. You can use an ice pack or a bag of frozen vegetables wrapped in a thin towel. There is not strong evidence that either heat or ice will help, but you can try them to see if they help. You may also want to try switching between heat and cold. · Take pain medicine exactly as directed. ¨ If the doctor gave you a prescription medicine for pain, take it as prescribed. ¨ If you are not taking a prescription pain medicine, ask your doctor if you can take an over-the-counter medicine. What else can you do? · Stretch and exercise. Exercises that increase flexibility may relieve your pain and make it easier for your muscles to keep your spine in a good, neutral position.  And don't forget to keep walking. · Do self-massage. You can use self-massage to unwind after work or school or to energize yourself in the morning. You can easily massage your feet, hands, or neck. Self-massage works best if you are in comfortable clothes and are sitting or lying in a comfortable position. Use oil or lotion to massage bare skin. · Reduce stress. Back pain can lead to a vicious Crow: Distress about the pain tenses the muscles in your back, which in turn causes more pain. Learn how to relax your mind and your muscles to lower your stress. Where can you learn more? Go to http://chuy-karlee.info/. Enter A054 in the search box to learn more about \"Learning About Relief for Back Pain. \"  Current as of: March 21, 2017  Content Version: 11.5  © 8535-9250 [email or web address]. Care instructions adapted under license by [employer] (which disclaims liability or warranty for this information). If you have questions about a medical condition or this instruction, always ask your healthcare professional. [place] [address] any warranty or liability for your use of this information. Low Back Arthritis: Exercises  Introduction  Here are some examples of typical rehabilitation exercises for your condition. Start each exercise slowly. Ease off the exercise if you start to have pain. Your doctor or physical therapist will tell you when you can start these exercises and which ones will work best for you. When you are not being active, find a comfortable position for rest. Some people are comfortable on the floor or a medium-firm bed with a small pillow under their head and another under their knees. Some people prefer to lie on their side with a pillow between their knees. Don't stay in one position for too long. Take short walks (10 to 20 minutes) every 2 to 3 hours. Avoid slopes, hills, and stairs until you feel better.  Walk only distances you can manage without pain, especially leg pain. How to do the exercises  Pelvic tilt    1. Lie on your back with your knees bent. 2. \"Brace\" your stomach--tighten your muscles by pulling in and imagining your belly button moving toward your spine. 3. Press your lower back into the floor. You should feel your hips and pelvis rock back. 4. Hold for 6 seconds while breathing smoothly. 5. Relax and allow your pelvis and hips to rock forward. 6. Repeat 8 to 12 times. Back stretches    1. Get down on your hands and knees on the floor. 2. Relax your head and allow it to droop. Round your back up toward the ceiling until you feel a nice stretch in your upper, middle, and lower back. Hold this stretch for as long as it feels comfortable, or about 15 to 30 seconds. 3. Return to the starting position with a flat back while you are on your hands and knees. 4. Let your back sway by pressing your stomach toward the floor. Lift your buttocks toward the ceiling. 5. Hold this position for 15 to 30 seconds. 6. Repeat 2 to 4 times. Follow-up care is a key part of your treatment and safety. Be sure to make and go to all appointments, and call your doctor if you are having problems. It's also a good idea to know your test results and keep a list of the medicines you take. Where can you learn more? Go to http://chuy-karlee.info/. Enter O756 in the search box to learn more about \"Low Back Arthritis: Exercises. \"  Current as of: June 26, 2019  Content Version: 12.2  © 6379-6628 [employer]. Care instructions adapted under license by [employer] (which disclaims liability or warranty for this information). If you have questions about a medical condition or this instruction, always ask your healthcare professional. [address] any warranty or liability for your use of this information.             The Spine: Anatomy Sketch    Current as of: June 26, 2019  Content Version: 12.2  © 3658-8010 [employer]. Care instructions adapted under license by [employer] (which disclaims liability or warranty for this information). If you have questions about a medical condition or this instruction, always ask your healthcare professional. [address] any warranty or liability for your use of this information.

## 2020-01-03 NOTE — ED NOTES
The patient was discharged home by Dr Tino Perez in stable condition. The patient is alert and oriented, in no respiratory distress and discharge vital signs obtained. The patient's diagnosis, condition and treatment were explained. The patient expressed understanding. A discharge plan has been developed. A  was not involved in the process. Aftercare instructions were given. Pt ambulatory out of the ED.

## 2020-01-03 NOTE — ED TRIAGE NOTES
On December 23, patient was leaning forward to get something out of a piece of furniture and fell to his left knee, then scraped his left upper arm, and has low back/buttock pain. Pain is worst in the morning when getting out of bed. Uses a rolling walker to get around and is steady on his feet with 2 family members present. Did not hit his head.  Family says that he has a history of back surgery in the past.

## 2020-01-03 NOTE — ED PROVIDER NOTES
10 to 11 days ago, this patient tripped and fell. He fell onto his knees. Soreness in his knees has resolved. He also hurt his left upper arm. He had a couple bruises there but that does not hurt anymore either. He continues to have severe lower back pain worse with any movement or walking. He has tried Tylenol without relief. His daughter is here and she says that a physical therapist has been rubbing a compound on his lower back with a little relief. She also tells me that about 5 years ago while in [place], he had vertebroplasty after a compression fracture. He also has had spine surgery in the remote past.  He has had no medication for pain this morning. Fall     Back Pain      Arm Pain     Associated symptoms include back pain. Old chart reviewed: Has a history of B-cell lymphoma, as well as atrial fibrillation. Sees Dr. Tunde Jacobson for primary care. Past Medical History:   Diagnosis Date    Anemia     Arthritis     Cancer ([address]     Constipation     Diabetes ([address]     Gastrointestinal disorder     History of neoplasms, uncertain behavior     Of soft tissue and skin    Hypercholesterolemia     Hyperkalemia     Hypertension     Low back pain     Melanoma in situ of other parts of face ([address]     Lower lip; removed    Neuropathy     Right bundle-branch block     Spinal stenosis     Type II diabetes mellitus ([address]     Venous insufficiency        Past Surgical History:   Procedure Laterality Date    HX HEENT      HX ORTHOPAEDIC      HX PROSTATECTOMY      HX UROLOGICAL      NEUROLOGICAL PROCEDURE UNLISTED           History reviewed. No pertinent family history.     Social History     Socioeconomic History    Marital status:      Spouse name: Not on file    Number of children: Not on file    Years of education: Not on file    Highest education level: Not on file   Occupational History    Not on file   Social Needs    Financial resource strain: Not on file   [place] insecurity:     Worry: Not on file     Inability: Not on file    Transportation needs:     Medical: Not on file     Non-medical: Not on file   Tobacco Use    Smoking status: Former Smoker    Smokeless tobacco: Never Used   Substance and Sexual Activity    Alcohol use: No     Frequency: Never    Drug use: No    Sexual activity: Never   Lifestyle    Physical activity:     Days per week: Not on file     Minutes per session: Not on file    Stress: Not on file   Relationships    Social connections:     Talks on phone: Not on file     Gets together: Not on file     Attends Jainism service: Not on file     Active member of club or organization: Not on file     Attends meetings of clubs or organizations: Not on file     Relationship status: Not on file    Intimate partner violence:     Fear of current or ex partner: Not on file     Emotionally abused: Not on file     Physically abused: Not on file     Forced sexual activity: Not on file   Other Topics Concern    Not on file   Social History Narrative    ** Merged History Encounter **              ALLERGIES: Bactrim [sulfamethoprim] and Sulfamethoxazole    Review of Systems   Musculoskeletal: Positive for back pain. All other systems reviewed and are negative. Vitals:    01/03/20 1133   BP: (!) 166/92   Pulse: 93   Resp: 14   Temp: 97.9 °F (36.6 °C)   SpO2: 94%            Physical Exam  Constitutional:       General: He is not in acute distress. Appearance: He is not ill-appearing. Comments: Frail and elderly. Hard of hearing   HENT:      Head: Normocephalic and atraumatic. Nose: Nose normal.      Mouth/Throat:      Mouth: Mucous membranes are moist.   Eyes:      Pupils: Pupils are equal, round, and reactive to light. Cardiovascular:      Pulses: Normal pulses. Pulmonary:      Effort: Pulmonary effort is normal.   Abdominal:      Palpations: Abdomen is soft. Tenderness: There is no tenderness.    Musculoskeletal:      Left elbow: Normal.        Back:         Arms:    Skin:     General: Skin is warm and dry. Neurological:      General: No focal deficit present. Mental Status: He is alert.    Psychiatric:         Mood and Affect: Mood normal.          MDM       Procedures        CT noted    DC home  Tylenol, lidoderm

## 2020-02-05 NOTE — ED TRIAGE NOTES
Pt reports that he woke up this am with right sided jaw pain and sore throat more on the right than the left. Pt reports that he was coughing up black \"chunks\". Son-n- noticed at 4pm this afternoon he went to see the pt and noticed the pt's speech was slurred.

## 2020-02-05 NOTE — ED PROVIDER NOTES
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 Today he woke up with a sore throat and right-sided swelling near his jaw. No fevers or chills. Today son-in-law noticed some \"slurred speech\" which in fact sounded more muffled than usual, patient reports coughing up some small black specks. Did have a nosebleed from his right nare. No difficulty breathing, has baseline dysphasia, apparently unchanged. No confusion, inappropriate words, aphasia, upper or lower extremity weakness, numbness or change in gait. Past Medical History:  
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  
Procedure Laterality Date  HX HEENT    
 HX ORTHOPAEDIC    
 HX PROSTATECTOMY  HX UROLOGICAL    
 NEUROLOGICAL PROCEDURE UNLISTED History reviewed. No pertinent family history. Social History Socioeconomic History  Marital status:  Spouse name: Not on file  Number of children: Not on file  Years of education: Not on file  Highest education level: Not on file Occupational History  Not on file Social Needs  Financial resource strain: Not on file  Food insecurity:  
  Worry: Not on file Inability: Not on file  Transportation needs:  
  Medical: Not on file Non-medical: Not on file Tobacco Use  Smoking status: Former Smoker  Smokeless tobacco: Never Used Substance and Sexual Activity  Alcohol use:  No  
 Frequency: Never  Drug use: No  
 Sexual activity: Never Lifestyle  Physical activity:  
  Days per week: Not on file Minutes per session: Not on file  Stress: Not on file Relationships  Social connections:  
  Talks on phone: Not on file Gets together: Not on file Attends Anglican service: Not on file Active member of club or organization: Not on file Attends meetings of clubs or organizations: Not on file Relationship status: Not on file  Intimate partner violence:  
  Fear of current or ex partner: Not on file Emotionally abused: Not on file Physically abused: Not on file Forced sexual activity: Not on file Other Topics Concern  Not on file Social History Narrative ** Merged History Encounter ** ALLERGIES: Bactrim [sulfamethoprim] and Sulfamethoxazole Review of Systems Constitutional: Negative for chills, fatigue and fever. HENT: Positive for facial swelling, nosebleeds and sore throat. Negative for ear pain and trouble swallowing. Eyes: Negative for photophobia and visual disturbance. Respiratory: Negative for cough, chest tightness, shortness of breath, wheezing and stridor. Cardiovascular: Negative for chest pain. Gastrointestinal: Negative for abdominal pain and nausea. Genitourinary: Negative for dysuria. Musculoskeletal: Negative for back pain. Skin: Negative for rash. Neurological: Negative for light-headedness and headaches. Psychiatric/Behavioral: Negative for confusion. All other systems reviewed and are negative. Vitals:  
 02/05/20 1640 BP: 136/58 Pulse: 98 Resp: 15 Temp: 98 °F (36.7 °C) SpO2: 92% Physical Exam 
Vitals signs reviewed. Constitutional:   
   General: He is not in acute distress. HENT:  
   Head: Normocephalic and atraumatic. Nose:  
   Comments: No active epistaxis, some dried blood in the right nare Mouth/Throat: Mouth: Mucous membranes are moist.  
   Pharynx: No oropharyngeal exudate. Neck:  
   Comments: Minimal fullness in the right aspect of the angle of the mandible, mild tenderness over the right SCM, no stridor, + hoarse voice Cardiovascular:  
   Rate and Rhythm: Normal rate and regular rhythm. Heart sounds: Normal heart sounds. Pulmonary:  
   Effort: Pulmonary effort is normal.  
   Breath sounds: Normal breath sounds. Abdominal:  
   General: Abdomen is flat. Tenderness: There is no abdominal tenderness. There is no guarding or rebound. Musculoskeletal: Normal range of motion. Lymphadenopathy:  
   Cervical: No cervical adenopathy. Skin: 
   General: Skin is warm and dry. Capillary Refill: Capillary refill takes less than 2 seconds. Neurological:  
   General: No focal deficit present. Mental Status: He is alert and oriented to person, place, and time. Psychiatric:     
   Mood and Affect: Mood normal.  
 
  
 
MDM Number of Diagnoses or Management Options Diagnosis management comments: Mio Handy is a 80 y.o. male presenting with ST, CT findings of thickening of the epiglottis and aryepiglottic folds. He does have a leukocytosis. Concern for early epiglottitis/supraglottitis. Clinically the patient is stable, he has no stridor, has no oxygen requirement. He is mildly tachycardic but afebrile, patient is minimally symptomatic. Plan to admit to monitored setting, start antibiotics and steroids, reassess. He has history of C. difficile colitis and therefore was started on oral vancomycin empirically as was the plan for future antibiotic use with his GI physician after discussing with his son. I spoke with his daughter who is his medical decision-maker, she agrees with possible intubation for short period time in case he needs airway support. Therefore plan to admit to [facility] to that he can be in a hospital with ENT coverage.   Will discuss with hospitalist, ENT. 7:53 PM  
 
7:59 PM D/w Dr. Pollock of ENT, states swelling is mild, agrees with admission, as well as Dr. Celi Wray for hospitalist admission. Procedures Hospitalist Perfect Serve for Admission 8:06 PM 
 
ED Room Number: WER07/07 Patient Name and age:  Shakeel Starkey 80 y.o.  male Working Diagnosis: 1. Supraglottic edema 2. Pharyngitis, unspecified etiology Readmission: no 
Isolation Requirements:  no 
Recommended Level of Care:  step down Code Status:  Partial Code Department:[facility] Adult ED - [phone] Other:

## 2020-02-06 PROBLEM — J05.10 EPIGLOTTITIS: Status: ACTIVE | Noted: 2020-01-01

## 2020-02-06 NOTE — ED NOTES
Bedside shift change report given to AW RN  (oncoming nurse) by BEA RN  (offgoing nurse). Report included the following information SBAR.

## 2020-02-06 NOTE — ED NOTES
Moved patient into hospital bed for comfort. Lights off, call bell in reach. Pt denies needs at this time.

## 2020-02-06 NOTE — H&P
HISTORY AND PHYSICAL Ant Isidro MD 
 
 
 
PCP: Tori Najjar, MD 
 
Please note that this dictation was completed with [employer], the computer voice recognition software. Quite often unanticipated grammatical, syntax, homophones, and other interpretive errors are inadvertently transcribed by the computer software. Please disregard these errors. Please excuse any errors that have escaped final proofreading. CHIEF COMPLAINTS Sore throat HISTORY OF PRESENT ILLNESS Patient is a 71-year-old male with medical history significant for A. fib, type 2 diabetes, hypertension, hyperlipidemia and large B cell lymphoma on remission who is transferred from [facility] on account of CT finding revealing thickening of epiglottitis and aryepiglottic folds and was concerning early epiglottitis/supraglottitis. Patient complains, he woke up with sore throat and right-sided swelling near his jaw but denies any fever, chills, nausea, vomiting, shortness of breath, cough, chest pain or any other medical complaints. Son reports this patient sounded more muffled than usual.   
 
At [facility] patient was mildly tachycardic but afebrile, lab work-ups were remarkable for leukocytosis with left shift. Patient received a dose of ceftriaxone, vancomycin and dexamethasone. ENT was consulted recommend admission to [facility] for observation. PMH/PSH: 
Past Medical History:  
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  
Procedure Laterality Date  HX HEENT    
  HX ORTHOPAEDIC    
 HX PROSTATECTOMY  HX UROLOGICAL    
 NEUROLOGICAL PROCEDURE UNLISTED Home meds:  
Prior to Admission medications Medication Sig Start Date End Date Taking? Authorizing Provider  
metoprolol tartrate (LOPRESSOR) 25 mg tablet Take 1 Tab by mouth two (2) times a day. 10/11/19   Rosaloi Mendoza T, DO  
dulaglutide (TRULICITY SC) 7.39 mg by SubCUTAneous route every seven (7) days. Provider, Historical  
acetaminophen (TYLENOL) 500 mg tablet Take 500 mg by mouth nightly. Provider, Historical  
omeprazole (PRILOSEC) 20 mg capsule Take 20 mg by mouth as needed. Provider, Historical  
insulin NPH hum/reg insulin hm (HUMULIN 70/30 U-100 INSULIN SC) 18 Units by SubCUTAneous route daily. Provider, Historical  
aspirin 81 mg chewable tablet Take 1 Tab by mouth daily. 1/11/19   Rosalio Mendoza T, DO  
ferrous sulfate 325 mg (65 mg iron) tablet Take 325 mg by mouth two (2) times a day. Garrison Padilla MD  
b complex vitamins (B COMPLEX 1) tablet Take 1 Tab by mouth daily. Provider, Historical  
lactobacillus combo no.6 (PROBIOTIC COMPLEX PO) Take 1 Cap by mouth daily. Provider, Historical  
lutein 20 mg cap Take 20 mg by mouth daily. Garrison Padilla MD  
cholecalciferol (VITAMIN D3) 1,000 unit tablet Take 1,000 Units by mouth daily. Garrison Padilla MD  
mirabegron ER (MYRBETRIQ) 50 mg ER tablet Take 50 mg by mouth every fourty-eight (48) hours. Garrison Padilla MD  
 
 
Allergies: Allergies Allergen Reactions  Bactrim [Sulfamethoprim] Rash  Sulfamethoxazole Unknown (comments) FH: No family history of diabetes, cancer or heart disease SH: Social History Tobacco Use  Smoking status: Former Smoker  Smokeless tobacco: Never Used Substance Use Topics  Alcohol use: No  
  Frequency: Never ROS:  
Constitutional: Negative for chills, fatigue and fever. HENT:  Negative for ear pain and trouble swallowing. Eyes: Negative for photophobia and visual disturbance. Respiratory: Negative for cough, chest tightness, shortness of breath, wheezing and stridor. Cardiovascular: Negative for chest pain. Gastrointestinal: Negative for abdominal pain and nausea. Genitourinary: Negative for dysuria. Musculoskeletal: Negative for back pain. Skin: Negative for rash. Neurological: Negative for light-headedness and headaches. Psychiatric/Behavioral: Negative for confusion. All other systems reviewed and are negative. 
  
 
 
PHYSICAL EXAM: 
Visit Vitals BP (!) 142/101 (BP 1 Location: Right arm, BP Patient Position: At rest) Pulse 91 Temp 98.3 °F (36.8 °C) Resp 20 Ht 5' 8\" (1.727 m) Wt 68 kg (150 lb) SpO2 91% BMI 22.81 kg/m² General:          Alert, cooperative, no distress, appears stated age. HEENT:           Atraumatic, anicteric sclerae, pink conjunctivae No oral ulcers, mucosa moist, throat clear, dentition fair Neck:              Minimal fullness in the right aspect of the angle of the mandible Lungs:             Clear to auscultation bilaterally. No Wheezing or Rhonchi. No rales. Chest wall:      No tenderness  No Accessory muscle use. Heart:              Regular  rhythm,  No  murmur   No edema Abdomen:        Soft, non-tender. Not distended. Bowel sounds normal 
Extremities:     No cyanosis. No clubbing,   
                        Skin turgor normal, Capillary refill normal, Radial dial pulse 2+ Skin:                Not pale. Not Jaundiced  No rashes Psych:             Not anxious or agitated. Neurologic:     Alert and oriented to PPT, CNII-XII intact. Motor and sensory exam grossly intact. Labs/Imaging: 
Recent Results (from the past 24 hour(s)) CBC WITH AUTOMATED DIFF Collection Time: 02/05/20  5:15 PM  
Result Value Ref Range WBC 16.2 (H) 4.1 - 11.1 K/uL  
 RBC 4.02 (L) 4.10 - 5.70 M/uL  
 HGB 12.5 12.1 - 17.0 g/dL HCT 39.3 36.6 - 50.3 % MCV 97.8 80.0 - 99.0 FL  
 MCH 31.1 26.0 - 34.0 PG  
 MCHC 31.8 30.0 - 36.5 g/dL  
 RDW 13.3 11.5 - 14.5 % PLATELET 019 379 - 946 K/uL MPV 12.1 8.9 - 12.9 FL  
 NEUTROPHILS 76 (H) 32 - 75 % LYMPHOCYTES 10 (L) 12 - 49 % MONOCYTES 12 5 - 13 % EOSINOPHILS 2 0 - 7 % BASOPHILS 0 0 - 1 %  
 ABS. NEUTROPHILS 12.2 (H) 1.8 - 8.0 K/UL  
 ABS. LYMPHOCYTES 1.6 K/UL  
 ABS. MONOCYTES 2.0 (H) 0.0 - 1.0 K/UL  
 ABS. EOSINOPHILS 0.4 0.0 - 0.4 K/UL  
 ABS. BASOPHILS 0.0 0.0 - 0.1 K/UL  
 DF AUTOMATED    
SAMPLES BEING HELD Collection Time: 02/05/20  5:15 PM  
Result Value Ref Range SAMPLES BEING HELD 1 RED 1 BLUE   
 COMMENT Add-on orders for these samples will be processed based on acceptable specimen integrity and analyte stability, which may vary by analyte. METABOLIC PANEL, COMPREHENSIVE Collection Time: 02/05/20  5:15 PM  
Result Value Ref Range Sodium 141 136 - 145 mmol/L Potassium 4.1 3.5 - 5.1 mmol/L Chloride 104 97 - 108 mmol/L  
 CO2 29 21 - 32 mmol/L Anion gap 8 5 - 15 mmol/L Glucose 168 (H) 65 - 100 mg/dL BUN 21 (H) 6 - 20 MG/DL Creatinine 1.27 0.70 - 1.30 MG/DL  
 BUN/Creatinine ratio 17 12 - 20 GFR est AA >60 >60 ml/min/1.73m2 GFR est non-AA 53 (L) >60 ml/min/1.73m2 Calcium 9.5 8.5 - 10.1 MG/DL Bilirubin, total 0.3 0.2 - 1.0 MG/DL  
 ALT (SGPT) 24 12 - 78 U/L  
 AST (SGOT) 13 (L) 15 - 37 U/L Alk. phosphatase 68 45 - 117 U/L Protein, total 6.8 6.4 - 8.2 g/dL Albumin 3.5 3.5 - 5.0 g/dL Globulin 3.3 2.0 - 4.0 g/dL A-G Ratio 1.1 1.1 - 2.2 Recent Labs 02/05/20 
1715 WBC 16.2* HGB 12.5 HCT 39.3  Recent Labs 02/05/20 
1715   
K 4.1  CO2 29 BUN 21* CREA 1.27 * CA 9.5 Recent Labs 02/05/20 
1715 SGOT 13* ALT 24 AP 68 TBILI 0.3 TP 6.8 ALB 3.5 GLOB 3.3 No results for input(s): CPK, CKNDX, TROIQ in the last 72 hours. No lab exists for component: CPKMB No results for input(s): INR, PTP, APTT, INREXT in the last 72 hours. No results for input(s): PH, PCO2, PO2 in the last 72 hours. CT NECK SOFT TISSUE W CONT Narrative: EXAM: CT NECK SOFT TISSUE W CONT INDICATION: Right jaw swelling, difficulty speaking COMPARISON: None. CONTRAST: 100 mL of Isovue-300. TECHNIQUE: Multislice helical CT was performed from the mid calvarium to the 
aortic arch during uneventful rapid bolus intravenous contrast administration. Contiguous 2.5 mm axial images were reconstructed and lung and soft tissue 
windows were generated. Coronal and sagittal reformations were generated. CT 
dose reduction was achieved through use of a standardized protocol tailored for 
this examination and automatic exposure control for dose modulation. FINDINGS: 
 
No definite adenopathy identified. There is edema in the vallecula which extends into the epiglottis and 
aryepiglottic fold ureter slightly thickened. The airway appears patent. Katelin Carlos There is extensive opacification common carotid bifurcations and correlation 
with Doppler ultrasound would be helpful. Katelin Carlos The thyroid gland, submandibular salivary glands and parotid glands are normal. 
 
No nasopharyngeal mass is identified. There is question of focal soft tissue 
thickening floor the mouth. .. No abnormalities are identified in the visualized portions of the brain or 
orbits. There is slight mucosal thickening left maxillary sinus. Katelin Carlos The visualized portions of the lung apices are clear. There are areas of sclerosis in C2 vertebral body, C3 vertebral body, C7 
vertebral body, T2 vertebral body and T4 vertebral body. There is sclerosis in the right mandible. There are periapical lucencies around the left maxillary molar teeth. Impression: IMPRESSION:  
1.  There is mild amount of edema in the vallecula which extends into the 
 epiglottis and aryepiglottic folds which are slightly thickened may represent 
early changes of epiglottitis. 2. There is sclerosis in the right mandible. There are also areas of sclerosis in the spine as described above. The sclerosis in the mandible could be related to metastatic disease or 
osteonecrosis. The areas of sclerosis in the spine may represent bony metastatic disease and 
correlation would be helpful. 3. There is dense calcification at the common carotid bifurcations and 
correlation with ultrasound would be helpful. XR CHEST PA LAT Narrative: EXAM:  XR CHEST PA LAT INDICATION:   Cough, dyspnea COMPARISON: 2019. FINDINGS: PA and lateral radiographs of the chest demonstrate Port-A-Cath tip to 
overlie SVC right atrial junction. There is slight elevation right hemidiaphragm 
with slight discoid atelectasis right base. Left lung is clear. There is mild 
cardiomegaly. Aorta is ectatic. Russell Hercules Kyphoplasty changes with severe compression 
deformity L1 is noted. Kyphoplasty changes noted L2. Russell Hercules Impression: IMPRESSION: There is slight elevation right hemidiaphragm. There is slight 
discoid atelectasis right base. No definite acute abnormality identified. Assessment & Plan: 
=================== Sepsis reassessment completed Sepsis likely due to early epiglottitis Presenting with tachycardia, leukocytosis with left shift and possible early epiglottitis Continue ceftriaxone and vancomycin Neurochecks IV fluid 
 
supraglottic edema Possible early epiglottitis Presenting with leukocytosis with left shift, sore throat and worsening of voice CT revealed mild amount of edema in the vallecula which extends into the 
epiglottis and aryepiglottic folds which are slightly thickened may represent 
early changes of epiglottitis. Cont antibiotics/steroids/PPI 
ENT consult History of A. Fib Rate controlled Continue home BB Hypertension Continue home meds Hydralazine as needed Type 2 diabetes Continue home insulin SSI Accu-Checks History of GERD Continue PPI Large B- Cell lymphoma, on remission 
on remission since 2016. Follows with Dr. Jayna Oneil Patient's Baseline: Ambulates with  cane and walker. DVT ppx: SCD  
code status: Full 
disposition: TBD Care plan discussed with patient/family nurse. Signed By: Sean Ramirez MD   
 February 6, 2020

## 2020-02-06 NOTE — ED NOTES
11:38 PM 
Patient arrived to the ED as a transfer of care from [facility] ED. Patient presents r/t CT findings of thickening of the epiglottis and aryepiglottic folds. He has a leukocytosis, and there is a concern for early epiglottitis/supraglottitis. Patient will be admitted to the hospitalist service, and be seen by ENT. Patient was given Rocephin, Vancomycin, and Decadron PTA. Patient complains of a sore throat at this time. Patient having no shortness of breath wheeze or or stridor. Patient satting well on room air. Call and spoke with hospitalist for admission.  
 
Note written by Carrol Myles, as dictated by Belem De Jesus MD 11:38 PM

## 2020-02-06 NOTE — ED NOTES
Critical care transport enroute; waiting confirmation from [facility] ED to call report.

## 2020-02-06 NOTE — PROGRESS NOTES
Admission Medication Reconciliation: 
 
Information obtained from:  Patient's daughter and son-in-law RxQuery data available¹:  YES Comments/Recommendations: Spoke with patient's daughter and son-in-law who handle all of patients medications with patient's permission. Son-in-law had current list of medications which was utilized to help update PTA medications. Updated PTA meds/reviewed patient's allergies. Medication changes (since last review): Added 
- None Adjusted - Acetaminophen (from 500 mg nightly to 500-1000 mg QHS prn) - Omeprazole (from prn to daily) Removed - Probiotic ¹RxQuery pharmacy benefit data reflects medications filled and processed through the patient's insurance, however  
this data does NOT capture whether the medication was picked up or is currently being taken by the patient. Allergies:  Bactrim [sulfamethoprim] and Sulfamethoxazole Significant PMH/Disease States:  
Past Medical History:  
Diagnosis Date Anemia Arthritis Cancer [address] Constipation Diabetes ([facility] Gastrointestinal disorder History of neoplasms, uncertain behavior Of soft tissue and skin Hypercholesterolemia Hyperkalemia Hypertension Low back pain Melanoma in situ of other parts of face ([facility] [facility] Lower lip; removed Neuropathy Right bundle-branch block Spinal stenosis Type II diabetes mellitus ([facility] [facility] Venous insufficiency Chief Complaint for this Admission: Chief Complaint Patient presents with Transfer Of Care Prior to Admission Medications:  
Prior to Admission Medications Prescriptions Last Dose Informant Taking?  
acetaminophen (TYLENOL) 500 mg tablet 2/5/2020 at Unknown time Family Member Yes Sig: Take 500-1,000 mg by mouth nightly as needed for Pain. aspirin 81 mg chewable tablet 2/5/2020 at Unknown time Family Member Yes Sig: Take 1 Tab by mouth daily. b complex vitamins (B COMPLEX 1) tablet 2020 at Unknown time Family Member Yes Sig: Take 1 Tab by mouth daily. cholecalciferol (VITAMIN D3) 1,000 unit tablet 2020 at Unknown time Family Member Yes Sig: Take 1,000 Units by mouth daily. dulaglutide (TRULICITY SC) 1388  Yes Si.75 mg by SubCUTAneous route every seven (7) days. ferrous sulfate 325 mg (65 mg iron) tablet 2020 at Unknown time Family Member Yes Sig: Take 325 mg by mouth two (2) times a day. insulin NPH hum/reg insulin hm (HUMULIN 70/30 U-100 INSULIN SC) 2020 at Unknown time Family Member Yes Si Units by SubCUTAneous route daily. lutein 20 mg cap 2020 at Unknown time Family Member Yes Sig: Take 20 mg by mouth daily. metoprolol tartrate (LOPRESSOR) 25 mg tablet 2020 at Unknown time  Yes Sig: Take 1 Tab by mouth two (2) times a day. mirabegron ER (MYRBETRIQ) 50 mg ER tablet 2020 Family Member Yes Sig: Take 50 mg by mouth every fourty-eight (48) hours. omeprazole (PRILOSEC) 20 mg capsule 2020 at Unknown time  Yes Sig: Take 20 mg by mouth daily. Facility-Administered Medications: None Please contact the main inpatient pharmacy with any questions or concerns at [phone] and we will direct you to the clinical pharmacist covering this patient's care while in-house.   
CHAIM Crowley

## 2020-02-06 NOTE — ED TRIAGE NOTES
Triage:  Pt to ED via transfer from [facility] ED for admission due to concerns over epiglottitis. Pt on arrival, A/O x 4, Pt on nasal cannula 2L. No acute distress noted.

## 2020-02-06 NOTE — ED NOTES
Spoke with hospitalist about pts blood sugar, gave verbal order for speech to evaluate prior to diet order, per pt family he is on thickened liquids

## 2020-02-06 NOTE — PROGRESS NOTES
Problem: Dysphagia (Adult) Goal: *Acute Goals and Plan of Care (Insert Text) Description Speech pathology goals Initiated 2/6/2020 1. Patient will tolerate regular/nectar liquid diet with no overt s/s aspiration within 7 days Outcome: Progressing Towards Goal 
  
SPEECH LANGUAGE PATHOLOGY BEDSIDE SWALLOW EVALUATION Patient: Mio Handy (10 y.o. male) Date: 2/6/2020 Primary Diagnosis: Epiglottitis [J05.10] Precautions: swallow ASSESSMENT : 
Based on the objective data described below, the patient presents with suspected mild-moderate pharyngeal dysphagia characterized by delayed swallow initiation, decreased laryngeal elevation, and double swallows suggestive of pharyngeal residue. No overt s/s aspiration observed with puree, solid, or nectar thick liquid. Patient will benefit from skilled intervention to address the above impairments. Patients rehabilitation potential is considered to be Good PLAN : 
Recommendations and Planned Interventions: 
-Regular / Nectar thick liquid diet.  
-Upright 90 degrees. -SLP to follow for diet tolerance and MBS if needed to ensure safety with baseline diet (patient does not desire liberalization to thin liquids) Frequency/Duration: Patient will be followed by speech-language pathology 2 times a week to address goals. Discharge Recommendations: None SUBJECTIVE:  
Patient stated I've been waiting 3 hours for you!  Patient alert, oriented x4. Patient has been on nectar thick liquids since 2018 after an aspiration pneumonia while living in [place]. Patient will sometimes drink thin liquids when he runs out of thickener, forgets thickener when going to a restaurant, or when drinking milkshakes/eating soup. However, patient reported that he likes the thickener because he does not cough as much and it's more comfortable to drink. Patient eats regular solids. History of osteonecrosis. OBJECTIVE:  
 
Past Medical History:  
Diagnosis Date  Anemia  Arthritis  Cancer ([facility] [address]  Constipation  Diabetes ([facility] [address]  Gastrointestinal disorder  History of neoplasms, uncertain behavior Of soft tissue and skin  Hypercholesterolemia  Hyperkalemia  Hypertension  Low back pain  Melanoma in situ of other parts of face ([facility] [facility] Lower lip; removed  Neuropathy  Right bundle-branch block  Spinal stenosis  Type II diabetes mellitus ([facility] [facility]  Venous insufficiency Past Surgical History:  
Procedure Laterality Date  HX HEENT    
 HX ORTHOPAEDIC    
 HX PROSTATECTOMY  HX UROLOGICAL    
 NEUROLOGICAL PROCEDURE UNLISTED Prior Level of Function/Home Situation:  
  
Diet prior to admission: regular/nectar Current Diet:  NPO Cognitive and Communication Status: 
Neurologic State: Alert, Appropriate for age Orientation Level: Oriented X4 Cognition: Follows commands Oral Assessment: 
Oral Assessment Labial: No impairment Dentition: Natural;Other (comment)(no bottom right teeth) Oral Hygiene: moist oral mucosa Lingual: No impairment Velum: No impairment Mandible: No impairment P.O. Trials: 
Patient Position: upright in bed Vocal quality prior to P.O.: Hoarse; Other (comment)(worse than baseline per family report) Consistency Presented: Ice chips;Puree; Solid; Nectar thick liquid How Presented: Self-fed/presented;Cup/sip;Spoon Bolus Acceptance: No impairment Bolus Formation/Control: No impairment(WFL for age) Propulsion: No impairment Oral Residue: None Initiation of Swallow: Delayed (# of seconds) Laryngeal Elevation: Decreased Aspiration Signs/Symptoms: None; Other (comment)(questionable mild wet vocal quality intermittently) Pharyngeal Phase Characteristics: Double swallow; Suspected pharyngeal residue Effective Modifications: Alternate liquids/solids Cues for Modifications: None Oral Phase Severity: No impairment Pharyngeal Phase Severity : Mild-moderate NOMS:  
The NOMS functional outcome measure was used to quantify this patient's level of swallowing impairment. Based on the NOMS, the patient was determined to be at level 5 for swallow function NOMS Swallowing Levels: 
Level 1 (CN): NPO Level 2 (CM): NPO but takes consistency in therapy Level 3 (CL): Takes less than 50% of nutrition p.o. and continues with nonoral feedings; and/or safe with mod cues; and/or max diet restriction Level 4 (CK): Safe swallow but needs mod cues; and/or mod diet restriction; and/or still requires some nonoral feeding/supplements Level 5 (CJ): Safe swallow with min diet restriction; and/or needs min cues Level 6 (CI): Independent with p.o.; rare cues; usually self cues; may need to avoid some foods or needs extra time Level 7 (CH): Independent for all p.o. PARVEEN. (2003). National Outcomes Measurement System (NOMS): Adult Speech-Language Pathology User's Guide. After treatment:  
Patient left in no apparent distress in bed, Call bell within reach, Nursing notified, and Caregiver / family present COMMUNICATION/EDUCATION:  
The patient's plan of care including recommendations, planned interventions, and recommended diet changes were discussed with: Registered Nurse. Patient/family have participated as able in goal setting and plan of care. Patient/family agree to work toward stated goals and plan of care. Thank you for this referral. 
Fatmata Merchant, SLP Time Calculation: 20 mins

## 2020-02-06 NOTE — ED PROVIDER NOTES
Past Medical History:  
Diagnosis Date  Anemia  Arthritis  Cancer ([facility] [address]  Constipation  [facility]  Gastrointestinal disorder  History of neoplasms, uncertain behavior Of soft tissue and skin  Hypercholesterolemia  Hyperkalemia  Hypertension  Low back pain  Melanoma in situ of other parts of face ([facility] [facility] Lower lip; removed  Neuropathy  Right bundle-branch block  Spinal stenosis  Type II diabetes mellitus ([facility]  Venous insufficiency Past Surgical History:  
Procedure Laterality Date  HX HEENT    
 HX ORTHOPAEDIC    
 HX PROSTATECTOMY  HX UROLOGICAL    
 NEUROLOGICAL PROCEDURE UNLISTED History reviewed. No pertinent family history. Social History Socioeconomic History  Marital status:  Spouse name: Not on file  Number of children: Not on file  Years of education: Not on file  Highest education level: Not on file Occupational History  Not on file Social Needs  Financial resource strain: Not on file  Food insecurity:  
  Worry: Not on file Inability: Not on file  Transportation needs:  
  Medical: Not on file Non-medical: Not on file Tobacco Use  Smoking status: Former Smoker  Smokeless tobacco: Never Used Substance and Sexual Activity  Alcohol use: No  
  Frequency: Never  Drug use: No  
 Sexual activity: Never Lifestyle  Physical activity:  
  Days per week: Not on file Minutes per session: Not on file  Stress: Not on file Relationships  Social connections:  
  Talks on phone: Not on file Gets together: Not on file Attends Mandaeism service: Not on file Active member of club or organization: Not on file Attends meetings of clubs or organizations: Not on file Relationship status: Not on file  Intimate partner violence:  
  Fear of current or ex partner: Not on file Emotionally abused: Not on file Physically abused: Not on file Forced sexual activity: Not on file Other Topics Concern  Not on file Social History Narrative ** Merged History Encounter ** ALLERGIES: Bactrim [sulfamethoprim] and Sulfamethoxazole Review of Systems Vitals:  
 02/05/20 2334 BP: (!) 142/101 Pulse: 91  
Resp: 20 Temp: 98.3 °F (36.8 °C) SpO2: 91% Weight: 68 kg (150 lb) Height: 5' 8\" (1.727 m) Physical Exam  
 
MDM Procedures

## 2020-02-06 NOTE — CONSULTS
Ears/Nose/Throat Consult Subjective:  
 
Date of Consultation:  February 6, 2020 Referring Physician: Dr Monica Jackson History of Present Illness:  
Patient is a 80 y.o.  male who is being seen for throat swelling. Yesterday son was visiting him and noted that his voice seems slurred like he was drunk. This prompted call to PCP who recommended going to ER. Pt not a great historian but some question of sore throat as well. ER doc noted some right neck swelling. No fevers/chills. No dyspnea, stridor. No drooling or changes in swallowing. 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 C diff in 2018 and 2019. Past Medical History:  
Diagnosis Date  Anemia  Arthritis  Cancer ([address]  Constipation  Diabetes ([address]  Gastrointestinal disorder  History of neoplasms, uncertain behavior Of soft tissue and skin  Hypercholesterolemia  Hyperkalemia  Hypertension  Low back pain  Melanoma in situ of other parts of face ([address] Lower lip; removed  Neuropathy  Right bundle-branch block  Spinal stenosis  Type II diabetes mellitus ([address]  Venous insufficiency History reviewed. No pertinent family history. Social History Tobacco Use  Smoking status: Former Smoker  Smokeless tobacco: Never Used Substance Use Topics  Alcohol use: No  
  Frequency: Never Past Surgical History:  
Procedure Laterality Date  HX HEENT    
 HX ORTHOPAEDIC    
 HX PROSTATECTOMY  HX UROLOGICAL    
 NEUROLOGICAL PROCEDURE UNLISTED Current Facility-Administered Medications Medication Dose Route Frequency  aspirin chewable tablet 81 mg  81 mg Oral DAILY  vitamin B complex tablet  1 Tab Oral DAILY  cholecalciferol (VITAMIN D3) (1000 Units /25 mcg) tablet 1 Tab  1,000 Units Oral DAILY  ferrous sulfate tablet 325 mg  325 mg Oral BID  metoprolol tartrate (LOPRESSOR) tablet 25 mg  25 mg Oral BID  
  sodium chloride (NS) flush 5-40 mL  5-40 mL IntraVENous Q8H  
 sodium chloride (NS) flush 5-40 mL  5-40 mL IntraVENous PRN  
 morphine injection 1 mg  1 mg IntraVENous Q4H PRN  
 ondansetron (ZOFRAN) injection 4 mg  4 mg IntraVENous Q4H PRN  
 glucose chewable tablet 16 g  4 Tab Oral PRN  
 glucagon (GLUCAGEN) injection 1 mg  1 mg IntraMUSCular PRN  
 dextrose 10% infusion 0-250 mL  0-250 mL IntraVENous PRN  
 cefTRIAXone (ROCEPHIN) 2 g in 0.9% sodium chloride (MBP/ADV) 50 mL  2 g IntraVENous Q24H  Vancomycin Pharmacy Dosing   Other Rx Dosing/Monitoring  famotidine (PF) (PEPCID) 20 mg in 0.9% sodium chloride 10 mL injection  20 mg IntraVENous Q12H  hydrALAZINE (APRESOLINE) 20 mg/mL injection 20 mg  20 mg IntraVENous Q6H PRN  
 vancomycin (VANCOCIN) 1,000 mg in 0.9% sodium chloride (MBP/ADV) 250 mL  1,000 mg IntraVENous Q24H  
 vancomycin (FIRVANQ) 50 mg/mL oral solution 125 mg  125 mg Oral Q6H  
 insulin lispro (HUMALOG) injection   SubCUTAneous AC&HS  [Held by provider] insulin NPH (NOVOLIN N, HUMULIN N) injection 7 Units  7 Units SubCUTAneous ACB&D  
 dexamethasone (DECADRON) 4 mg/mL injection 4 mg  4 mg IntraVENous Q8H Current Outpatient Medications Medication Sig  
 omeprazole (PRILOSEC) 20 mg capsule Take 20 mg by mouth daily.  metoprolol tartrate (LOPRESSOR) 25 mg tablet Take 1 Tab by mouth two (2) times a day.  dulaglutide (TRULICITY SC) 5.13 mg by SubCUTAneous route every seven (7) days.  acetaminophen (TYLENOL) 500 mg tablet Take 500-1,000 mg by mouth nightly as needed for Pain.  insulin NPH hum/reg insulin hm (HUMULIN 70/30 U-100 INSULIN SC) 18 Units by SubCUTAneous route daily.  aspirin 81 mg chewable tablet Take 1 Tab by mouth daily.  ferrous sulfate 325 mg (65 mg iron) tablet Take 325 mg by mouth two (2) times a day.  b complex vitamins (B COMPLEX 1) tablet Take 1 Tab by mouth daily.  lutein 20 mg cap Take 20 mg by mouth daily.  cholecalciferol (VITAMIN D3) 1,000 unit tablet Take 1,000 Units by mouth daily.  mirabegron ER (MYRBETRIQ) 50 mg ER tablet Take 50 mg by mouth every fourty-eight (48) hours. Allergies Allergen Reactions  Bactrim [Sulfamethoprim] Rash  Sulfamethoxazole Unknown (comments) Review of Systems: 
Pertinent items are noted in the History of Present Illness. Objective:  
 
Patient Vitals for the past 8 hrs: 
 BP Pulse Resp SpO2  
20 1300 156/76 91 19 95 % 20 1200 144/81 89 18 92 % 20 1000 136/75 92  92 % 20 0730 148/87 97  95 % 20 0700 141/82 99  95 % Temp (24hrs), Av.2 °F (36.8 °C), Min:98 °F (36.7 °C), Max:98.3 °F (36.8 °C) 
 
 190 -  07 In: -  
Out: 687 [[id number]] Physical Exam:  
General 
General Appearance- Well nourished adult in no apparent distress who is alert and cooperative. Gait- not assessed. Voice- Normal voice and communication. HEENT Head: Normally developed without evidence of trauma or lesions. Face: 
Skin:  Normal color, texture, and turgor. There are no lesions of concern nor swelling or induration. Lips- normal, well healed left lower lip incision Facial Nerve- Bilateral- function is equal and symmetrical with no deficit. Ear: Auricle- Left- Normal development without sinus pit, cyst or any other lesion. Right- Normal development without sinus pit, cyst or any other lesion Mastoid- Left- No erythema, edema, tenderness, or protrusion of the auricle. Right- No erythema, edema, tenderness, or protrusion of the auricle. Nose: External Nose- Normally developed without lesion. Nasal Mucosa- moist and pink without erythema, edema, or lesions. Oral Cavity/OropharynxDentition- Normal dentition for age witho no evidence of discoloration, inflammation, or infection. Oral Mucosa: moist without erythema or lesions. No exposed bone.  Tongue- no lesions or palpable masses. Floor of mouth- soft without palpable masses or mucosal lesion. Palate and uvula- Palate is without cleft and the uvula is not bifid. Soft palate elevates symmetrically. Tonsils- Bilateral -Normal in size without exudates or erythema. Salivary Ducts-  Ducts appear to be normal.  Throat: Pharynx- Normal mucosal lining without erythema or mass. Larynx: difficult to examine directly. Neck:-- Trachea- midline with normal laryngeal framework with no crepitus. Salivary Glands- normal to palpation without nodules or tenderness. Thyroid- normal to palpation without mass or irregularity. Lymph Nodes- No palpable adenopathy or masses. Range of Motion- good in all directions, with good anterior-posterior and lateral flexion and rotation. Chest and Lung Exam: Normal respiratory effort with no wheezing, retractions, or rales. No stridor. Cardiovascular: Regular rate and rhythm. Normal peripheral pulses without bruits. Neurologic exam: awake and alert to person. Assessment:  
 
1) leukocytosis 2) supraglottic edema 3) h/o large B cell lymphoma Hospital Problems  Date Reviewed: 9/11/2019 Codes Class Noted POA Epiglottitis ICD-10-CM: J05.10 ICD-9-CM: 464.30  2/6/2020 Unknown Plan:  
 
Labs reviewed. CT reviewed as well, shows mild right vallecula/epiglottic swelling. Could represent early epiglottitis or pharyngitis However CT findings fairly mild and nonspecific. Clinically better today after steroids and abx. Would stop steroids after 24hrs of treatment. If cont to improve, switch to PO abx to complete 10 day course. Will need to be on PO vancomycin as well given prior C diff. Signed By: Gabriela Suarez MD   
 February 6, 2020

## 2020-02-06 NOTE — ED NOTES
Pt's pulse ox decreased below 90, 88-89% on room air. Pt placed on nasal cannula to 2lpm. O2 sat increased to 93%.

## 2020-02-06 NOTE — ED NOTES
Pt resting with HOB elevated. Son at bedside; IVF and IV antbx infusing. Pt/ family updated on plan of care/ transfer to .

## 2020-02-06 NOTE — ED NOTES
TRANSFER - OUT REPORT: 
 
Verbal report given to MS STEFANIE OF [facility]) on Mio Handy  being transferred to [facility] ED(unit) for routine progression of care Report consisted of patients Situation, Background, Assessment and  
Recommendations(SBAR). Information from the following report(s) SBAR, Kardex, ED Summary, [facility] and Recent Results was reviewed with the receiving nurse. Lines:  
Venous Access Device Portacath Upper chest (subclavicular area, right (Active) Peripheral IV 02/05/20 Left Antecubital (Active) Site Assessment Clean, dry, & intact 2/5/2020  5:16 PM  
Phlebitis Assessment 0 2/5/2020  5:16 PM  
Infiltration Assessment 0 2/5/2020  5:16 PM  
Dressing Status Clean, dry, & intact 2/5/2020  5:16 PM  
Dressing Type Transparent 2/5/2020  5:16 PM  
Hub Color/Line Status Blue 2/5/2020  5:16 PM  
Action Taken Blood drawn 2/5/2020  5:16 PM  
  
 
Opportunity for questions and clarification was provided.    
 
Patient transported with: 
 Registered Nurse and [address] [place] [address] Team

## 2020-02-06 NOTE — PROGRESS NOTES
Patient seen and examined today. Patient is very hard of hearing-difficult to obtain history. Patient states that he had some sore throat but also mentions that he had trouble swallowing for few months. Per chart review patient was brought to the hospital because of change in voice and sore throat-CT neck showed early changes of epiglottitis. On exam- 
Elderly patient, hard of hearing. Clear to auscultation bilaterally. Poor dentition. #Epiglottitis-he is currently on vancomycin and ceftriaxone. Patient had history of recurrent C. 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 Will add oral vancomycin while he is on antibiotics. Will decrease the dose of Decadron to 4 mg every 8 hours. ENT consulted. Swallow eval and will advance diet as tolerated. #Insulin-dependent diabetes-patient could not tell me the dose of insulin he takes at home. Will start 7 units of NPH twice daily and continue sliding scale. Will adjust the dose of insulin once this patient starts eating a diet. #Patient has history of osteonecrosis of the mandible for which she received treatment in the past at [address]. #B-cell lymphoma-he is not on any treatment for the last 2 years per chart review. He follows with oncologist as outpatient. There were some areas of sclerosis in the spine based on CT scan. He will need outpatient follow-up. #Dense calcification at the common carotid bifurcation on CT scan-ordered duplex carotid for further evaluation.

## 2020-02-06 NOTE — PROGRESS NOTES
Pharmacist Note - Vancomycin Dosing Consult provided for this 80 y.o. male for indication of epiglotittis. Antibiotic regimen(s): Vancomycin and ceftriaxone Patient on vancomycin PTA? NO Recent Labs 20 
1715 WBC 16.2*  
CREA 1.27  
BUN 21* Frequency of BMP: daily Height: 172.7 cm Weight: 68 kg Est CrCl: 35 ml/min Temp (24hrs), Av.2 °F (36.8 °C), Min:98 °F (36.7 °C), Max:98.3 °F (36.8 °C) Cultures: none Goal trough = 10 - 15 mcg/mL Therapy will be initiated with a loading dose of 1000 mg IV x 1 to be followed by a maintenance dose of 1000 mg IV every 24 hours. Pharmacy to follow patient daily and order levels / make dose adjustments as appropriate.

## 2020-02-07 NOTE — PROGRESS NOTES
Problem: Falls - Risk of 
Goal: *Absence of Falls Description Document Ana Lilia Hopper Fall Risk and appropriate interventions in the flowsheet. Outcome: Progressing Towards Goal 
Note: Fall Risk Interventions: 
Mobility Interventions: Communicate number of staff needed for ambulation/transfer, Bed/chair exit alarm, Patient to call before getting OOB Mentation Interventions: Bed/chair exit alarm, Door open when patient unattended, More frequent rounding, Reorient patient, Increase mobility Medication Interventions: Patient to call before getting OOB, Bed/chair exit alarm Elimination Interventions: Call light in reach, Bed/chair exit alarm, Patient to call for help with toileting needs History of Falls Interventions: Door open when patient unattended, Bed/chair exit alarm Problem: Pressure Injury - Risk of 
Goal: *Prevention of pressure injury Description Document Bryan Scale and appropriate interventions in the flowsheet. Outcome: Progressing Towards Goal 
Note: Pressure Injury Interventions: 
Sensory Interventions: Assess changes in LOC, Check visual cues for pain, Float heels, Keep linens dry and wrinkle-free, Maintain/enhance activity level, Minimize linen layers Moisture Interventions: Absorbent underpads, Check for incontinence Q2 hours and as needed, Limit adult briefs, Minimize layers Activity Interventions: Increase time out of bed Mobility Interventions: Pressure redistribution bed/mattress (bed type) Nutrition Interventions: Document food/fluid/supplement intake, Offer support with meals,snacks and hydration Friction and Shear Interventions: Lift sheet, Minimize layers Problem: Patient Education: Go to Patient Education Activity Goal: Patient/Family Education Outcome: Progressing Towards Goal 
  
1556: Discharge paperwork, medication list, new prescriptions, and follow up appointments reviewed with patient and patient's daughter.  Both verbalized understanding. IV removed. Opportunity for questions allowed.

## 2020-02-07 NOTE — PROGRESS NOTES
SPEECH LANGUAGE PATHOLOGY DYSPHAGIA TREATMENT/DISCHARGE Patient: Dany Spann (1 y.o. male) Date: 2/7/2020 Diagnosis: Epiglottitis [J05.10] <principal problem not specified> Precautions: n/a ASSESSMENT: 
Pt tolerating his baseline diet of regular diet/nectar-thick liquids without overt difficulty or overt s/s of aspiration. Pt did note to have wet vocal quality upon entrance, however, this could be 2/2 unthickened water found in patient's room. Given that this is patient's baseline diet and pt does not have interest in trialing thin liquids, no further acute SLP needs anticipated. Will sign-off. Please reconsult should SLP be of any assistance. PLAN: 
--regular diet/nectar-thick liquids 
--small sips/bites 
--alternate liquids/solids 
--fully upright during meals Patient will be discharged from acute skilled speech therapy at this time. Rationale for discharge: 
Goals achieved Discharge Recommendations:  None SUBJECTIVE:  
Patient stated, \"I want to sit up to eat. OBJECTIVE:  
Cognitive and Communication Status: 
Neurologic State: Alert Orientation Level: Oriented X4 Cognition: Follows commands, Poor safety awareness Perception: Appears intact Perseveration: No perseveration noted Safety/Judgement: Decreased awareness of need for assistance, Decreased awareness of need for safety Dysphagia Treatment: 
Oral Assessment: 
Oral Assessment Labial: No impairment Dentition: Natural;Other (comment) Oral Hygiene: oral mucosa moist and clear of secretions P.O. Trials: 
Patient Position: upright in chair Vocal quality prior to P.O.: Wet(suspect 2/2 to unthickened water in room) Consistency Presented: Solid; Nectar thick liquid How Presented: Self-fed/presented;Cup/sip;Spoon;Straw Bolus Acceptance: No impairment Bolus Formation/Control: No impairment Propulsion: No impairment Oral Residue: None Initiation of Swallow: No impairment Laryngeal Elevation: Functional 
Aspiration Signs/Symptoms: None Pharyngeal Phase Characteristics: Double swallow; Suspected pharyngeal residue Oral Phase Severity: No impairment Pharyngeal Phase Severity : Mild-moderate NOMS:  
The NOMS functional outcome measure was used to quantify this patient's level of swallowing impairment. Based on the NOMS, the patient was determined to be at level 4 for swallow function NOMS Swallowing Levels: 
Level 1 (CN): NPO Level 2 (CM): NPO but takes consistency in therapy Level 3 (CL): Takes less than 50% of nutrition p.o. and continues with nonoral feedings; and/or safe with mod cues; and/or max diet restriction Level 4 (CK): Safe swallow but needs mod cues; and/or mod diet restriction; and/or still requires some nonoral feeding/supplements Level 5 (CJ): Safe swallow with min diet restriction; and/or needs min cues Level 6 (CI): Independent with p.o.; rare cues; usually self cues; may need to avoid some foods or needs extra time Level 7 (CH): Independent for all p.o. MORGAN (2003). National Outcomes Measurement System (NOMS): Adult Speech-Language Pathology User's Guide. Pain: 
Pain Scale 1: Numeric (0 - 10) Pain Intensity 1: 0 After treatment:  
Call bell within reach and Nursing notified COMMUNICATION/EDUCATION:  
 
The patient's plan of care including recommendations, planned interventions, and recommended diet changes were discussed with: Registered Nurse. Jeremie Jerome SLP Time Calculation: 12 mins

## 2020-02-07 NOTE — PROGRESS NOTES
Bedside shift change report given to Judy Barakat (oncoming nurse) by Wei Gu RN (offgoing nurse). Report included the following information SBAR, Kardex, ED Summary, Intake/Output, MAR, Recent Results and Cardiac Rhythm NSR. Problem: Falls - Risk of 
Goal: *Absence of Falls Description Document Nati Carranza Fall Risk and appropriate interventions in the flowsheet. Outcome: Progressing Towards Goal 
Note: Fall Risk Interventions: 
Mobility Interventions: Communicate number of staff needed for ambulation/transfer, Patient to call before getting OOB Medication Interventions: Patient to call before getting OOB Elimination Interventions: Call light in reach, Urinal in reach, Patient to call for help with toileting needs History of Falls Interventions: Door open when patient unattended, Room close to nurse's station, Vital signs minimum Q4HRs X 24 hrs (comment for end date) Problem: Patient Education: Go to Patient Education Activity Goal: Patient/Family Education Outcome: Progressing Towards Goal 
Note:  
Pt uses nectar thickening in liquids Problem: Pressure Injury - Risk of 
Goal: *Prevention of pressure injury Description Document Bryan Scale and appropriate interventions in the flowsheet. Outcome: Progressing Towards Goal 
Note: Pressure Injury Interventions: 
Sensory Interventions: Float heels, Keep linens dry and wrinkle-free, Minimize linen layers Moisture Interventions: Absorbent underpads, Check for incontinence Q2 hours and as needed Activity Interventions: Increase time out of bed Mobility Interventions: Float heels, HOB 30 degrees or less Nutrition Interventions: Document food/fluid/supplement intake Friction and Shear Interventions: HOB 30 degrees or less

## 2020-02-07 NOTE — PROGRESS NOTES
Hospital follow-up PCP transitional care appointment has been scheduled with Dr. Minnie Luke for Thursday, 2/13/20 at 2:30 p.m. Pending patient discharge.   Flora Marin, Care Management Specialist.

## 2020-02-07 NOTE — PROGRESS NOTES
Pharmacy Renal Dosing Note - Famotidine Famotidine 20 mg every 12 hours has been changed to every 24 hours for CrCl <50 mL/min per [facility] P&T approved renal dosing protocol. Pharmacy will monitor daily and will make additional adjustments as necessary for changes in renal function.

## 2020-02-07 NOTE — PROGRESS NOTES
TRANSFER - IN REPORT: 
 
Verbal report received from [place], [facility] (name) on Aury Ta  being received from ED(unit) for routine progression of care Report consisted of patients Situation, Background, Assessment and  
Recommendations(SBAR). Information from the following report(s) SBAR, Kardex, ED Summary, [facility] and Recent Results was reviewed with the receiving nurse. Opportunity for questions and clarification was provided. Assessment completed upon patients arrival to unit and care assumed. Primary Nurse Marline Bull RN and Niels Batres RN performed a dual skin assessment on this patient No impairment noted Bryan score is 18

## 2020-02-07 NOTE — PROGRESS NOTES
SLP Contact Note SLP treatment complete. Pt tolerating his baseline diet of regular diet and nectar-thick liquids. No wishes to pursue thin liquids despite thin water being noted at bedside. Therefore, no further SLP needs. Full note to follow. Thank you, Jesus Muñoz M.Ed, CCC-SLP Speech-Language Pathologist

## 2020-02-07 NOTE — PROGRESS NOTES
Admitted from [facility][note 3]. He resides in independent. RUR Score:  17% Do you (patient/family) have any concerns for transition/discharge? None Plan for utilizing home health:   No needs identified at this time. Current Advanced Directive/Advance Care Plan:  Has AMD and DDNR copy on chart Transition of Care Plan: Anticipate that he will discharge back to home once cleared. Seen by SLP and they have cleared him. Possible discharge today per attending Care Management Interventions PCP Verified by CM: Yes(Dr Jose Armando Jimenez) Palliative Care Criteria Met (RRAT>21 & CHF Dx)?: No 
Mode of Transport at Discharge: (private car) Speech Therapy Consult: Yes Current Support Network: (Living in independent living senior place) Confirm Follow Up Transport: Family The Patient and/or Patient Representative was Provided with a Choice of Provider and Agrees with the Discharge Plan?: (daughter Mode Pineda) [address] Provided?: No 
 
Kay Huynh RN CRM Ext [id number] Observation notice provided in writing to patient and/or caregiver as well as verbal explanation of the policy. Patients who are in outpatient status also receive the Observation notice. Medicare pt has received, reviewed, and signed 2nd IM letter informing them of their right to appeal the discharge. Signed copied has been placed on pt bedside chart.

## 2020-02-10 NOTE — DISCHARGE SUMMARY
Discharge Summary PATIENT ID: Mariama Lee MRN: [id number] YOB: 1927 DATE OF ADMISSION: 2/5/2020 11:22 PM   
DATE OF DISCHARGE: 2/7/2020 PRIMARY CARE PROVIDER: Areli Villegas MD  
 
ATTENDING PHYSICIAN: Nelson Pinto MD 
 
DISCHARGING PROVIDER: Nelson Pinto MD   
To contact this individual call [phone] and ask the  to page. If unavailable ask to be transferred the Adult Hospitalist Department. CONSULTATIONS: IP CONSULT TO OTOLARYNGOLOGY PROCEDURES/SURGERIES: * No surgery found * [id number] [facility] COURSE:  
 
DISCHARGE DIAGNOSES / PLAN:   
 
55-year-old male with medical history significant for A. fib, type 2 diabetes, hypertension, hyperlipidemia and large B cell lymphoma on remission who is transferred from [address] [place] [address] on account of CT finding revealing thickening of epiglottitis and aryepiglottic folds and was concerning early epiglottitis/supraglottitis Patient seen and examined on the day of discharge. Asstmptomatic and hemodynamically stable. #Epiglottitis-he received vancomycin and ceftriaxone. Patient had history of recurrent C. 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 On  oral vancomycin while he is on antibiotics. Discussed with Dr. Elaine Mcrae, patient's gastroenterologist. Patient discharged on oral augmentin. He received 24 hrs of sterodis   Swallow eval and was placed on diet. #Insulin-dependent diabetes- resume home dose insulin at discharge #Patient has history of osteonecrosis of the mandible for which she received treatment in the past at [facility]. #B-cell lymphoma-he is not on any treatment for the last 2 years per chart review. He follows with oncologist as outpatient. There were some areas of sclerosis in the spine based on CT scan. He will need outpatient follow-up. This was discussed with 's NP. #Dense calcification at the common carotid bifurcation on CT scan-carotid duplex findings as below PENDING TEST RESULTS:  
At the time of discharge the following test results are still pending: none Xr Chest Pa Lat Result Date: 2/5/2020 EXAM:  XR CHEST PA LAT INDICATION:   Cough, dyspnea COMPARISON: 2019. FINDINGS: PA and lateral radiographs of the chest demonstrate Port-A-Cath tip to overlie SVC right atrial junction. There is slight elevation right hemidiaphragm with slight discoid atelectasis right base. Left lung is clear. There is mild cardiomegaly. Aorta is ectatic. Neptali Reasoner Kyphoplasty changes with severe compression deformity L1 is noted. Kyphoplasty changes noted L2. Neptali Reasoner IMPRESSION: There is slight elevation right hemidiaphragm. There is slight discoid atelectasis right base. No definite acute abnormality identified. Ct Neck Soft Tissue W Cont Result Date: 2/5/2020 EXAM: CT NECK SOFT TISSUE W CONT INDICATION: Right jaw swelling, difficulty speaking COMPARISON: None. CONTRAST: 100 mL of Isovue-300. TECHNIQUE: Multislice helical CT was performed from the mid calvarium to the aortic arch during uneventful rapid bolus intravenous contrast administration. Contiguous 2.5 mm axial images were reconstructed and lung and soft tissue windows were generated. Coronal and sagittal reformations were generated. CT dose reduction was achieved through use of a standardized protocol tailored for this examination and automatic exposure control for dose modulation. FINDINGS: No definite adenopathy identified. There is edema in the vallecula which extends into the epiglottis and aryepiglottic fold ureter slightly thickened. The airway appears patent. . There is extensive opacification common carotid bifurcations and correlation with Doppler ultrasound would be helpful. . The thyroid gland, submandibular salivary glands and parotid glands are normal. No nasopharyngeal mass is identified. There is question of focal soft tissue thickening floor the mouth. ..  No abnormalities are identified in the visualized portions of the brain or orbits. There is slight mucosal thickening left maxillary sinus. . The visualized portions of the lung apices are clear. There are areas of sclerosis in C2 vertebral body, C3 vertebral body, C7 vertebral body, T2 vertebral body and T4 vertebral body. There is sclerosis in the right mandible. There are periapical lucencies around the left maxillary molar teeth. IMPRESSION: 1. There is mild amount of edema in the vallecula which extends into the epiglottis and aryepiglottic folds which are slightly thickened may represent early changes of epiglottitis. 2. There is sclerosis in the right mandible. There are also areas of sclerosis in the spine as described above. The sclerosis in the mandible could be related to metastatic disease or osteonecrosis. The areas of sclerosis in the spine may represent bony metastatic disease and correlation would be helpful. 3. There is dense calcification at the common carotid bifurcations and correlation with ultrasound would be helpful. Ct Spine Lumb Wo Cont Result Date: 1/3/2020 EXAM:  CT LUMBAR SPINE WITHOUT  CONTRAST INDICATION: trauma 10 days ago. Low back and buttock pain after fall. COMPARISON: None. CONTRAST: None. TECHNIQUE: Multislice helical CT of the lumbar spine was performed without intravenous contrast administration. Coronal and sagittal reconstructions were generated. CT dose reduction was achieved through use of a standardized protocol tailored for this examination and automatic exposure control for dose modulation. Adaptive statistical iterative reconstruction (ASIR) was utilized. FINDINGS: There is a treated severe L1 compression deformity. Kyphoplasty cement extends into both pedicles. Lower thoracic spine: The spinal canal and neural foramina are widely patent. L1-L2: No significant spinal canal stenosis. There is moderate right-sided neural foraminal stenosis (axial image 32).  L2-L3: No significant spinal canal stenosis. There is a mild disc bulge with mild bilateral neural foraminal stenosis (series 3, image 43). L3-L4: No significant spinal canal  stenosis. There is a mild disc bulge with mild bilateral neural foraminal stenosis (series 3, image 59). L4-L5: There is a broad disc bulge, asymmetric to the right. This examination with the facet arthropathy and ligamentum flavum hypertrophy results in mild central canal stenosis. The central canal measures 8.5 mm anterior to posterior. There is moderate right and severe left neural foraminal stenosis (series 3, image 70). L5-S1: No significant spinal canal or neural foraminal stenosis. A left-sided laminectomy is present. There is mild right-sided neural foraminal stenosis (axial image 78). A mixed sclerotic lucent 4.4 x 1.4 cm osseous defect is noted in the left iliac crest (series 3, image 92). IMPRESSION: Multifactorial central canal stenosis L4-5. Moderate right and severe left L4-5 neural foraminal stenosis. Moderate right L1-2 neural foraminal stenosis. Left iliac crest defect may be related to bone graft donor site. Please correlate with surgical history. FOLLOW UP APPOINTMENTS:   
Follow-up Information Follow up With Specialties Details Why Contact Info Efraín Arcos MD Pediatrics, Family Practice On 2/13/2020 Hospital f/u PCP appointment Thursday, 2/13/20 @ 2:30 p.m.  [address] [place] [address] 
[phone] Eli Bill MD Otolaryngology  As needed Onel Francis [address] [place] [address] 
[phone] ADDITIONAL CARE RECOMMENDATIONS:  
-Take augmentin and oral vancomycin for 5 days. We prescribed oral vancomycin as you have history of C diff infection. 
-check blood sugars three times daily 
-follow up with your PCP 
-Follow up with your cardiologist to discuss about the carotid artery ultrasound results.  
 
 
 
 
DISCHARGE MEDICATIONS: 
Discharge Medication List as of 2/7/2020  3:40 PM  
  
 START taking these medications Details  
amoxicillin-clavulanate (AUGMENTIN) 875-125 mg per tablet Take 1 Tab by mouth two (2) times a day for 5 days. , Print, Disp-10 Tab, R-0  
  
vancomycin (FIRVANQ) 50 mg/mL oral solution Take 2.5 mL by mouth every six (6) hours for 5 days. , Print, Disp-50 mL, R-0 Saccharomyces boulardii (FLORASTOR) 250 mg capsule Take 1 Cap by mouth two (2) times a day for 20 days. , No Print, Disp-20 Cap, R-0  
  
  
CONTINUE these medications which have NOT CHANGED Details  
omeprazole (PRILOSEC) 20 mg capsule Take 20 mg by mouth daily. , Historical Med  
  
metoprolol tartrate (LOPRESSOR) 25 mg tablet Take 1 Tab by mouth two (2) times a day., Normal, Disp-90 Tab, R-3  
  
dulaglutide (TRULICITY SC) 4.45 mg by SubCUTAneous route every seven (7) days. , Historical Med  
  
acetaminophen (TYLENOL) 500 mg tablet Take 500-1,000 mg by mouth nightly as needed for Pain., Historical Med  
  
insulin NPH hum/reg insulin hm (HUMULIN 70/30 U-100 INSULIN SC) 18 Units by SubCUTAneous route daily. , Historical Med  
  
aspirin 81 mg chewable tablet Take 1 Tab by mouth daily. , Normal, Disp-81 Tab, R-30  
  
ferrous sulfate 325 mg (65 mg iron) tablet Take 325 mg by mouth two (2) times a day., Historical Med  
  
b complex vitamins (B COMPLEX 1) tablet Take 1 Tab by mouth daily. , Historical Med  
  
lutein 20 mg cap Take 20 mg by mouth daily. , Historical Med  
  
cholecalciferol (VITAMIN D3) 1,000 unit tablet Take 1,000 Units by mouth daily. , Historical Med  
  
mirabegron ER (MYRBETRIQ) 50 mg ER tablet Take 50 mg by mouth every fourty-eight (48) hours. , Historical Med NOTIFY YOUR PHYSICIAN FOR ANY OF THE FOLLOWING:  
Fever over 101 degrees for 24 hours. Chest pain, shortness of breath, fever, chills, nausea, vomiting, diarrhea, change in mentation, falling, weakness, bleeding. Severe pain or pain not relieved by medications. Or, any other signs or symptoms that you may have questions about. DISPOSITION: 
  Home With: 
 OT  PT  HH  RN  
  
 Long term SNF/Inpatient Rehab X Independent/assisted living Hospice Other:  
 
 
PATIENT CONDITION AT DISCHARGE:  
 
Functional status Poor Deconditioned X Independent Cognition Bettey Cane X Forgetful Dementia Catheters/lines (plus indication) Lackey PICC   
 PEG   
X None Code status X Full code DNR   
 
PHYSICAL EXAMINATION AT DISCHARGE: 
General:          elderly patient. HEENT:           Atraumatic, anicteric sclerae, EOMI,hard of hearing Lungs:             Clear to auscultation bilaterally. Heart:              Regular  rhythm, normal rate, No  murmur Abdomen:        Soft, non-tender. Not distended. Bowel sounds normal 
Extremities:     No LE edema Skin:                no rash or ulcers Psych:             Not anxious or agitated. Neurologic:      Alert,awake, hard of hearing -makes it difficult to answer questions,moves all extemities CHRONIC MEDICAL DIAGNOSES: 
Problem List as of 2/7/2020 Date Reviewed: 9/11/2019 Codes Class Noted - Resolved Epiglottitis ICD-10-CM: J05.10 ICD-9-CM: 464.30  2/6/2020 - Present HAP (hospital-acquired pneumonia) ICD-10-CM: J18.9, Y95 
ICD-9-CM: 266  3/24/2019 - Present Essential hypertension ICD-10-CM: I10 
ICD-9-CM: 401.9  3/24/2019 - Present Acute kidney injury ([facility] [address] ICD-10-CM: N17.9 ICD-9-CM: 584.9  3/24/2019 - Present Chronic anemia ICD-10-CM: D64.9 ICD-9-CM: 285.9  3/24/2019 - Present Dyslipidemia ICD-10-CM: E78.5 ICD-9-CM: 272.4  3/24/2019 - Present Atrial fibrillation with rapid ventricular response (HCC) ICD-10-CM: I48.91 
ICD-9-CM: 427.31  3/22/2019 - Present Septic shock (HCC) ICD-10-CM: A41.9, R65.21 ICD-9-CM: 038.9, 785.52, 995.92  3/22/2019 - Present Hypotension ICD-10-CM: I95.9 ICD-9-CM: 458.9  12/15/2018 - Present C. difficile diarrhea ICD-10-CM: A04.72 
ICD-9-CM: 008.45  11/13/2018 - Present Non Hodgkin's lymphoma ([facility] ICD-10-CM: C85.90 ICD-9-CM: 202.80  11/12/2018 - Present History of Clostridium difficile colitis ICD-10-CM: Z86.19 ICD-9-CM: V12.79  11/10/2018 - Present DM (diabetes mellitus) ([facility] ICD-10-CM: E11.9 ICD-9-CM: 250.00  11/10/2018 - Present Premature atrial complexes ICD-10-CM: I49.1 ICD-9-CM: 427.61  11/10/2018 - Present RESOLVED: Bandemia ICD-10-CM: G1296151 ICD-9-CM: 288.66  11/10/2018 - 11/12/2018 RESOLVED: Lactic acidosis ICD-10-CM: E87.2 ICD-9-CM: 276.2  11/10/2018 - 11/12/2018  
   
  
 
 
35 minutes were spent with the patient on counseling and coordination of care Signed: Jose L Owens MD 
2/9/2020 
8:36 PM

## 2020-02-10 NOTE — PROGRESS NOTES
Hospital Discharge Follow-Up Date/Time:  2/10/2020 3:43 PM 
Karthik Powell, Care Transition Nurse [facility] Coordination Team 
Direct phone:  [phone] Patient was admitted to [facility] on 20 and discharged on 20 for supraglottic edema, leukocytosis. The physician discharge summary was not available at the time of outreach. Patient was contacted within one business days of discharge. Top Challenges reviewed with the provider PCP- received IV steroids and ABX during hospital stay- discharged on two oral abx including vancomycin to cover for PMH of C-Diff. WBC on - 16.2, 27- 25.1 Advance Care Planning:  
Does patient have an Advance Directive:  reviewed and current - dated 2005, has DDNR in place dated 2018. Method of communication with provider :staff message Was this a readmission? no  
 
Care Transition Nurse (CTN) contacted the patient by telephone to perform post hospital discharge assessment. Verified name and  with daughterFrandy  as identifiers. Provided introduction to self, and explanation of the CTN role. Daughter received hospital discharge instructions. CTN reviewed discharge instructions and red flags with daughter who verbalized understanding. Daughter given an opportunity to ask questions and does not have any further questions or concerns at this time. The daughter agrees to contact the PCP office for questions related to their healthcare. CTN provided contact information for future reference. Disease Specific:   N/A Patients top risk factors for readmission:  daughter states that he wears Bilateral Hearing aids- does not hear well enough to talk over the phone. CTN was not able to talk with him. Home Health orders at discharge: none Durable Medical Equipment ordered at discharge: none Medication(s):  
New Medications at Discharge: · amoxicillin-clavulanate 875-125 mg- 1 tablet BID for 5 days. · Vancomycin 50 mg oral soln- take 2.5 ml Q6H for 5 days. · florastor 250 mg cap- 1 cap BID for 20 days. Changed Medications at Discharge: none Discontinued Medications at Discharge: none Medication reconciliation was performed with daughter, who verbalizes understanding of administration of home medications. There were no barriers to obtaining medications identified at this time. Referral to Pharm D needed: no  
 
Current Outpatient Medications Medication Sig  
 amoxicillin-clavulanate (AUGMENTIN) 875-125 mg per tablet Take 1 Tab by mouth two (2) times a day for 5 days.  vancomycin (FIRVANQ) 50 mg/mL oral solution Take 2.5 mL by mouth every six (6) hours for 5 days.  Saccharomyces boulardii (FLORASTOR) 250 mg capsule Take 1 Cap by mouth two (2) times a day for 20 days.  omeprazole (PRILOSEC) 20 mg capsule Take 20 mg by mouth daily.  metoprolol tartrate (LOPRESSOR) 25 mg tablet Take 1 Tab by mouth two (2) times a day.  dulaglutide (TRULICITY SC) 3.63 mg by SubCUTAneous route every seven (7) days.  acetaminophen (TYLENOL) 500 mg tablet Take 500-1,000 mg by mouth nightly as needed for Pain.  insulin NPH hum/reg insulin hm (HUMULIN 70/30 U-100 INSULIN SC) 18 Units by SubCUTAneous route daily.  aspirin 81 mg chewable tablet Take 1 Tab by mouth daily.  ferrous sulfate 325 mg (65 mg iron) tablet Take 325 mg by mouth two (2) times a day.  b complex vitamins (B COMPLEX 1) tablet Take 1 Tab by mouth daily.  lutein 20 mg cap Take 20 mg by mouth daily.  cholecalciferol (VITAMIN D3) 1,000 unit tablet Take 1,000 Units by mouth daily.  mirabegron ER (MYRBETRIQ) 50 mg ER tablet Take 50 mg by mouth every fourty-eight (48) hours. No current facility-administered medications for this visit. There are no discontinued medications. [facility] [place](s):  
Future Appointments Date Time Provider Irma Hong 2/12/2020  9:00 AM Teresa Alanis  [address]  
2/13/2020  2:30 PM Gabe Castanon MD [address]  
3/10/2020 11:30 AM [facility] INF7 CH2 <1H [facility]  
3/18/2020 11:15 AM Mara Umana NP [facility] SCHED  
10/12/2020 10:40 AM Chey De Jesus DO [facility] follow up appointment(s): [facility]:  n/a  
 
 
Goals Post Hospitalization  Understands red flags post discharge. 2/20/20- spoke with daughter, Shantell Hawkins- she states that they have been checking in on him over the weekend- with no complaints-concerns. He is at dermatology appointment at this time with her , Laura Bansal. Reports no pain, eating well. No reports of diarrhea. He has a chart to check off when he is taking his meds- especially antibiotics-  will be checking chart today to see if there are any concerns. Confirms that he does not need ENT follow up.   Has PCP appointment for this Thursday, 2/13 at 2:30 pm.      [facility]

## 2020-02-11 NOTE — DISCHARGE INSTRUCTIONS
Discharge Instructions       PATIENT ID: Ramez Penn  MRN: [id number]   YOB: 1927    DATE OF ADMISSION: 2/5/2020 11:22 PM    DATE OF DISCHARGE: 2/7/2020    PRIMARY CARE PROVIDER: Artemio March MD     ATTENDING PHYSICIAN: Henrietta Grullon MD  DISCHARGING PROVIDER: Jere Libman, MD    To contact this individual call [phone] and ask the  to page. If unavailable ask to be transferred the Adult Hospitalist Department. DISCHARGE DIAGNOSES - epiglottis    CONSULTATIONS: IP CONSULT TO OTOLARYNGOLOGY    PROCEDURES/SURGERIES: * No surgery found *    PENDING TEST RESULTS:   At the time of discharge the following test results are still pending: none    FOLLOW UP APPOINTMENTS:   Follow-up Information     Follow up With Specialties Details Why Contact Info    Artemio March MD Pediatrics, Family Practice In 1 week  [address]  [address] [facility]  [phone]      Sai Whelan MD Otolaryngology  As needed [phone]             ADDITIONAL CARE RECOMMENDATIONS:   -Take augmentin and oral vancomycin for 5 days. We prescribed oral vancomycin as you have history of C diff infection.  -check blood sugars three times daily  -follow up with your PCP  -Follow up with your cardiologist to discuss about the carotid artery ultrasound results. Xr Chest Pa Lat    Result Date: 2/5/2020  IMPRESSION: There is slight elevation right hemidiaphragm. There is slight discoid atelectasis right base. No definite acute abnormality identified. Ct Neck Soft Tissue W Cont    Result Date: 2/5/2020  IMPRESSION: 1. There is mild amount of edema in the vallecula which extends into the epiglottis and aryepiglottic folds which are slightly thickened may represent early changes of epiglottitis. 2. There is sclerosis in the right mandible. There are also areas of sclerosis in the spine as described above.  The sclerosis in the mandible could be related to metastatic disease or osteonecrosis. The areas of sclerosis in the spine may represent bony metastatic disease and correlation would be helpful. 3. There is dense calcification at the common carotid bifurcations and correlation with ultrasound would be helpful. · Moderate right internal carotid artery stenosis (upper portion of the 50 to 69 percent range). · Mild, less than 50 percent stenosis of the left internal carotid artery. · Partially reversed flow in the right vertebral artery with hemodynamically significant stenosis of the proximal right subclavian artery. DIET: Regular Diet/ thick liquids    ACTIVITY: regular    WOUND CARE: na    EQUIPMENT needed: na      DISCHARGE MEDICATIONS:   See Medication Reconciliation Form    · It is important that you take the medication exactly as they are prescribed. · Keep your medication in the bottles provided by the pharmacist and keep a list of the medication names, dosages, and times to be taken in your wallet. · Do not take other medications without consulting your doctor. NOTIFY YOUR PHYSICIAN FOR ANY OF THE FOLLOWING:   Fever over 101 degrees for 24 hours. Chest pain, shortness of breath, fever, chills, nausea, vomiting, diarrhea, change in mentation, falling, weakness, bleeding. Severe pain or pain not relieved by medications. Or, any other signs or symptoms that you may have questions about.       DISPOSITION:  X  Home With:   OT  PT  MING  RN       SNF/Inpatient Rehab/LTAC    Independent/assisted living    Hospice    Other:         Signed:   Latasha Montero MD  2/7/2020  3:27 PM

## 2020-02-12 NOTE — PROGRESS NOTES
Gabino Favre is a 80 y.o. male Follow up for the Evaluation for Lymphoma. 1. Have you been to the ER, urgent care clinic since your last visit? Hospitalized since your last visit? Yes.  
 
2. Have you seen or consulted any other health care providers outside of the [address] [place] [address] since your last visit? Include any pap smears or colon screening.  No

## 2020-02-12 NOTE — PROGRESS NOTES
[facility] [place] at [address], [address] [facility] [address] [facility] W: [phone]  F: [phone] Reason for Visit:  
Mio Handy is a 80 y.o. 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 
· Single agent maintenance rituximab 225 mg/m2 q 3 weeks 12/7/16, last dose 5/2018, unclear how many doses in between, had 2 hospitalizations in 2018 (5 total) · Admitted 9/26/18-9/29/18 for sepsis due to dental abscess, related to xgeva/zometa History of Present Illness:  
Admitted from 2/5/20-2/7/20 for throat swelling, treated with steroids and abx. 
  
Hx of DM, HTN, NHL Interval history:  In today for follow up. Complains of gr 1 constipation, gr 1 diarrhea, gr 1 fatigue, gr 1 neuropathy in toes/fingers. Was hospitalized on 2/05/20 for sore throat and swelling of jaw, slurred speech. CT findings of thickening of the epiglottis and aryepiglottic folds. Concern for epiglottitis/supraglottitis. Admission tx consisted of  antibiotics and steroids. Was hospitalized in 12/2018 for C.diff. Past Medical History:  
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  
Procedure Laterality Date  HX HEENT    
 HX ORTHOPAEDIC    
 HX PROSTATECTOMY  HX UROLOGICAL    
 NEUROLOGICAL PROCEDURE UNLISTED Social History Tobacco Use  Smoking status: Former Smoker  Smokeless tobacco: Never Used Substance Use Topics  Alcohol use: No  
  Frequency: Never History reviewed. No pertinent family history. Current Outpatient Medications Medication Sig  
 amoxicillin-clavulanate (AUGMENTIN) 875-125 mg per tablet Take 1 Tab by mouth two (2) times a day for 5 days.  vancomycin (FIRVANQ) 50 mg/mL oral solution Take 2.5 mL by mouth every six (6) hours for 5 days.  Saccharomyces boulardii (FLORASTOR) 250 mg capsule Take 1 Cap by mouth two (2) times a day for 20 days.  omeprazole (PRILOSEC) 20 mg capsule Take 20 mg by mouth daily.  metoprolol tartrate (LOPRESSOR) 25 mg tablet Take 1 Tab by mouth two (2) times a day.  dulaglutide (TRULICITY SC) 5.70 mg by SubCUTAneous route every seven (7) days.  acetaminophen (TYLENOL) 500 mg tablet Take 500-1,000 mg by mouth nightly as needed for Pain.  insulin NPH hum/reg insulin hm (HUMULIN 70/30 U-100 INSULIN SC) 18 Units by SubCUTAneous route daily.  aspirin 81 mg chewable tablet Take 1 Tab by mouth daily.  ferrous sulfate 325 mg (65 mg iron) tablet Take 325 mg by mouth two (2) times a day.  b complex vitamins (B COMPLEX 1) tablet Take 1 Tab by mouth daily.  lutein 20 mg cap Take 20 mg by mouth daily.  cholecalciferol (VITAMIN D3) 1,000 unit tablet Take 1,000 Units by mouth daily.  mirabegron ER (MYRBETRIQ) 50 mg ER tablet Take 50 mg by mouth every fourty-eight (48) hours. No current facility-administered medications for this visit. Allergies Allergen Reactions  Bactrim [Sulfamethoprim] Rash  Sulfamethoxazole Unknown (comments) Review of Systems: A complete review of systems was obtained, negative except as described above. Physical Exam:  
 
Visit Vitals /67 (BP 1 Location: Left arm, BP Patient Position: Sitting) Pulse (!) 102 Temp 97.3 °F (36.3 °C) (Temporal) Resp 16 Ht 5' 8\" (1.727 m) Wt 153 lb 6.4 oz (69.6 kg) SpO2 96% BMI 23.32 kg/m² ECOG PS: 0 General: No distress Eyes: PERRLA, anicteric sclerae HENT: Atraumatic, OP clear Neck: Supple Lymphatic: No cervical, supraclavicular adenopathy Respiratory: CTAB, normal respiratory effort CV: Normal rate, regular rhythm, no murmurs, no peripheral edema GI: Soft, nontender, nondistended, no masses, no hepatomegaly, no splenomegaly; + BS 
MS:  Digits without clubbing or cyanosis. Skin: No rashes, ecchymoses, or petechiae. Normal temperature, turgor, and texture. Psych: Alert, oriented, appropriate affect, normal judgment/insight Results:  
 
Lab Results Component Value Date/Time WBC 25.1 (H) 02/07/2020 03:36 AM  
 HGB 13.2 02/07/2020 03:36 AM  
 HCT 40.0 02/07/2020 03:36 AM  
 PLATELET 885 80/74/3570 03:36 AM  
 MCV 95.5 02/07/2020 03:36 AM  
 ABS. NEUTROPHILS 12.2 (H) 02/05/2020 05:15 PM  
 
Lab Results Component Value Date/Time Sodium 136 02/07/2020 03:36 AM  
 Potassium 4.5 02/07/2020 03:36 AM  
 Chloride 104 02/07/2020 03:36 AM  
 CO2 25 02/07/2020 03:36 AM  
 Glucose 245 (H) 02/07/2020 03:36 AM  
 BUN 26 (H) 02/07/2020 03:36 AM  
 Creatinine 1.07 02/07/2020 03:36 AM  
 GFR est AA >60 02/07/2020 03:36 AM  
 GFR est non-AA >60 02/07/2020 03:36 AM  
 Calcium 9.2 02/07/2020 03:36 AM  
 Glucose (POC) 288 (H) 02/07/2020 03:45 PM  
 
Lab Results Component Value Date/Time Bilirubin, total 0.3 02/05/2020 05:15 PM  
 ALT (SGPT) 24 02/05/2020 05:15 PM  
 AST (SGOT) 13 (L) 02/05/2020 05:15 PM  
 Alk. phosphatase 68 02/05/2020 05:15 PM  
 Protein, total 6.8 02/05/2020 05:15 PM  
 Albumin 3.5 02/05/2020 05:15 PM  
 Globulin 3.3 02/05/2020 05:15 PM  
 
Lab Results Component Value Date/Time Reticulocyte count 0.4 (L) 12/16/2018 02:56 AM  
 Iron % saturation 23 11/22/2019 11:26 AM  
 TIBC 257 11/22/2019 11:26 AM  
 Ferritin 77 11/22/2019 11:26 AM  
 Vitamin B12 581 11/22/2019 11:45 AM  
 Folate 25.0 (H) 11/22/2019 11:45 AM  
  11/22/2019 11:26 AM  
 TSH 1.49 03/23/2019 02:27 AM  
 
Lab Results Component Value Date/Time INR 1.2 (H) 11/12/2018 01:22 AM  
 
2/5/20 CT neck FINDINGS: 
  
No definite adenopathy identified. 
  
There is edema in the vallecula which extends into the epiglottis and 
aryepiglottic fold ureter slightly thickened. The airway appears patent. .  
  
There is extensive opacification common carotid bifurcations and correlation 
with Doppler ultrasound would be helpful. .  
  
The thyroid gland, submandibular salivary glands and parotid glands are normal. 
  
No nasopharyngeal mass is identified. There is question of focal soft tissue 
thickening floor the mouth. .. 
  
No abnormalities are identified in the visualized portions of the brain or 
orbits.  
  
There is slight mucosal thickening left maxillary sinus. . 
  
The visualized portions of the lung apices are clear. There are areas of sclerosis in C2 vertebral body, C3 vertebral body, C7 
vertebral body, T2 vertebral body and T4 vertebral body. 
  
There is sclerosis in the right mandible. 
  
There are periapical lucencies around the left maxillary molar teeth. 
  
  
IMPRESSION IMPRESSION:  
1.  There is mild amount of edema in the vallecula which extends into the 
epiglottis and aryepiglottic folds which are slightly thickened may represent 
early changes of epiglottitis. 
  
 2. There is sclerosis in the right mandible. 
  
There are also areas of sclerosis in the spine as described above. 
  
The sclerosis in the mandible could be related to metastatic disease or 
osteonecrosis. 
  
The areas of sclerosis in the spine may represent bony metastatic disease and 
correlation would be helpful. 
  
3. There is dense calcification at the common carotid bifurcations and 
correlation with ultrasound would be helpful. Records reviewed and summarized above. Pathology report(s) reviewed above. Radiology report(s) reviewed above. Assessment/plan: 1. Diffuse large B-cell lymphoma: In remission. He has had off/on rituximab maintenance for 2 years, unclear of the benefit in relapsed DLBCL, and unclear of benefit after 2 years. discontinued Our approach to patient surveillance is to schedule patient visits every three months during the first year, every three to six months during the second year, and every six months starting two years after complete response. At these visits, we perform a history and physical examination, complete blood count, chemistries, and LDH. There is no role for routine PET or PET/CT imaging in the longitudinal follow-up of asymptomatic patients after response assessment. There are limited data to support performing CT scans at a given interval, and the decision regarding frequency of CT imaging should be individualized to the patient. For most patients, we do not routinely perform surveillance imaging. Discussed with pt and family today the CT bone findings from 2/5/20. The mandible lesion is known, see below. The spine lesions may be recurrent lymphoma. At this time, the patient and family do not want further testing or biopsy as we would not treat without symptoms, and I am in complete agreement. Will move his f/u from march to June and continue the lab schedule as below.  
 
2. Emotional well being:  he has excellent support and is coping well with her disease 3. Port maintenance: port flush q 8 weeks with cbc, cmp, ferritin, iron profile, ldh 4. Osteonecrosis of jaw: Has seen [facility] dent[facility]; no more bone agents, not clear of evidence in the first place for lymphoma 5. Anemia:  Resolved 2/7/20; taking PO iron bid Hgb continues to improve, Hgb 12 on 9/4/19. 6. Thrombocytopenia:  Resolved 2/7/20 
 
7. DM2:  On insulin 8. Throat swelling:  resolved I appreciate the opportunity to participate in Mr. Maura Flores's care. Signed By: Heidy Dixon MD   
 
No orders of the defined types were placed in this encounter.

## 2020-02-19 NOTE — PROGRESS NOTES
Identified pt with two pt identifiers(name and ). Chief Complaint Patient presents with  
[facility] Follow Up  
  -2020  Diarrhea  
  started with medicine from hospital - antibiotics Health Maintenance Due Topic  Foot Exam Q1   
 MICROALBUMIN Q1   
 Eye Exam Retinal or Dilated  DTaP/Tdap/Td series (1 - Tdap)  Shingrix Vaccine Age 50> (1 of 2)  GLAUCOMA SCREENING Q2Y  Medicare Yearly Exam   
 Pneumococcal 65+ years (2 of 2 - PPSV23) Wt Readings from Last 3 Encounters:  
20 148 lb 12.8 oz (67.5 kg) 20 153 lb 6.4 oz (69.6 kg) 20 148 lb 6.4 oz (67.3 kg) Temp Readings from Last 3 Encounters:  
20 98 °F (36.7 °C) (Oral) 20 97.3 °F (36.3 °C) (Temporal) 20 97.3 °F (36.3 °C) BP Readings from Last 3 Encounters:  
20 112/68  
20 132/67  
20 166/63 Pulse Readings from Last 3 Encounters:  
20 69  
20 (!) 102  
20 76 Learning Assessment: 
:  
 
Learning Assessment 2019 PRIMARY LEARNER Patient BARRIERS PRIMARY LEARNER HEARING  
PRIMARY LANGUAGE ENGLISH  
LEARNER PREFERENCE PRIMARY OTHER (COMMENT) ANSWERED BY daughter RELATIONSHIP OTHER Depression Screening: 
:  
 
3 most recent PHQ Screens 2020 Little interest or pleasure in doing things Not at all Feeling down, depressed, irritable, or hopeless Not at all Total Score PHQ 2 0 Fall Risk Assessment: 
:  
 
Fall Risk Assessment, last 12 mths 2020 Able to walk? Yes Fall in past 12 months? Yes Fall with injury? No  
Number of falls in past 12 months 2 Fall Risk Score 2 Abuse Screening: 
:  
 
Abuse Screening Questionnaire 2020 Do you ever feel afraid of your partner? Deepali Hsu Are you in a relationship with someone who physically or mentally threatens you? Deepali Hsu Is it safe for you to go home? Cipriano Overton Coordination of Care Questionnaire: 
:  
 
 1) Have you been to an emergency room, urgent care clinic since your last visit? yes 2/5/2020 [facility] since your last visit? yes  2/5 - 7/2020        
 
2) Have you seen or consulted any other health care providers outside of [facility] since your last visit? yes  Dr Analisa Lyn, Dermatology (Include any pap smears or colon screenings in this section.) 3) Do you have an Advance Directive on file? yes Are you interested in receiving information about Advance Directives? no 
 
Patient is accompanied by son I have received verbal consent from Jorge Adkins to discuss any/all medical information while they are present in the room. Reviewed record in preparation for visit and have obtained necessary documentation. Medication reconciliation up to date and corrected with patient at this time. Results for orders placed or performed in visit on 02/19/20 AMB POC URINE, MICROALBUMIN, SEMIQUANT (3 RESULTS) Result Value Ref Range ALBUMIN, URINE POC 30 Negative mg/L  
 CREATININE, URINE  mg/dL  Microalbumin/creat ratio (POC) <30 <30 MG/G

## 2020-02-19 NOTE — PATIENT INSTRUCTIONS
Dementia: Care Instructions Your Care Instructions Dementia is a loss of mental skills that affects your daily life. It is different than the occasional trouble with memory that is part of aging. You may find it hard to remember things that you feel you should be able to remember. Or you may feel that your mind is just not working as well as usual. 
Finding out that you have dementia is a shock. You may be afraid and worried about how the condition will change your life. Although there is no cure at this time, medicine may slow memory loss and improve thinking for a while. Other medicines may be able to help you sleep or cope with depression and behavior changes. Dementia often gets worse slowly. But it can get worse quickly. As dementia gets worse, it may become harder to do common things that take planning, like making a list and going shopping. Over time, the disease may make it hard for you to take care of yourself. Some people with dementia need others to help care for them. Dementia is different for everyone. You may be able to function well for a long time. In the early stage of the condition, you can do things at home to make life easier and safer. You also can keep doing your hobbies and other activities. Many people find comfort in planning now for their future needs. Follow-up care is a key part of your treatment and safety. Be sure to make and go to all appointments, and call your doctor if you are having problems. It's also a good idea to know your test results and keep a list of the medicines you take. How can you care for yourself at home? · Take your medicines exactly as prescribed. Call your doctor if you think you are having a problem with your medicine. · Eat healthy foods. Eat lots of whole grains, fruits, and vegetables every day. If you are not hungry, try snacks or nutritional drinks such as Boost, Ensure, or Sustacal. 
· If you have problems sleeping: ? Try not to nap too close to your bedtime. ? Exercise regularly. Walking is a good choice. ? Try a glass of warm milk or caffeine-free herbal tea before bed. · Do tasks and activities during the time of day when you feel your best. It may help to develop a daily routine. · Post labels, lists, and sticky notes to help you remember things. Write your activities on a calendar you can easily find. Put your clock where you can easily see it. · Stay active. Take walks in familiar places, or with friends or loved ones. Try to stay active mentally too. Read and work crossword puzzles if you enjoy these activities. · Do not drive unless you can pass an on-road driving test. If you are not sure if you are safe to drive, your state 's license bureau can test you. · Keep a cordless phone and a flashlight with new batteries by your bed. If possible, put a phone in each of the main rooms of your house, or carry a cell phone in case you fall and cannot reach a phone. Or, you can wear a device around your neck or wrist. You push a button that sends a signal for help. Acknowledge your emotions and plan for the future · Talk openly and honestly with your doctor. · Let yourself grieve. It is common to feel angry, scared, frustrated, anxious, or depressed. · Get emotional support from family, friends, a support group, or a counselor experienced in working with people who have dementia. · Ask for help if you need it. · Tell your doctor how you feel. You may feel upset, angry, or worried at times. Many things can cause this, including poor sleep, medicine side effects, confusion, and pain. Your doctor may be able to help you. · Plan for the future. ? Talk to your family and doctor about preparing a living will and other important papers while you can make decisions. These papers tell your doctors how to care for you at the end of your life.  
? Consider naming a person to make decisions about your care if you are not able to. When should you call for help? Call 911 anytime you think you may need emergency care. For example, call if: 
  · You are lost and do not know whom to call.  
  · You are injured and do not know whom to call.  
 Call your doctor now or seek immediate medical care if: 
  · You are more confused or upset than usual.  
  · You feel like you could hurt yourself because your mind is not working well.  
Queen Ousmane closely for changes in your health, and be sure to contact your doctor if you have any problems. Where can you learn more? Go to http://chuy-karlee.info/. Enter L002 in the search box to learn more about \"Dementia: Care Instructions. \" Current as of: May 28, 2019 Content Version: 12.2 © 3560-6233 [employer]. Care instructions adapted under license by [employer] (which disclaims liability or warranty for this information). If you have questions about a medical condition or this instruction, always ask your healthcare professional. [address] any warranty or liability for your use of this information.

## 2020-02-19 NOTE — PROGRESS NOTES
Chief Complaint: 
Chief Complaint Patient presents with  
[facility] Follow Up  
  2/5-7/2020  Diarrhea  
  started with medicine from hospital - antibiotics History of Present Illness: 
80-year-old male with medical history significant for A. fib, type 2 diabetes, hypertension, hyperlipidemia and large B cell lymphoma on remission who is transferred from [facility] on account of CT finding revealing thickening of epiglottitis and aryepiglottic folds and was concerning early epiglottitis/supraglottitis. He was evaluated by ENT in the hospital and treated with antibiotics. Hospital Course: 
Epiglottitis-he received vancomycin and ceftriaxone.  Patient had history of recurrent C. 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  On  oral vancomycin while he is on antibiotics. Discussed with Dr. Jodee Hadley, patient's gastroenterologist. Patient discharged on oral augmentin. He received 24 hrs of sterodis   Swallow eval and was placed on diet. 
  
Insulin-dependent diabetes- resume home dose insulin at discharge 
  
Osteonecrosis of the mandible - Patient has history of osteonecrosis of the mandible for which she received treatment in the past at [facility] dentistry. 
  
B-cell lymphoma-he is not on any treatment for the last 2 years per chart review. [facility] follows with oncologist as outpatient. Mitzi Pale were some areas of sclerosis in the spine based on CT scan. [facility] will need outpatient follow-up. This was discussed with 's NP. 
  
Dense calcification at the common carotid bifurcation on CT scan-carotid duplex findings as below Family concerns sent to me previous to visit:  
Dr. Adela Ortiz on behalf of my father, Louis Orellana was scheduled for a follow up visit with you after discharge from a recent hospital stay treating a throat infection.   We had to cancel the appointment due to a scheduling conflict. Winnie Gibson, some new concerns have arisen related to memory and cognitive function.  I wondered how best to approach this.  We could bring him in for the hospital follow up, and you could address that as well.  Or, would it be helpful for my  and I to provide background by email or phone? We appreciate and look forward to your advice. Today: 
Carly Lott is accompanied by his Son-in-law to the follow up visit today. It appears that Brant Sanders is interested in getting  again to a lady he met at his assisted living facility. Unfortunately, the concern is that  she has significant dementia and has reported on several occasions that he has  or that his children have taken him away and she does not remember what he has told her. Brant Sanders,  as well, has showed some concerns about his memory and cognition as well. Even though, some of the concerns have been brought to his attention, he is still wanting to pursue the marriage. But, just recently verbalized that it might not be a good idea. We did do a mini-mental status exam. He scored a 26/30 on the test today. He had trouble with serial 7's and spelled word WORLD backwards as \"D,L O W\" he missed the R. His sentence and copy of the picture was pretty good. He had 10 angles and 2 did intersect. Reviewed PmHx, RxHx, FmHx, SocHx, AllgHx and updated and dated in the chart. Patient Active Problem List  
 Diagnosis  Epiglottitis  HAP (hospital-acquired pneumonia)  Essential hypertension  Acute kidney injury ([facility]  Chronic anemia  Dyslipidemia  Atrial fibrillation with rapid ventricular response (HCC)  Septic shock ([facility]  Hypotension  C. difficile diarrhea  Non Hodgkin's lymphoma ([facility]  History of Clostridium difficile colitis  DM (diabetes mellitus) ([facility]  Premature atrial complexes Review of Systems - negative except as listed above in the HPI Objective:  
 
Vitals:  
 20 1053 BP: 112/68 Pulse: 69 Resp: 18 Temp: 98 °F (36.7 °C) TempSrc: Oral  
SpO2: 95% Weight: 148 lb 12.8 oz (67.5 kg) Height: 5' 8\" (1.727 m) Physical Examination:  
General appearance - alert, well appearing, and in no distress, improved and chronically ill appearing Mental status - alert, oriented to person, place, and time Chest - clear to auscultation, no wheezes, rales or rhonchi, symmetric air entry Heart - normal rate, regular rhythm, normal S1, S2, no murmurs, rubs, clicks or gallops Neurological - alert, oriented, normal speech, no focal findings or movement disorder noted Musculoskeletal - uses rolling walker to ambulate Extremities - peripheral pulses normal, no pedal edema, no clubbing or cyanosis Skin - normal coloration and turgor, no rashes, no suspicious skin lesions noted Assessment/ Plan:  
Diagnoses and all orders for this visit: 
 
1. Type 2 diabetes mellitus with hyperglycemia, with long-term current use of insulin (HCC) -     AMB POC URINE, MICROALBUMIN, SEMIQUANT (3 RESULTS) - Reviewed his Free style yazmin monitor. BS are high in the evenings >150. He is currently on Lantus 18 U in the day. - Increased Lantus to 20 U. Goal is to keep HBA1C between 7.5-8.0 and avoid any episodes of hypoglycemia. 2. Cognitive changes 
-     REFERRAL TO NEUROLOGY 
-     REFERRAL TO NEUROLOGY 
- 89M with possible dementia onset and cognitive changes. Please evaluate. See note for further description of issues. - Mini-mental status performed and score was 26/30. Needs formal evaluation. 3. Memory changes 
-     REFERRAL TO NEUROLOGY 
-     REFERRAL TO NEUROLOGY Other orders 
-     insulin NPH/insulin regular (HUMULIN 70/30 U-100 INSULIN) 100 unit/mL (70-30) injection; 20 U in the morning. I have discussed the diagnosis with the patient and the intended treatment plan as seen in the above orders. The patient has received an after-visit summary and questions were answered concerning future plans.  Asked to return should symptoms worsen or not improve with treatment. Any pending labs and studies will be relayed to patient when they become available. Pt verbalizes understanding of plan of care and denies further questions or concerns at this time. Follow-up and Dispositions · Return in about 3 months (around 5/19/2020), or if symptoms worsen or fail to improve. Amie Goodman MD 
 
Patient Instructions Dementia: Care Instructions Your Care Instructions Dementia is a loss of mental skills that affects your daily life. It is different than the occasional trouble with memory that is part of aging. You may find it hard to remember things that you feel you should be able to remember. Or you may feel that your mind is just not working as well as usual. 
Finding out that you have dementia is a shock. You may be afraid and worried about how the condition will change your life. Although there is no cure at this time, medicine may slow memory loss and improve thinking for a while. Other medicines may be able to help you sleep or cope with depression and behavior changes. Dementia often gets worse slowly. But it can get worse quickly. As dementia gets worse, it may become harder to do common things that take planning, like making a list and going shopping. Over time, the disease may make it hard for you to take care of yourself. Some people with dementia need others to help care for them. Dementia is different for everyone. You may be able to function well for a long time. In the early stage of the condition, you can do things at home to make life easier and safer. You also can keep doing your hobbies and other activities. Many people find comfort in planning now for their future needs. Follow-up care is a key part of your treatment and safety.  Be sure to make and go to all appointments, and call your doctor if you are having problems. It's also a good idea to know your test results and keep a list of the medicines you take. How can you care for yourself at home? · Take your medicines exactly as prescribed. Call your doctor if you think you are having a problem with your medicine. · Eat healthy foods. Eat lots of whole grains, fruits, and vegetables every day. If you are not hungry, try snacks or nutritional drinks such as Boost, Ensure, or Sustacal. 
· If you have problems sleeping: ? Try not to nap too close to your bedtime. ? Exercise regularly. Walking is a good choice. ? Try a glass of warm milk or caffeine-free herbal tea before bed. · Do tasks and activities during the time of day when you feel your best. It may help to develop a daily routine. · Post labels, lists, and sticky notes to help you remember things. Write your activities on a calendar you can easily find. Put your clock where you can easily see it. · Stay active. Take walks in familiar places, or with friends or loved ones. Try to stay active mentally too. Read and work crossword puzzles if you enjoy these activities. · Do not drive unless you can pass an on-road driving test. If you are not sure if you are safe to drive, your state 's license bureau can test you. · Keep a cordless phone and a flashlight with new batteries by your bed. If possible, put a phone in each of the main rooms of your house, or carry a cell phone in case you fall and cannot reach a phone. Or, you can wear a device around your neck or wrist. You push a button that sends a signal for help. Acknowledge your emotions and plan for the future · Talk openly and honestly with your doctor. · Let yourself grieve. It is common to feel angry, scared, frustrated, anxious, or depressed. · Get emotional support from family, friends, a support group, or a counselor experienced in working with people who have dementia. · Ask for help if you need it. · Tell your doctor how you feel. You may feel upset, angry, or worried at times. Many things can cause this, including poor sleep, medicine side effects, confusion, and pain. Your doctor may be able to help you. · Plan for the future. ? Talk to your family and doctor about preparing a living will and other important papers while you can make decisions. These papers tell your doctors how to care for you at the end of your life. ? Consider naming a person to make decisions about your care if you are not able to. When should you call for help? Call 911 anytime you think you may need emergency care. For example, call if: 
  · You are lost and do not know whom to call.  
  · You are injured and do not know whom to call.  
 Call your doctor now or seek immediate medical care if: 
  · You are more confused or upset than usual.  
  · You feel like you could hurt yourself because your mind is not working well.  
Geo Tamara closely for changes in your health, and be sure to contact your doctor if you have any problems. Where can you learn more? Go to http://chuy-karlee.info/. Enter E158 in the search box to learn more about \"Dementia: Care Instructions. \" Current as of: May 28, 2019 Content Version: 12.2 © 3268-6741 [employer]. Care instructions adapted under license by [employer] (which disclaims liability or warranty for this information). If you have questions about a medical condition or this instruction, always ask your healthcare professional. [place] [address] any warranty or liability for your use of this information. This note will not be viewable in [address]

## 2020-03-03 NOTE — ED PROVIDER NOTES
HPI  
The patient is a 22-year-old white male who presents to the emergency room with acute onset of having 3 days of coughing productive of thick yellow sputum fevers to 99 8. He got a flu shot this year and no travel. He states his sputum is gray and thick he has had no rigors or night sweats. He had a history of epiglottitis not to distant past. 
Past Medical History:  
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  
Procedure Laterality Date  HX HEENT    
 HX ORTHOPAEDIC    
 HX PROSTATECTOMY  HX UROLOGICAL    
 NEUROLOGICAL PROCEDURE UNLISTED History reviewed. No pertinent family history. Social History Socioeconomic History  Marital status:  Spouse name: Not on file  Number of children: Not on file  Years of education: Not on file  Highest education level: Not on file Occupational History  Not on file Social Needs  Financial resource strain: Not on file  Food insecurity:  
  Worry: Not on file Inability: Not on file  Transportation needs:  
  Medical: Not on file Non-medical: Not on file Tobacco Use  Smoking status: Former Smoker  Smokeless tobacco: Never Used Substance and Sexual Activity  Alcohol use: No  
  Frequency: Never  Drug use: No  
 Sexual activity: Never Lifestyle  Physical activity:  
  Days per week: Not on file Minutes per session: Not on file  Stress: Not on file Relationships  Social connections: Talks on phone: Not on file Gets together: Not on file Attends Caodaism service: Not on file Active member of club or organization: Not on file Attends meetings of clubs or organizations: Not on file Relationship status: Not on file  Intimate partner violence:  
  Fear of current or ex partner: Not on file Emotionally abused: Not on file Physically abused: Not on file Forced sexual activity: Not on file Other Topics Concern  Not on file Social History Narrative ** Merged History Encounter ** ALLERGIES: Bactrim [sulfamethoprim] and Sulfamethoxazole Review of Systems All other systems reviewed and are negative. Vitals:  
 03/03/20 1752 BP: 172/85 Pulse: (!) 114 Resp: 18 Temp: 99.6 °F (37.6 °C) SpO2: 91% Weight: 70.3 kg (154 lb 15.7 oz) Height: 5' 8\" (1.727 m) Physical Exam 
Vitals signs and nursing note reviewed. Constitutional:   
   Appearance: He is well-developed. Comments: Elderly chronically ill slightly debilitated elderly chronically ill slightly debilitated with a thick cough occasional rales bilaterally HENT:  
   Head: Normocephalic and atraumatic. Mouth/Throat:  
   Pharynx: No oropharyngeal exudate. Eyes:  
   General: No scleral icterus. Conjunctiva/sclera: Conjunctivae normal.  
Neck: Musculoskeletal: Neck supple. Thyroid: No thyromegaly. Cardiovascular:  
   Rate and Rhythm: Normal rate and regular rhythm. Heart sounds: Normal heart sounds. No murmur. No friction rub. No gallop. Pulmonary:  
   Effort: Pulmonary effort is normal. No respiratory distress. Breath sounds: Normal breath sounds. No stridor. No wheezing or rales. Abdominal:  
   General: Bowel sounds are normal.  
   Palpations: Abdomen is soft. Tenderness: There is no abdominal tenderness. There is no guarding or rebound. Musculoskeletal: Normal range of motion. Lymphadenopathy: Cervical: No cervical adenopathy. Skin: 
   General: Skin is warm and dry. Neurological:  
   Mental Status: He is alert and oriented to person, place, and time. MDM Procedures

## 2020-03-03 NOTE — ED TRIAGE NOTES
Pt presents to ED with c/o 3 day hx of cough which is sometimes productive, Pt states some pain in chest. Pt denies fever or vomiting.

## 2020-03-03 NOTE — ED NOTES
6:58 PM 
Change of shift. Care of patient taken over from Dr. Zahida Esteban; H&P reviewed, bedside handoff complete. Awaiting labs and CT. 
 
7:33 PM 
Discussed with patient and family. CT does show mild airspace disease in the right lower lung field. Patient and family wishes to go home. Discussed results and plan with patient and family. Patient will be discharged home with PCP follow up. Patient instructed to return to the emergency room for any worsening symptoms or any other concerns.

## 2020-03-04 NOTE — DISCHARGE INSTRUCTIONS
We hope that we have addressed all of your medical concerns. The examination and treatment you received in the Emergency Department were for an emergent problem and were not intended as complete care. It is important that you follow up with your healthcare provider(s) for ongoing care. If your symptoms worsen or do not improve as expected, and you are unable to reach your usual health care provider(s), you should return to the Emergency Department. Today's healthcare is undergoing tremendous change, and patient satisfaction surveys are one of the many tools to assess the quality of medical care. You may receive a survey from the [employer] organization regarding your experience in the Emergency Department. I hope that your experience has been completely positive, particularly the medical care that I provided. As such, please participate in the survey; anything less than excellent does not meet my expectations or intentions. [facility] and 8 [facility] participate in nationally recognized quality of care measures. If your blood pressure is greater than 120/80, as reported below, we urge that you seek medical care to address the potential of high blood pressure, commonly known as hypertension. Hypertension can be hereditary or can be caused by certain medical conditions, pain, stress, or \"white coat syndrome. \"       Please make an appointment with your health care provider(s) for follow up of your Emergency Department visit. VITALS:   Patient Vitals for the past 8 hrs:   Temp Pulse Resp BP SpO2   03/03/20 1908 -- (!) 117 18 (!) 169/92 92 %   03/03/20 1752 99.6 °F (37.6 °C) (!) 114 18 172/85 91 %          Thank you for allowing us to provide you with medical care today. We realize that you have many choices for your emergency care needs. Please choose us in the future for any continued health care needs. [place] Josiane, [address].   Office: [phone]            Recent Results (from the past 24 hour(s))   CBC WITH AUTOMATED DIFF    Collection Time: 03/03/20  6:28 PM   Result Value Ref Range    WBC 9.8 4.1 - 11.1 K/uL    RBC 4.08 (L) 4.10 - 5.70 M/uL    HGB 12.4 12.1 - 17.0 g/dL    HCT 39.4 36.6 - 50.3 %    MCV 96.6 80.0 - 99.0 FL    MCH 30.4 26.0 - 34.0 PG    MCHC 31.5 30.0 - 36.5 g/dL    RDW 13.4 11.5 - 14.5 %    PLATELET 320 (L) 027 - 400 K/uL    MPV 12.1 8.9 - 12.9 FL    NEUTROPHILS 68 32 - 75 %    LYMPHOCYTES 14 12 - 49 %    MONOCYTES 17 (H) 5 - 13 %    EOSINOPHILS 1 0 - 7 %    BASOPHILS 0 0 - 1 %    ABS. NEUTROPHILS 6.7 1.8 - 8.0 K/UL    ABS. LYMPHOCYTES 1.3 K/UL    ABS. MONOCYTES 1.6 (H) 0.0 - 1.0 K/UL    ABS. EOSINOPHILS 0.1 0.0 - 0.4 K/UL    ABS. BASOPHILS 0.0 0.0 - 0.1 K/UL    DF AUTOMATED     METABOLIC PANEL, COMPREHENSIVE    Collection Time: 03/03/20  6:28 PM   Result Value Ref Range    Sodium 141 136 - 145 mmol/L    Potassium 4.4 3.5 - 5.1 mmol/L    Chloride 105 97 - 108 mmol/L    CO2 28 21 - 32 mmol/L    Anion gap 8 5 - 15 mmol/L    Glucose 224 (H) 65 - 100 mg/dL    BUN 20 6 - 20 MG/DL    Creatinine 1.21 0.70 - 1.30 MG/DL    BUN/Creatinine ratio 17 12 - 20      GFR est AA >60 >60 ml/min/1.73m2    GFR est non-AA 56 (L) >60 ml/min/1.73m2    Calcium 8.9 8.5 - 10.1 MG/DL    Bilirubin, total 0.2 0.2 - 1.0 MG/DL    ALT (SGPT) 28 12 - 78 U/L    AST (SGOT) 18 15 - 37 U/L    Alk. phosphatase 72 45 - 117 U/L    Protein, total 6.9 6.4 - 8.2 g/dL    Albumin 3.6 3.5 - 5.0 g/dL    Globulin 3.3 2.0 - 4.0 g/dL    A-G Ratio 1.1 1.1 - 2.2     SAMPLES BEING HELD    Collection Time: 03/03/20  6:28 PM   Result Value Ref Range    SAMPLES BEING HELD 1BC     COMMENT        Add-on orders for these samples will be processed based on acceptable specimen integrity and analyte stability, which may vary by analyte.    NT-PRO BNP    Collection Time: 03/03/20  6:28 PM   Result Value Ref Range    NT pro- (H) 0 - 450 PG/ML   TROPONIN I    Collection Time: 03/03/20  6:28 PM   Result Value Ref Range    Troponin-I, Qt. <0.05 <0.05 ng/mL   INFLUENZA A+B VIRAL AGS    Collection Time: 03/03/20  6:28 PM   Result Value Ref Range    Influenza A Antigen NEGATIVE  NEG      Influenza B Antigen NEGATIVE  NEG         Xr Chest Pa Lat    Result Date: 3/3/2020  INDICATION:   cough COMPARISON: February 5, 2020 FINDINGS: Frontal and lateral views of the chest demonstrate normal heart size and tortuous aorta. Unchanged right IJ Port-A-Cath. The lungs are adequately expanded. There is no edema, effusion, consolidation, or pneumothorax. Mild bibasilar atelectasis, unchanged. Degenerative changes in spine. Kyphosis with prior kyphoplasty L1. IMPRESSION: No significant change in mild bibasilar airspace opacities likely atelectasis. Ct Chest Wo Cont    Result Date: 3/3/2020  INDICATION: r/o pneumonia COMPARISON: March 3, 2020 TECHNIQUE:  Noncontrast 5 mm axial images were obtained through the chest. CT dose reduction was achieved through use of a standardized protocol tailored for this examination and automatic exposure control for dose modulation. FINDINGS: THYROID: Unremarkable. MEDIASTINUM/HIRA: Right internal jugular Port-A-Cath. No enlarged thoracic lymph nodes. Atherosclerosis of the aorta. HEART/PERICARDIUM: No cardiomegaly. No pericardial effusion. Coronary atherosclerosis. LUNGS/PLEURA: Mild right lower lobe airspace disease. Right middle lobe atelectasis. No pleural effusion or pulmonary edema. No pneumothorax. Calcified granuloma posterior right upper lobe. INCIDENTALLY IMAGED UPPER ABDOMEN: Cholelithiasis. BONES: Prior kyphoplasty L1 and L2. Heterogeneous mineralization with vague areas of sclerosis throughout the bony thorax including the spine ribs and sternum. ADDITIONAL COMMENTS:  N/A. IMPRESSION: 1. Mild right lower lobe airspace disease. Right middle lobe atelectasis.  2. Numerous sclerotic lesions throughout the bony thorax suspicious for metastatic disease. Patient Education        Pneumonia: Care Instructions  Your Care Instructions    Pneumonia is an infection of the lungs. Most cases are caused by infections from bacteria or viruses. Pneumonia may be mild or very severe. If it is caused by bacteria, you will be treated with antibiotics. It may take a few weeks to a few months to recover fully from pneumonia, depending on how sick you were and whether your overall health is good. Follow-up care is a key part of your treatment and safety. Be sure to make and go to all appointments, and call your doctor if you are having problems. It's also a good idea to know your test results and keep a list of the medicines you take. How can you care for yourself at home? · Take your antibiotics exactly as directed. Do not stop taking the medicine just because you are feeling better. You need to take the full course of antibiotics. · Take your medicines exactly as prescribed. Call your doctor if you think you are having a problem with your medicine. · Get plenty of rest and sleep. You may feel weak and tired for a while, but your energy level will improve with time. · To prevent dehydration, drink plenty of fluids, enough so that your urine is light yellow or clear like water. Choose water and other caffeine-free clear liquids until you feel better. If you have kidney, heart, or liver disease and have to limit fluids, talk with your doctor before you increase the amount of fluids you drink. · Take care of your cough so you can rest. A cough that brings up mucus from your lungs is common with pneumonia. It is one way your body gets rid of the infection. But if coughing keeps you from resting or causes severe fatigue and chest-wall pain, talk to your doctor. He or she may suggest that you take a medicine to reduce the cough. · Use a vaporizer or humidifier to add moisture to your bedroom.  Follow the directions for cleaning the machine. · Do not smoke or allow others to smoke around you. Smoke will make your cough last longer. If you need help quitting, talk to your doctor about stop-smoking programs and medicines. These can increase your chances of quitting for good. · Take an over-the-counter pain medicine, such as acetaminophen (Tylenol), ibuprofen (Advil, Motrin), or naproxen (Aleve). Read and follow all instructions on the label. · Do not take two or more pain medicines at the same time unless the doctor told you to. Many pain medicines have acetaminophen, which is Tylenol. Too much acetaminophen (Tylenol) can be harmful. · If you were given a spirometer to measure how well your lungs are working, use it as instructed. This can help your doctor tell how your recovery is going. · To prevent pneumonia in the future, talk to your doctor about getting a flu vaccine (once a year) and a pneumococcal vaccine (one time only for most people). When should you call for help? Call 911 anytime you think you may need emergency care. For example, call if:    · You have severe trouble breathing.    Call your doctor now or seek immediate medical care if:    · You cough up dark brown or bloody mucus (sputum).     · You have new or worse trouble breathing.     · You are dizzy or lightheaded, or you feel like you may faint.    Watch closely for changes in your health, and be sure to contact your doctor if:    · You have a new or higher fever.     · You are coughing more deeply or more often.     · You are not getting better after 2 days (48 hours).     · You do not get better as expected. Where can you learn more? Go to http://chuy-karlee.info/. Enter [id number] in the search box to learn more about \"Pneumonia: Care Instructions. \"  Current as of: June 9, 2019  Content Version: 12.2  © 9763-7142 [employer].  Care instructions adapted under license by [employer] (which disclaims liability or warranty for this information). If you have questions about a medical condition or this instruction, always ask your healthcare professional. [place] [address] any warranty or liability for your use of this information.

## 2020-03-04 NOTE — ED NOTES
Patient  discharged with instructions to pt and his daughter [facility]) per Dr Charles Johns. Instructions reviewed with pt and daughter.

## 2020-03-11 NOTE — PROGRESS NOTES
[facility] VISIT NOTE Pt arrived at [facility] ambulatory and in no distress for [facility] Flush/Labs  Assessment completed, pt c/o a cough. Sarah Paul Patient Vitals for the past 12 hrs: 
 Temp Pulse Resp BP SpO2  
03/11/20 1150 98.2 °F (36.8 °C) 89 18 118/60 96 %  
 
right chest port accessed with . 75  in coyne no difficulty. Positive blood return noted and labs drawn. Tolerated treatment well, no adverse reaction noted. Port de-accessed and flushed per protocol. Positive blood return noted. D/C'd from [facility] ambulatory and in no distress accompanied by family. Next appointment is 5/5/20 at 11:30.

## 2020-03-13 NOTE — PROGRESS NOTES
Patient has graduated from the Transitions of Care Coordination  program on 3/8/20. Patient/family has the ability to self-manage at this time Care management goals have been completed. Patient was not referred to the [facility] team for further management. Goals Addressed This Visit's Progress Post Hospitalization  COMPLETED: Understands red flags post discharge. 2/20/20- spoke with daughter, Keshawn Ta- she states that they have been checking in on him over the weekend- with no complaints-concerns. He is at dermatology appointment at this time with her , Precious Adams. Reports no pain, eating well. No reports of diarrhea. He has a chart to check off when he is taking his meds- especially antibiotics-  will be checking chart today to see if there are any concerns. Confirms that he does not need ENT follow up. Has PCP appointment for this Thursday, 2/13 at 2:30 pm.      [facility] Patient has Care Transition Nurse's contact information for any further questions, concerns, or needs. Patients upcoming visits:   
Future Appointments Date Time Provider Irma Hong 5/5/2020 11:30 AM SS INF7 CH3 <1H [facility]  
6/17/2020  3:15 PM Marybeth Oakes NP [facility]  
10/12/2020 10:40 AM Luan Terrazas

## 2020-04-04 NOTE — PROGRESS NOTES
Consent: Liza Obregon, who was seen by synchronous (real-time) audio-video technology, and/or his healthcare decision maker, is aware that this patient-initiated, Telehealth encounter on 4/2/2020 is a billable service, with coverage as determined by his insurance carrier. He is aware that he may receive a bill and has provided verbal consent to proceed: Yes. Assessment & Plan:  
Diagnoses and all orders for this visit: 
 
1. URI with cough and congestion 
-     azithromycin (ZITHROMAX) 250 mg tablet; Take 2 tablets today, then take 1 tablet daily 2. Sore throat 
-     azithromycin (ZITHROMAX) 250 mg tablet; Take 2 tablets today, then take 1 tablet daily I spent at least 15 minutes with this established patient, and >50% of the time was spent counseling and/or coordinating care regarding Verne[place] with cough and congestion. [address] Subjective:  
Liza Obregon is a 80 y.o. male who was seen for Cold Symptoms and Neck Pain We discussed his symptoms in detail. He has a productive cough without SOB, fever or chills. He is drinking and eating well. Symptoms are a little aggravating. He does live in an assisted living facility. His PRUDENCE is with him today. Both are following distancing and stay at home orders. The patient does NOT have a low grade fever). He does NOT  have lower respiratory symptoms or SOB. He has had no close contact with a laboratory confirmed COVID-19 patient within the last 14 days of his symptoms. sandhya has not traveled to or from affected geographical areas with in the past 14 days of symptoms ([place], [place], [place], [facility], [place] or suspected high risk states). Discussed exposure protocols and quarantine with CDC Guidelines What to do if you are sick with coronavirus disease 2019 Patient who was given an opportunity for questions and concerns.  The patient agrees to contact the [place] exposure line [phone], local [facility] department [place] [facility] ([phone] and PCP office for questions related to their healthcare. Prior to Admission medications Medication Sig Start Date End Date Taking? Authorizing Provider  
azithromycin (ZITHROMAX) 250 mg tablet Take 2 tablets today, then take 1 tablet daily 4/2/20 4/7/20 Yes Yasmeen Lugo MD  
dextromethorphan (DELSYM) 30 mg/5 mL liquid Take 60 mg by mouth two (2) times a day. Garrison Padilla MD  
albuterol (PROVENTIL HFA, VENTOLIN HFA, PROAIR HFA) 90 mcg/actuation inhaler Take 2 Puffs by inhalation every four (4) hours as needed for Wheezing or Shortness of Breath. 3/3/20   Bud Love,   
insulin NPH/insulin regular (HUMULIN 70/30 U-100 INSULIN) 100 unit/mL (70-30) injection 20 U in the morning. 2/19/20   Yasmeen Lugo MD  
omeprazole (PRILOSEC) 20 mg capsule Take 20 mg by mouth daily. Provider, Historical  
metoprolol tartrate (LOPRESSOR) 25 mg tablet Take 1 Tab by mouth two (2) times a day. 10/11/19   Ronda Flakes T, DO  
dulaglutide (TRULICITY SC) 1.37 mg by SubCUTAneous route every seven (7) days. Provider, Historical  
acetaminophen (TYLENOL) 500 mg tablet Take 500-1,000 mg by mouth nightly as needed for Pain. Takes one every night    Provider, Historical  
aspirin 81 mg chewable tablet Take 1 Tab by mouth daily. 1/11/19   Ronda Flakes T, DO  
ferrous sulfate 325 mg (65 mg iron) tablet Take 325 mg by mouth two (2) times a day. Valerie, MD Garrison  
b complex vitamins (B COMPLEX 1) tablet Take 1 Tab by mouth daily. Provider, Historical  
lutein 20 mg cap Take 20 mg by mouth daily. Garrison Padilla MD  
cholecalciferol (VITAMIN D3) 1,000 unit tablet Take 1,000 Units by mouth daily. Garrison Padilla MD  
mirabegron ER (MYRBETRIQ) 50 mg ER tablet Take 50 mg by mouth every fourty-eight (48) hours. Garrison Padilla MD  
 
Allergies Allergen Reactions  Bactrim [Sulfamethoprim] Rash  Sulfamethoxazole Unknown (comments) Patient Active Problem List  
 Diagnosis Date Noted  Epiglottitis 02/06/2020  
 HAP (hospital-acquired pneumonia) 03/24/2019  Essential hypertension 03/24/2019  Acute kidney injury ([facility] 03/24/2019  Chronic anemia 03/24/2019  Dyslipidemia 03/24/2019  Atrial fibrillation with rapid ventricular response ([facility] 03/22/2019  Septic shock ([facility] 03/22/2019  Hypotension 12/15/2018  C. difficile diarrhea 11/13/2018  Non Hodgkin's lymphoma ([facility] 11/12/2018  History of Clostridium difficile colitis 11/10/2018  DM (diabetes mellitus) ([facility] 11/10/2018  Premature atrial complexes 11/10/2018 Current Outpatient Medications Medication Sig Dispense Refill  azithromycin (ZITHROMAX) 250 mg tablet Take 2 tablets today, then take 1 tablet daily 6 Tab 0  
 dextromethorphan (DELSYM) 30 mg/5 mL liquid Take 60 mg by mouth two (2) times a day.  albuterol (PROVENTIL HFA, VENTOLIN HFA, PROAIR HFA) 90 mcg/actuation inhaler Take 2 Puffs by inhalation every four (4) hours as needed for Wheezing or Shortness of Breath. 1 Inhaler 0  
 insulin NPH/insulin regular (HUMULIN 70/30 U-100 INSULIN) 100 unit/mL (70-30) injection 20 U in the morning. 10 mL 1  
 omeprazole (PRILOSEC) 20 mg capsule Take 20 mg by mouth daily.  metoprolol tartrate (LOPRESSOR) 25 mg tablet Take 1 Tab by mouth two (2) times a day. 90 Tab 3  
 dulaglutide (TRULICITY SC) 7.52 mg by SubCUTAneous route every seven (7) days.  acetaminophen (TYLENOL) 500 mg tablet Take 500-1,000 mg by mouth nightly as needed for Pain. Takes one every night  aspirin 81 mg chewable tablet Take 1 Tab by mouth daily. 81 Tab 30  
 ferrous sulfate 325 mg (65 mg iron) tablet Take 325 mg by mouth two (2) times a day.  b complex vitamins (B COMPLEX 1) tablet Take 1 Tab by mouth daily.  lutein 20 mg cap Take 20 mg by mouth daily.  cholecalciferol (VITAMIN D3) 1,000 unit tablet Take 1,000 Units by mouth daily.  mirabegron ER (MYRBETRIQ) 50 mg ER tablet Take 50 mg by mouth every fourty-eight (48) hours. Allergies Allergen Reactions  Bactrim [Sulfamethoprim] Rash  Sulfamethoxazole Unknown (comments) Past Medical History:  
Diagnosis Date  Anemia  Arthritis  Cancer ([facility]  Constipation  Diabetes ([facility]  Gastrointestinal disorder  History of neoplasms, uncertain behavior Of soft tissue and skin  Hypercholesterolemia  Hyperkalemia  Hypertension  Low back pain  Melanoma in situ of other parts of face ([facility] Lower lip; removed  Neuropathy  Right bundle-branch block  Spinal stenosis  Type II diabetes mellitus ([facility]  Venous insufficiency Past Surgical History:  
Procedure Laterality Date  HX HEENT    
 HX ORTHOPAEDIC    
 HX PROSTATECTOMY  HX UROLOGICAL    
 NEUROLOGICAL PROCEDURE UNLISTED History reviewed. No pertinent family history. Social History Tobacco Use  Smoking status: Former Smoker  Smokeless tobacco: Never Used Substance Use Topics  Alcohol use: No  
  Frequency: Never Review of Systems Constitutional: Negative for fever. HENT: Positive for congestion. Negative for sinus pain and sore throat. Respiratory: Positive for cough and sputum production. Negative for shortness of breath. All other systems reviewed and are negative. Objective:  
Vital Signs: (As obtained by patient/caregiver at home) There were no vitals taken for this visit. Constitutional: [x] Appears well-developed and well-nourished [x] No apparent distress   
  [] Abnormal - Mental status: [x] Alert and awake  [x] Oriented to person/place/time [x] Able to follow commands   
[] Abnormal - Eyes:   EOM    [x]  Normal    [] Abnormal -  
Sclera  [x]  Normal    [] Abnormal - 
        Discharge [x]  None visible   [] Abnormal - HENT: [x] Normocephalic, atraumatic  [] Abnormal -  
 [x] Mouth/Throat: Mucous membranes are moist 
 
External Ears [x] Normal  [] Abnormal - Neck: [x] No visualized mass [] Abnormal - Pulmonary/Chest: [x] Respiratory effort normal   [x] No visualized signs of difficulty breathing or respiratory distress 
      [] Abnormal - Musculoskeletal:   [x] Normal gait with no signs of ataxia [x] Normal range of motion of neck [] Abnormal -  
 
Neurological:        [x] No Facial Asymmetry (Cranial nerve 7 motor function) (limited exam due to video visit) [x] No gaze palsy  
     [] Abnormal -   
     
Skin:        [x] No significant exanthematous lesions or discoloration noted on facial skin   
     [] Abnormal - Psychiatric:       [x] Normal Affect [] Abnormal -  
     [x] No Hallucinations Other pertinent observable physical exam findings:- The patient looks well and in NAD. We discussed the expected course, resolution and complications of the diagnosis(es) in detail. Medication risks, benefits, costs, interactions, and alternatives were discussed as indicated. I advised him to contact the office if his condition worsens, changes or fails to improve as anticipated. He expressed understanding with the diagnosis(es) and plan. Rajesh Botello is a 80 y.o. male being evaluated by a video visit encounter for concerns as above. A caregiver was present when appropriate. Due to this being a TeleHealth encounter ([facility]), evaluation of the following organ systems was limited: Vitals/Constitutional/EENT/Resp/CV/GI//MS/Neuro/Skin/Heme-Lymph-Imm.  
Pursuant to the emergency declaration under the [facility] waiver authority and the [employer] Act, this Virtual  Visit was conducted, with patient's (and/or legal guardian's) consent, to reduce the patient's risk of exposure to COVID-19 and provide necessary medical care. Services were provided through a video synchronous discussion virtually to substitute for in-person clinic visit. Patient and provider were located at their individual homes. Pt verbalizes understanding of plan of care and denies further questions or concerns at this time.  
 
Aleida Fofana MD

## 2020-06-17 NOTE — PROGRESS NOTES
[facility] Progress Note Date: 2020 Name: Mari Echevarria MRN: [id number] : 1927 
 
1425. Mr. Guillermina Talley Arrived ambulatory and in no distress for PF/Labs. Patient very hard of [place], hearing aides in place but patient having difficulty hearing with aides in place. Communicated with patient by writing. R chest wall port accessed without difficulty, labs drawn & sent for processing. Labs pending, please see [facility] for results. Patient Vitals for the past 12 hrs: 
 Temp Pulse Resp BP SpO2  
20 1426 (!) 96.5 °F (35.8 °C) 86 18 160/74 96 % 1440. Mr. Guillermina Talley tolerated treatment well and was discharged from [place] [address] in stable condition. Port de-accessed, flushed & heparinized per protocol. He is to return on 20 for his next appointment. Devan Rolon RN 
2020

## 2020-07-24 NOTE — ED PROVIDER NOTES
HPI the patient has a 2-day history of mechanical low back pain. The pain is worse with bending or standing. He denies having abdominal pain, leg pain, bowel/bladder symptoms or other complaints. He denies having pain like this previously, although his chart shows that he has a history of previous back pain. Past Medical History:   Diagnosis Date    Anemia     Arthritis     Cancer ([facility]     Constipation     Diabetes ([facility]     Gastrointestinal disorder     History of neoplasms, uncertain behavior     Of soft tissue and skin    Hypercholesterolemia     Hyperkalemia     Hypertension     Low back pain     Melanoma in situ of other parts of face ([facility]     Lower lip; removed    Neuropathy     Right bundle-branch block     Spinal stenosis     Type II diabetes mellitus ([facility]     Venous insufficiency        Past Surgical History:   Procedure Laterality Date    HX HEENT      HX ORTHOPAEDIC      HX PROSTATECTOMY      HX UROLOGICAL      NEUROLOGICAL PROCEDURE UNLISTED           No family history on file.     Social History     Socioeconomic History    Marital status:      Spouse name: Not on file    Number of children: Not on file    Years of education: Not on file    Highest education level: Not on file   Occupational History    Not on file   Social Needs    Financial resource strain: Not on file    Food insecurity     Worry: Not on file     Inability: Not on file    Transportation needs     Medical: Not on file     Non-medical: Not on file   Tobacco Use    Smoking status: Former Smoker    Smokeless tobacco: Never Used   Substance and Sexual Activity    Alcohol use: No     Frequency: Never    Drug use: No    Sexual activity: Never   Lifestyle    Physical activity     Days per week: Not on file     Minutes per session: Not on file    Stress: Not on file   Relationships    Social connections     Talks on phone: Not on file     Gets together: Not on file     Attends Buddhism service: Not on file     Active member of club or organization: Not on file     Attends meetings of clubs or organizations: Not on file     Relationship status: Not on file    Intimate partner violence     Fear of current or ex partner: Not on file     Emotionally abused: Not on file     Physically abused: Not on file     Forced sexual activity: Not on file   Other Topics Concern    Not on file   Social History Narrative    ** Merged History Encounter **              ALLERGIES: Bactrim [sulfamethoprim] and Sulfamethoxazole    Review of Systems   Constitutional: Negative for fever. Cardiovascular: Negative for chest pain. Gastrointestinal: Negative for abdominal pain. Genitourinary: Negative for flank pain. Musculoskeletal: Positive for back pain. Neurological: Negative for headaches. Psychiatric/Behavioral: Negative for agitation. There were no vitals filed for this visit. Physical Exam  Constitutional:       General: He is not in acute distress. Appearance: He is well-developed. HENT:      Head: Normocephalic. Neck:      Musculoskeletal: Normal range of motion. Cardiovascular:      Rate and Rhythm: Normal rate. Pulmonary:      Effort: Pulmonary effort is normal.   Abdominal:      Palpations: Abdomen is soft. Tenderness: There is no abdominal tenderness. Musculoskeletal: Normal range of motion. Comments: There is tenderness to the lower lumbar midline. No swelling/bruising is noted. Skin:     General: Skin is warm and dry. Capillary Refill: Capillary refill takes less than 2 seconds. Neurological:      Mental Status: He is alert.    Psychiatric:         Behavior: Behavior normal.          MDM       Procedures

## 2020-07-24 NOTE — ED TRIAGE NOTES
Pt assisted to treatment area via wheelchair he states that for the past 2 days his lower back on both sides have been hurting. He denies any falls or injury to his back. His pain increases with movement especially when he is trying to get up. He took 2 Tylenol Arthritis his morning.   Dr Larry Clay at the bedside

## 2020-07-24 NOTE — ED NOTES
Pt and daughter given discharge instructions by Dr Kristy Watt they verbalize an understanding pt assisted to car via wheelchair

## 2020-07-24 NOTE — DISCHARGE INSTRUCTIONS

## 2020-07-28 PROBLEM — M25.559 HIP PAIN: Status: ACTIVE | Noted: 2020-01-01

## 2020-07-28 PROBLEM — M54.9 BACK PAIN: Status: ACTIVE | Noted: 2020-01-01

## 2020-07-28 NOTE — ED NOTES
TRANSFER - OUT REPORT: 
 
Verbal report given to Mable TOLBERT on Renaldo Fabian  being transferred to Remote Tele 4th Floor for routine progression of care Report consisted of patients Situation, Background, Assessment and  
Recommendations(SBAR). Information from the following report(s) SBAR, Kardex and ED Summary was reviewed with the receiving nurse. Lines:  
Venous Access Device (Active) Peripheral IV 07/28/20 Left;Mid Forearm (Active) Site Assessment Clean, dry, & intact 07/28/20 1156 Phlebitis Assessment 0 07/28/20 1156 Infiltration Assessment 0 07/28/20 1156 Dressing Status Clean, dry, & intact 07/28/20 1156 Dressing Type Transparent 07/28/20 1156 Hub Color/Line Status Flushed 07/28/20 1156 Action Taken Blood drawn 07/28/20 1156 Opportunity for questions and clarification was provided. Patient transported with: 
 Registered Nurse

## 2020-07-28 NOTE — PROGRESS NOTES
TRANSFER - IN REPORT: 
 
Verbal report received from Julio Cesar(name) on Angélica Bah  being received from ED(unit) for routine progression of care Report consisted of patients Situation, Background, Assessment and  
Recommendations(SBAR). Information from the following report(s) SBAR, Kardex, Intake/Output and Recent Results was reviewed with the receiving nurse. Opportunity for questions and clarification was provided. Assessment completed upon patients arrival to unit and care assumed. 1832- pt resting quietly in bed. Bedside shift change report given to Christal (oncoming nurse) by Morgan Fairbanks (offgoing nurse). Report included the following information SBAR, Kardex, Intake/Output and Recent Results.

## 2020-07-28 NOTE — CONSULTS
[address] [facility] [phone] Hematology / Oncology Consult Reason for Visit:  
Angélica Bah is a 80 y.o. male who is seen in consultation at the request of Dr. 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 
· Single agent maintenance rituximab 225 mg/m2 q 3 weeks 12/7/16, last dose 5/2018, unclear how many doses in between, had 2 hospitalizations in 2018 (5 total) · Admitted 9/26/18-9/29/18 for sepsis due to dental abscess, related to xgeva/zometa Last office visit on 2/12/2020 History of Present Illness:  
 
Angélica Bah was admitted on 7/28/2020 from the ED when he presented with c/o back pain; on the right lower hip area and across to left hip area. Rates pain 9/10. Pain is worse when changing positions; going from lying to standing position or turning in the bed. Started acutely last Friday and has not improved. Prior to pain starting was up walking to the dining room area and around his facility. Hashad some problems with constipation Denies N/V. Denies SOB. Requesting something for pain.   
 
Daughter, Osvaldo Rod states her father was seen at [facility] ED last Friday (7/24)  for same pain,had xrays; was given steroids and muscle relaxant and told to take Arthritis Tylenol. He has not had any improvement in the pain. He currently lives at [facility] which is an independent living facility. Up until Friday had been very mobile. I spoke with the daughter by phone [phone], immediately after speaking with the patient. Past Medical History:  
Diagnosis Date  Anemia  Arthritis  Cancer ([address]  Constipation  Diabetes ([address]  Gastrointestinal disorder  History of neoplasms, uncertain behavior Of soft tissue and skin  Hypercholesterolemia  Hyperkalemia  Hypertension  Low back pain  Melanoma in situ of other parts of face ([facility] [address] Lower lip; removed  Neuropathy  Right bundle-branch block  Spinal stenosis  Type II diabetes mellitus ([facility] [address]  Venous insufficiency Past Surgical History:  
Procedure Laterality Date  HX HEENT    
 HX ORTHOPAEDIC    
 HX PROSTATECTOMY  HX UROLOGICAL    
 NEUROLOGICAL PROCEDURE UNLISTED Social History Tobacco Use  Smoking status: Former Smoker  Smokeless tobacco: Never Used Substance Use Topics  Alcohol use: No  
  Frequency: Never No family history on file. Current Facility-Administered Medications Medication Dose Route Frequency  morphine injection 2 mg  2 mg IntraVENous Q4H PRN  
 [START ON 7/29/2020] aspirin chewable tablet 81 mg  81 mg Oral DAILY  ferrous sulfate tablet 325 mg  325 mg Oral BID  metoprolol tartrate (LOPRESSOR) tablet 25 mg  25 mg Oral BID  sodium chloride (NS) flush 5-40 mL  5-40 mL IntraVENous Q8H  
 sodium chloride (NS) flush 5-40 mL  5-40 mL IntraVENous PRN  
 acetaminophen (TYLENOL) tablet 650 mg  650 mg Oral Q6H  
 polyethylene glycol (MIRALAX) packet 17 g  17 g Oral DAILY PRN  promethazine (PHENERGAN) tablet 12.5 mg  12.5 mg Oral Q6H PRN  Or  
  ondansetron (ZOFRAN) injection 4 mg  4 mg IntraVENous Q6H PRN  
 [START ON 7/29/2020] enoxaparin (LOVENOX) injection 40 mg  40 mg SubCUTAneous DAILY Allergies Allergen Reactions  Bactrim [Sulfamethoprim] Rash  Sulfamethoxazole Unknown (comments) Review of Systems: A complete review of systems was obtained, negative except as described above. Physical Exam:  
 
Visit Vitals BP (!) 172/91 (BP 1 Location: Left arm, BP Patient Position: At rest) Pulse 93 Temp 97.6 °F (36.4 °C) Resp 15 Ht 5' 7\" (1.702 m) Wt 151 lb (68.5 kg) SpO2 95% BMI 23.65 kg/m² ECOG PS: 2-3 Constitutional: Appears well-developed and well-nourished in no apparent distress Mental status: Alert and awake, Oriented to person/place/time, Able to follow commands Eyes: EOM normal, Sclera normal, No visible ocular discharge HENT: Normocephalic, atraumatic Mouth/Throat: Moist mucous membranes External Ears normal 
 
Neck: No visualized mass Pulmonary/Chest: Respiratory effort normal, No visualized signs of difficulty breathing or respiratory distress Musculoskeletal: Normal gait with no signs of ataxia, Normal range of motion of neck Neurological: No facial asymmetry (Cranial nerve 7 motor function), No gaze palsy Skin: No significant exanthematous lesions or discoloration noted on facial skin Psychiatric: Normal affect, No hallucinations Due to this being a TeleHealth evaluation, many elements of the physical examination are unable to be assessed. Results:  
 
Lab Results Component Value Date/Time WBC 12.8 (H) 07/28/2020 11:59 AM  
 HGB 14.6 07/28/2020 11:59 AM  
 HCT 44.2 07/28/2020 11:59 AM  
 PLATELET 256 12/39/4232 11:59 AM  
 MCV 94.0 07/28/2020 11:59 AM  
 ABS. NEUTROPHILS 9.5 (H) 07/28/2020 11:59 AM  
 
Lab Results Component Value Date/Time  Sodium 138 07/28/2020 11:59 AM  
 Potassium 4.3 07/28/2020 11:59 AM  
 Chloride 105 07/28/2020 11:59 AM  
 CO2 31 07/28/2020 11:59 AM  
 Glucose 194 (H) 07/28/2020 11:59 AM  
 BUN 23 (H) 07/28/2020 11:59 AM  
 Creatinine 1.02 07/28/2020 11:59 AM  
 GFR est AA >60 07/28/2020 11:59 AM  
 GFR est non-AA >60 07/28/2020 11:59 AM  
 Calcium 9.3 07/28/2020 11:59 AM  
 Glucose (POC) 204 (H) 07/28/2020 08:51 AM  
 
Lab Results Component Value Date/Time Bilirubin, total 0.6 07/28/2020 11:59 AM  
 ALT (SGPT) 20 07/28/2020 11:59 AM  
 Alk. phosphatase 69 07/28/2020 11:59 AM  
 Protein, total 7.2 07/28/2020 11:59 AM  
 Albumin 3.5 07/28/2020 11:59 AM  
 Globulin 3.7 07/28/2020 11:59 AM  
 
 
Lab Results Component Value Date/Time Reticulocyte count 0.4 (L) 12/16/2018 02:56 AM  
 Iron % saturation 20 06/17/2020 02:23 PM  
 TIBC 286 06/17/2020 02:23 PM  
 Ferritin 103 06/17/2020 02:23 PM  
 Vitamin B12 581 11/22/2019 11:45 AM  
 Folate 25.0 (H) 11/22/2019 11:45 AM  
  06/17/2020 02:23 PM  
 TSH 1.49 03/23/2019 02:27 AM  
 
Lab Results Component Value Date/Time INR 1.2 (H) 11/12/2018 01:22 AM  
 
Lab Results Component Value Date/Time  
 Prostate Specific Ag <0.1 12/12/2018 11:25 AM  
  06/17/2020 02:23 PM  
 
 
 
7/24/2020 XR Spine LUMB IMPRESSION: Prior kyphoplasty procedures of L1 and L2. No acute process 
identified by plain film technique. Constipation. 
  
 
7/28/2020 CT PELV WO CONT IMPRESSION: 
1. Scattered mixed lytic and sclerotic lesions throughout the pelvis concerning 
for metastatic diseaseMRI imaging is recommended 2. Acute fracture is not identified Assessment and Recommendations: 1. Pain, due to cancer, likely recurrent lymphoma:  New scattered mixed lytic and sclerotic lesions in pelvis noted on ED scans; will consult palliative care and hospice Goal is to have his pain under control 2. Diffuse large B-cell lymphoma: appears to have relapsed.   During last clinic visit on 2/12/20 Dr Hermila Mai I discussed with pt and family today the CT bone findings from 2/5/20. The mandible lesion is known and spine lesions may be recurrent lymphoma; at that time pt and family did not want further testing or biopsy as we would not treat without symptoms. CT imaging now with scattered mixed lytic and sclerotic lesions throughout the pelvis concerning for metastatic disease; radiology recommending further eval with MRI. His cancer clearly has returned. Unfortunately, this it has recurred multiple times and further chemotherapy would likely be toxic without cure at this time. He states to me that he would not want a biopsy at this time. I would not recommend further chemotherapy, and he and his daughter are in agreement. Will focus on pain control and QOL. Hospice will be consulted. May decide to do further imaging later, if there is a persistent problematic area for pain control, but want his pain under better control first prior to moving him for imaging. 3. Constipation Bowel regimen 4. Debility PT/OT eval; would recommend hospice, not rehab. 5. SARS- CoV2:  Test pending Pt seen in conjunction with William Hickey NP I was in the office while conducting this encounter. The patient was in the hospital 
 
Consent: 
He and/or his healthcare decision maker is aware that this patient-initiated Telehealth encounter is a billable service, with coverage as determined by his insurance carrier. He is aware that he may receive a bill and has provided verbal consent to proceed: Yes Pursuant to the emergency declaration under the [facility] waiver authority and the [employer] and [employer] Act, this Virtual  Visit was conducted, with patient's (and/or legal guardian's) consent, to reduce the patient's risk of exposure to COVID-19 and provide necessary medical care.  
  
Services were provided through a video synchronous discussion virtually to substitute for in-person visit.  
 
 
Signed By: Jenny Peralta MD

## 2020-07-28 NOTE — PROGRESS NOTES
7/28/20204:31 PM Received call from [facility] with [facility]([phone]) who confirmed referral was received and he will contact pt's daughter tonight. CM will follow up.   
 
7/28/2020  3:23 PM Received return phone call from pt's daughter, who reported [facility]([phone]) would be their preferred hospice agency. Pt's daughter relayed her hope is pt will be able to return to his [facility] with extra care. CM called to [employer], requested return phone call from liaison regarding referral. CM did confirmed with intake they service pt's home address. Referral sent to [facility] via [employer]. CM will follow up.  
 
7/28/2020 2:35 PM Consult for hospice received. Met with pt's daughter and son in law to discuss options for hospice. Pt's daughter reported they have a friend that is a  for a hospice agency in [place] and would like to use this agency. Pt's daughter reported she will contact her friend and relay hospice agency name to CM as soon as possible. CM discussed options for discharge disposition, hospice and return to [facility] with personal care vs [facility] vs SNF. Pt's daughter and son in law were unable to determine which level of care pt will need and was understanding pt will be evaluated by PT and OT to determine safest discharge disposition. CM provided pt's daughter and son in law CM's contact information to relay hospice agency preference. CM will follow. ADRIANA Otto Transitions of Care Plan: 
1. COVID r/o due to living in a congregated area(IDL) 2. Hospice recommendation for Oncology, choice of hospice agency pending family's decision 3. Discharge disposition is pending PT and OT recommendations

## 2020-07-28 NOTE — PROGRESS NOTES
9:11 AM 
Met with pt and pt daughter in pt's room. Pt is experiencing back pain, he came into [address] [facility] ED on 7/24/20 and back again today with the same complaint of back pain. Reason for Admission:   Back Pain RUR Score:   25 PCP: First and Last name: Felipe Rodriguez Name of Practice: 
 Are you a current patient: Yes/No: Yes Approximate date of last visit: within the last several months Can you do a virtual visit with your PCP: Yes, if daughter is available Resources/supports as identified by patient/family:  Pt has been living at [address] [facility] without issues, going to the dining room for meals and to PT in his building [facility] days a week until a week ago when the pain started in his back. He has a supportive daughter who is aware if he continues to have the pain which prohibits him from caring for himself, she expressed that she will need to either hire an aide to help him at home or search for assisted living facilities. CM will send via email a list of providers. Top Challenges facing patient (as identified by patient/family and CM): Finances/Medication cost?  None; has prescription coverage through [facility] Transportation? Daughter transports him to appointments Support system or lack thereof? DaughterErin can be reached at [phone] Living arrangements? Apartment at [facility] alone Self-care/ADLs/Cognition? A&O, able to care for himself until pain started on Friday. Current Advanced Directive/Advance Care Plan:  Pt has ACP Plan for utilizing home health:    Currently using [facility] for PT in his facility. Transition of Care Plan:     
 
1). Consult to Oncology 2). CM will follow for dc needs 3). Will email daughter list of private duty providers and assisted living Care Management Interventions PCP Verified by CM: Yes(in the last couple of months) Mode of Transport at Discharge: Other (see comment)(Daughter will transport at dc) Transition of Care Consult (CM Consult): Discharge Planning Discharge Durable Medical Equipment: No 
Physical Therapy Consult: No 
Occupational Therapy Consult: No 
Current Support Network: Other(Lives in [address] [place] [address]) Confirm Follow Up Transport: Family Discharge Location Discharge Placement: Home with family assistance

## 2020-07-28 NOTE — ED PROVIDER NOTES
Patient is a 80-year-old male presenting to the emergency department with right hip and back pain. Patient states that he cannot remember if he has had a recent fall however per nursing staff at his assisted living facility he did have a recent fall. Patient thinks that he might of fallen out of his chair approximately 2 weeks ago. Patient states that he went to stand up and walk is having difficulty with ambulation secondary to the right hip and back pain. Past Medical History:  
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  
Procedure Laterality Date  HX HEENT    
 HX ORTHOPAEDIC    
 HX PROSTATECTOMY  HX UROLOGICAL    
 NEUROLOGICAL PROCEDURE UNLISTED No family history on file. Social History Socioeconomic History  Marital status:  Spouse name: Not on file  Number of children: Not on file  Years of education: Not on file  Highest education level: Not on file Occupational History  Not on file Social Needs  Financial resource strain: Not on file  Food insecurity Worry: Not on file Inability: Not on file  Transportation needs Medical: Not on file Non-medical: Not on file Tobacco Use  Smoking status: Former Smoker  Smokeless tobacco: Never Used Substance and Sexual Activity  Alcohol use: No  
  Frequency: Never  Drug use: No  
 Sexual activity: Never Lifestyle  Physical activity Days per week: Not on file Minutes per session: Not on file  Stress: Not on file Relationships  Social connections Talks on phone: Not on file Gets together: Not on file Attends Adventism service: Not on file Active member of club or organization: Not on file Attends meetings of clubs or organizations: Not on file Relationship status: Not on file  Intimate partner violence Fear of current or ex partner: Not on file Emotionally abused: Not on file Physically abused: Not on file Forced sexual activity: Not on file Other Topics Concern  Not on file Social History Narrative ** Merged History Encounter ** ALLERGIES: Bactrim [sulfamethoprim] and Sulfamethoxazole Review of Systems Respiratory: Negative for shortness of breath. Cardiovascular: Negative for chest pain. Musculoskeletal: Positive for arthralgias, back pain and gait problem. Negative for joint swelling, neck pain and neck stiffness. Neurological: Negative for dizziness, light-headedness and headaches. All other systems reviewed and are negative. Vitals:  
 07/28/20 8122 BP: (!) 160/114 Pulse: 100 Resp: 20 Temp: 98.4 °F (36.9 °C) SpO2: 94% Weight: 68.5 kg (151 lb) Height: 5' 7\" (1.702 m) Physical Exam 
Vitals signs and nursing note reviewed. Constitutional:   
   Appearance: Normal appearance. Comments: Elderly, frail HENT:  
   Head: Normocephalic and atraumatic. Eyes:  
   Extraocular Movements: Extraocular movements intact. Conjunctiva/sclera: Conjunctivae normal.  
   Pupils: Pupils are equal, round, and reactive to light. Cardiovascular:  
   Rate and Rhythm: Normal rate and regular rhythm. Pulmonary:  
   Effort: Pulmonary effort is normal.  
   Breath sounds: Normal breath sounds. Musculoskeletal:  
   Lumbar back: He exhibits tenderness, bony tenderness and pain. He exhibits no swelling, no edema, no deformity and no laceration. Back: 
 
   Comments: Tenderness to palpation no overlying erythema or ecchymosis. Skin: 
   General: Skin is warm and dry. Neurological:  
   General: No focal deficit present. Mental Status: He is alert and oriented to person, place, and time. Cranial Nerves: No cranial nerve deficit. Sensory: No sensory deficit. Motor: No weakness. Coordination: Coordination normal.  
Psychiatric:     
   Mood and Affect: Mood normal.  
 
  
 
MDM Number of Diagnoses or Management Options Diagnosis management comments: A/P: Back contusion, pelvic fracture, sacral fracture. 55-year-old male presenting after questionable ground-level fall 2 weeks ago now with right hip/back pain with pain on ambulation. Will obtain CT pelvis without contrast to evaluate for fracture. Amount and/or Complexity of Data Reviewed Tests in the radiology section of CPT®: ordered and reviewed Procedures 10:25 AM 
CT imaging concerning for metastatic disease with lytic lesions involving the pelvis. Due to patient's B-cell lymphoma and these recent findings consult to Dr. Meme Valero. Spoke to hand Dr. Adeline Crenshaw nurse practitioner who will see and evaluate patient patient likely to be discharged with MRI outpatient. 10:46 AM 
After discussion with an nurse practitioner with Dr. Alexis Lopez and case management patient currently lives in independent living due to acute changes patient status patient's not safe to be discharged home for fears of increased fall risk. Patient will be admitted for pain control MRI imaging and likely placement. Blanca Matthews Serve for Admission 10:47 AM 
 
ED Room Number: ER07/07 Patient Name and age:  Tatiana Mittal 80 y.o.  male Working Diagnosis: 1. Hip pain 2. Acute midline low back pain, unspecified whether sciatica present COVID-19 Suspicion:  no 
 
Code Status:  Full Code Readmission: no 
Isolation Requirements:  yes Recommended Level of Care:  med/surg Department:[facility] ED - [phone] Other:

## 2020-07-28 NOTE — PROGRESS NOTES
Spiritual Care Assessment/Progress Note [address] 
 
 
NAME: Angélica Bah      MRN: [id number] AGE: 80 y.o. SEX: male Tenriism Affiliation: [facility] Language: English  
 
7/28/2020     Total Time (in minutes): 10 Spiritual Assessment begun in [facility] POST SURG ORT 2 through conversation with: 
  
    []Patient        [] Family    [] Friend(s) Reason for Consult: Palliative Care, Initial/Spiritual Assessment Spiritual beliefs: (Please include comment if needed) 
   [] Identifies with a elise tradition:     
   [] Supported by a elise community:        
   [] Claims no spiritual orientation:       
   [] Seeking spiritual identity:            
   [] Adheres to an individual form of spirituality:       
   [x] Not able to assess:                   
 
    
Identified resources for coping:  
   [] Prayer                           
   [] Music                  [] Guided Imagery 
   [] Family/friends                 [] Pet visits [] Devotional reading                         [x] Unknown 
   [] Other:                  
 
 
Interventions offered during this visit: (See comments for more details) Patient Interventions: Other (comment)(consulted with Pall NP) Plan of Care: 
 
 [] Support spiritual and/or cultural needs  
 [] Support AMD and/or advance care planning process    
 [] Support grieving process 
 [] Coordinate Rites and/or Rituals  
 [] Coordination with community clergy [] No spiritual needs identified at this time 
 [] Detailed Plan of Care below (See Comments)  [] Make referral to Music Therapy 
[] Make referral to Pet Therapy    
[] Make referral to Addiction services 
[] Make referral to [facility] 
[] Make referral to Spiritual Care Partner 
[] No future visits requested       
[x] Follow up visits as needed Comments: Consutled with Palliative NP Malika who indicated Mr. Grady Art is under contact Plus precautions. She share about her visit, Mr. Melonie Dawkins has a daughter. His wife and son have . Chaplains will contintue to follow as able and/or needed. Visited by: Zulma Estevez MS., [address] ([address]30)

## 2020-07-28 NOTE — PROGRESS NOTES
[facility]: MED RECONCILIATION Comments/Recommendations:  
Prior to admission medication list updated per telephone conversation with patient's daughter, David Reich, and patient's son in law via telephone. Last doses of medications were yesterday. Family does not believe that he took any of his medications today. Insulin NPH/Insulin regular 70/30: patient takes 18 units in the morning. He does not take any in the evening. Per family member, his MD does not want him to take insulin in the evening due to risk of hypoglycemia. Patient monitors blood glucose at home. Topical patch on back prior to physical therapy: family is not sure what type of patch. Possibly lidocaine patch. Patient's family requests that medication regimen be kept as simple as possible since patient lives independently and administers his own medications. Preferred pharmacy is [facility] on [address]. Allergies: Bactrim [sulfamethoprim] and Sulfamethoxazole Prior to Admission Medications Prescriptions Last Dose Informant Patient Reported? Taking? OTHER  Child Yes Yes Sig: Apply 1 Patch to affected area as needed. Apply patch to back prior to therapy. Family is not sure what type of patch. Possibly a lidocaine patch. acetaminophen (Tylenol Arthritis Pain) 650 mg TbER 2020 at PM Child Yes Yes Sig: Take 650 mg by mouth three (3) times daily. aspirin 81 mg chewable tablet 2020 at Unknown time Child No Yes Sig: Take 1 Tab by mouth daily. b complex vitamins (B COMPLEX 1) tablet 2020 at Unknown time Child Yes Yes Sig: Take 1 Tab by mouth daily. cholecalciferol (VITAMIN D3) 1,000 unit tablet 2020 at Unknown time Child Yes Yes Sig: Take 1,000 Units by mouth daily. dulaglutide (TRULICITY SC)  Child Yes Yes Si.75 mg by SubCUTAneous route every seven (7) days. ferrous sulfate 325 mg (65 mg iron) tablet 2020 at Unknown time Child Yes Yes Sig: Take 325 mg by mouth two (2) times a day. insulin NPH/insulin regular (NOVOLIN 70/30, HUMULIN 70/30) 100 unit/mL (70-30) injection 2020 at Unknown time Child Yes Yes Si Units by SubCUTAneous route Daily (before breakfast). lutein 20 mg cap 2020 at Unknown time Child Yes Yes Sig: Take 20 mg by mouth daily. methocarbamoL (Robaxin-750) 750 mg tablet 2020 at PM Child No Yes Sig: Take 1 Tab by mouth four (4) times daily. metoprolol tartrate (LOPRESSOR) 50 mg tablet 2020 at PM Child Yes Yes Sig: Take 25 mg by mouth two (2) times a day. mirabegron ER (MYRBETRIQ) 50 mg ER tablet 2020 at PM Child Yes Yes Sig: Take 50 mg by mouth nightly. omeprazole (PRILOSEC) 20 mg capsule 2020 at Unknown time Child Yes Yes Sig: Take 20 mg by mouth daily. predniSONE (DELTASONE) 20 mg tablet 2020 at Unknown time Child Yes Yes Sig: Take 20 mg by mouth daily (with breakfast). X 5 days Jessica Crespo, Pharmacist

## 2020-07-28 NOTE — ED TRIAGE NOTES
Pt arrives by EMS with a c/c of acute onset, lower back pain x3 days ago. EMS reports nursing facility admits to pt falling yesterday. Pt does not recall falling.

## 2020-07-28 NOTE — H&P
[facility] [address]  Office [phone] Fax [phone] Admission H&P Name: Army Morrissey MRN: [id number]  Sex: Male YOB: 1927  Age: 80 y.o. PCP: Kwame Foster MD  
 
Source of Information: patient, medical records Chief complaint: back pain History of Present Illness Army Morrissey is a 80 y.o. male with PMHx of large B-cell lymphoma, melanoma, prostate cancer, T2DM, HTN, HLD, and a-fib who presents to the ED complaining of acute onset back pain. Pt is a resident at [address] [place] [address] and states that about 4 days ago he experienced sudden onset pain in his lower back that he describes as \"aching\" and constant. The pain is present in his bilateral lower back and also extends to his R hip. The pt said the pain prevented him from getting out of bed or moving around much to the point where his daughter took him to [facility] ED where he had some imaging of his spine and was prescribed a muscle relaxer, steroids, and arthritis-strength tylenol. Three days passed with no improvement in the pt's pain at which point he presented to this ED. Pt states that he is independently ambulatory at baseline but has been unable to get out of bed even to use the bathroom over the last few days. He and his daughter deny any recent falls. Pt also denies dizziness, pain radiating down his legs, weakness, or bowel/bladder incontinence. Pt also cannot recall any precipitating event that would have caused his pain. Of note pt has a hx of large B-cell lymphoma that was diagnosed via an iliac bone biopsy in 2014. He received chemotherapy for this cancer and was on maintenance therapy up until 2018. Pt is followed by Dr. Nishant Mittal. COVID Questions:  
Experiencing any of the following symptoms: fever, chills, cough, SOB, diarrhea, URI symptoms. no 
Any Sick contacts with fever, cough, diarrhea, SOB, URI symptoms.  no 
 Traveled out of state or out of country. no 
Lives alone. Has been staying at home. yes In the ED: 
Vitals: Temp 98.4   /114      RR 20   SatO2  94% on RA Labs: WBC 12.8 Imaging: CT pelvis w/ scattered mixed lytic and sclerotic lesions throughout the pelvis concerning for metastatic disease. No acute fracture identified. Treatment: consult to heme/onc EKG: none Patient Vitals for the past 12 hrs: 
 Temp Pulse Resp BP SpO2  
07/28/20 0915    146/79 92 %  
07/28/20 0845    (!) 146/91 94 %  
07/28/20 0830    130/63 94 %  
07/28/20 0800    (!) 141/91 94 %  
07/28/20 0715    162/71 92 %  
07/28/20 0635 98.4 °F (36.9 °C) 100 20 (!) 160/114 94 % Review of Systems Review of Systems Constitutional: Negative for appetite change, chills, diaphoresis and fever. HENT: Negative for congestion. Respiratory: Negative for cough, shortness of breath and wheezing. Cardiovascular: Negative for chest pain. Gastrointestinal: Negative for abdominal pain, diarrhea, nausea and vomiting. Genitourinary: Negative for dysuria. Musculoskeletal: Negative for arthralgias and joint swelling. Skin: Negative for rash. Neurological: Negative for dizziness, weakness and headaches. Home Medications Prior to Admission medications Medication Sig Start Date End Date Taking? Authorizing Provider  
methocarbamoL (Robaxin-750) 750 mg tablet Take 1 Tab by mouth four (4) times daily. 7/24/20   Evelyne Guadarrama MD  
predniSONE (DELTASONE) 20 mg tablet Take 20 mg by mouth daily for 5 days. 7/24/20 7/29/20  Evelyne Guadarrama MD  
dextromethorphan (DELSYM) 30 mg/5 mL liquid Take 60 mg by mouth two (2) times a day. Other, MD Garrison  
albuterol (PROVENTIL HFA, VENTOLIN HFA, PROAIR HFA) 90 mcg/actuation inhaler Take 2 Puffs by inhalation every four (4) hours as needed for Wheezing or Shortness of Breath.  3/3/20   Trista Tom DO  
 insulin NPH/insulin regular (HUMULIN 70/30 U-100 INSULIN) 100 unit/mL (70-30) injection 20 U in the morning. 2/19/20   Rosemarie Braga MD  
omeprazole (PRILOSEC) 20 mg capsule Take 20 mg by mouth daily. Provider, Historical  
metoprolol tartrate (LOPRESSOR) 25 mg tablet Take 1 Tab by mouth two (2) times a day. 10/11/19   Cesar ALFONSO, DO  
dulaglutide (TRULICITY SC) 5.93 mg by SubCUTAneous route every seven (7) days. Provider, Historical  
acetaminophen (TYLENOL) 500 mg tablet Take 500-1,000 mg by mouth nightly as needed for Pain. Takes one every night    Provider, Historical  
aspirin 81 mg chewable tablet Take 1 Tab by mouth daily. 1/11/19   Cesar ALFONSO,   
ferrous sulfate 325 mg (65 mg iron) tablet Take 325 mg by mouth two (2) times a day. Garrison Padilla MD  
b complex vitamins (B COMPLEX 1) tablet Take 1 Tab by mouth daily. Provider, Historical  
lutein 20 mg cap Take 20 mg by mouth daily. Garrison Padilla MD  
cholecalciferol (VITAMIN D3) 1,000 unit tablet Take 1,000 Units by mouth daily. Garrison Padilla MD  
mirabegron ER (MYRBETRIQ) 50 mg ER tablet Take 50 mg by mouth every fourty-eight (48) hours. Garirson Padilla MD  
 
 
Allergies Allergies Allergen Reactions  Bactrim [Sulfamethoprim] Rash  Sulfamethoxazole Unknown (comments) Past Medical History Past Medical History:  
Diagnosis Date  Anemia  Arthritis  Cancer ([address]  Constipation  Diabetes ([address]  Gastrointestinal disorder  History of neoplasms, uncertain behavior Of soft tissue and skin  Hypercholesterolemia  Hyperkalemia  Hypertension  Low back pain  Melanoma in situ of other parts of face ([address] Lower lip; removed  Neuropathy  Right bundle-branch block  Spinal stenosis  Type II diabetes mellitus ([address]  Venous insufficiency Previous Hospitalization(s) Past Surgical History:  
Procedure Laterality Date  HX HEENT    
 HX ORTHOPAEDIC    
  HX PROSTATECTOMY  HX UROLOGICAL    
 NEUROLOGICAL PROCEDURE UNLISTED Family History No family history on file. Social History Alcohol history: Not at all Smoking history: Non-smoker Illicit drug history: Not at all Living arrangement: patient lives alone. Ambulates: Independently Vital Signs Visit Vitals /79 Pulse 100 Temp 98.4 °F (36.9 °C) Resp 20 Ht 5' 7\" (1.702 m) Wt 151 lb (68.5 kg) SpO2 92% BMI 23.65 kg/m² Physical Exam 
General: No acute distress. Alert. Cooperative. Hard of hearing Head: Normocephalic. Atraumatic. Neck: Supple. Normal ROM. No stiffness. Respiratory: CTAB. No w/r/r/c.  
Cardiovascular: RRR. Normal S1,S2. No m/r/g. Pulses 2+ throughout. GI: + bowel sounds. Nontender. No rebound tenderness or guarding. Nondistended. Back: Lumbar spine tender to palpation including to b/l paravertebral muscles. No erythema or ecchymoses noted. No point tenderness or step offs appreciated. Extremities: Absent LE edema. Distal pulses intact. R buttock tender to palpation, no erythema or ecchymoses present. Musculoskeletal: Full ROM in all extremities. Skin: Warm, dry. No rashes. Neuro: CN II-XII grossly intact. A&O x4. 
  
 
Laboratory Data Recent Results (from the past 8 hour(s)) GLUCOSE, POC Collection Time: 07/28/20  8:51 AM  
Result Value Ref Range Glucose (POC) 204 (H) 65 - 100 mg/dL Performed by Matais [facility] Imaging CXR Results  (Last 48 hours) None CT Results  (Last 48 hours) 07/28/20 0745  CT PELV WO CONT Final result Impression:  IMPRESSION:  
1. Scattered mixed lytic and sclerotic lesions throughout the pelvis concerning  
for metastatic disease. MR imaging is recommended 2. Acute fracture is not identified Narrative:  INDICATION:  eval for fx s/p fall , pelvic pain EXAM: CT pelvis. No comparisons. Thin section axial images were obtained. From these sagittal and coronal  
reformats were performed. CT dose reduction was achieved through use of a  
standardized protocol tailored for this examination and automatic exposure  
control for dose modulation. FINDINGS: Bone mineral density is decreased. There are scattered mixed chronic  
and lytic lesions throughout the pelvis. The largest is noted in the posterior  
right iliac wing measuring 5.7 x 1.8 cm. Subtle periosteal reaction with this  
largest lesion. There is no acute fracture. No evidence of avascular necrosis. There are degenerative changes of the lower lumbar spine. There are vascular calcifications. Patient is post left inguinal hernia repair. Intrapelvic bowel is nondistended. No soft tissue hematoma Assessment and Plan Wilma Chu is a 80 y.o. male with a PMHx of large B-cell lymphoma, melanoma, prostate cancer, T2DM, HTN, HLD, and a-fib who is admitted for low back pain. Low back pain: Likely 2/2 metastatic progression of lymphoma (dx 2014). Pt s/p maintenance chemo (5/2018). CT pelvis w/ scattered mixed lytic and sclerotic lesions throughout the pelvis concerning for metastatic disease. No acute fracture identified. Pt follows w/ Dr. Emmie Ritchie outpt. 
-tylenol 650mg PO Q6H, morphine 2mg Q4H PRN 
-miralax daily PRN 
-will monitor pain control and adjust regimen as needed 
-consult to heme/onc - appreciate recs 
     -further consults to palliative medicine, hospice 
     -can consider MRI for further workup of bone lesions, will defer at this time 
     -goals of care discussion w/ family 
     -treatment for cancer recurrence would likely be non-curative 
-PT/OT evaluation for debility Hx large B-cell lymphoma: Dx in 2014 w/ chemotherapy up until 2018. Pt follows w/ Dr. Aly Mendoza. Imaging as above. 
-pain regimen as above 
-consult to heme/onc, palliative, hospice as above T2DM: A1C 8.4 (2/6/20). Home meds include NPH/regular 78/71 68N QAM, trulicity Q7 days. -will start lantus 15u tomorrow morning 
-SSI normal sensitivity, ACHS POC glucose checks 
-hypoglycemia protocols ordered Hypertension: BP on admission 160/114, likely elevated due to pain. Home regimen includes metoprolol. 
- continue home metoprolol 25mg BID  
- labetalol 20mg IV Q6H PRN SBP >170, DBP >110 
- will continue to monitor at this time and readjust as BP's trend Hx of A-fib: Follows w/ Dr. Dana Calloway. RRR on admission. 
-continue home metoprolol 25mg BID 
 
GERD: Home meds include prolosec. 
-start protonix 40mg PO daily per hospital formulary HLD:  
-continue home ASA 81mg daily Anemia: Recent iron panel normal. 
-continue ferrous sulfate 325mg BID Urinary frequency: 
-continue home myrbetriq 50mg PO Q48H Hx of melanoma: Pt underwent Moh's procedure in 2019. 
-stable Hx of prostate cancer: Dx 1995, s/p prostatectomy 
-stable FEN/GI - Diabetic diet. NS at 100 mL/hr. Activity - Ambulate with assistance DVT prophylaxis - Lovenox GI prophylaxis - Protonix Fall prophylaxis - Not indicated at this time. Disposition - Admit to Remote Telemetry. Plan to d/c to [facility]. Consulting PT, OT and CM Code Status - DNR. Discussed with patient / caregivers. [facility] Name and Contact - Aura Gasca,  Daughter - [phone] Patient Arina Meraz will be discussed with Dr. Poly Thompson. 11:17 AM, 07/28/20 Yara Fraga MD 
Family Medicine Resident For Billing Chief Complaint Patient presents with  Back Pain Hospital Problems  Date Reviewed: 4/4/2020 Codes Class Noted POA Back pain ICD-10-CM: M54.9 ICD-9-CM: 724.5  7/28/2020 Unknown Hip pain ICD-10-CM: M25.559 ICD-9-CM: 719.45  7/28/2020 Unknown

## 2020-07-29 PROBLEM — A04.72 C. DIFFICILE DIARRHEA: Status: RESOLVED | Noted: 2018-11-13 | Resolved: 2020-01-01

## 2020-07-29 PROBLEM — R65.21 SEPTIC SHOCK (HCC): Status: RESOLVED | Noted: 2019-03-22 | Resolved: 2020-01-01

## 2020-07-29 PROBLEM — I95.9 HYPOTENSION: Status: RESOLVED | Noted: 2018-12-15 | Resolved: 2020-01-01
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
## 2020-07-29 PROBLEM — R35.0 URINARY FREQUENCY: Status: ACTIVE | Noted: 2020-01-01

## 2020-07-29 PROBLEM — I49.1 PREMATURE ATRIAL COMPLEXES: Status: RESOLVED | Noted: 2018-11-10 | Resolved: 2020-01-01

## 2020-07-29 PROBLEM — J05.10 EPIGLOTTITIS: Status: RESOLVED | Noted: 2020-01-01 | Resolved: 2020-01-01

## 2020-07-29 PROBLEM — A41.9 SEPTIC SHOCK (HCC): Status: RESOLVED | Noted: 2019-03-22 | Resolved: 2020-01-01

## 2020-07-29 PROBLEM — Z86.19 HISTORY OF CLOSTRIDIUM DIFFICILE COLITIS: Status: RESOLVED | Noted: 2018-11-10 | Resolved: 2020-01-01

## 2020-07-29 PROBLEM — J18.9 HAP (HOSPITAL-ACQUIRED PNEUMONIA): Status: RESOLVED | Noted: 2019-03-24 | Resolved: 2020-01-01

## 2020-07-29 PROBLEM — K21.9 GERD (GASTROESOPHAGEAL REFLUX DISEASE): Status: ACTIVE | Noted: 2020-01-01

## 2020-07-29 NOTE — MED STUDENT NOTES
*ATTENTION:  This note has been created by a medical student for educational purposes only. Please do not refer to the content of this note for clinical decision-making, billing, or other purposes. Please see attending physicians note to obtain clinical information on this patient. * [id number] [facility] PROGRAM 
PROGRESS NOTE  
 
7/29/2020 PCP: Karol Hatfield MD  
 
Assessment/Plan: In summary, Mr. Forrest Plascencia is a 79yo M with past medical history of B-cell lymphoma with pelvic metastases, prostate cancer s/p prostatectomy, melanoma in situ s/p Mohs removal, DMT2, Afib, hypertension, and GERD who presents with significant back/pelvic back. #B-Cell Lymphoma with metastases Evaluated by Heme/Onc, appreciate their input. His new-onset pain is progression of his known existing pelvic disease. At this time, Mr. Forrest Plascencia does not wish to pursue further intervention and would prefer to have his pain controlled and to be comfortable. He is opioid naive prior to this admission. 
-Morphine 2mg IV q2hrs 
-Dexamethasone 4mg 
-Acetaminophen 1000mg q8hrs -PT/OT to evaluate 
-Palliative, Hospice, and Heme/Onc consulted #Hypertension Improved on home metoprolol. Has not needed any labetalol. Continue to monitor. #DMT2 Start NPH 15u qAM + SSI. As he is now on dexamethasone may need to increase insulin requirements. Subjective:  
Overnight, he had an episode of difficulty urinating. He could not initiate his stream; a bladder scan showed 504mL in the bladder. Upon getting back into bed, however, he was incontinent. The patient denies any lower extremity weakness or loss of sensation, or difficulty with bowel movements. He otherwise denies fevers, chills, chest pain, shortness of breath. The morphine is controlling his pelvic pain. Objective:  
Physical examination Patient Vitals for the past 24 hrs: 
 Temp Pulse Resp BP SpO2  
07/29/20 0821 97.7 °F (36.5 °C) 82 18 124/75 96 % 20 0700  86     
20 0338 97.5 °F (36.4 °C) 85 18 115/54 95 %  
20 2325  76     
20 2314 98 °F (36.7 °C) 76 18 111/54 91 %  
20 1956 97.7 °F (36.5 °C) 70 18 157/72 91 %  
20 1500  (!) 102     
20 1421 97.8 °F (36.6 °C) 90 16 (!) 160/93 94 %  
20 1312  93     
20 1254    (!) 172/91   
20 1252 97.6 °F (36.4 °C) 91 15 (!) 180/100 95 %  
20 1145 97.4 °F (36.3 °C) 96 18 (!) 134/96 95 %  
20 1130    134/81 97 %  
20 1115    132/79 93 % 20 1100    117/69 95 %  
20 1045    150/81 93 % 20 1030    (!) 141/94 94 %  
20 1015    149/74 94 %  
20 1000    136/82 94 %  
20 0945    147/78 95 % Temp (24hrs), Av.7 °F (36.5 °C), Min:97.4 °F (36.3 °C), Max:98 °F (36.7 °C) O2 Device: Room air Date 20 - 20 5363 20 - 20 8075 Shift  24 Hour Total 1900-0659 24 Hour Total  
INTAKE  
I.V.(mL/kg/hr)  436.7(0.5) 436.7(0.3) Volume (0.9% sodium chloride infusion)  436.7 436.7 Shift Total(mL/kg)  436. 7(6.4) 436. 7(6.4) OUTPUT Urine(mL/kg/hr)  250(0.3) 250(0.2) 300  300 Urine Voided  250 250 300  300 Shift Total(mL/kg)  250(3.7) 250(3.7) 300(4.4)  300(4.4) NET  186.7 186.7 -300  -300 Weight (kg) 68.5 68.5 68.5 68.5 68.5 68.5 General:   Alert, cooperative, no acute distress Head:   Atraumatic Lungs:   Clear to auscultation bilaterally Heart:   Normal rate, regular rhythm, no murmur, rubs or gallops Abdomen:    Soft, non-tender No masses or organomegaly Extremities:  No edema or DVT signs Pulses:  Symmetric all extremities Skin:  Warm and dry No rashes or lesions Neurologic:  Alert and oriented x4 No focal deficits Data Review: CBC: 
Recent Labs  
  20 
1159 WBC 12.8* HGB 14.6 HCT 44.2  Metabolic Panel: 
Recent Labs 07/28/20 
1159   
K 4.3  CO2 31 BUN 23* CREA 1.02  
* CA 9.3 ALB 3.5 TBILI 0.6 ALT 20 Micro: 
Lab Results Component Value Date/Time Culture result: NO GROWTH 1 DAY 03/24/2019 09:49 PM  
 Culture result: MRSA PRESENT (A) 03/23/2019 04:01 AM  
 Culture result:  03/23/2019 04:01 AM  
      Screening of patient nares for MRSA is for surveillance purposes and, if positive, to facilitate isolation considerations in high risk settings. It is not intended for automatic decolonization interventions per se as regimens are not sufficiently effective to warrant routine use. Imaging: 
Xr Spine Lumb 2 Or 3 V Result Date: 7/24/2020 EXAM: XR SPINE LUMB 2 OR 3 V INDICATION: Back Pain COMPARISON: 1/3/2020 FINDINGS: Nonstandard AP, lateral and spot lateral views of the lumbar spine. There is a persistent kyphoplasty change involving L1 and L2 with unchanged severe compression deformity of L1. No new finding is seen. Multiple pelvic clips are present. Constipation is present. IMPRESSION: Prior kyphoplasty procedures of L1 and L2. No acute process identified by plain film technique. Constipation. Ct Pelv Wo Cont Result Date: 7/28/2020 INDICATION:  eval for fx s/p fall , pelvic pain EXAM: CT pelvis. No comparisons. Thin section axial images were obtained. From these sagittal and coronal reformats were performed. CT dose reduction was achieved through use of a standardized protocol tailored for this examination and automatic exposure control for dose modulation. FINDINGS: Bone mineral density is decreased. There are scattered mixed chronic and lytic lesions throughout the pelvis. The largest is noted in the posterior right iliac wing measuring 5.7 x 1.8 cm. Subtle periosteal reaction with this largest lesion. There is no acute fracture. No evidence of avascular necrosis. There are degenerative changes of the lower lumbar spine.  There are vascular calcifications. Patient is post left inguinal hernia repair. Intrapelvic bowel is nondistended. No soft tissue hematoma IMPRESSION: 1. Scattered mixed lytic and sclerotic lesions throughout the pelvis concerning for metastatic disease. MR imaging is recommended 2. Acute fracture is not identified Medications reviewed Current Facility-Administered Medications Medication Dose Route Frequency  insulin lispro (HUMALOG) injection   SubCUTAneous AC&HS  
 glucose chewable tablet 16 g  4 Tab Oral PRN  
 dextrose (D50W) injection syrg 12.5-25 g  12.5-25 g IntraVENous PRN  
 glucagon (GLUCAGEN) injection 1 mg  1 mg IntraMUSCular PRN  
 aspirin chewable tablet 81 mg  81 mg Oral DAILY  ferrous sulfate tablet 325 mg  325 mg Oral BID  metoprolol tartrate (LOPRESSOR) tablet 25 mg  25 mg Oral BID  sodium chloride (NS) flush 5-40 mL  5-40 mL IntraVENous Q8H  
 sodium chloride (NS) flush 5-40 mL  5-40 mL IntraVENous PRN  polyethylene glycol (MIRALAX) packet 17 g  17 g Oral DAILY PRN  promethazine (PHENERGAN) tablet 12.5 mg  12.5 mg Oral Q6H PRN Or  
 ondansetron (ZOFRAN) injection 4 mg  4 mg IntraVENous Q6H PRN  
 enoxaparin (LOVENOX) injection 40 mg  40 mg SubCUTAneous DAILY  labetaloL (NORMODYNE;TRANDATE) 20 mg/4 mL (5 mg/mL) injection 20 mg  20 mg IntraVENous Q6H PRN  
 0.9% sodium chloride infusion  100 mL/hr IntraVENous CONTINUOUS  
 glucose chewable tablet 16 g  4 Tab Oral PRN  
 dextrose (D50W) injection syrg 12.5-25 g  25-50 mL IntraVENous PRN  
 glucagon (GLUCAGEN) injection 1 mg  1 mg IntraMUSCular PRN  pantoprazole (PROTONIX) tablet 40 mg  40 mg Oral DAILY  mirabegron ER (MYRBETRIQ) tablet 50 mg  50 mg Oral Q48H  
 morphine injection 2 mg  2 mg IntraVENous Q2H PRN  
 dexAMETHasone (DECADRON) tablet 4 mg  4 mg Oral DAILY  acetaminophen (TYLENOL) tablet 1,000 mg  1,000 mg Oral Q8H  
 insulin NPH (NOVOLIN N, HUMULIN N) injection 12 Units  12 Units SubCUTAneous DAILY  senna-docusate (PERICOLACE) 8.6-50 mg per tablet 1 Tab  1 Tab Oral QHS  polyethylene glycol (MIRALAX) packet 17 g  17 g Oral DAILY Signed: 
 Quinton Hawkins MS4 Attending note: Attending note to follow. ..

## 2020-07-29 NOTE — PROGRESS NOTES
Spiritual Care Assessment/Progress Note [facility] 
 
 
NAME: Reji Pearson      MRN: [id number] AGE: 80 y.o. SEX: male Denominational Affiliation: Katie Blackwell Language: English  
 
7/29/2020     Total Time (in minutes): 21 Spiritual Assessment begun in [facility] 4M POST SURG ORT 2 through conversation with: 
  
    [x]Patient        [] Family    [] Friend(s) Reason for Consult: Initial/Spiritual assessment, patient floor Spiritual beliefs: (Please include comment if needed) [x] Identifies with a elise tradition:   Katie Blackwell   
   [] Supported by a elise community:        
   [] Claims no spiritual orientation:       
   [] Seeking spiritual identity:            
   [] Adheres to an individual form of spirituality:       
   [] Not able to assess:                   
 
    
Identified resources for coping:  
   [] Prayer                           
   [] Music                  [] Guided Imagery [x] Family/friends                 [] Pet visits [] Devotional reading                         [] Unknown 
   [] Other:                                       
 
 
Interventions offered during this visit: (See comments for more details) Patient Interventions: Affirmation of emotions/emotional suffering, Initial/Spiritual assessment, patient floor, Iconic (affirming the presence of God/Higher Power), Affirmation of elise, Prayer (assurance of) Plan of Care: 
 
 [] Support spiritual and/or cultural needs  
 [] Support AMD and/or advance care planning process    
 [] Support grieving process 
 [] Coordinate Rites and/or Rituals  
 [] Coordination with community clergy [] No spiritual needs identified at this time 
 [] Detailed Plan of Care below (See Comments)  [] Make referral to Music Therapy 
[] Make referral to Pet Therapy    
[] Make referral to Addiction services 
[] Make referral to [facility] 
[] Make referral to Spiritual Care Partner [] No future visits requested       
[x] Follow up visits as needed Comments: Initial Palliative Care spiritual assessment in 4 Post surg. Mr. Vanessa Potter son in law and daughter were visiting. All remembered me from previous visits. The mood in the room was light. Mr. Cortes Lunsford is very hard of hearing. He indicated he as feeling a bit better and hoped to be going home. He had just received his lunch. Assisted with getting him some mustard and novak which did not come with his valentin. He asked to be remembered in Prayer. Provided spiritual presence and assurance of prayer. He asked for continued visits as chaplains are able. Chaplains will follow as able and/or needed. Visited by: Nick Khan., MS., [address] ([id number])

## 2020-07-29 NOTE — PROGRESS NOTES
Bedside and Verbal shift change report given to  Carla Villanueva Rn (oncoming nurse) by   Joseph Gates (offgoing nurse). Report included the following information SBAR, Kardex, Intake/Output, MAR, Accordion, Recent Results and Med Rec Status.

## 2020-07-29 NOTE — PROGRESS NOTES
7/29/2020 4:08 PM Multiple conversations had with pt's daughter and son in law. CM discussed options at length with family, return to [facility] with hospice, [facility] with hospice, private adult home with hospice vs home with family and hospice. Pt's son in law inquiring about SNF option, CM explained qualifications under Medicare. Disposition with barriers: 1. Return to [facility], cost of 24 hour personal care($2500-$3500 a week) 2. [facility] placement, cost and also visitor restrictions due to [address] 3. Private adult home, family uncertain of the care pt would receive at this level of care because care varies at each adult home 4. Home with daughter and son in law, they watch their grandchildren and will not have the availability to care for pt 5. SNF placement, pt is not rehab appropriate given hospice recommendation Pt's daughter, understandably is having a difficult time deciding on best option for pt. CM provided pt's daughter and son in law with resource for adult homes, [facility] with [employer]. Pt's daughter plans to call [facility] to discuss options. CM also called numerous ALFs in [place] and [facility] and provided this information to pt's daughter and son in law regarding visitation and costs. Pt's daughter expressed her uncertainty of best plan for pt given the prognosis of pt is uncertain in terms of time. Discharge disposition will be difficult due to cost of care, COVID19 pandemic limitations, and family decision. CM will continue to provide support and assistance to pt's family with discharge placement. Pt has been accepted by [facility]. HERNANDO GrandaW

## 2020-07-29 NOTE — PROGRESS NOTES
Palliative Medicine Consult Patient Name: Adeline Yeung YOB: 1927 Date of Initial Consult: 2020 Reason for Consult: care decisions Requesting Provider: Maxime Canseco NP Primary Care Physician: Hilda Corral MD 
  
 SUMMARY:  
Adeline Yeung is a 80 y.o. with a past history of recurrent C. Dif, Prostate cancer s/p prostatectomy () B cell lymphoma (dx ), HLD, dysphagia , who was admitted on 2020 from home with a diagnosis of  Current medical issues leading to Palliative Medicine involvement include: care decisions. Patient presented to ER w/ cc of acute, lower back, right lower hip pain  x a few days. Patient also reportedly having had a recent fall. /91, WBC 12. 8. XR Lumbar spine +Scattered mixed lytic and sclerotic lesions throughout the pelvis concerning for metastatic disease. Patient admitted for further workup. Oncology consulted, pain likely d/t cancer, likely recurrent lymphoma. The lymphoma has recurred multiple times and further chemo likely do more harm, recommended comfort focused care. Patient declined biopsy. COVID19 PUI. SH- Patient living in an independent living facility, his daughterNiru and PRUDENCE live  in [place]. Son  in 2016. Patient  x 2, both . Patient was a , did [address] work in [address]. PALLIATIVE DIAGNOSES:  
 
1. Goals of care discussion 2. Pain 3. Debility 4. Concern about end of life PLAN:  
1. Prior to visit I completed chart review for updated information since initial consult  2. COVID19 test returned negative. 3. I met with patient, no family initially  at bedside, though I did meet with them later in day. 4. F/U today to assess effectiveness of adjustments I made to patients pain treatment plan yesterday. 5. Pain- cancer related bone pain. See palliative ESAS for more detailed information. Patient reported pain better,particularly at rest, still has severe pain with movement. · Patients current pain treatment plan is Morphine 2mg IV every 2 hours as needed, he has used x4, plus she received an additional 2mg Iv from a different order that has since been discontinued. Patient has required  5 doses (total 10mg) IV over past 24 hours · I was informed patient had difficulty participating in PT d/t severe pain. · 10mg IV morphine equals approx 30mg oral morphine. I placed an order for MS Contin 15mg orally julianna 12 hours. Continue with morphine 2mg IV every 2 hours as needed, moderate to severe pain. · I increased frequency dexamethasone 4mg orally to twice daily. 6. Constipation- No BM x 2 days. Increased risk of constipation secondary to opioid, decreased activity, decreased intake. I increased senna s lissett twice daily from once daily. Patient  also has order for miralax q hs. 7. Debility- Patient had been active, ambulated independently up until the past 2 weeks when lower back/hip pain began. Patient reported pain with ambulation became increasingly difficult. · Im still hopeful that if patients pain well  controlled, he will be able to resume activity, ambulate independently and be able to be discharged back to his apartment. 8. I called patients daughter after my  visit with patient, informed them negative COVID19 test results, I also let them know patient likely being transferred to 5th floor. Family stated they would be at bedside 1130, they wanted to meet with WILMA Che. I sent Yane message via [employer] and she was able to meet with them. 9.   [facility] discussion- met with patients daughter and son in law, they are interested in [facility], but uncertain about dispo plans, as it does not seem that patient is able to return to independent living without additional ATC care. CM exploring options, [facility] will accept patient once plan formulated.  
10. Understandable, family struggling with patient being at end of life, they are very concerned about dispo, dont want him to go somewhere where they do not allow visitors due to [address] pandemic, there are also financial barriers to hiring CGs atc etc. When patient moved out of 4th floow to 5th, they were concerned that they would not be working with unit WILMA Barfield, they verbalized their need for continuity. I had run into them later in the day at which time they verbalized their concerns, and that they had unsuccessfully attempted to contact patient advocate. I spoke with [facility] patient advocate, Luis Edwardswiley, she agreed to meet with family. 11. Communicated plan of care with: Palliative IDT, Dr. Yesenia Ennis w/ family medicine and WILMA Barfield GOALS OF CARE / TREATMENT PREFERENCES:  
[====Goals of Care====] GOALS OF CARE: 
Patient/Health Care Proxy Stated Goals: Comfort TREATMENT PREFERENCES:  
Code Status: DNR Advance Care Planning: 
Advance Care Planning 7/28/2020 Patient's Healthcare Decision Maker is: Named in scanned ACP document Confirm Advance Directive Yes, on file Patient Would Like to Complete Advance Directive - Does the patient have other document types Do Not Resuscitate Medical Interventions: Comfort measures Other Instructions:  
Artificially Administered Nutrition: No feeding tube The palliative care team has discussed with patient / health care proxy about goals of care / treatment preferences for patient. 
[====Goals of Care====] HISTORY:  
 
History obtained from: chart, patient, family CHIEF COMPLAINT: denied HPI/SUBJECTIVE: The patient is:  
[x] Verbal and participatory [] Non-participatory due to:  
Patient is very hard of hearing. Pain better, see palliative ESAS. Denied SOB, Clinical Pain Assessment (nonverbal scale for severity on nonverbal patients):  
Clinical Pain Assessment Severity: 2 Location: lower back, bottom Character: tight, rigid, sharp Duration: weeks Effect: difficulty walking, getting comfortable Factors: walking Frequency: continuous FUNCTIONAL ASSESSMENT:  
 
Palliative Performance Scale (PPS): PPS: 40 PSYCHOSOCIAL/SPIRITUAL SCREENING:  
 
Advance Care Planning: 
Advance Care Planning 7/28/2020 Patient's Healthcare Decision Maker is: Named in scanned ACP document Confirm Advance Directive Yes, on file Patient Would Like to Complete Advance Directive - Does the patient have other document types Do Not Resuscitate Any spiritual / Rastafarian concerns: 
[] Yes /  [x] No 
 
Caregiver Burnout: 
[] Yes /  [x] No /  [] No Caregiver Present Anticipatory grief assessment:  
[x] Normal  / [] Maladaptive ESAS Anxiety: Anxiety: 0 
 
ESAS Depression: Depression: 0 REVIEW OF SYSTEMS:  
 
Positive and pertinent negative findings in ROS are noted above in HPI. The following systems were [x] reviewed / [] unable to be reviewed as noted in HPI Other findings are noted below. Systems: constitutional, ears/nose/mouth/throat, respiratory, gastrointestinal, genitourinary, musculoskeletal, integumentary, neurologic, psychiatric, endocrine. Positive findings noted below. Modified ESAS Completed by: provider Fatigue: 4 Drowsiness: 0 Depression: 0 Pain: 2 Anxiety: 0 Nausea: 0 Anorexia: 5 Dyspnea: 0 Constipation: Yes PHYSICAL EXAM:  
 
From RN flowsheet: 
Wt Readings from Last 3 Encounters:  
07/28/20 151 lb (68.5 kg) 07/24/20 145 lb (65.8 kg) 03/03/20 154 lb 15.7 oz (70.3 kg) Blood pressure 115/63, pulse 87, temperature 97.6 °F (36.4 °C), resp. rate 18, height 5' 7\" (1.702 m), weight 151 lb (68.5 kg), SpO2 92 %. Pain Scale 1: Numeric (0 - 10) Pain Intensity 1: 2 Pain Onset 1: acute Pain Location 1: Back Pain Orientation 1: Lower Pain Description 1: Aching Pain Intervention(s) 1: Medication (see MAR) Last bowel movement, if known: Constitutional: thin, appears somewhat younger than stated age, awake, alert, pleasant, NAD Eyes: pupils equal, anicteric ENMT: no nasal discharge, moist mucous membranes Cardiovascular: regular rhythm, distal pulses intact Respiratory: breathing not labored, symmetric Gastrointestinal: soft non-tender, +bowel sounds Musculoskeletal: no deformity, no tenderness to palpation Skin: warm, dry Neurologic: following commands, moving all extremities Psychiatric: full affect, no hallucinations Other: 
 
 
 HISTORY:  
 
Principal Problem: 
  Hip pain (7/28/2020) Active Problems: 
  DM (diabetes mellitus) ([address] (11/10/2018) Non Hodgkin's lymphoma ([address] (11/12/2018) Atrial fibrillation ([address] (3/22/2019) Essential hypertension (3/24/2019) Acute kidney injury ([address] (3/24/2019) Chronic anemia (3/24/2019) Dyslipidemia (3/24/2019) Back pain (7/28/2020) Urinary frequency (7/29/2020) GERD (gastroesophageal reflux disease) (7/29/2020) Past Medical History:  
Diagnosis Date  Anemia  Arthritis  C. 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  
 History of Clostridium difficile colitis 11/10/2018  History of neoplasms, uncertain behavior Of soft tissue and skin  Hypercholesterolemia  Hyperkalemia  Hypertension  Hypotension 12/15/2018  Low back pain  Melanoma in situ of other parts of face ([address] Lower lip; removed  Neuropathy  Premature atrial complexes 11/10/2018  Right bundle-branch block  Septic shock ([address] 3/22/2019  Spinal stenosis  Type II diabetes mellitus ([address]  Venous insufficiency Past Surgical History:  
Procedure Laterality Date  HX HEENT    
 HX ORTHOPAEDIC    
 HX PROSTATECTOMY  HX UROLOGICAL    
 NEUROLOGICAL PROCEDURE UNLISTED    
 No family history on file. History reviewed, no pertinent family history. Social History Tobacco Use  Smoking status: Former Smoker  Smokeless tobacco: Never Used Substance Use Topics  Alcohol use: No  
  Frequency: Never Allergies Allergen Reactions  Bactrim [Sulfamethoprim] Rash  Sulfamethoxazole Unknown (comments) Current Facility-Administered Medications Medication Dose Route Frequency  insulin lispro (HUMALOG) injection   SubCUTAneous AC&HS  
 glucose chewable tablet 16 g  4 Tab Oral PRN  
 dextrose (D50W) injection syrg 12.5-25 g  12.5-25 g IntraVENous PRN  
 glucagon (GLUCAGEN) injection 1 mg  1 mg IntraMUSCular PRN  
 morphine CR (MS CONTIN) tablet 15 mg  15 mg Oral Q12H  
 senna-docusate (PERICOLACE) 8.6-50 mg per tablet 1 Tab  1 Tab Oral BID  dexAMETHasone (DECADRON) tablet 4 mg  4 mg Oral Q12H  aspirin chewable tablet 81 mg  81 mg Oral DAILY  ferrous sulfate tablet 325 mg  325 mg Oral BID  metoprolol tartrate (LOPRESSOR) tablet 25 mg  25 mg Oral BID  sodium chloride (NS) flush 5-40 mL  5-40 mL IntraVENous Q8H  
 sodium chloride (NS) flush 5-40 mL  5-40 mL IntraVENous PRN  polyethylene glycol (MIRALAX) packet 17 g  17 g Oral DAILY PRN  promethazine (PHENERGAN) tablet 12.5 mg  12.5 mg Oral Q6H PRN Or  
 ondansetron (ZOFRAN) injection 4 mg  4 mg IntraVENous Q6H PRN  
 enoxaparin (LOVENOX) injection 40 mg  40 mg SubCUTAneous DAILY  labetaloL (NORMODYNE;TRANDATE) 20 mg/4 mL (5 mg/mL) injection 20 mg  20 mg IntraVENous Q6H PRN  
 0.9% sodium chloride infusion  100 mL/hr IntraVENous CONTINUOUS  
 glucose chewable tablet 16 g  4 Tab Oral PRN  
 dextrose (D50W) injection syrg 12.5-25 g  25-50 mL IntraVENous PRN  
 glucagon (GLUCAGEN) injection 1 mg  1 mg IntraMUSCular PRN  pantoprazole (PROTONIX) tablet 40 mg  40 mg Oral DAILY  mirabegron ER (MYRBETRIQ) tablet 50 mg  50 mg Oral Q48H  morphine injection 2 mg  2 mg IntraVENous Q2H PRN  
 acetaminophen (TYLENOL) tablet 1,000 mg  1,000 mg Oral Q8H  
 insulin NPH (NOVOLIN N, HUMULIN N) injection 12 Units  12 Units SubCUTAneous DAILY  polyethylene glycol (MIRALAX) packet 17 g  17 g Oral DAILY  
 
 
 
 LAB AND IMAGING FINDINGS:  
 
Lab Results Component Value Date/Time WBC 12.8 (H) 07/28/2020 11:59 AM  
 HGB 14.6 07/28/2020 11:59 AM  
 PLATELET 525 07/34/2509 11:59 AM  
 
Lab Results Component Value Date/Time Sodium 138 07/28/2020 11:59 AM  
 Potassium 4.3 07/28/2020 11:59 AM  
 Chloride 105 07/28/2020 11:59 AM  
 CO2 31 07/28/2020 11:59 AM  
 BUN 23 (H) 07/28/2020 11:59 AM  
 Creatinine 1.02 07/28/2020 11:59 AM  
 Calcium 9.3 07/28/2020 11:59 AM  
 Magnesium 2.0 03/26/2019 04:05 AM  
 Phosphorus 3.2 03/26/2019 04:05 AM  
  
Lab Results Component Value Date/Time Alk. phosphatase 69 07/28/2020 11:59 AM  
 Protein, total 7.2 07/28/2020 11:59 AM  
 Albumin 3.5 07/28/2020 11:59 AM  
 Globulin 3.7 07/28/2020 11:59 AM  
 
Lab Results Component Value Date/Time INR 1.2 (H) 11/12/2018 01:22 AM  
 Prothrombin time 11.6 (H) 11/12/2018 01:22 AM  
  
Lab Results Component Value Date/Time Iron 57 06/17/2020 02:23 PM  
 TIBC 286 06/17/2020 02:23 PM  
 Iron % saturation 20 06/17/2020 02:23 PM  
 Ferritin 103 06/17/2020 02:23 PM  
  
No results found for: PH, PCO2, PO2 No components found for: [place] No results found for: CPK, CKMB Total time:65min Counseling / coordination time, spent as noted above:50 min 
> 50% counseling / coordination?: yes Prolonged service was provided for  [x]30 min   []75 min in face to face time in the presence of the patient, spent as noted above. Time Start:  
Time End:  
Note: this can only be billed with 82390 (initial) or 56520 (follow up). If multiple start / stop times, list each separately.

## 2020-07-29 NOTE — PROGRESS NOTES
Problem: Mobility Impaired (Adult and Pediatric) Goal: *Acute Goals and Plan of Care (Insert Text) Description: FUNCTIONAL STATUS PRIOR TO ADMISSION: Patient was modified independent using a rolling walker for functional mobility. HOME SUPPORT PRIOR TO ADMISSION: The patient lived alone with no local support. Physical Therapy Goals Initiated 7/29/2020 1. Patient will move from supine to sit and sit to supine  in bed with minimal assistance/contact guard assist within 7 day(s). 2.  Patient will transfer from bed to chair and chair to bed with minimal assistance/contact guard assist using the least restrictive device within 7 day(s). 3.  Patient will perform sit to stand with minimal assistance/contact guard assist within 7 day(s). 4.  Patient will ambulate with minimal assistance/contact guard assist for 150 feet with the least restrictive device within 7 day(s). Outcome: Progressing Towards Goal 
Note: PHYSICAL THERAPY EVALUATION Patient: Ramirez Diaz (89 y.o. 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 Patient with intermittent ability to participate due to pain. Patient was just medicated with IV morphine prior to therapy session, which allowed him to participate. Without medication the patient is unable to achieve sitting. Patient overall required MIN A for upright activity and RW for stability. Patient lives alone in an independent living facility, he states family is unable to provide 24 hour assist.  Patient to return to his apartment will require 24 hour physical assist, if unable to return home with that assistance will require SNF for safety. Current Level of Function Impacting Discharge (mobility/balance): patient with MIN A for bed mobility and sit-stand, RW with CGA. He was medicated with IV pain medication just prior to session, without IV pain medication he is unable to participate Functional Outcome Measure: The patient scored Total: 35/100 on the Barthel Index which is indicative of 65% impaired ability to care for basic self needs/dependency on others. Other factors to consider for discharge: patient is returning home with hospice Patient will benefit from skilled therapy intervention to address the above noted impairments. PLAN : 
Recommendations and Planned Interventions: bed mobility training, transfer training, gait training, therapeutic exercises, neuromuscular re-education, and therapeutic activities Frequency/Duration: Patient will be followed by physical therapy:  5 times a week to address goals. Recommendation for discharge: (in order for the patient to meet his/her long term goals) Therapy up to 5 days/week in SNF setting This discharge recommendation: A follow-up discussion with the attending provider and/or case management is planned IF patient discharges home will need the following DME: to be determined (TBD) SUBJECTIVE:  
Patient stated . OBJECTIVE DATA SUMMARY:  
HISTORY:   
Past Medical History:  
Diagnosis Date Anemia Arthritis C. difficile diarrhea 11/13/2018 [address] Constipation Diabetes ([address] Epiglottitis 2/6/2020 Gastrointestinal disorder HAP (hospital-acquired pneumonia) 3/24/2019 History of Clostridium difficile colitis 11/10/2018 History of neoplasms, uncertain behavior Of soft tissue and skin Hypercholesterolemia Hyperkalemia Hypertension Hypotension 12/15/2018 Low back pain Melanoma in situ of other parts of face ([address] Lower lip; removed Neuropathy Premature atrial complexes 11/10/2018 Right bundle-branch block Septic shock ([facility] 3/22/2019 Spinal stenosis Type II diabetes mellitus ([facility] Venous insufficiency Past Surgical History:  
Procedure Laterality Date HX HEENT    
 HX ORTHOPAEDIC    
 HX PROSTATECTOMY HX UROLOGICAL    
 NEUROLOGICAL PROCEDURE UNLISTED Personal factors and/or comorbidities impacting plan of care: see above Home Situation Home Environment: Independent living # Steps to Enter: 0 One/Two Story Residence: One story Living Alone: Yes Support Systems: Family member(s) Patient Expects to be Discharged to[de-identified] Skilled nursing facility Current DME Used/Available at Home: Cane, straight EXAMINATION/PRESENTATION/DECISION MAKING:  
Critical Behavior: 
Neurologic State: Alert Orientation Level: Oriented X4 Cognition: Follows commands Hearing: Auditory Auditory Impairment: Hard of hearing, bilateral, Hearing aid(s) Hearing Aids/Status: With patient Skin:  all exposed intact Edema: none noted Range Of Motion: 
AROM: Grossly decreased, non-functional 
  
  
  
PROM: Grossly decreased, non-functional 
  
  
  
Strength:   
Strength: Grossly decreased, non-functional 
  
  
  
  
  
  
Tone & Sensation:  
  
  
  
  
  
  
  
  
  
   
Coordination: 
Coordination: Grossly decreased, non-functional 
Vision:  
  
Functional Mobility: 
Bed Mobility: 
Rolling: Minimum assistance Supine to Sit: Moderate assistance Sit to Supine: Moderate assistance Transfers: 
Sit to Stand: Minimum assistance Stand to Sit: Minimum assistance Bed to Chair: Minimum assistance Balance:  
  
Ambulation/Gait Training: 
Distance (ft): 30 Feet (ft) Assistive Device: Walker, rolling;Gait belt Ambulation - Level of Assistance: Contact guard assistance Gait Description (WDL): Exceptions to [facility] Gait Abnormalities: Decreased step clearance; Step to gait Functional Measure: 
Barthel Index: 
 
Bathin Bladder: 5 Bowels: 5 Groomin Dressin Feedin Mobility: 5 Stairs: 0 Toilet Use: 5 Transfer (Bed to Chair and Back): 10 Total: 35/100 The Barthel ADL Index: Guidelines 1. The index should be used as a record of what a patient does, not as a record of what a patient could do. 2. The main aim is to establish degree of independence from any help, physical or verbal, however minor and for whatever reason. 3. The need for supervision renders the patient not independent. 4. A patient's performance should be established using the best available evidence. Asking the patient, friends/relatives and nurses are the usual sources, but direct observation and common sense are also important. However direct testing is not needed. 5. Usually the patient's performance over the preceding 24-48 hours is important, but occasionally longer periods will be relevant. 6. Middle categories imply that the patient supplies over 50 per cent of the effort. 7. Use of aids to be independent is allowed. Marie Buenrostro., Barthel, D.W. (2429). Functional evaluation: the Barthel Index. [address] [facility] (14)2. Ghanshyam Guzman brigitte MARIS Kat, Kavin Milner., DeejayMountain View Regional Medical Centerraúl Brooks., [place], [address] [place] [address] (). Measuring the change indisability after inpatient rehabilitation; comparison of the responsiveness of the Barthel Index and Functional [place] Measure. Journal of Neurology, Neurosurgery, and Psychiatry, 66(4), 564-242. Kirk Dimas, N.J.A, Nahum Quiroz  WYEHUDA.SERJIO, & Radha Young MJIMMIE. (2004.) Assessment of post-stroke quality of life in cost-effectiveness studies: The usefulness of the Barthel Index and the EuroQoL-5D. [facility], 13, 143-37 Physical Therapy Evaluation Charge Determination History Examination Presentation Decision-Making HIGH Complexity :3+ comorbidities / personal factors will impact the outcome/ POC  MEDIUM Complexity : 3 Standardized tests and measures addressing body structure, function, activity limitation and / or participation in recreation  MEDIUM Complexity : Evolving with changing characteristics  Other outcome measures barthel index  HIGH Based on the above components, the patient evaluation is determined to be of the following complexity level: MEDIUM Pain Rating: 
Unable to participate unless premedicated with IV pain medication Activity Tolerance:  
Fair Please refer to the flowsheet for vital signs taken during this treatment. After treatment patient left in no apparent distress:  
Supine in bed, Call bell within reach, and Bed / chair alarm activated COMMUNICATION/EDUCATION:  
The patients plan of care was discussed with: Occupational therapist and Registered nurse, Case management. Fall prevention education was provided and the patient/caregiver indicated understanding., Patient/family have participated as able in goal setting and plan of care. , and Patient/family agree to work toward stated goals and plan of care. Thank you for this referral. 
Mansoor Queen, PT, DPT Time Calculation: 22 mins

## 2020-07-29 NOTE — PROGRESS NOTES
Med[facility] Gorman [facility] called regarding Negative COVID results. MD to d/c droplet precautions. Attempted to call back regarding pt blood glucose 272, no answer x2.  
 
0430 - Called to pt's room, pt attempting to use urinal and states \"I don't know if I am peeing\". 50ml noted in urinal. Asked pt if he felt he emptied his bladder. He states \"I don't know\". Bladder scan done, 504ml in bladder. Attempted to stand pt at bedside to void. Pt unable to stand. Returned to bed, bed pad changed/incontinence care provided. 0 - Dr. Sunni Salazar Notified of HS blood glucose and pt bladder as noted above. MD to order SSI, advised to continue to monitor output and bladder scan as needed.

## 2020-07-29 NOTE — PROGRESS NOTES
Palliative medicine brief note- 
 
Patient has used 5 doses of IV Morphine since 1300 hours on  7/28, which is a total of 10 mg IV Morphine. New order placed for patient to start MS Contin 15 mg orally every 12 hours. Patient has not had documented BM since admission. I increased frequency of Senna S, 1 tab twice daily. I will call daughter shortly. Please contact me via [employer] with any questions or concerns. Thank you.

## 2020-07-29 NOTE — PROGRESS NOTES
[id number] [facility] PROGRAM 
PROGRESS NOTE  
 
7/29/2020 PCP: Ana Trinidad MD  
 
Assessment/Plan:  
 
Harleen Lira is a 80 y.o. male with a PMHx of large B-cell lymphoma, melanoma, prostate cancer, T2DM, HTN, HLD, and a-fib who is admitted for low back pain. 24-hour events: Pt decided to pursue hospice care upon discharge. He used 6mg of PRN morphine yesterday. 
  
Low back pain: Likely 2/2 metastatic progression of lymphoma (dx 2014). Pt s/p maintenance chemo (5/2018). CT pelvis w/ scattered mixed lytic and sclerotic lesions throughout the pelvis concerning for metastatic disease. No acute fracture identified. Pt follows w/ Dr. Shiraz Brandon. -consult to palliative - appreciate recs: 
     -tylenol 1000mg PO Q8H, morphine 2mg Q2H PRN 
     -dexamethasone 4mg daily 
     -pt desires to go home w/ hospice care [facility]) 
     -palliative will continue to follow and readjust pain regimen as needed 
-miralax daily PRN 
-consult to heme/onc - appreciate recs -PT/OT evaluation for debility 
  
Hx large B-cell lymphoma, likely recurrence: Dx in 2014 w/ chemotherapy up until 2018. Pt follows w/ Dr. Shiraz Brandon. Imaging as above. 
-pain regimen as above 
-consult to heme/onc, palliative, hospice as above COVID Negative: Pt resident at an independent living facility, at increased risk due to congregate living. SARS-COV PCR negative x1. 
  
T2DM: A1C 8.4 (2/6/20). Home meds include NPH/regular 14/42 07K QAM, trulicity Q7 days. -start NPH 12u this morning 
-SSI normal sensitivity, ACHS POC glucose checks 
-hypoglycemia protocols ordered 
  
Hypertension: BP on admission 160/114, likely elevated due to pain. Home regimen includes metoprolol. Improved this morning. 
- continue home metoprolol 25mg BID  
- PRN labetalol SBP >170, DBP >110 
- will continue to monitor at this time and readjust as BP's trend 
  
Hx of A-fib: Follows w/ Dr. Cyrus Ziegler outpt. RRR on admission. -continue home metoprolol 25mg BID 
  
GERD: Home meds include prilosec. 
-protonix 40mg PO daily 
  
HLD:  
-home ASA 81mg daily 
  
Anemia: Recent iron panel normal. 
-home ferrous sulfate 325mg BID 
  
Urinary frequency: 
-home myrbetriq 50mg PO Q48H 
  
Hx of melanoma - stable: Pt underwent Moh's procedure in 2019 
  
Hx of prostate cancer - stable: Dx 1995, s/p prostatectomy 
  
FEN/GI - Diabetic diet. NS at 100 mL/hr. Activity - Ambulate with assistance DVT prophylaxis - Lovenox GI prophylaxis - Protonix Fall prophylaxis - Not indicated at this time. Code Status - DNR. Discussed with patient / caregivers. Next of Fabian Dumont Name and Contact - Saundra Kuhn,  Daughter - [phone] Fabian Shepard discussed with Dr. Bao Rodriguez. Subjective: Pt was seen and examined at bedside. Afebrile and hemodynamically stable. Slept well overnight, PRN morphine is working well. Endorses one episode of urinary incontinence. Denies chest pain, SOB, nausea, vomiting, abdominal pain, dizziness. Objective:  
Physical examination Patient Vitals for the past 24 hrs: 
 Temp Pulse Resp BP SpO2  
07/29/20 0338 97.5 °F (36.4 °C) 85 18 115/54 95 %  
07/28/20 2325  76     
07/28/20 2314 98 °F (36.7 °C) 76 18 111/54 91 %  
07/28/20 1956 97.7 °F (36.5 °C) 70 18 157/72 91 %  
07/28/20 1500  (!) 102     
07/28/20 1421 97.8 °F (36.6 °C) 90 16 (!) 160/93 94 %  
07/28/20 1312  93     
07/28/20 1254    (!) 172/91   
07/28/20 1252 97.6 °F (36.4 °C) 91 15 (!) 180/100 95 %  
07/28/20 1145 97.4 °F (36.3 °C) 96 18 (!) 134/96 95 %  
07/28/20 1130    134/81 97 %  
07/28/20 1115    132/79 93 % 07/28/20 1100    117/69 95 %  
07/28/20 1045    150/81 93 % 07/28/20 1030    (!) 141/94 94 %  
07/28/20 1015    149/74 94 %  
07/28/20 1000    136/82 94 %  
07/28/20 0945    147/78 95 %  
07/28/20 0915    146/79 92 %  
07/28/20 0845    (!) 146/91 94 %  
07/28/20 0830    130/63 94 % 20 0800    (!) 141/91 94 %  
20 0715    162/71 92 %  
20 0635 98.4 °F (36.9 °C) 100 20 (!) 160/114 94 % Temp (24hrs), Av.8 °F (36.6 °C), Min:97.4 °F (36.3 °C), Max:98.4 °F (36.9 °C) O2 Device: Room air Date 20 - 20 1509 20 - 20 3238 Shift 6372-2263 2309-3445 24 Hour Total 2743-8843 1145-9502 24 Hour Total  
INTAKE  
I.V.(mL/kg/hr)  436.7 436.7 Volume (0.9% sodium chloride infusion)  436.7 436.7 Shift Total(mL/kg)  436. 7(6.4) 436. 7(6.4) OUTPUT Urine(mL/kg/hr)  250 250 Urine Voided  250 250 Shift Total(mL/kg)  250(3.7) 250(3.7) NET  186.7 186. 7 Weight (kg) 68.5 68.5 68.5 68.5 68.5 68.5 Physical Exam 
General: No acute distress. Alert. Cooperative. Hard of hearing Head: Normocephalic. Atraumatic. Neck: Supple. Normal ROM. No stiffness. Respiratory: CTAB. No w/r/r/c.  
Cardiovascular: RRR. Normal S1,S2. Systolic ejection murmur 2/6. No r/g. Pulses 2+ throughout. GI: + bowel sounds. Nontender. No rebound tenderness or guarding. Nondistended. Back: Lumbar spine tender to palpation including to b/l paravertebral muscles. No erythema or ecchymoses noted. No point tenderness or step offs appreciated. Extremities: Absent LE edema. Distal pulses intact. R buttock tender to palpation, no erythema or ecchymoses present. Musculoskeletal: Full ROM in all extremities. Skin: Warm, dry. No rashes. Neuro: CN II-XII grossly intact. A&O x4. Data Review: CBC: 
Recent Labs  
  20 
1159 WBC 12.8* HGB 14.6 HCT 44.2  Metabolic Panel: 
Recent Labs  
  20 
1159   
K 4.3  CO2 31 BUN 23* CREA 1.02  
* CA 9.3 ALB 3.5 TBILI 0.6 ALT 20 Micro: 
Lab Results Component Value Date/Time  Culture result: NO GROWTH 1 DAY 2019 09:49 PM  
 Culture result: MRSA PRESENT (A) 2019 04:01 AM  
 Culture result:  03/23/2019 04:01 AM  
      Screening of patient nares for MRSA is for surveillance purposes and, if positive, to facilitate isolation considerations in high risk settings. It is not intended for automatic decolonization interventions per se as regimens are not sufficiently effective to warrant routine use. Imaging: 
Xr Spine Lumb 2 Or 3 V Result Date: 7/24/2020 EXAM: XR SPINE LUMB 2 OR 3 V INDICATION: Back Pain COMPARISON: 1/3/2020 FINDINGS: Nonstandard AP, lateral and spot lateral views of the lumbar spine. There is a persistent kyphoplasty change involving L1 and L2 with unchanged severe compression deformity of L1. No new finding is seen. Multiple pelvic clips are present. Constipation is present. IMPRESSION: Prior kyphoplasty procedures of L1 and L2. No acute process identified by plain film technique. Constipation. Ct Pelv Wo Cont Result Date: 7/28/2020 INDICATION:  eval for fx s/p fall , pelvic pain EXAM: CT pelvis. No comparisons. Thin section axial images were obtained. From these sagittal and coronal reformats were performed. CT dose reduction was achieved through use of a standardized protocol tailored for this examination and automatic exposure control for dose modulation. FINDINGS: Bone mineral density is decreased. There are scattered mixed chronic and lytic lesions throughout the pelvis. The largest is noted in the posterior right iliac wing measuring 5.7 x 1.8 cm. Subtle periosteal reaction with this largest lesion. There is no acute fracture. No evidence of avascular necrosis. There are degenerative changes of the lower lumbar spine. There are vascular calcifications. Patient is post left inguinal hernia repair. Intrapelvic bowel is nondistended. No soft tissue hematoma IMPRESSION: 1. Scattered mixed lytic and sclerotic lesions throughout the pelvis concerning for metastatic disease. MR imaging is recommended 2. Acute fracture is not identified Medications reviewed Current Facility-Administered Medications Medication Dose Route Frequency  insulin lispro (HUMALOG) injection   SubCUTAneous AC&HS  
 glucose chewable tablet 16 g  4 Tab Oral PRN  
 dextrose (D50W) injection syrg 12.5-25 g  12.5-25 g IntraVENous PRN  
 glucagon (GLUCAGEN) injection 1 mg  1 mg IntraMUSCular PRN  
 aspirin chewable tablet 81 mg  81 mg Oral DAILY  ferrous sulfate tablet 325 mg  325 mg Oral BID  metoprolol tartrate (LOPRESSOR) tablet 25 mg  25 mg Oral BID  sodium chloride (NS) flush 5-40 mL  5-40 mL IntraVENous Q8H  
 sodium chloride (NS) flush 5-40 mL  5-40 mL IntraVENous PRN  polyethylene glycol (MIRALAX) packet 17 g  17 g Oral DAILY PRN  promethazine (PHENERGAN) tablet 12.5 mg  12.5 mg Oral Q6H PRN Or  
 ondansetron (ZOFRAN) injection 4 mg  4 mg IntraVENous Q6H PRN  
 enoxaparin (LOVENOX) injection 40 mg  40 mg SubCUTAneous DAILY  labetaloL (NORMODYNE;TRANDATE) 20 mg/4 mL (5 mg/mL) injection 20 mg  20 mg IntraVENous Q6H PRN  
 0.9% sodium chloride infusion  100 mL/hr IntraVENous CONTINUOUS  
 glucose chewable tablet 16 g  4 Tab Oral PRN  
 dextrose (D50W) injection syrg 12.5-25 g  25-50 mL IntraVENous PRN  
 glucagon (GLUCAGEN) injection 1 mg  1 mg IntraMUSCular PRN  pantoprazole (PROTONIX) tablet 40 mg  40 mg Oral DAILY  mirabegron ER (MYRBETRIQ) tablet 50 mg  50 mg Oral Q48H  
 morphine injection 2 mg  2 mg IntraVENous Q2H PRN  
 dexAMETHasone (DECADRON) tablet 4 mg  4 mg Oral DAILY  acetaminophen (TYLENOL) tablet 1,000 mg  1,000 mg Oral Q8H  
 insulin NPH (NOVOLIN N, HUMULIN N) injection 12 Units  12 Units SubCUTAneous DAILY  senna-docusate (PERICOLACE) 8.6-50 mg per tablet 1 Tab  1 Tab Oral QHS  polyethylene glycol (MIRALAX) packet 17 g  17 g Oral DAILY Signed: 
 Kurtis Ortiz MD 
 Resident, Family Medicine Attending note: Attending note to follow. ..

## 2020-07-29 NOTE — PROGRESS NOTES
Bedside shift change report given to Mable (oncoming nurse) by Trent Roberts (offgoing nurse). Report included the following information SBAR, Kardex, Intake/Output and Recent Results. TRANSFER - OUT REPORT: 
 
Verbal report given to Brittani(name) on Army Morrissey  being transferred to [facility](unit) for routine progression of care Report consisted of patients Situation, Background, Assessment and  
Recommendations(SBAR). Information from the following report(s) SBAR, Kardex, Intake/Output and Med Rec Status was reviewed with the receiving nurse. Lines:  
Venous Access Device (Active) Peripheral IV 07/28/20 Left;Mid Forearm (Active) Site Assessment Clean, dry, & intact 07/29/20 0066 Phlebitis Assessment 0 07/29/20 0819 Infiltration Assessment 0 07/29/20 0819 Dressing Status Clean, dry, & intact 07/29/20 0838 Dressing Type Transparent 07/29/20 0819 Hub Color/Line Status Pink 07/29/20 5721 Action Taken Open ports on tubing capped 07/29/20 0819 Alcohol Cap Used Yes 07/29/20 3570 Opportunity for questions and clarification was provided. Patient transported with: 
 Monitor Tech

## 2020-07-29 NOTE — PROGRESS NOTES
7/29/2020   CARE MANAGEMENT NOTE:  Pt transferred from the 4th to the 5th floor. EMR reviewed and handoff received from previous  Kay Rojas). Pt was admitted with low back pain, likely 2/2 large B-cell lymphoma. Reportedly, pt resides at [facility] living. Dtr, Aster Izaguirre ([phone]) is the primary family contact. Transition Plan of Care: 1. [facility] ([phone]) referral was sent yesterday by my colleague. We briefly discussed pt's return home with private duty aides for one week so that pt could have \"closure. \"  Also discussed hospice at an alternate facility such as a [facility]. Of concern, facilities may have a no visit policy currently 2/2 Covid guidelines. 2.  PT eval complete; pt ambulated 30 feet on 7/29 and SNF was recommended. 3.  4th floor  Kay Rojas) will continue to provide support and will assist pt/family with discharge planning. CM will continue to follow pt for definitive discharge plan SNF vs home hospice vs other options. Leana

## 2020-07-29 NOTE — PROGRESS NOTES
Problem: Self Care Deficits Care Plan (Adult) Goal: *Acute Goals and Plan of Care (Insert Text) Description:  
FUNCTIONAL STATUS PRIOR TO ADMISSION: Patient was modified independent using a rolling walker for functional mobility. HOME SUPPORT: The patient lived alone with no local support. Occupational Therapy Goals Initiated 7/29/2020 1. Patient will perform lower body dressing with supervision/set-up within 7 day(s). 2.  Patient will perform bathing with supervision/set-up within 7 day(s). 3.  Patient will perform toilet transfers with supervision/set-up within 7 day(s). 4.  Patient will perform all aspects of toileting with supervision/set-up within 7 day(s). 5.  Patient will utilize energy conservation techniques during functional activities with verbal cues within 7 day(s). Outcome: Progressing Towards Goal 
 OCCUPATIONAL THERAPY EVALUATION Patient: Ramirez Diaz (13 y.o. male) Date: 7/29/2020 Primary Diagnosis: Back pain [M54.9] Hip pain [M25.559] Precautions:   Fall(hard of hearing) ASSESSMENT Based on the objective data described below, the patient presents with decreased functional mobility, decreased strength and increased pain following admission for back pain. Patient reports back and hip pain and found to have metastatic b-cell lymphoma with mets to spine and bone. Patient agreeable to activity and requests to try to move. He has been medicated with IV morphine ~20-30 minutes prior to evaluation. Patient requires min assist for mobility at this time with use of RW and assist x 1. He is unable to manage distal LE dressing due to pain, but does attempt cross leg technique. Patient returned to bed at end of session.   Per charting patient hopeful to return home, however at this time, he is unable to be alone secondary to narcotic medication schedule, and need for physical assist.  Patient reports he may be able to find assist to allow to return home, however if unable SNF is recommended. Current Level of Function Impacting Discharge (ADLs/self-care): min assist, patient recently medicated with IV morphine Functional Outcome Measure: The patient scored 35/100 on the Barthel Index outcome measure. Other factors to consider for discharge: lives alone Patient will benefit from skilled therapy intervention to address the above noted impairments. PLAN : 
Recommendations and Planned Interventions: self care training, functional mobility training, therapeutic exercise, balance training, therapeutic activities, endurance activities, patient education, home safety training, and family training/education Frequency/Duration: Patient will be followed by occupational therapy 5 times a week to address goals. Recommendation for discharge: (in order for the patient to meet his/her long term goals) Therapy up to 5 days/week in SNF setting This discharge recommendation: 
Has been made in collaboration with the attending provider and/or case management IF patient discharges home will need the following DME: TBD SUBJECTIVE:  
Patient stated I want to  try.  OBJECTIVE DATA SUMMARY:  
HISTORY:  
Past Medical History:  
Diagnosis Date Anemia Arthritis C. 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  
Procedure Laterality Date HX HEENT    
 HX ORTHOPAEDIC    
 HX PROSTATECTOMY HX UROLOGICAL    
 NEUROLOGICAL PROCEDURE UNLISTED Expanded or extensive additional review of patient history:  
 
Home Situation Home Environment: Independent living # Steps to Enter: 0 One/Two Story Residence: One story Living Alone: Yes Support Systems: Family member(s) Patient Expects to be Discharged to[de-identified] Skilled nursing facility Current DME Used/Available at Home: Cane, straight Tub or Shower Type: Shower Hand dominance: Right EXAMINATION OF PERFORMANCE DEFICITS: 
Cognitive/Behavioral Status: 
Neurologic State: Alert Orientation Level: Oriented X4 Cognition: Follows commands Skin: intact as seen Edema: none noted Hearing: Auditory Auditory Impairment: Hard of hearing, bilateral, Hearing aid(s) Hearing Aids/Status: With patient Vision/Perceptual:   
    
    
    
  
    
    
  
 
Range of Motion: 
AROM: Grossly decreased, non-functional 
PROM: Grossly decreased, non-functional 
  
  
  
  
  
  
 
Strength: 
Strength: Grossly decreased, non-functional 
  
  
  
  
 
Coordination: 
Coordination: Grossly decreased, non-functional 
    
  
 
Tone & Sensation: 
Normal tone, sensation intact Balance: 
  
 
Functional Mobility and Transfers for ADLs: 
Bed Mobility: 
Rolling: Minimum assistance Supine to Sit: Moderate assistance Sit to Supine: Moderate assistance Transfers: 
Sit to Stand: Minimum assistance Stand to Sit: Minimum assistance Bed to Chair: Minimum assistance Toilet Transfer : Minimum assistance(verbal cues for walker managment, safety) Assistive Device : Walker, rolling ADL Assessment: 
Feeding: Setup Oral Facial Hygiene/Grooming: Contact guard assistance(seated ) Bathing: Minimum assistance(seated ) Upper Body Dressing: Contact guard assistance Lower Body Dressing: Minimum assistance Toileting: Minimum assistance ADL Intervention and task modifications: 
  
 
  
 
  
 
  
 
  
 
  
 
  
 
  
 
Therapeutic Exercise:  
Functional Measure: 
Barthel Index: 
 
Bathin Bladder: 5 Bowels: 5 Groomin Dressin Feedin Mobility: 5 Stairs: 0 Toilet Use: 5 Transfer (Bed to Chair and Back): 10 Total: 35/100 The Barthel ADL Index: Guidelines 1. The index should be used as a record of what a patient does, not as a record of what a patient could do. 2. The main aim is to establish degree of independence from any help, physical or verbal, however minor and for whatever reason. 3. The need for supervision renders the patient not independent. 4. A patient's performance should be established using the best available evidence. Asking the patient, friends/relatives and nurses are the usual sources, but direct observation and common sense are also important. However direct testing is not needed. 5. Usually the patient's performance over the preceding 24-48 hours is important, but occasionally longer periods will be relevant. 6. Middle categories imply that the patient supplies over 50 per cent of the effort. 7. Use of aids to be independent is allowed. Shahida Gallego., Barthel, D.W. (5141). Functional evaluation: the Barthel Index. [address] [facility] (14)2. MARIS Santiago, Ovi Romero., Fred MadridHCA [facility], [address] [place] [address] (). Measuring the change indisability after inpatient rehabilitation; comparison of the responsiveness of the Barthel Index and Functional [place] Measure. Journal of Neurology, Neurosurgery, and Psychiatry, 66(4), 014-803. Carter Sandhoff, N.J.A, YAMILE Sanderson, & Cesar Singh MJIMMIE. (2004.) Assessment of post-stroke quality of life in cost-effectiveness studies: The usefulness of the Barthel Index and the EuroQoL-5D. [facility], 13, [phone] Occupational Therapy Evaluation Charge Determination History Examination Decision-Making LOW Complexity : Brief history review  LOW Complexity : 1-3 performance deficits relating to physical, cognitive , or psychosocial skils that result in activity limitations and / or participation restrictions  LOW Complexity : No comorbidities that affect functional and no verbal or physical assistance needed to complete eval tasks Based on the above components, the patient evaluation is determined to be of the following complexity level: LOW Pain Rating: 
 
 
Activity Tolerance:  
Fair and requires rest breaks Please refer to the flowsheet for vital signs taken during this treatment. After treatment patient left in no apparent distress:   
Supine in bed, Call bell within reach, Bed / chair alarm activated, and Side rails x 3 
 
COMMUNICATION/EDUCATION:  
The patients plan of care was discussed with: Physical therapist and Registered nurse. Home safety education was provided and the patient/caregiver indicated understanding., Patient/family have participated as able in goal setting and plan of care. , and Patient/family agree to work toward stated goals and plan of care. This patients plan of care is appropriate for delegation to \[facility] [place] [facility]\"". Thank you for this referral. 
Sarai Marks OTR/L Time Calculation: 23 mins

## 2020-07-29 NOTE — CONSULTS
Palliative Medicine Consult Patient Name: Dagoberto Montero YOB: 1927 Date of Initial Consult: 2020 Reason for Consult: care decisions Requesting Provider: Cassi Rodriguez NP Primary Care Physician: Caitlin Singh MD 
  
 SUMMARY:  
Dagoberto Montero is a 80 y.o. with a past history of recurrent C. Dif, Prostate cancer s/p prostatectomy () B cell lymphoma (dx ), HLD, dysphagia , who was admitted on 2020 from home with a diagnosis of  Current medical issues leading to Palliative Medicine involvement include: care decisions. Patient presented to ER w/ cc of acute, lower back, right lower hip pain  x a few days. Patient also reportedly having had a recent fall. /91, WBC 12. 8. XR Lumbar spine +Scattered mixed lytic and sclerotic lesions throughout the pelvis concerning for metastatic disease. Patient admitted for further workup. Oncology consulted, pain likely d/t cancer, likely recurrent lymphoma. The lymphoma has recurred multiple times and further chemo likely do more harm, recommended comfort focused care. Patient declined biopsy. COVID19 PUI. SH- Patient living in an independent living facility, his daughterNiru and PRUDENCE live  in [place]. Son  in 2016. Patient  x 2, both . PALLIATIVE DIAGNOSES:  
1. COVID19 PUI- test results pending 2. Advanced care planning discussion 3. DNR Discussion 4. Goals of care discussion 5. Pain 6. Debility 7. Concern about end of life PLAN:  
1. Prior to visit I completed extensive chart review, including documentation from previous hospitalizations. I also spoke with oncology NP Cassi Rodriguez. 2. Patient currently COVID19 PUI, results still pending, requiring appropriate PPE for visit. 3. I met with patient, no family at bedside due to LCQLL18 pandemic visitor restrictions. 4. Patient is extremely [place], despite wearing hearing aides. I introduced myself and role of palliative medicine. 5. Patient with good insight regarding hospitalization and findings of recurrent cancer, now with mets to bones. 6. Patient is very clear regarding GOC, he wants to focus on pain control and quality of life, does NOT wish for life sustaining treatments or interventions. 7. Patient is hopeful that he will be able to return to his apartment at the independent living facility, though he verbalized his understanding that he may need to hire caregivers. He also acknowledged that there is also possibility of  Needing to go to a different facility that can provide atc care. 8. We spent time discussing Hospice services, patient is interested, Hospice consult had already been placed by oncology team, apparently family expressed their wishes for [facility]. 9. Pain- Cancer related pain w/ bone mets. See palliative ESAS. I placed order for Dexamethasone 4mg every day. I scheduled acetaminophen 1000mg orally every 8 hours and modified morphine 2mg IV from every 4 hours to every 2 hours. · Patient received morphine 2mg IV x 1, approx 1 hour before visit. He reported significant improvement in pain level at rest. 
· I spent time educating the patient regarding pain treatments plan, encouraged him not to wait until pain was severe before he asked for medicine. · I will follow up tomorrow and make adjustments as needed. 10. Debility- Patient had been active, ambulated independently up until the past 2 weeks when lower back/hip pain began. Patient reported pain with ambulation became increasingly difficult. · Im hopeful that if patients pain well  controlled, he will be able to resume activity, ambulate independently and be able to be discharged back to his apartment. · I placed order for PT and OT to evaluate and treat, as well as to make recommendations for discharge planning. 11. AMD-in the chart and his daughter is MPOA. , she was listed as secondary, but the son, who was primary, is now . 12. Code status-Patient has DNR code status. Copy of Durable DNR in EMR. 13. Per patients request, I called the patients daughter, she put me on speaker phone so that her  could also participate. We reviwed findings on imaging and what patients wishes are. We discussed discharge planning, recommended they explore options, including back to apartment with hospice, possible need for hired CG and /or possibility of needing atc care even with support of hospice. 14. Discussed with patients nurse Mable TOLBERT, also spoke with [facility] [place] regarding consult 15. Initial consult note routed to primary continuity provider 16. Communicated plan of care with: Palliative IDT, Dr. Dk Dewitt w/ family medicine and Bruce Duncan NP w/ oncology GOALS OF CARE / TREATMENT PREFERENCES:  
[====Goals of Care====] GOALS OF CARE: 
Patient/Health Care Proxy Stated Goals: Comfort TREATMENT PREFERENCES:  
Code Status: DNR Advance Care Planning: 
Advance Care Planning 7/28/2020 Patient's Healthcare Decision Maker is: Named in scanned ACP document Confirm Advance Directive Yes, on file Patient Would Like to Complete Advance Directive - Does the patient have other document types Do Not Resuscitate Medical Interventions: Comfort measures Other Instructions:  
Artificially Administered Nutrition: No feeding tube The palliative care team has discussed with patient / health care proxy about goals of care / treatment preferences for patient. 
[====Goals of Care====] HISTORY:  
 
History obtained from: chart, patient, family CHIEF COMPLAINT: denied HPI/SUBJECTIVE: The patient is:  
[x] Verbal and participatory [] Non-participatory due to:  
Patient is very hard of hearing. Pain better, see palliative ESAS. Denied SOB, Clinical Pain Assessment (nonverbal scale for severity on nonverbal patients):  
Clinical Pain Assessment Severity: 2 Location: lower back, bottom Character: tight, rigid, sharp Duration: weeks Effect: difficulty walking, getting comfortable Factors: walking Frequency: continuous FUNCTIONAL ASSESSMENT:  
 
Palliative Performance Scale (PPS): PPS: 40 PSYCHOSOCIAL/SPIRITUAL SCREENING:  
 
Advance Care Planning: 
Advance Care Planning 7/28/2020 Patient's Healthcare Decision Maker is: Named in scanned ACP document Confirm Advance Directive Yes, on file Patient Would Like to Complete Advance Directive - Does the patient have other document types Do Not Resuscitate Any spiritual / Mosque concerns: 
[] Yes /  [x] No 
 
Caregiver Burnout: 
[] Yes /  [x] No /  [] No Caregiver Present Anticipatory grief assessment:  
[x] Normal  / [] Maladaptive ESAS Anxiety: Anxiety: 0 
 
ESAS Depression: Depression: 0 REVIEW OF SYSTEMS:  
 
Positive and pertinent negative findings in ROS are noted above in HPI. The following systems were [x] reviewed / [] unable to be reviewed as noted in HPI Other findings are noted below. Systems: constitutional, ears/nose/mouth/throat, respiratory, gastrointestinal, genitourinary, musculoskeletal, integumentary, neurologic, psychiatric, endocrine. Positive findings noted below. Modified ESAS Completed by: provider Fatigue: 4 Drowsiness: 0 Depression: 0 Pain: 2 Anxiety: 0 Nausea: 0 Anorexia: 5 Dyspnea: 0 Constipation: No  
     
 
 
 PHYSICAL EXAM:  
 
From RN flowsheet: 
Wt Readings from Last 3 Encounters:  
07/28/20 151 lb (68.5 kg) 07/24/20 145 lb (65.8 kg) 03/03/20 154 lb 15.7 oz (70.3 kg) Blood pressure 157/72, pulse 70, temperature 97.7 °F (36.5 °C), resp. rate 18, height 5' 7\" (1.702 m), weight 151 lb (68.5 kg), SpO2 91 %. Pain Scale 1: Numeric (0 - 10) Pain Intensity 1: 6 Pain Location 1: Back Pain Orientation 1: Lower Pain Description 1: Aching Pain Intervention(s) 1: Medication (see MAR) Last bowel movement, if known: Constitutional: thin, appears somewhat younger than stated age, awake, alert, pleasant, NAD Eyes: pupils equal, anicteric ENMT: no nasal discharge, moist mucous membranes Cardiovascular: regular rhythm, distal pulses intact Respiratory: breathing not labored, symmetric Gastrointestinal: soft non-tender, +bowel sounds Musculoskeletal: no deformity, no tenderness to palpation Skin: warm, dry Neurologic: following commands, moving all extremities Psychiatric: full affect, no hallucinations Other: 
 
 
 HISTORY:  
 
Active Problems: 
  Back pain (7/28/2020) Hip pain (7/28/2020) Past Medical History:  
Diagnosis Date  Anemia  Arthritis  Cancer ([address]  Constipation  Diabetes ([facility] [address]  Gastrointestinal disorder  History of neoplasms, uncertain behavior Of soft tissue and skin  Hypercholesterolemia  Hyperkalemia  Hypertension  Low back pain  Melanoma in situ of other parts of face ([address] Lower lip; removed  Neuropathy  Right bundle-branch block  Spinal stenosis  Type II diabetes mellitus ([address]  Venous insufficiency Past Surgical History:  
Procedure Laterality Date  HX HEENT    
 HX ORTHOPAEDIC    
 HX PROSTATECTOMY  HX UROLOGICAL    
 NEUROLOGICAL PROCEDURE UNLISTED No family history on file. History reviewed, no pertinent family history. Social History Tobacco Use  Smoking status: Former Smoker  Smokeless tobacco: Never Used Substance Use Topics  Alcohol use: No  
  Frequency: Never Allergies Allergen Reactions  Bactrim [Sulfamethoprim] Rash  Sulfamethoxazole Unknown (comments) Current Facility-Administered Medications Medication Dose Route Frequency  [START ON 7/29/2020] aspirin chewable tablet 81 mg  81 mg Oral DAILY  ferrous sulfate tablet 325 mg  325 mg Oral BID  metoprolol tartrate (LOPRESSOR) tablet 25 mg  25 mg Oral BID  
  sodium chloride (NS) flush 5-40 mL  5-40 mL IntraVENous Q8H  
 sodium chloride (NS) flush 5-40 mL  5-40 mL IntraVENous PRN  polyethylene glycol (MIRALAX) packet 17 g  17 g Oral DAILY PRN  promethazine (PHENERGAN) tablet 12.5 mg  12.5 mg Oral Q6H PRN Or  
 ondansetron (ZOFRAN) injection 4 mg  4 mg IntraVENous Q6H PRN  
 [START ON 7/29/2020] enoxaparin (LOVENOX) injection 40 mg  40 mg SubCUTAneous DAILY  labetaloL (NORMODYNE;TRANDATE) 20 mg/4 mL (5 mg/mL) injection 20 mg  20 mg IntraVENous Q6H PRN  
 0.9% sodium chloride infusion  100 mL/hr IntraVENous CONTINUOUS  
 glucose chewable tablet 16 g  4 Tab Oral PRN  
 dextrose (D50W) injection syrg 12.5-25 g  25-50 mL IntraVENous PRN  
 glucagon (GLUCAGEN) injection 1 mg  1 mg IntraMUSCular PRN  
 [START ON 7/29/2020] pantoprazole (PROTONIX) tablet 40 mg  40 mg Oral DAILY  [START ON 7/29/2020] mirabegron ER (MYRBETRIQ) tablet 50 mg  50 mg Oral Q48H  
 morphine injection 2 mg  2 mg IntraVENous Q2H PRN  
 dexAMETHasone (DECADRON) tablet 4 mg  4 mg Oral DAILY  acetaminophen (TYLENOL) tablet 1,000 mg  1,000 mg Oral Q8H  
 [START ON 7/29/2020] insulin NPH (NOVOLIN N, HUMULIN N) injection 12 Units  12 Units SubCUTAneous DAILY  senna-docusate (PERICOLACE) 8.6-50 mg per tablet 1 Tab  1 Tab Oral QHS  [START ON 7/29/2020] polyethylene glycol (MIRALAX) packet 17 g  17 g Oral DAILY  
 
 
 
 LAB AND IMAGING FINDINGS:  
 
Lab Results Component Value Date/Time WBC 12.8 (H) 07/28/2020 11:59 AM  
 HGB 14.6 07/28/2020 11:59 AM  
 PLATELET 007 83/95/6513 11:59 AM  
 
Lab Results Component Value Date/Time Sodium 138 07/28/2020 11:59 AM  
 Potassium 4.3 07/28/2020 11:59 AM  
 Chloride 105 07/28/2020 11:59 AM  
 CO2 31 07/28/2020 11:59 AM  
 BUN 23 (H) 07/28/2020 11:59 AM  
 Creatinine 1.02 07/28/2020 11:59 AM  
 Calcium 9.3 07/28/2020 11:59 AM  
 Magnesium 2.0 03/26/2019 04:05 AM  
 Phosphorus 3.2 03/26/2019 04:05 AM  
  
Lab Results Component Value Date/Time Alk. phosphatase 69 07/28/2020 11:59 AM  
 Protein, total 7.2 07/28/2020 11:59 AM  
 Albumin 3.5 07/28/2020 11:59 AM  
 Globulin 3.7 07/28/2020 11:59 AM  
 
Lab Results Component Value Date/Time INR 1.2 (H) 11/12/2018 01:22 AM  
 Prothrombin time 11.6 (H) 11/12/2018 01:22 AM  
  
Lab Results Component Value Date/Time Iron 57 06/17/2020 02:23 PM  
 TIBC 286 06/17/2020 02:23 PM  
 Iron % saturation 20 06/17/2020 02:23 PM  
 Ferritin 103 06/17/2020 02:23 PM  
  
No results found for: PH, PCO2, PO2 No components found for: [place] No results found for: CPK, CKMB Total time:100 min Counseling / coordination time, spent as noted above: 80 min 
> 50% counseling / coordination?: yes Prolonged service was provided for  [x]30 min   []75 min in face to face time in the presence of the patient, spent as noted above. Time Start:  
Time End:  
Note: this can only be billed with 49547 (initial) or 16062 (follow up). If multiple start / stop times, list each separately.

## 2020-07-29 NOTE — PROGRESS NOTES
[id number] [facility] PROGRAM 
PROGRESS NOTE  
 
7/30/2020 PCP: Nelly Yu MD  
 
Assessment/Plan:  
 
Zahida Bowling is a 80 y.o. male with a PMHx of large B-cell lymphoma, melanoma, prostate cancer, T2DM, HTN, HLD, and a-fib who is admitted for low back pain. 24-hour events: No PRN morphine used over last 24 hours.  
  
Low back pain: Likely 2/2 metastatic progression of lymphoma (dx 2014). Pt s/p maintenance chemo (5/2018). CT pelvis w/ scattered mixed lytic and sclerotic lesions throughout the pelvis concerning for metastatic disease. No acute fracture identified. Pt follows w/ Dr. Jessa Oliva. -consult to palliative - appreciate recs: 
     -ms contin 15mg PO BID, morphine 2mg Q2H PRN 
     -dexamethasone 4mg BID 
     -pt desires to go home w/ hospice care [facility]) - ongoing discussion to whether pt is able to go home by himself 
     -palliative will continue to follow and readjust pain regimen as needed 
-miralax daily, senna BID 
-consult to heme/onc - appreciate recs 
  
Hx large B-cell lymphoma, likely recurrence: Dx in 2014 w/ chemotherapy up until 2018. Pt follows w/ Dr. Jessa Oliva. Imaging as above. 
-pain regimen as above 
-consult to heme/onc, palliative, hospice as above COVID Negative: Pt resident at an independent living facility, at increased risk due to congregate living. SARS-COV PCR negative x1. 
  
T2DM: A1C 8.4 (2/6/20). Home meds include NPH/regular 30/53 51A QAM, trulicity Q7 days. 
-8u of sliding scale used yesterday 
-continue NPH 12u QAM 
-SSI normal sensitivity, ACHS POC glucose checks 
-hypoglycemia protocols ordered 
  
Hypertension: BP on admission 160/114, likely elevated due to pain. Home regimen includes metoprolol.  Improved this morning. 
- continue home metoprolol 25mg BID  
- PRN labetalol SBP >170, DBP >110 
- will continue to monitor at this time and readjust as BP's trend 
  
 Paroxismal A-fib: Follows w/ Dr. Willard Bedoya outpt. RRR on admission. 
-continue home metoprolol 25mg BID 
  
GERD: Home meds include prilosec. 
-protonix 40mg PO daily 
  
HLD:  
-home ASA 81mg daily 
  
Anemia: Recent iron panel normal. 
-home ferrous sulfate 325mg BID 
  
Urinary frequency: 
-home myrbetriq 50mg PO Q48H 
  
Hx of melanoma - stable: Pt underwent Moh's procedure in 2019 
  
Hx of prostate cancer - stable: Dx , s/p prostatectomy 
  
FEN/GI - Diabetic diet. NS at 100 mL/hr. Activity - Ambulate with assistance DVT prophylaxis - Lovenox GI prophylaxis - Protonix Fall prophylaxis - Not indicated at this time. Code Status - DNR. Discussed with patient / caregivers. Next of Fabian 69 Name and Contact - Bhupinder Rosario,  Daughter - [phone] Reji Pearson discussed with Dr. Kam Serrato. Subjective: Pt was seen and examined at bedside. Afebrile and hemodynamically stable. Pain controlled when laying in bed, not when moving. Appetite is good. Pt has not had a bowel movement since admission. Denies chest pain, SOB, nausea, vomiting, abdominal pain, dizziness. Objective:  
Physical examination Patient Vitals for the past 24 hrs: 
 Temp Pulse Resp BP SpO2  
20 0252 97.7 °F (36.5 °C) 60 16 135/65 93 % 20 2348  80     
20 2303 97.5 °F (36.4 °C) 77 16 117/62 94 %  
20 1932 97.6 °F (36.4 °C) 62 16 111/64 91 %  
20 1601 97.6 °F (36.4 °C) 87 18 115/63 92 %  
20 1258 97.3 °F (36.3 °C) 63 18 135/72 94 %  
20 0821 97.7 °F (36.5 °C) 82 18 124/75 96 %  
20 0700  86    Temp (24hrs), Av.6 °F (36.4 °C), Min:97.3 °F (36.3 °C), Max:97.7 °F (36.5 °C) O2 Device: Room air Date 20 - 20 3968 20 - 20 6492 Shift 9077-3890 4132-1416 24 Hour Total 4190-9904 5769-3349 24 Hour Total  
INTAKE  
I.V.(mL/kg/hr) 1813.3(2.2) 1053.3 2866.7 Volume (0.9% sodium chloride infusion) 1813.3 1053.3 2866.7 Shift Total(mL/kg) 1813.3(26.5) 1053. 3(15.4) 2866. 7(41.9) OUTPUT Urine(mL/kg/hr) 700(0.9) 550 1250 Urine Voided  Shift Total(mL/kg) 700(10.2) 550(8) [id number]) NET 1113.3 503.3 1616.7 Weight (kg) 68.5 68.5 68.5 68.5 68.5 68.5 Physical Exam 
General: No acute distress. Alert. Cooperative. Hard of hearing Head: Normocephalic. Atraumatic. Neck: Supple. Normal ROM. No stiffness. Respiratory: Referred breath sounds, good airmovement. No w/r/r/c.  
Cardiovascular: RRR. Normal S1,S2. Systolic ejection murmur 2/6. No r/g. Pulses 2+ throughout. GI: + bowel sounds. Nontender. No rebound tenderness or guarding. Nondistended. Nontender to suprapubic region. Back: Lumbar spine tender to palpation including to b/l paravertebral muscles. No erythema or ecchymoses noted. No point tenderness or step offs appreciated. Extremities: Absent LE edema. Distal pulses intact. R buttock tender to palpation, no erythema or ecchymoses present. Musculoskeletal: Full ROM in all extremities. Skin: Warm, dry. No rashes. Neuro: CN II-XII grossly intact. A&O x4. Data Review: CBC: 
Recent Labs  
  07/28/20 
1159 WBC 12.8* HGB 14.6 HCT 44.2  Metabolic Panel: 
Recent Labs  
  07/28/20 
1159   
K 4.3  CO2 31 BUN 23* CREA 1.02  
* CA 9.3 ALB 3.5 TBILI 0.6 ALT 20 Micro: 
Lab Results Component Value Date/Time Culture result: NO GROWTH 1 DAY 03/24/2019 09:49 PM  
 Culture result: MRSA PRESENT (A) 03/23/2019 04:01 AM  
 Culture result:  03/23/2019 04:01 AM  
      Screening of patient nares for MRSA is for surveillance purposes and, if positive, to facilitate isolation considerations in high risk settings. It is not intended for automatic decolonization interventions per se as regimens are not sufficiently effective to warrant routine use. Imaging: 
Xr Spine Lumb 2 Or 3 V Result Date: 7/24/2020 EXAM: XR SPINE LUMB 2 OR 3 V INDICATION: Back Pain COMPARISON: 1/3/2020 FINDINGS: Nonstandard AP, lateral and spot lateral views of the lumbar spine. There is a persistent kyphoplasty change involving L1 and L2 with unchanged severe compression deformity of L1. No new finding is seen. Multiple pelvic clips are present. Constipation is present. IMPRESSION: Prior kyphoplasty procedures of L1 and L2. No acute process identified by plain film technique. Constipation. Ct Pelv Wo Cont Result Date: 7/28/2020 INDICATION:  eval for fx s/p fall , pelvic pain EXAM: CT pelvis. No comparisons. Thin section axial images were obtained. From these sagittal and coronal reformats were performed. CT dose reduction was achieved through use of a standardized protocol tailored for this examination and automatic exposure control for dose modulation. FINDINGS: Bone mineral density is decreased. There are scattered mixed chronic and lytic lesions throughout the pelvis. The largest is noted in the posterior right iliac wing measuring 5.7 x 1.8 cm. Subtle periosteal reaction with this largest lesion. There is no acute fracture. No evidence of avascular necrosis. There are degenerative changes of the lower lumbar spine. There are vascular calcifications. Patient is post left inguinal hernia repair. Intrapelvic bowel is nondistended. No soft tissue hematoma IMPRESSION: 1. Scattered mixed lytic and sclerotic lesions throughout the pelvis concerning for metastatic disease. MR imaging is recommended 2. Acute fracture is not identified Medications reviewed Current Facility-Administered Medications Medication Dose Route Frequency  insulin lispro (HUMALOG) injection   SubCUTAneous AC&HS  
 glucose chewable tablet 16 g  4 Tab Oral PRN  
 dextrose (D50W) injection syrg 12.5-25 g  12.5-25 g IntraVENous PRN  
 glucagon (GLUCAGEN) injection 1 mg  1 mg IntraMUSCular PRN  
  morphine CR (MS CONTIN) tablet 15 mg  15 mg Oral Q12H  
 senna-docusate (PERICOLACE) 8.6-50 mg per tablet 1 Tab  1 Tab Oral BID  dexAMETHasone (DECADRON) tablet 4 mg  4 mg Oral Q12H  aspirin chewable tablet 81 mg  81 mg Oral DAILY  ferrous sulfate tablet 325 mg  325 mg Oral BID  metoprolol tartrate (LOPRESSOR) tablet 25 mg  25 mg Oral BID  sodium chloride (NS) flush 5-40 mL  5-40 mL IntraVENous Q8H  
 sodium chloride (NS) flush 5-40 mL  5-40 mL IntraVENous PRN  polyethylene glycol (MIRALAX) packet 17 g  17 g Oral DAILY PRN  promethazine (PHENERGAN) tablet 12.5 mg  12.5 mg Oral Q6H PRN Or  
 ondansetron (ZOFRAN) injection 4 mg  4 mg IntraVENous Q6H PRN  
 enoxaparin (LOVENOX) injection 40 mg  40 mg SubCUTAneous DAILY  labetaloL (NORMODYNE;TRANDATE) 20 mg/4 mL (5 mg/mL) injection 20 mg  20 mg IntraVENous Q6H PRN  
 0.9% sodium chloride infusion  100 mL/hr IntraVENous CONTINUOUS  
 glucose chewable tablet 16 g  4 Tab Oral PRN  
 dextrose (D50W) injection syrg 12.5-25 g  25-50 mL IntraVENous PRN  
 glucagon (GLUCAGEN) injection 1 mg  1 mg IntraMUSCular PRN  pantoprazole (PROTONIX) tablet 40 mg  40 mg Oral DAILY  mirabegron ER (MYRBETRIQ) tablet 50 mg  50 mg Oral Q48H  
 morphine injection 2 mg  2 mg IntraVENous Q2H PRN  
 acetaminophen (TYLENOL) tablet 1,000 mg  1,000 mg Oral Q8H  
 insulin NPH (NOVOLIN N, HUMULIN N) injection 12 Units  12 Units SubCUTAneous DAILY  polyethylene glycol (MIRALAX) packet 17 g  17 g Oral DAILY Signed: 
 Portia Morgan MD 
 Resident, Family Medicine Attending note: Attending note to follow. ..

## 2020-07-29 NOTE — PROGRESS NOTES
[employer] [facility] at [address] [place] [address] [phone] Hematology / Oncology Consult Reason for Visit:  
Breana Chinchilla is a 80 y.o. male who is seen in consultation at the request of Dr. 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 
· Single agent maintenance rituximab 225 mg/m2 q 3 weeks 12/7/16, last dose 5/2018, unclear how many doses in between, had 2 hospitalizations in 2018 (5 total) · Admitted 9/26/18-9/29/18 for sepsis due to dental abscess, related to xgeva/zometa Last office visit on 2/12/2020 History of Present Illness:  
 
Breana Chinchilla was admitted on 7/28/2020 from the ED when he presented with c/o back pain; on the right lower hip area and across to left hip area. Rates pain 9/10. Pain is worse when changing positions; going from lying to standing position or turning in the bed. Started acutely last Friday and has not improved. Prior to pain starting was up walking to the dining room area and around his facility. Hashad some problems with constipation Denies N/V. Denies SOB. Requesting something for pain.   
 
Daughter, Ros Serrano states her father was seen at [facility] ED last Friday (7/24)  for same pain,had xrays; was given steroids and muscle relaxant and told to take Arthritis Tylenol. He has not had any improvement in the pain. He currently lives at [facility] which is an independent living facility. Up until Friday had been very mobile. I spoke with the daughter by phone [phone], immediately after speaking with the patient. Interval History:  
 
Feeling better; rates pain 9/10 at present. Still no BM but having more of a sensation to have a BM. Denies SOB; denies N/V. Daughter, Yaya Quezada and son in [facility] at bedside. Waiting to meet with CM to discuss options for placement. Current Facility-Administered Medications Medication Dose Route Frequency  insulin lispro (HUMALOG) injection   SubCUTAneous AC&HS  
 glucose chewable tablet 16 g  4 Tab Oral PRN  
 dextrose (D50W) injection syrg 12.5-25 g  12.5-25 g IntraVENous PRN  
 glucagon (GLUCAGEN) injection 1 mg  1 mg IntraMUSCular PRN  
 morphine CR (MS CONTIN) tablet 15 mg  15 mg Oral Q12H  
 senna-docusate (PERICOLACE) 8.6-50 mg per tablet 1 Tab  1 Tab Oral BID  dexAMETHasone (DECADRON) tablet 4 mg  4 mg Oral Q12H  aspirin chewable tablet 81 mg  81 mg Oral DAILY  ferrous sulfate tablet 325 mg  325 mg Oral BID  metoprolol tartrate (LOPRESSOR) tablet 25 mg  25 mg Oral BID  sodium chloride (NS) flush 5-40 mL  5-40 mL IntraVENous Q8H  
 sodium chloride (NS) flush 5-40 mL  5-40 mL IntraVENous PRN  polyethylene glycol (MIRALAX) packet 17 g  17 g Oral DAILY PRN  promethazine (PHENERGAN) tablet 12.5 mg  12.5 mg Oral Q6H PRN Or  
 ondansetron (ZOFRAN) injection 4 mg  4 mg IntraVENous Q6H PRN  
 enoxaparin (LOVENOX) injection 40 mg  40 mg SubCUTAneous DAILY  labetaloL (NORMODYNE;TRANDATE) 20 mg/4 mL (5 mg/mL) injection 20 mg  20 mg IntraVENous Q6H PRN  
 0.9% sodium chloride infusion  100 mL/hr IntraVENous CONTINUOUS  
 glucose chewable tablet 16 g  4 Tab Oral PRN  
  dextrose (D50W) injection syrg 12.5-25 g  25-50 mL IntraVENous PRN  
 glucagon (GLUCAGEN) injection 1 mg  1 mg IntraMUSCular PRN  pantoprazole (PROTONIX) tablet 40 mg  40 mg Oral DAILY  mirabegron ER (MYRBETRIQ) tablet 50 mg  50 mg Oral Q48H  
 morphine injection 2 mg  2 mg IntraVENous Q2H PRN  
 acetaminophen (TYLENOL) tablet 1,000 mg  1,000 mg Oral Q8H  
 insulin NPH (NOVOLIN N, HUMULIN N) injection 12 Units  12 Units SubCUTAneous DAILY  polyethylene glycol (MIRALAX) packet 17 g  17 g Oral DAILY Allergies Allergen Reactions  Bactrim [Sulfamethoprim] Rash  Sulfamethoxazole Unknown (comments) Review of Systems: A complete review of systems was obtained, negative except as described above. Physical Exam:  
 
Visit Vitals /75 (BP 1 Location: Right arm, BP Patient Position: At rest) Pulse 82 Temp 97.7 °F (36.5 °C) Resp 18 Ht 5' 7\" (1.702 m) Wt 68.5 kg (151 lb) SpO2 96% BMI 23.65 kg/m² ECOG PS: 2-3 General: No distress Eyes: Anicteric sclerae HENT: Atraumatic Neck: Supple Respiratory: Normal respiratory effort CV: No peripheral edema GI: Soft, nontender, nondistended, no masses, no hepatomegaly, no splenomegaly Skin: No rashes, ecchymoses, or petechiae Psych: Alert, oriented, appropriate affect, normal judgment/insight Results:  
 
Lab Results Component Value Date/Time WBC 12.8 (H) 07/28/2020 11:59 AM  
 HGB 14.6 07/28/2020 11:59 AM  
 HCT 44.2 07/28/2020 11:59 AM  
 PLATELET 793 51/32/4084 11:59 AM  
 MCV 94.0 07/28/2020 11:59 AM  
 ABS. NEUTROPHILS 9.5 (H) 07/28/2020 11:59 AM  
 
Lab Results Component Value Date/Time  Sodium 138 07/28/2020 11:59 AM  
 Potassium 4.3 07/28/2020 11:59 AM  
 Chloride 105 07/28/2020 11:59 AM  
 CO2 31 07/28/2020 11:59 AM  
 Glucose 194 (H) 07/28/2020 11:59 AM  
 BUN 23 (H) 07/28/2020 11:59 AM  
 Creatinine 1.02 07/28/2020 11:59 AM  
 GFR est AA >60 07/28/2020 11:59 AM  
 GFR est non-AA >60 07/28/2020 11:59 AM  
 Calcium 9.3 07/28/2020 11:59 AM  
 Glucose (POC) 275 (H) 07/29/2020 06:46 AM  
 
Lab Results Component Value Date/Time Bilirubin, total 0.6 07/28/2020 11:59 AM  
 ALT (SGPT) 20 07/28/2020 11:59 AM  
 Alk. phosphatase 69 07/28/2020 11:59 AM  
 Protein, total 7.2 07/28/2020 11:59 AM  
 Albumin 3.5 07/28/2020 11:59 AM  
 Globulin 3.7 07/28/2020 11:59 AM  
 
 
Lab Results Component Value Date/Time Reticulocyte count 0.4 (L) 12/16/2018 02:56 AM  
 Iron % saturation 20 06/17/2020 02:23 PM  
 TIBC 286 06/17/2020 02:23 PM  
 Ferritin 103 06/17/2020 02:23 PM  
 Vitamin B12 581 11/22/2019 11:45 AM  
 Folate 25.0 (H) 11/22/2019 11:45 AM  
  06/17/2020 02:23 PM  
 TSH 1.49 03/23/2019 02:27 AM  
 
Lab Results Component Value Date/Time INR 1.2 (H) 11/12/2018 01:22 AM  
 
Lab Results Component Value Date/Time  
 Prostate Specific Ag <0.1 12/12/2018 11:25 AM  
  06/17/2020 02:23 PM  
 
 
 
7/24/2020 XR Spine LUMB IMPRESSION: Prior kyphoplasty procedures of L1 and L2. No acute process 
identified by plain film technique. Constipation. 
  
 
7/28/2020 CT PELV WO CONT IMPRESSION: 
1. Scattered mixed lytic and sclerotic lesions throughout the pelvis concerning 
for metastatic diseaseMRI imaging is recommended 2. Acute fracture is not identified Assessment and Recommendations: 1. Pain, due to cancer, likely recurrent lymphoma:  New scattered mixed lytic and sclerotic lesions in pelvis noted on ED scans;   
palliative team following Hospice consult placed with [facility] per family request 
Goal is to have his pain under control prior to discharge 2. Diffuse large B-cell lymphoma: appears to have relapsed. During last clinic visit on 2/12/20 Dr Shaheen Chicas I discussed with pt and family today the CT bone findings from 2/5/20.   The mandible lesion is known and spine lesions may be recurrent lymphoma; at that time pt and family did not want further testing or biopsy as we would not treat without symptoms. CT imaging now with scattered mixed lytic and sclerotic lesions throughout the pelvis concerning for metastatic disease; radiology recommending further eval with MRI. His cancer clearly has returned. Unfortunately, this it has recurred multiple times and further chemotherapy would likely be toxic without cure at this time. He states to me that he would not want a biopsy at this time. Not recommending any further chemotherapy, and he and his daughter are in agreement. Will focus on pain control and QOL. Hospice has been consulted. May decide to do further imaging later, if there is a persistent problematic area for pain control, but want his pain under better control first prior to moving him for imaging. 3. Constipation Bowel regimen adjusted 4. Debility PT/OT eval 
 
5. SARS- CoV2:  negative Plan reviewed with Dr Emmy Ng Signed By: Dulce Maria Jimenez NP

## 2020-07-29 NOTE — CDMP QUERY
Patient admitted with low back pain and likely recurrence of B Cell lymphoma, noted to have atrial fibrillation. If possible, please document in progress notes and discharge summary further specificity regarding the type of atrial fibrillation: 
 
=> Chronic Atrial Fibrillation, unspecified 
=> Paroxysmal Atrial Fibrillation 
=> Longstanding Persistent Atrial Fibrillation 
=> Permanent Atrial Fibrillation  
=> Persistent Atrial Fibrillation 
=> Other, please specify 
=> Clinically unable to determine The medical record reflects the following: 
   Risk Factors: 81 yo M admitted with low back pain Clinical Indicators: h/o afib Treatment: metoprolol 25mg BID Chronic: nonspecific term that could be referring to paroxysmal, persistent, or permanent Longstanding persistent: persistent and continuous, lasting > 1 year. Paroxysmal - self-terminating or intermittent; resolves with or without intervention within 7 days of onset; may recur with various frequency. Persistent - Fails to terminate within 7 days; Often requires meds or cardioversion to restore to NSR. Permanent - longstanding & persistent; Medication has been ineffective in restoring NSR &/or cardioversion is contraindicated Definitions per MS-DRG Training Guide and Quick Reference Guide, [facility] 112 5 Diseases and Disorders of the Circulatory System; 2019; [employer]. Software content from the [employer]formation Program 
 
Thank you for your time [facility] RN/BSN, CCDS Desk:   [phone] Other:  [phone]

## 2020-07-29 NOTE — PROGRESS NOTES
TRANSFER - IN REPORT: 
 
Verbal report received from   Dayo Palencia Rn(name) on [facility]  being received from  4th floor, orthopedic (unit) for routine progression of care Report consisted of patients Situation, Background, Assessment and  
Recommendations(SBAR). Information from the following report(s) SBAR, Kardex, Intake/Output, MAR, Accordion, Recent Results and Med Rec Status was reviewed with the receiving nurse. Opportunity for questions and clarification was provided. Assessment completed upon patients arrival to unit and care assumed.

## 2020-07-30 NOTE — PROGRESS NOTES
7/30/2020 4:31 PM 
 
 Discharge plan: 1. Home with hospice through [facility] on 7/31 2. Per Timbo Mckeon with [facility] DME to be delivered to pt's daughter's home tonight 3. [facility] transport set for 1PM to transport pt to pt's daughter's home 4. Hospice intake scheduled for 3PM 
 
Pt's daughter is aware and agreeable to the above plan. 7/30/2020 12:31 PM Met with pt's daughter and son in law, they have decided to take pt home to their house with [facility] and personal care through [facility]. Pt's [facility] will [facility] with the [facility] of personal care at home for pt. Planning for pt to work with PT when family present after pt finishes lunch. CM contacted Timbo Mckeon with [facility] who will follow up with pt's daughter following PT session to discuss equipment needs and intake at home. CM also called [facility], spoke with Ricky Toledo and relayed pt's family would like to use their services and Timbo Mckeon from [facility] will be contacting her directly to coordinate care. Anticipating discharge on 7/31 if equipment and care is in place. CM will follow up.  
 
7/30/2020 10:39 AM CM called and spoke with Timbo Mckeon from [facility] and also spoke with pt's daughter regarding plan for pt's discharge. Pt's daughter expressed their main concern for discharge is being able to visit pt. Pt's daughter is understanding due to pandemic, if pt was to discharge to an [facility] family would not be able to visit inside until end of life. Pt's daughter reported she plans to contact the [place] [id number] at [place] to confirm their visitation policy. Pt's daughter reported the other option they are now open to is pt returning home with her and her  at [place] [id number]. Pt's daughter reported they would like to confirm there would be personal care available to come to their house to assist them with pt.  CM called to 5 personal care agencies that confirmed they would be able to provide care for pt at home but would need more discussion with family first to confirm availability. CM will provide pt's daughter and son in law with personal care agency options and pricing. CM also called and lvm with Briseyda Alvarez with [facility] to relay conversation with pt's daughter. ADRIANA Grewal

## 2020-07-30 NOTE — PROGRESS NOTES
Problem: Mobility Impaired (Adult and Pediatric) Goal: *Acute Goals and Plan of Care (Insert Text) Description: FUNCTIONAL STATUS PRIOR TO ADMISSION: Patient was modified independent using a rolling walker for functional mobility. HOME SUPPORT PRIOR TO ADMISSION: The patient lived alone with no local support. Physical Therapy Goals Initiated 7/29/2020 1. Patient will move from supine to sit and sit to supine  in bed with minimal assistance/contact guard assist within 7 day(s). 2.  Patient will transfer from bed to chair and chair to bed with minimal assistance/contact guard assist using the least restrictive device within 7 day(s). 3.  Patient will perform sit to stand with minimal assistance/contact guard assist within 7 day(s). 4.  Patient will ambulate with minimal assistance/contact guard assist for 150 feet with the least restrictive device within 7 day(s). Outcome: Progressing Towards Goal 
Note: PHYSICAL THERAPY TREATMENT Patient: Rosangela Cutler (88 y.o. male) Date: 7/30/2020 Diagnosis: Back pain [M54.9] Hip pain [M25.559] Hip pain Precautions: Fall(hard of hearing) Chart, physical therapy assessment, plan of care and goals were reviewed. ASSESSMENT Patient continues with skilled PT services and progressing towards goals. Pt received pain medication 30min prior to session, two family members present throughout session. Pt with occasional posterior lean and requires Min A x 1 for all gait and tranfers using RW, rollator used at baseline is not appropriate at this time. Family member assisted pt with short bout of gait training and bed mobility. Gait belt issued for use with upright mobility. Pt would need hospital bed, BSC and rolling walker for mobility and ADL's Current Level of Function Impacting Discharge (mobility/balance): Min A Other factors to consider for discharge: PLAN : 
Patient continues to benefit from skilled intervention to address the above impairments. Continue treatment per established plan of care. to address goals. Recommendation for discharge: (in order for the patient to meet his/her long term goals) To be determined: home with hospice This discharge recommendation: 
Has been made in collaboration with the attending provider and/or case management IF patient discharges home will need the following DME: bedside commode, hospital bed, and rolling walker SUBJECTIVE:  
Patient stated [address] would like to get up.  OBJECTIVE DATA SUMMARY:  
Critical Behavior: 
Neurologic State: Alert Orientation Level: Oriented X4 Cognition: Follows commands Functional Mobility Training: 
Bed Mobility: 
  
Supine to Sit: Minimum assistance Sit to Supine: Minimum assistance Transfers: 
Sit to Stand: Minimum assistance Stand to Sit: Minimum assistance Bed to Chair: Minimum assistance Balance: 
  
Ambulation/Gait Training: 
Distance (ft): (48' and 20') Assistive Device: Walker, rolling;Gait belt Ambulation - Level of Assistance: Minimal assistance Gait Abnormalities: Decreased step clearance Stairs: Therapeutic Exercises:  
 
Pain Rating: 
3/10 Activity Tolerance:  
Good Please refer to the flowsheet for vital signs taken during this treatment. After treatment patient left in no apparent distress:  
Supine in bed, Call bell within reach, Bed / chair alarm activated, and Caregiver / family present COMMUNICATION/COLLABORATION:  
The patients plan of care was discussed with: Registered nurse and Case management . Jo Joseph Time Calculation: 58 mins

## 2020-07-30 NOTE — PROGRESS NOTES
Follow up with . Darby Lee on 5 Med surg. His daughter and son in law along with the nurse were in the room. He shared some of his past medical challenges and appears to have o good outlook of the future. He indicated he is also looking forward to seeing his wife and son who are already in heaven along with numorous people he will be able to see again. Provided spiritual presence and assurance of continued prayers. Visited by: Kodak Armendariz MS., [address] ([id number])

## 2020-07-30 NOTE — PROGRESS NOTES
2428: Bedside shift change report given to Constantino Lerner RN (oncoming nurse) by Ana Maria Zuniga RN (offgoing nurse). Report included the following information SBAR, Kardex, Intake/Output and Cardiac Rhythm NSR.

## 2020-07-30 NOTE — PROGRESS NOTES
Palliative Medicine Consult Patient Name: Angélica Bah YOB: 1927 Date of Initial Consult: 2020 Reason for Consult: care decisions Requesting Provider: Dulce Maria Jimenez NP Primary Care Physician: Heri Couch MD 
  
 SUMMARY:  
Angélica Bah is a 80 y.o. with a past history of recurrent C. Dif, Prostate cancer s/p prostatectomy () B cell lymphoma (dx ), HLD, dysphagia , who was admitted on 2020 from home with a diagnosis of  Current medical issues leading to Palliative Medicine involvement include: care decisions. Patient presented to ER w/ cc of acute, lower back, right lower hip pain  x a few days. Patient also reportedly having had a recent fall. /91, WBC 12. 8. XR Lumbar spine +Scattered mixed lytic and sclerotic lesions throughout the pelvis concerning for metastatic disease. Patient admitted for further workup. Oncology consulted, pain likely d/t cancer, likely recurrent lymphoma. The lymphoma has recurred multiple times and further chemo likely do more harm, recommended comfort focused care. Patient declined biopsy. COVID19 PUI. SH- Patient living in an independent living facility, his daughterNiru and PRUDENCE live  in [facility]. Son  in 2016. Patient  x 2, both . Patient was a , did [address] work in [address]. PALLIATIVE DIAGNOSES:  
 
1. Goals of care discussion 2. Pain 3. Debility 4. Concern about end of life PLAN:  
1. Prior to visit I completed chart review for updated information since last visit on  2. I met with patient, family had just left to get lunch, they were to return afterwards. 3. F/U today to assess effectiveness of patients current pain treatment plan. Unfortunately, patient not able to tolerate PT due to pain, however review or records show that patient has not received/asked for  any prn IV morphine over the past 24 hours. 4. Patient reported pain with movement, but overall pain less than had been at time of admission. 5. We discussed his current regimen, including: Dexamehtasone 4mg orally twice dailly,  acetaminophen 1000 mg orally every 8 hours scheduled, as well as MS Contin 15mg orally every 12 hours, with Morphine 2mg IV every 2 hours as needed  For moderate to severe pain not well controlled by other scheduled meds. Patient had forgotten that there was prn medicine available. 6. In anticipation of patient being discharged in the coming day or two, and because patient is able to take medications by mouth, I discontinued IV morphine prn and ordered Dilaudid 2mg orally every 4 hours as needed, moderate to severe pain. 7. I spoke with PT Breanna, she stated that she would be attempting PT with patient, upon daughters return. We discussed barriers to his participation had been related to pain. I spoke with Ciaran Rosales RN, asked her to give dose of dilaudid 2mg now, which is approximately 30 minutes before PT to begin. 8. Later in day, I spoke with PT Breanna, she stated that patient was up and ambulating with assistance, without complaint of pain. 9. Patients daughter and son in law requested to speak with me prior to their departure for the day, asked about home pain regimen. I shared my experience with Hospice, that as long as the medications were on their formulary and the patient was tolerating the regimen, they would likely be able to continue with the regimen upon discharge, 
10. I did reach out to francisco nieves/ ROBINA OF [facility], left voice message asking  if he needed hard scripts  for his current pain treatment plan at discharge. I will be happy to write Rxs at discharge, if needed. 11. Constipation- Records indicate patient had BM on 7/29, described as soft, but size not documented. Patient did not recall that he had a BM, he stated that he feels like he is getting constipated.   Increased risk of constipation secondary to opioid, decreased activity, decreased intake. I increased senna s from 1 tab twice daily, now to 2 tabs twice daily, he also has order for miralax every night, as well as miralax daily prn. 12. Debility- Patient had been active, ambulated independently up until the past 2 weeks when lower back/hip pain began. Patient reported pain with ambulation became increasingly difficult. · Patient took prn dilaudid 2 mg orally approx 30 minutes prior to ambulation with PT, therapist reported that he did fairly well, was without complaints of pain. 15. Family decided to bring patient back home with them. They will also have the support of [facility] and plan to hire caregivers to assist.  
14. Communicated plan of care with: Palliative IDT, WILMA Pollack GOALS OF CARE / TREATMENT PREFERENCES:  
[====Goals of Care====] GOALS OF CARE: 
Patient/Health Care Proxy Stated Goals: Comfort TREATMENT PREFERENCES:  
Code Status: DNR Advance Care Planning: 
Advance Care Planning 7/28/2020 Patient's Healthcare Decision Maker is: Named in scanned ACP document Confirm Advance Directive Yes, on file Patient Would Like to Complete Advance Directive - Does the patient have other document types Do Not Resuscitate Medical Interventions: Comfort measures Other Instructions:  
Artificially Administered Nutrition: No feeding tube The palliative care team has discussed with patient / health care proxy about goals of care / treatment preferences for patient. 
[====Goals of Care====] HISTORY:  
 
History obtained from: chart, patient, family CHIEF COMPLAINT: denied HPI/SUBJECTIVE: The patient is:  
[x] Verbal and participatory [] Non-participatory due to:  
Patient is very hard of hearing. Pain better, see palliative ESAS. Denied SOB, reported improved appetite Clinical Pain Assessment (nonverbal scale for severity on nonverbal patients):  
 Clinical Pain Assessment Severity: 2 Location: lower back, bottom Character: tight, rigid, sharp Duration: weeks Effect: difficulty walking, getting comfortable Factors: walking Frequency: continuous FUNCTIONAL ASSESSMENT:  
 
Palliative Performance Scale (PPS): PPS: 40 PSYCHOSOCIAL/SPIRITUAL SCREENING:  
 
Advance Care Planning: 
Advance Care Planning 7/28/2020 Patient's Healthcare Decision Maker is: Named in scanned ACP document Confirm Advance Directive Yes, on file Patient Would Like to Complete Advance Directive - Does the patient have other document types Do Not Resuscitate Any spiritual / Yazdanism concerns: 
[] Yes /  [x] No 
 
Caregiver Burnout: 
[] Yes /  [x] No /  [] No Caregiver Present Anticipatory grief assessment:  
[x] Normal  / [] Maladaptive ESAS Anxiety: Anxiety: 0 
 
ESAS Depression: Depression: 0 REVIEW OF SYSTEMS:  
 
Positive and pertinent negative findings in ROS are noted above in HPI. The following systems were [x] reviewed / [] unable to be reviewed as noted in HPI Other findings are noted below. Systems: constitutional, ears/nose/mouth/throat, respiratory, gastrointestinal, genitourinary, musculoskeletal, integumentary, neurologic, psychiatric, endocrine. Positive findings noted below. Modified ESAS Completed by: provider Fatigue: 4 Drowsiness: 0 Depression: 0 Pain: 2 Anxiety: 0 Nausea: 0 Anorexia: 5 Dyspnea: 0 Constipation: Yes PHYSICAL EXAM:  
 
From RN flowsheet: 
Wt Readings from Last 3 Encounters:  
07/28/20 151 lb (68.5 kg) 07/24/20 145 lb (65.8 kg) 03/03/20 154 lb 15.7 oz (70.3 kg) Blood pressure 136/69, pulse 67, temperature 97.8 °F (36.6 °C), resp. rate 18, height 5' 7\" (1.702 m), weight 151 lb (68.5 kg), SpO2 93 %. Pain Scale 1: Numeric (0 - 10) Pain Intensity 1: 0 Pain Onset 1: acute Pain Location 1: Back Pain Orientation 1: Lower Pain Description 1: Aching Pain Intervention(s) 1: Medication (see MAR)(given into help patient work with PT) Last bowel movement, if known:  
 
Constitutional: thin, appears somewhat younger than stated age, awake, alert, pleasant, NAD Eyes: pupils equal, anicteric ENMT: no nasal discharge, moist mucous membranes Cardiovascular: regular rhythm, distal pulses intact Respiratory: breathing not labored, symmetric Gastrointestinal: soft non-tender, +bowel sounds Musculoskeletal: no deformity, no tenderness to palpation Skin: warm, dry Neurologic: following commands, moving all extremities Psychiatric: full affect, no hallucinations Other: 
 
 
 HISTORY:  
 
Principal Problem: 
  Hip pain (7/28/2020) Active Problems: 
  DM (diabetes mellitus) ([address] (11/10/2018) Non Hodgkin's lymphoma ([address] (11/12/2018) Atrial fibrillation ([address] (3/22/2019) Essential hypertension (3/24/2019) Acute kidney injury ([address] (3/24/2019) Chronic anemia (3/24/2019) Dyslipidemia (3/24/2019) Back pain (7/28/2020) Urinary frequency (7/29/2020) GERD (gastroesophageal reflux disease) (7/29/2020) Past Medical History:  
Diagnosis Date  Anemia  Arthritis  C. 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  
 History of Clostridium difficile colitis 11/10/2018  History of neoplasms, uncertain behavior Of soft tissue and skin  Hypercholesterolemia  Hyperkalemia  Hypertension  Hypotension 12/15/2018  Low back pain  Melanoma in situ of other parts of face ([address] Lower lip; removed  Neuropathy  Premature atrial complexes 11/10/2018  Right bundle-branch block  Septic shock ([address] 3/22/2019  Spinal stenosis  Type II diabetes mellitus ([address]  Venous insufficiency Past Surgical History: Procedure Laterality Date  HX HEENT    
 HX ORTHOPAEDIC    
 HX PROSTATECTOMY  HX UROLOGICAL    
 NEUROLOGICAL PROCEDURE UNLISTED No family history on file. History reviewed, no pertinent family history. Social History Tobacco Use  Smoking status: Former Smoker  Smokeless tobacco: Never Used Substance Use Topics  Alcohol use: No  
  Frequency: Never Allergies Allergen Reactions  Bactrim [Sulfamethoprim] Rash  Sulfamethoxazole Unknown (comments) Current Facility-Administered Medications Medication Dose Route Frequency  insulin glargine (LANTUS) injection 16 Units  16 Units SubCUTAneous DAILY  HYDROmorphone (DILAUDID) tablet 2 mg  2 mg Oral Q4H PRN  
 insulin lispro (HUMALOG) injection   SubCUTAneous AC&HS  
 glucose chewable tablet 16 g  4 Tab Oral PRN  
 dextrose (D50W) injection syrg 12.5-25 g  12.5-25 g IntraVENous PRN  
 glucagon (GLUCAGEN) injection 1 mg  1 mg IntraMUSCular PRN  
 morphine CR (MS CONTIN) tablet 15 mg  15 mg Oral Q12H  
 senna-docusate (PERICOLACE) 8.6-50 mg per tablet 1 Tab  1 Tab Oral BID  dexAMETHasone (DECADRON) tablet 4 mg  4 mg Oral Q12H  aspirin chewable tablet 81 mg  81 mg Oral DAILY  ferrous sulfate tablet 325 mg  325 mg Oral BID  metoprolol tartrate (LOPRESSOR) tablet 25 mg  25 mg Oral BID  sodium chloride (NS) flush 5-40 mL  5-40 mL IntraVENous Q8H  
 sodium chloride (NS) flush 5-40 mL  5-40 mL IntraVENous PRN  polyethylene glycol (MIRALAX) packet 17 g  17 g Oral DAILY PRN  promethazine (PHENERGAN) tablet 12.5 mg  12.5 mg Oral Q6H PRN Or  
 ondansetron (ZOFRAN) injection 4 mg  4 mg IntraVENous Q6H PRN  
 enoxaparin (LOVENOX) injection 40 mg  40 mg SubCUTAneous DAILY  labetaloL (NORMODYNE;TRANDATE) 20 mg/4 mL (5 mg/mL) injection 20 mg  20 mg IntraVENous Q6H PRN  
 0.9% sodium chloride infusion  100 mL/hr IntraVENous CONTINUOUS  
 glucose chewable tablet 16 g  4 Tab Oral PRN  
  dextrose (D50W) injection syrg 12.5-25 g  25-50 mL IntraVENous PRN  
 glucagon (GLUCAGEN) injection 1 mg  1 mg IntraMUSCular PRN  pantoprazole (PROTONIX) tablet 40 mg  40 mg Oral DAILY  mirabegron ER (MYRBETRIQ) tablet 50 mg  50 mg Oral Q48H  
 acetaminophen (TYLENOL) tablet 1,000 mg  1,000 mg Oral Q8H  
 polyethylene glycol (MIRALAX) packet 17 g  17 g Oral DAILY  
 
 
 
 LAB AND IMAGING FINDINGS:  
 
Lab Results Component Value Date/Time WBC 12.8 (H) 07/28/2020 11:59 AM  
 HGB 14.6 07/28/2020 11:59 AM  
 PLATELET 163 08/89/2339 11:59 AM  
 
Lab Results Component Value Date/Time Sodium 138 07/28/2020 11:59 AM  
 Potassium 4.3 07/28/2020 11:59 AM  
 Chloride 105 07/28/2020 11:59 AM  
 CO2 31 07/28/2020 11:59 AM  
 BUN 23 (H) 07/28/2020 11:59 AM  
 Creatinine 1.02 07/28/2020 11:59 AM  
 Calcium 9.3 07/28/2020 11:59 AM  
 Magnesium 2.0 03/26/2019 04:05 AM  
 Phosphorus 3.2 03/26/2019 04:05 AM  
  
Lab Results Component Value Date/Time Alk. phosphatase 69 07/28/2020 11:59 AM  
 Protein, total 7.2 07/28/2020 11:59 AM  
 Albumin 3.5 07/28/2020 11:59 AM  
 Globulin 3.7 07/28/2020 11:59 AM  
 
Lab Results Component Value Date/Time INR 1.2 (H) 11/12/2018 01:22 AM  
 Prothrombin time 11.6 (H) 11/12/2018 01:22 AM  
  
Lab Results Component Value Date/Time Iron 57 06/17/2020 02:23 PM  
 TIBC 286 06/17/2020 02:23 PM  
 Iron % saturation 20 06/17/2020 02:23 PM  
 Ferritin 103 06/17/2020 02:23 PM  
  
No results found for: PH, PCO2, PO2 No components found for: [place] No results found for: CPK, CKMB Total time:65min Counseling / coordination time, spent as noted above:50 min 
> 50% counseling / coordination?: yes Prolonged service was provided for  [x]30 min   []75 min in face to face time in the presence of the patient, spent as noted above. Time Start:  
Time End:  
Note: this can only be billed with 79519 (initial) or 10067 (follow up). If multiple start / stop times, list each separately.

## 2020-07-30 NOTE — PROGRESS NOTES
[address] [facility] at [address] [place] [address] [phone] Hematology / Oncology Consult Reason for Visit:  
Renaldo Fabian is a 80 y.o. male who is seen in consultation at the request of Dr. 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 
· Single agent maintenance rituximab 225 mg/m2 q 3 weeks 12/7/16, last dose 5/2018, unclear how many doses in between, had 2 hospitalizations in 2018 (5 total) · Admitted 9/26/18-9/29/18 for sepsis due to dental abscess, related to xgeva/zometa Last office visit on 2/12/2020 History of Present Illness:  
 
Renaldo Fabian was admitted on 7/28/2020 from the ED when he presented with c/o back pain; on the right lower hip area and across to left hip area. Rates pain 9/10. Pain is worse when changing positions; going from lying to standing position or turning in the bed. Started acutely last Friday and has not improved. Prior to pain starting was up walking to the dining room area and around his facility. Hashad some problems with constipation Denies N/V. Denies SOB. Requesting something for pain.   
 
Daughter, Dana Upton states her father was seen at [facility] ED last Friday (7/24)  for same pain,had xrays; was given steroids and muscle relaxant and told to take Arthritis Tylenol. He has not had any improvement in the pain. He currently lives at [facility] which is an independent living facility. Up until Friday had been very mobile. I spoke with the daughter by phone [phone], immediately after speaking with the patient. Interval History:  
 
Feeling better; rates pain at 6/10. Reports was up ambulating in the hallway and did well. Some pain with ambulation. Going home tomorrow to daughter's house. Current Facility-Administered Medications Medication Dose Route Frequency  insulin glargine (LANTUS) injection 16 Units  16 Units SubCUTAneous DAILY  HYDROmorphone (DILAUDID) tablet 2 mg  2 mg Oral Q4H PRN  
 senna-docusate (PERICOLACE) 8.6-50 mg per tablet 2 Tab  2 Tab Oral BID  insulin lispro (HUMALOG) injection   SubCUTAneous AC&HS  
 glucose chewable tablet 16 g  4 Tab Oral PRN  
 dextrose (D50W) injection syrg 12.5-25 g  12.5-25 g IntraVENous PRN  
 glucagon (GLUCAGEN) injection 1 mg  1 mg IntraMUSCular PRN  
 morphine CR (MS CONTIN) tablet 15 mg  15 mg Oral Q12H  
 dexAMETHasone (DECADRON) tablet 4 mg  4 mg Oral Q12H  aspirin chewable tablet 81 mg  81 mg Oral DAILY  ferrous sulfate tablet 325 mg  325 mg Oral BID  metoprolol tartrate (LOPRESSOR) tablet 25 mg  25 mg Oral BID  sodium chloride (NS) flush 5-40 mL  5-40 mL IntraVENous Q8H  
 sodium chloride (NS) flush 5-40 mL  5-40 mL IntraVENous PRN  polyethylene glycol (MIRALAX) packet 17 g  17 g Oral DAILY PRN  promethazine (PHENERGAN) tablet 12.5 mg  12.5 mg Oral Q6H PRN Or  
 ondansetron (ZOFRAN) injection 4 mg  4 mg IntraVENous Q6H PRN  
 enoxaparin (LOVENOX) injection 40 mg  40 mg SubCUTAneous DAILY  labetaloL (NORMODYNE;TRANDATE) 20 mg/4 mL (5 mg/mL) injection 20 mg  20 mg IntraVENous Q6H PRN  
 0.9% sodium chloride infusion  100 mL/hr IntraVENous CONTINUOUS  
  glucose chewable tablet 16 g  4 Tab Oral PRN  
 dextrose (D50W) injection syrg 12.5-25 g  25-50 mL IntraVENous PRN  
 glucagon (GLUCAGEN) injection 1 mg  1 mg IntraMUSCular PRN  pantoprazole (PROTONIX) tablet 40 mg  40 mg Oral DAILY  mirabegron ER (MYRBETRIQ) tablet 50 mg  50 mg Oral Q48H  
 acetaminophen (TYLENOL) tablet 1,000 mg  1,000 mg Oral Q8H  
 polyethylene glycol (MIRALAX) packet 17 g  17 g Oral DAILY Allergies Allergen Reactions  Bactrim [Sulfamethoprim] Rash  Sulfamethoxazole Unknown (comments) Review of Systems: A complete review of systems was obtained, negative except as described above. Physical Exam:  
 
Visit Vitals /69 (BP 1 Location: Left arm, BP Patient Position: At rest) Pulse 67 Temp 97.8 °F (36.6 °C) Resp 18 Ht 5' 7\" (1.702 m) Wt 68.5 kg (151 lb) SpO2 93% BMI 23.65 kg/m² ECOG PS: 3 General: No distress Eyes: Anicteric sclerae HENT: Atraumatic Neck: Supple Respiratory: Normal respiratory effort CV: No peripheral edema GI: Soft, nontender, nondistended, no masses, no hepatomegaly, no splenomegaly Skin: No rashes, ecchymoses, or petechiae Psych: Alert, oriented, appropriate affect, normal judgment/insight Results:  
 
Lab Results Component Value Date/Time WBC 12.8 (H) 07/28/2020 11:59 AM  
 HGB 14.6 07/28/2020 11:59 AM  
 HCT 44.2 07/28/2020 11:59 AM  
 PLATELET 082 43/20/2245 11:59 AM  
 MCV 94.0 07/28/2020 11:59 AM  
 ABS. NEUTROPHILS 9.5 (H) 07/28/2020 11:59 AM  
 
Lab Results Component Value Date/Time  Sodium 138 07/28/2020 11:59 AM  
 Potassium 4.3 07/28/2020 11:59 AM  
 Chloride 105 07/28/2020 11:59 AM  
 CO2 31 07/28/2020 11:59 AM  
 Glucose 194 (H) 07/28/2020 11:59 AM  
 BUN 23 (H) 07/28/2020 11:59 AM  
 Creatinine 1.02 07/28/2020 11:59 AM  
 GFR est AA >60 07/28/2020 11:59 AM  
 GFR est non-AA >60 07/28/2020 11:59 AM  
 Calcium 9.3 07/28/2020 11:59 AM  
 Glucose (POC) 276 (H) 07/30/2020 03:18 PM  
 
 Lab Results Component Value Date/Time Bilirubin, total 0.6 07/28/2020 11:59 AM  
 ALT (SGPT) 20 07/28/2020 11:59 AM  
 Alk. phosphatase 69 07/28/2020 11:59 AM  
 Protein, total 7.2 07/28/2020 11:59 AM  
 Albumin 3.5 07/28/2020 11:59 AM  
 Globulin 3.7 07/28/2020 11:59 AM  
 
 
Lab Results Component Value Date/Time Reticulocyte count 0.4 (L) 12/16/2018 02:56 AM  
 Iron % saturation 20 06/17/2020 02:23 PM  
 TIBC 286 06/17/2020 02:23 PM  
 Ferritin 103 06/17/2020 02:23 PM  
 Vitamin B12 581 11/22/2019 11:45 AM  
 Folate 25.0 (H) 11/22/2019 11:45 AM  
  06/17/2020 02:23 PM  
 TSH 1.49 03/23/2019 02:27 AM  
 
Lab Results Component Value Date/Time INR 1.2 (H) 11/12/2018 01:22 AM  
 
Lab Results Component Value Date/Time  
 Prostate Specific Ag <0.1 12/12/2018 11:25 AM  
  06/17/2020 02:23 PM  
 
 
 
7/24/2020 XR Spine LUMB IMPRESSION: Prior kyphoplasty procedures of L1 and L2. No acute process 
identified by plain film technique. Constipation. 
  
 
7/28/2020 CT PELV WO CONT IMPRESSION: 
1. Scattered mixed lytic and sclerotic lesions throughout the pelvis concerning 
for metastatic diseaseMRI imaging is recommended 2. Acute fracture is not identified Assessment and Recommendations: 1. Pain, due to cancer, likely recurrent lymphoma:  New scattered mixed lytic and sclerotic lesions in pelvis noted on ED scans;   
palliative team following Hospice consult placed with [facility] per family request 
Goal is to have his pain under control prior to discharge 2. Diffuse large B-cell lymphoma: appears to have relapsed. During last clinic visit on 2/12/20 Dr Ginny Juarez I discussed with pt and family today the CT bone findings from 2/5/20. The mandible lesion is known and spine lesions may be recurrent lymphoma; at that time pt and family did not want further testing or biopsy as we would not treat without symptoms. CT imaging now with scattered mixed lytic and sclerotic lesions throughout the pelvis concerning for metastatic disease; radiology recommending further eval with MRI. His cancer clearly has returned. Unfortunately, this it has recurred multiple times and further chemotherapy would likely be toxic without cure at this time. He states to me that he would not want a biopsy at this time. Not recommending any further chemotherapy, and he and his daughter are in agreement. Will focus on pain control and QOL. Hospice has been consulted. May decide to do further imaging later, if there is a persistent problematic area for pain control, but want his pain under better control first prior to moving him for imaging. 3. Constipation Bowel regimen adjusted 4. Debility PT/OT following 5. SARS- CoV2:  Negative 6. Dispo Plan for discharge home tomorrow with support of [facility] and personal care through [facility]. Plan reviewed with Dr Davis  Signed By: Elfego Whitlock NP

## 2020-07-30 NOTE — PROGRESS NOTES
Occupational Therapy Note: Pt politely declined stating he just returned to bed having ambulated in the room/bathroom. Will cont to follow next tx date.

## 2020-07-30 NOTE — PROGRESS NOTES
Problem: Risk for Spread of Infection Goal: Prevent transmission of infectious organism to others Description: Prevent the transmission of infectious organisms to other patients, staff members, and visitors. Outcome: Progressing Towards Goal 
  
Problem: Patient Education:  Go to Education Activity Goal: Patient/Family Education Outcome: Progressing Towards Goal 
  
Problem: Pressure Injury - Risk of 
Goal: *Prevention of pressure injury Description: Document Bryan Scale and appropriate interventions in the flowsheet. Outcome: Progressing Towards Goal 
Note: Pressure Injury Interventions: 
Sensory Interventions: Assess need for specialty bed, Check visual cues for pain, Monitor skin under medical devices, Pressure redistribution bed/mattress (bed type), Turn and reposition approx. every two hours (pillows and wedges if needed) Moisture Interventions: Maintain skin hydration (lotion/cream), Assess need for specialty bed, Internal/External urinary devices Activity Interventions: Increase time out of bed, Pressure redistribution bed/mattress(bed type) Mobility Interventions: HOB 30 degrees or less, Pressure redistribution bed/mattress (bed type), Turn and reposition approx. every two hours(pillow and wedges) Nutrition Interventions: Document food/fluid/supplement intake Friction and Shear Interventions: HOB 30 degrees or less, Transferring/repositioning devices Problem: Patient Education: Go to Patient Education Activity Goal: Patient/Family Education Outcome: Progressing Towards Goal 
  
Problem: Falls - Risk of 
Goal: *Absence of Falls Description: Document Alessandro Medel Fall Risk and appropriate interventions in the flowsheet. Outcome: Progressing Towards Goal 
Note: Fall Risk Interventions: 
Mobility Interventions: Bed/chair exit alarm Medication Interventions: Teach patient to arise slowly Elimination Interventions: Call light in reach History of Falls Interventions: Bed/chair exit alarm, Room close to nurse's station Problem: Patient Education: Go to Patient Education Activity Goal: Patient/Family Education Outcome: Progressing Towards Goal

## 2020-07-30 NOTE — PROGRESS NOTES
CMS Note 7/30/2020 Patient received and signed their 2nd IMM letter. Patient was given a copy for their record. Renato Quick CMS

## 2020-07-30 NOTE — PROGRESS NOTES
Bedside shift change report given to [facility], RN (oncoming nurse) by Nader Glasgow RN (offgoing nurse). Report included the following information SBAR, MAR, Accordion and Recent Results.

## 2020-07-31 NOTE — PROGRESS NOTES
Pharmacist Discharge Medication Reconciliation Discharge Provider:  Dr. Beverly Lopez Discharge Medications: My Medications CONTINUE taking these medications Instructions Each Dose to Equal Morning Noon Evening Bedtime Myrbetriq 50 mg ER tablet Generic drug:  mirabegron ER Take 50 mg by mouth nightly. 50 mg 
      
omeprazole 20 mg capsule Commonly known as:  PRILOSEC Take 20 mg by mouth daily. 20 mg Tylenol Arthritis Pain 650 mg [facility] Generic drug:  acetaminophen Take 650 mg by mouth three (3) times daily. 650 mg 
      
 
  
 
STOP taking these medications   
 
aspirin 81 mg chewable tablet B Complex 1 tablet Generic drug:  b complex vitamins 
  
  
cholecalciferol (1000 Units /25 mcg) tablet Commonly known as:  VITAMIN D3 
  
  
ferrous sulfate 325 mg (65 mg iron) tablet 
  
  
insulin NPH/insulin regular 100 unit/mL (70-30) injection Commonly known as:  NOVOLIN 70/30, HUMULIN 70/30 
  
  
lutein 20 mg Cap 
  
  
methocarbamoL 750 mg tablet Commonly known as:  Robaxin-750 
  
  
metoprolol tartrate 50 mg tablet Commonly known as:  LOPRESSOR 
  
  
OTHER 
  
  
predniSONE 20 mg tablet Commonly known as:  Sonja Lindo SC 
  
  
 
  
 
Confirmed with [facility] team that pain medications will be taken care of by hospice. The patient's chart, MAR, and AVS were reviewed by Lili Crenshaw, Rebecca, BCPS Contact: [phone]

## 2020-07-31 NOTE — DISCHARGE INSTRUCTIONS
HOME DISCHARGE INSTRUCTIONS    Christina Umana / [id number] : 1927    Admission date: 2020 Discharge date: 2020 8:48 AM     Please bring this form with you to show your care provider at your follow-up appointment. Primary care provider:  Bigg Cassidy MD    Discharging provider:  Shanna Joseph DO  - Family Medicine Resident  Adina Root MD - [facility] Medicine Attending      You have been admitted to the hospital with the following diagnoses:    ACUTE DIAGNOSES:  Back pain [M54.9]  Hip pain [M25.559]    . . . . . . . . . . . . . . . . . . . . . . . . . . . . . . . . . . . . . . . . . . . . . . . . . . . . . . . . . . . . . . . . . . . . . . . .   You are well enough to be discharged from the hospital. However, because you were inpatient in a hospital, you are at greater risk of having been exposed to the coronavirus. PLEASE stay inside and self-quarantine for 14 days to prevent further spread of the coronavirus. . . . . . . . . . . . . . . . . . . . . . . . . . . . . . . . . . . . . . . . . . . . . . . . . . . . . . . . . . . . . . . . . . . . . . . . .   . . . . . . . . . . . . . . . . . . . . . . . . . . . . . . . . . . . . . . . . . . . . . . . . . . . . . . . . . . . . . . . . . . . . . . . [facility] FOLLOW-UP CARE RECOMMENDATIONS:    Appointments  Follow-up Information     Follow up With Specialties Details Why Contact Info    Jesus Alberto Mello MD Hematology and Oncology, Internal Medicine, Hematology, Oncology Go on 2020 appt at 2:30 pm  [address]  [phone]      Bigg Cassidy MD Pediatric Medicine, Family Medicine   [address]  [phone]               Follow-up tests needed: none    Pending test results: At the time of your discharge the following test results are still pending: none. Please make sure you review these results with your outpatient follow-up provider(s).     DIET/what to eat: Regular Diet    ACTIVITY:  Activity as tolerated    Wound care: none    Equipment needed:  none    Specific symptoms to watch for: chest pain, shortness of breath, fever, chills, nausea, vomiting, diarrhea, change in mentation, falling, weakness, bleeding. What to do if new or unexpected symptoms occur? If you experience any of the above symptoms (or should other concerns or questions arise after discharge) please call your primary care physician. Return to the emergency room if you cannot get hold of your doctor. · It is very important that you keep your follow-up appointment(s). · Please bring discharge papers, medication list (and/or medication bottles) to your follow-up appointments for review by your outpatient provider(s). · Please check the list of medications and be sure it includes every medication (even non-prescription medications) that your provider wants you to take. · It is important that you take the medication exactly as they are prescribed. · Keep your medication in the bottles provided by the pharmacist and keep a list of the medication names, dosages, and times to be taken in your wallet. · Do not take other medications without consulting your doctor. · If you have any questions about your medications or other instructions, please talk to your nurse or care provider before you leave the hospital.     Information obtained by:     I understand that if any problems occur once I am at home I am to contact my physician. These instructions were explained to me and I had the opportunity to ask questions. I understand and acknowledge receipt of the instructions indicated above.                                                                                                                                                Physician's or R.N.'s Signature                                                                  Date/Time Patient or Representative Signature                                                          Date/Time

## 2020-07-31 NOTE — PROGRESS NOTES
Bedside and Verbal shift change report given to [address], RN (oncoming nurse) by Eliane Arias RN (offgoing nurse). Report included the following information SBAR, Kardex and MAR.

## 2020-07-31 NOTE — PROGRESS NOTES
Introduced myself to patient and his daughter Ms. Ricki Aschoff. Mask, gloves and gown worn while meeting with patient. Medicare letter signed by patient, copy of the letter given to the pt's daughter. Pt is being transported by [facility] today at 1:00. Packet for [facility] is on the pt's chart. AVS has been faxed to [facility] with [facility] Joseph's phone number is [phone]. Fax number is [phone]. I have also sent the AVS to [address] [place] [address] through [place] [facility]. No other issues or concerns at this time. Patient and daughter verbalized understanding. Care Management Interventions PCP Verified by CM: Yes(in the last couple of months) Mode of Transport at Discharge: Other (see comment)(Daughter will transport at [place]) Transition of Care Consult (CM Consult): Home Hospice [facility]: No 
Reason Outside [place]: Patient already serviced by other home care/hospice agency Discharge Durable Medical Equipment: No 
Physical Therapy Consult: No 
Occupational Therapy Consult: No 
Current Support Network: Other(Lives in [address] [place] [address]) Confirm Follow Up Transport: Family The Patient and/or Patient Representative was Provided with a Choice of Provider and Agrees with the Discharge Plan?: Yes Freedom of Choice List was Provided with Basic Dialogue that Supports the Patient's Individualized Plan of Care/Goals, Treatment Preferences and Shares the Quality Data Associated with the Providers?: Yes Discharge Location Discharge Placement: Home with hospice GIOVANNY Cervantes

## 2020-07-31 NOTE — PROGRESS NOTES
Patient visited. Patient denies discomfort presently. Patient very hard of hearing, unable to understand discharge nurse even with speaking directly into ear with hearing aid. Patient able to verbalize understanding of discharge this afternoon. Patient's daughter and POA called, Madhav Christiansen. Niru given discharge instructions over the phone. Niru verbalizes understanding of discharge instructions, plan to go to home with hospice at 1pm by [place]. Patient's belongings packed. Discharge paperwork included in belonging bags for patient and his daughter. Niru's only discharge concern is patient's complaint to her of ongoing constipation. [place], patient's primary nurse, made aware. [place] called family practice team to address patient's ongoing constipation.

## 2020-07-31 NOTE — PALLIATIVE CARE DISCHARGE
Goals of Care/Treatment Preferences The Palliative Medicine team was consulted as part of your/your loved one's care in the hospital. Our team is a supportive service; we strive to relieve suffering and improve quality of life. We reviewed advance care planning information, which includes the following: 
Patient's Healthcare Decision Maker is[de-identified] Named in scanned ACP document Confirm Advance Directive: Yes, on file Does the patient have other document types: Do Not Resuscitate Patient/Health Care Proxy Stated Goals: Comfort We reviewed / discussed your code status as: DNR 
   Full Code means perform CPR in the event of cardiac arrest. 
    DNR means do NOT perform CPR in the event of cardiac arrest. 
    Partial Code means you have specific preferences, please discuss with your healthcare team. 
    Antonia Lars means this issue was not addressed / resolved during your stay Medical Interventions: Comfort measures Other Instructions: You will be returning home with support from [facility]. You have a Durable Do Not Resuscitate Order in place, which should travel with you. When you are in a facility, this form should be placed on your chart. Once you are home, it is recommended that the [facility] form be placed in a visible location such as on the refrigerator or bedroom door. Artificially Administered Nutrition: No feeding tube Because of the importance of this information, we are providing you with a printed copy to share with other healthcare providers after this hospitalization is complete.

## 2020-07-31 NOTE — ACP (ADVANCE CARE PLANNING)
Primary Decision Maker: Ramesh Hampton - [phone] Advance Care Planning 2020 Patient's Healthcare Decision Maker is: Named in scanned ACP document Confirm Advance Directive Yes, on file Patient Would Like to Complete Advance Directive - Does the patient have other document types Do Not Resuscitate Pt has ACP documents on file. AMD dated 05 contains appointment of health care agents and health care instructions; however, primary agent (son) is now , dtr had been appointed as secondary. POA document dated 16 appoints dtr and son-in-law as primary and secondary medical/$ POAs respectively. DDNR was signed 2018; copy has been scanned into medical record. Pt will be returning home with support from [facility].

## 2020-07-31 NOTE — MED STUDENT NOTES
*ATTENTION:  This note has been created by a medical student for educational purposes only. Please do not refer to the content of this note for clinical decision-making, billing, or other purposes. Please see attending physicians note to obtain clinical information on this patient. *  
 
  [facility] [address] [facility] Office [phone] Fax [phone] Discharge / Transfer / Off-Service Note Name: Arina Meraz MRN: [id number]  Sex: Male YOB: 1927  Age: 80 y.o. PCP: Asia Villarreal MD  
 
Date of admission: 7/28/2020 Date of discharge/transfer: 7/31/2020 Attending physician at admission: Poly Thompson. Attending physician at discharge/transfer: Roseanna Ward MD 
Resident physician at discharge/transfer: Yara Fraga MD 
  
Consultants during hospitalization IP CONSULT TO ONCOLOGY 
IP CONSULT TO PALLIATIVE CARE - PROVIDER Admission diagnoses Back pain [M54.9] Hip pain [M25.559] Recommended follow-up after discharge 1. Dr. Hermila Mai in Hematology/Oncology on 8/31 Things to follow up on with PCP:  
1) Pain control 2) Constipation 
 ---------------------------------------------------------------------------------------------------------------------------- The patient was well enough to be discharged from the hospital. However, because they were inpatient in a hospital, they are at greater risk of having been exposed to the coronavirus. PLEASE stay inside and self-quarantine for 14 days to prevent further spread of the coronavirus. 
------------------------------------------------------------------------------------------------------------------ Medication Changes: START MS Contin 15mg PO every 12 hours, Dilaudid 2mg PO every 4 hours as needed, dexamethasone 4mg PO every 12 hours, Miralax, Senna STOP Aspirin, B Complex, Vitamin D, Iron, Lutein, Methocarbamol, Metoprolol, Prednisone, Trulicity, NPH Insulin CHANGES No medications had doses or frequency changed No other changes were made to your medications, please take all your other home medicines as previously prescribed. History of Present Illness As per admitting provider, Dr. Savi Young: \"Kendell Philip is a 80 y.o. male with PMHx of large B-cell lymphoma, melanoma, prostate cancer, T2DM, HTN, HLD, and a-fib who presents to the ED complaining of acute onset back pain. 
  
Pt is a resident at [address] [place] [address] and states that about 4 days ago he experienced sudden onset pain in his lower back that he describes as \"aching\" and constant. The pain is present in his bilateral lower back and also extends to his R hip. The pt said the pain prevented him from getting out of bed or moving around much to the point where his daughter took him to [facility] ED where he had some imaging of his spine and was prescribed a muscle relaxer, steroids, and arthritis-strength tylenol. Three days passed with no improvement in the pt's pain at which point he presented to this ED.  
  
Pt states that he is independently ambulatory at baseline but has been unable to get out of bed even to use the bathroom over the last few days. He and his daughter deny any recent falls. Pt also denies dizziness, pain radiating down his legs, weakness, or bowel/bladder incontinence. Pt also cannot recall any precipitating event that would have caused his pain.  
  
Of note pt has a hx of large B-cell lymphoma that was diagnosed via an iliac bone biopsy in 2014. He received chemotherapy for this cancer and was on maintenance therapy up until 2018. Pt is followed by Dr. Dieudonne Hassan. \" Hospital course #B-Cell Lymphoma with Spine/Pelvic Involvement He initially presented to the emergency department with new-onset back and pelvic pain.  Imaging revealed known pelvic lesions from his B-cell lymphoma, which was diagnosed in 2014 and has recurred despite chemotherapy. It is thus suspected that his back and hip pain are a progression of his lymphoma and not a new, acute process. At his last office visit with Dr. Jamee Conway in 2/2020, the family elected not to pursue additional testing or treatment, and would instead focus on symptom management / comfort measures. As such we began him on MS Contin 15mg BID with Dilaudid 2mg PO every 4 hours PRN, which has worked well. #Constipation Multifactorial due to decreased intake, decreased physical activity, and opioids. He was begun on Miralax and Senna, and after an enema he did have a bowel movement as well. Anticipate this will improve as his pain is brought under control and he is ambulating more. #GERD Continue Prilosec. #Hypertension, DMT2, Afib, HLD His home medications will be discontinued in the setting of comfort measures only. Physical exam at discharge: 
 
Vitals Reviewed. Patient Vitals for the past 12 hrs: 
 Temp Pulse Resp BP SpO2  
07/31/20 0811 98.2 °F (36.8 °C) 69 18 157/82 92 % 07/31/20 0356 97.9 °F (36.6 °C) (!) 54 18 133/58 94 % 07/31/20 0041 97.7 °F (36.5 °C) (!) 58 16 127/56 97 % General Oriented to person, place, and time and well-developed. Appears well-nourished, no distress. Not diaphoretic. Hard of hearing. Cardio Normal rate, regular rhythm. Exam reveals no gallop and no friction rub. No murmur heard. No chest wall tenderness. Pulmonary Effort normal and breath sounds normal. No respiratory distress. No wheezes, no rales. Abdominal Soft. Bowel sounds normal. No distension. No tenderness.  Deferred. Extremities No edema of lower extremities. No tenderness. Distal pulses intact. Neurological Alert and oriented to person, place, and time. Dermatology Skin is warm and dry. No rash noted. No erythema or pallor. Psychiatric Affect and judgment normal.   
 
 
Condition at discharge: Hospice Labs Recent Labs  
  07/28/20 
1159 WBC 12.8*  
 HGB 14.6 HCT 44.2  Recent Labs  
  07/28/20 
1159   
K 4.3  CO2 31 BUN 23* CREA 1.02  
* CA 9.3 Recent Labs  
  07/28/20 
1159 ALT 20 AP 69 TBILI 0.6 TP 7.2 ALB 3.5 GLOB 3.7 Recent Labs  
  07/31/20 
1604 07/30/20 
2056 07/30/20 
1518 07/30/20 
1134 07/30/20 
8621 GLUCPOC 228* 147* 276* 261* 268* Micro: 
Lab Results Component Value Date/Time Culture result: NO GROWTH 1 DAY 03/24/2019 09:49 PM  
 Culture result: MRSA PRESENT (A) 03/23/2019 04:01 AM  
 Culture result:  03/23/2019 04:01 AM  
      Screening of patient nares for MRSA is for surveillance purposes and, if positive, to facilitate isolation considerations in high risk settings. It is not intended for automatic decolonization interventions per se as regimens are not sufficiently effective to warrant routine use. Imaging: 
Xr Spine Lumb 2 Or 3 V Result Date: 7/24/2020 EXAM: XR SPINE LUMB 2 OR 3 V INDICATION: Back Pain COMPARISON: 1/3/2020 FINDINGS: Nonstandard AP, lateral and spot lateral views of the lumbar spine. There is a persistent kyphoplasty change involving L1 and L2 with unchanged severe compression deformity of L1. No new finding is seen. Multiple pelvic clips are present. Constipation is present. IMPRESSION: Prior kyphoplasty procedures of L1 and L2. No acute process identified by plain film technique. Constipation. Ct Pelv Wo Cont Result Date: 7/28/2020 INDICATION:  eval for fx s/p fall , pelvic pain EXAM: CT pelvis. No comparisons. Thin section axial images were obtained. From these sagittal and coronal reformats were performed. CT dose reduction was achieved through use of a standardized protocol tailored for this examination and automatic exposure control for dose modulation. FINDINGS: Bone mineral density is decreased. There are scattered mixed chronic and lytic lesions throughout the pelvis.  The largest is noted in the posterior right iliac wing measuring 5.7 x 1.8 cm. Subtle periosteal reaction with this largest lesion. There is no acute fracture. No evidence of avascular necrosis. There are degenerative changes of the lower lumbar spine. There are vascular calcifications. Patient is post left inguinal hernia repair. Intrapelvic bowel is nondistended. No soft tissue hematoma IMPRESSION: 1. Scattered mixed lytic and sclerotic lesions throughout the pelvis concerning for metastatic disease. MR imaging is recommended 2. Acute fracture is not identified Procedures / Diagnostic Studies · None Chronic diagnoses Problem List as of 7/31/2020 Date Reviewed: 4/4/2020 Codes Class Noted - Resolved Urinary frequency ICD-10-CM: R35.0 ICD-9-CM: 788.41  7/29/2020 - Present GERD (gastroesophageal reflux disease) ICD-10-CM: K21.9 ICD-9-CM: 530.81  7/29/2020 - Present Back pain ICD-10-CM: M54.9 ICD-9-CM: 724.5  7/28/2020 - Present * (Principal) Hip pain ICD-10-CM: M25.559 ICD-9-CM: 719.45  7/28/2020 - Present Essential hypertension ICD-10-CM: I10 
ICD-9-CM: 401.9  3/24/2019 - Present Acute kidney injury ([facility] ICD-10-CM: N17.9 ICD-9-CM: 584.9  3/24/2019 - Present Chronic anemia ICD-10-CM: D64.9 ICD-9-CM: 285.9  3/24/2019 - Present Dyslipidemia ICD-10-CM: E78.5 ICD-9-CM: 272.4  3/24/2019 - Present Atrial fibrillation [facility]) ICD-10-CM: I48.91 
ICD-9-CM: 427.31  3/22/2019 - Present Non Hodgkin's lymphoma ([facility] [address] ICD-10-CM: C85.90 ICD-9-CM: 202.80  11/12/2018 - Present DM (diabetes mellitus) ([facility] [address] [address] ICD-10-CM: E11.9 ICD-9-CM: 250.00  11/10/2018 - Present RESOLVED: Epiglottitis ICD-10-CM: J05.10 ICD-9-CM: 464.30  2/6/2020 - 7/29/2020 RESOLVED: HAP (hospital-acquired pneumonia) ICD-10-CM: J18.9, Y95 
ICD-9-CM: 391  3/24/2019 - 7/29/2020 RESOLVED: Septic shock (HCC) ICD-10-CM: A41.9, R65.21 ICD-9-CM: 038.9, 785.52, 995.92  3/22/2019 - 7/29/2020 RESOLVED: Hypotension ICD-10-CM: I95.9 ICD-9-CM: 458.9  12/15/2018 - 7/29/2020 RESOLVED: C. difficile diarrhea ICD-10-CM: A04.72 
ICD-9-CM: 008.45  11/13/2018 - 7/29/2020 RESOLVED: Bandemia ICD-10-CM: [id number] ICD-9-CM: 288.66  11/10/2018 - 11/12/2018 RESOLVED: Lactic acidosis ICD-10-CM: E87.2 ICD-9-CM: 276.2  11/10/2018 - 11/12/2018 RESOLVED: History of Clostridium difficile colitis ICD-10-CM: Z86.19 ICD-9-CM: V12.79  11/10/2018 - 7/29/2020 RESOLVED: Premature atrial complexes ICD-10-CM: I49.1 ICD-9-CM: 427.61  11/10/2018 - 7/29/2020 Current Discharge Medication List  
  
CONTINUE these medications which have NOT CHANGED Details  
acetaminophen (Tylenol Arthritis Pain) 650 mg TbER Take 650 mg by mouth three (3) times daily. omeprazole (PRILOSEC) 20 mg capsule Take 20 mg by mouth daily. mirabegron ER (MYRBETRIQ) 50 mg ER tablet Take 50 mg by mouth nightly. STOP taking these medications  
  
 insulin NPH/insulin regular (NOVOLIN 70/30, HUMULIN 70/30) 100 unit/mL (70-30) injection Comments:  
Reason for Stopping:   
   
 metoprolol tartrate (LOPRESSOR) 50 mg tablet Comments:  
Reason for Stopping:   
   
 predniSONE (DELTASONE) 20 mg tablet Comments:  
Reason for Stopping: OTHER Comments:  
Reason for Stopping:   
   
 methocarbamoL (Robaxin-750) 750 mg tablet Comments:  
Reason for Stopping:   
   
 dulaglutide (TRULICITY SC) Comments:  
Reason for Stopping:   
   
 aspirin 81 mg chewable tablet Comments:  
Reason for Stopping:   
   
 ferrous sulfate 325 mg (65 mg iron) tablet Comments:  
Reason for Stopping:   
   
 b complex vitamins (B COMPLEX 1) tablet Comments:  
Reason for Stopping:   
   
 lutein 20 mg cap Comments:  
Reason for Stopping:   
   
 cholecalciferol (VITAMIN D3) 1,000 unit tablet Comments:  
Reason for Stopping:   
   
  
 
  
Diet:  Regular diet. Activity:  As tolerated Disposition: Home or Self Care Discharge instructions to patient/family Please seek medical attention for any new or worsening symptoms particularly fever, chest pain, shortness of breath, abdominal pain, nausea, vomiting Follow up plans/appointments Follow-up Information Follow up With Specialties Details Why Contact Info Dony Anderson MD Hematology and Oncology, Internal Medicine, Hematology, Oncology Go on 8/31/2020 appt at 2:30 pm  [address] [facility] [phone] [address] [facility] 
[phone] Jordin Tavarez MD Pediatric Medicine, Family Medicine   [place] [address] [address] [facility] 
[phone] Jaime  MS4 For Billing Chief Complaint Patient presents with  Back Pain Hospital Problems  Date Reviewed: 4/4/2020 Codes Class Noted POA Urinary frequency ICD-10-CM: R35.0 ICD-9-CM: 788.41  7/29/2020 Unknown GERD (gastroesophageal reflux disease) ICD-10-CM: K21.9 ICD-9-CM: 530.81  7/29/2020 Unknown Back pain ICD-10-CM: M54.9 ICD-9-CM: 724.5  7/28/2020 Unknown * (Principal) Hip pain ICD-10-CM: M25.559 ICD-9-CM: 719.45  7/28/2020 Unknown Essential hypertension ICD-10-CM: I10 
ICD-9-CM: 401.9  3/24/2019 Yes [facility] ICD-10-CM: N17.9 ICD-9-CM: 584.9  3/24/2019 Yes Chronic anemia ICD-10-CM: D64.9 ICD-9-CM: 285.9  3/24/2019 Yes Dyslipidemia ICD-10-CM: E78.5 ICD-9-CM: 272.4  3/24/2019 Yes Atrial fibrillation ([facility] ICD-10-CM: I48.91 
ICD-9-CM: 427.31  3/22/2019 Yes Non Hodgkin's lymphoma ([facility] ICD-10-CM: C85.90 ICD-9-CM: 202.80  11/12/2018 Yes DM (diabetes mellitus) ([facility] ICD-10-CM: E11.9 ICD-9-CM: 250.00  11/10/2018 Yes

## 2020-07-31 NOTE — DISCHARGE SUMMARY
[address] [address] Office [phone] Fax [phone] Discharge / Transfer / Off-Service Note Name: Chano Coppola MRN: [id number]  Sex: Male YOB: 1927  Age: 80 y.o. PCP: Navdeep Douglas MD  
 
Date of admission: 7/28/2020 Date of discharge/transfer: 7/31/2020 Attending physician at admission: Andres Macdonald Attending physician at discharge/transfer: Saul Del Rio MD 
Resident physician at discharge/transfer: Taz Mack MD 
  
Consultants during hospitalization IP CONSULT TO ONCOLOGY 
IP CONSULT TO PALLIATIVE CARE - PROVIDER Admission diagnoses Back pain [M54.9] Hip pain [M25.559] Recommended follow-up after discharge 1. PCP-Carlos Michelle MD 
2. Heme/onc - Dr. Camron Meadows Things to follow up on with PCP:  
-pain control (being managed by hospice) 
-progression of cancer 
-end of life planning 
 ---------------------------------------------------------------------------------------------------------------------------- The patient was well enough to be discharged from the hospital. However, because they were inpatient in a hospital, they are at greater risk of having been exposed to the coronavirus. PLEASE stay inside and self-quarantine for 14 days to prevent further spread of the coronavirus. 
------------------------------------------------------------------------------------------------------------------ Medication Changes: START: 
-bowel regimen (per hospice) 
-pain regimen (per hospice) STOP: 
-ASA, b vitamins, vit D, iron, insulin, lutein, methocarbamol, metoprolol, prednisone, trulicity CHANGES: 
-none No other changes were made to your medications, please take all your other home medicines as previously prescribed. History of Present Illness As per admitting provider, Dr. Hawk: \"Kendell Skinner is a 80 y.o. male with PMHx of large B-cell lymphoma, melanoma, prostate cancer, T2DM, HTN, HLD, and a-fib who presents to the ED complaining of acute onset back pain. 
  
Pt is a resident at [address] [place] [address] and states that about 4 days ago he experienced sudden onset pain in his lower back that he describes as \"aching\" and constant. The pain is present in his bilateral lower back and also extends to his R hip. The pt said the pain prevented him from getting out of bed or moving around much to the point where his daughter took him to [facility] [place] where he had some imaging of his spine and was prescribed a muscle relaxer, steroids, and arthritis-strength tylenol. Three days passed with no improvement in the pt's pain at which point he presented to this ED.  
  
Pt states that he is independently ambulatory at baseline but has been unable to get out of bed even to use the bathroom over the last few days. He and his daughter deny any recent falls. Pt also denies dizziness, pain radiating down his legs, weakness, or bowel/bladder incontinence. Pt also cannot recall any precipitating event that would have caused his pain.  
  
Of note pt has a hx of large B-cell lymphoma that was diagnosed via an iliac bone biopsy in 2014. He received chemotherapy for this cancer and was on maintenance therapy up until 2018. Pt is followed by Dr. Keith Redmond Questions:  
Experiencing any of the following symptoms: fever, chills, cough, SOB, diarrhea, URI symptoms. no Any Sick contacts with fever, cough, diarrhea, SOB, URI symptoms. no 
Traveled out of state or out of country. no 
Lives alone. Has been staying at home. yes 
  In the ED: 
Vitals: Temp 98.4   /114      RR 20   SatO2  94% on RA Labs: WBC 12.8 Imaging: CT pelvis w/ scattered mixed lytic and sclerotic lesions throughout the pelvis concerning for metastatic disease. No acute fracture identified. Treatment: consult to heme/onc 
  
EKG: none\" Hospital course Virgen Steen a 80 y. o. male with a PMHx of large B-cell lymphoma, melanoma, prostate cancer, T2DM, HTN, HLD, and a-fib who is admitted for low back pain. 
  
24-hour events: None 
  
Low back pain: Likely 2/2 metastatic progression of lymphoma (dx 2014). Pt s/p maintenance chemo (5/2018). CT pelvis w/ scattered mixed lytic and sclerotic lesions throughout the pelvis concerning for metastatic disease. No acute fracture identified. Pt follows w/ Dr. Manuel Garcia. Pain has been well-controlled at rest, still present when moving. 
-pain regimen in the hospital managed by palliative care (ms contin 15mg BID, dilaudid 2mg Q4H PRN) -bowel regimen in the hospital per palliative: miralax daily, senna BID 
-per PT/OT pt would benefit from SNF placement 
  
Hx large B-cell lymphoma, likely recurrence: Dx in 2014 w/ chemotherapy up until 2018. Pt follows w/ Dr. Manuel Garcia. Imaging as above. 
-pain regimen per hospice 
-f/u w/ Dr. Ernie Fan outpt - of note pt has decided to pursue hospice and wants no further intervention for his cancer COVID Negative: Pt resident at an independent living facility, at increased risk due to congregate living. SARS-COV PCR negative x1. 
  
T2DM: A1C 8.4 (2/6/20). Home meds include NPH/regular 88/65 46C QAM, trulicity Q7 days. 
-stop insulin and trulicity upon discharge as pt is on hospice 
  
Hypertension: BP on admission 160/114, likely elevated due to pain. Home regimen includes metoprolol. 
-d/c home metoprolol on discharge 
  
Paroxismal A-fib: Follows w/ Dr. Claudene Signs. RRR on admission.  
-d/c metoprolol on discharge  
  
GERD: Home meds include prilosec. 
-continue protonix 40mg PO daily 
  
HLD:  
-d/c home ASA 81mg on discharge 
  
Anemia: Recent iron panel normal. 
-d/c home ferrous sulfate 325mg on discharge 
  
Urinary frequency: 
-continue home myrbetriq 50mg PO Q48H 
  
Hx of melanoma - stable: Pt underwent Moh's procedure in 2019 
  
 Hx of prostate cancer - stable: Dx 1995, s/p prostatectomy Physical exam at discharge: 
 
Vitals Reviewed. Patient Vitals for the past 12 hrs: 
 Temp Pulse Resp BP SpO2  
07/31/20 0811 98.2 °F (36.8 °C) 69 18 157/82 92 % 07/31/20 0356 97.9 °F (36.6 °C) (!) 54 18 133/58 94 % 07/31/20 0041 97.7 °F (36.5 °C) (!) 58 16 127/56 97 % 07/30/20 2020 97.7 °F (36.5 °C) 80 18 135/78 94 % Physical Exam 
General: No acute distress. Alert. Cooperative. Hard of hearing Head: Normocephalic. Atraumatic. Neck: Supple. Normal ROM. No stiffness. Respiratory: Referred breath sounds, good airmovement. No w/r/r/c.  
Cardiovascular: RRR. Normal S1,S2. Systolic ejection murmur 2/6. No r/g. Pulses 2+ throughout. GI: + bowel sounds. Nontender. No rebound tenderness or guarding. Nondistended. Nontender to suprapubic region. Back: Lumbar spine tender to palpation including to b/l paravertebral muscles. No erythema or ecchymoses noted. No point tenderness or step offs appreciated. Extremities: Absent LE edema. Distal pulses intact. R buttock tender to palpation, no erythema or ecchymoses present. Musculoskeletal: Full ROM in all extremities. Skin: Warm, dry. No rashes. Neuro: CN II-XII grossly intact. A&O x4. Condition at discharge: Hospice Labs Recent Labs  
  07/28/20 
1159 WBC 12.8* HGB 14.6 HCT 44.2  Recent Labs  
  07/28/20 
1159   
K 4.3  CO2 31 BUN 23* CREA 1.02  
* CA 9.3 Recent Labs  
  07/28/20 
1159 ALT 20 AP 69 TBILI 0.6 TP 7.2 ALB 3.5 GLOB 3.7 Recent Labs  
  07/31/20 
9052 07/30/20 
2056 07/30/20 
1518 07/30/20 
1134 07/30/20 
4331 GLUCPOC 228* 147* 276* 261* 268* Micro: 
Lab Results Component Value Date/Time  Culture result: NO GROWTH 1 DAY 03/24/2019 09:49 PM  
 Culture result: MRSA PRESENT (A) 03/23/2019 04:01 AM  
 Culture result:  03/23/2019 04:01 AM  
 Screening of patient nares for MRSA is for surveillance purposes and, if positive, to facilitate isolation considerations in high risk settings. It is not intended for automatic decolonization interventions per se as regimens are not sufficiently effective to warrant routine use. Imaging: 
Xr Spine Lumb 2 Or 3 V Result Date: 7/24/2020 EXAM: XR SPINE LUMB 2 OR 3 V INDICATION: Back Pain COMPARISON: 1/3/2020 FINDINGS: Nonstandard AP, lateral and spot lateral views of the lumbar spine. There is a persistent kyphoplasty change involving L1 and L2 with unchanged severe compression deformity of L1. No new finding is seen. Multiple pelvic clips are present. Constipation is present. IMPRESSION: Prior kyphoplasty procedures of L1 and L2. No acute process identified by plain film technique. Constipation. Ct Pelv Wo Cont Result Date: 7/28/2020 INDICATION:  eval for fx s/p fall , pelvic pain EXAM: CT pelvis. No comparisons. Thin section axial images were obtained. From these sagittal and coronal reformats were performed. CT dose reduction was achieved through use of a standardized protocol tailored for this examination and automatic exposure control for dose modulation. FINDINGS: Bone mineral density is decreased. There are scattered mixed chronic and lytic lesions throughout the pelvis. The largest is noted in the posterior right iliac wing measuring 5.7 x 1.8 cm. Subtle periosteal reaction with this largest lesion. There is no acute fracture. No evidence of avascular necrosis. There are degenerative changes of the lower lumbar spine. There are vascular calcifications. Patient is post left inguinal hernia repair. Intrapelvic bowel is nondistended. No soft tissue hematoma IMPRESSION: 1. Scattered mixed lytic and sclerotic lesions throughout the pelvis concerning for metastatic disease. MR imaging is recommended 2. Acute fracture is not identified Procedures / Diagnostic Studies · none Chronic diagnoses Problem List as of 7/31/2020 Date Reviewed: 4/4/2020 Codes Class Noted - Resolved Urinary frequency ICD-10-CM: R35.0 ICD-9-CM: 788.41  7/29/2020 - Present GERD (gastroesophageal reflux disease) ICD-10-CM: K21.9 ICD-9-CM: 530.81  7/29/2020 - Present Back pain ICD-10-CM: M54.9 ICD-9-CM: 724.5  7/28/2020 - Present * (Principal) Hip pain ICD-10-CM: M25.559 ICD-9-CM: 719.45  7/28/2020 - Present Essential hypertension ICD-10-CM: I10 
ICD-9-CM: 401.9  3/24/2019 - Present Acute kidney injury ([facility] ICD-10-CM: N17.9 ICD-9-CM: 584.9  3/24/2019 - Present Chronic anemia ICD-10-CM: D64.9 ICD-9-CM: 285.9  3/24/2019 - Present Dyslipidemia ICD-10-CM: E78.5 ICD-9-CM: 272.4  3/24/2019 - Present Atrial fibrillation [facility]) ICD-10-CM: I48.91 
ICD-9-CM: 427.31  3/22/2019 - Present Non Hodgkin's lymphoma ([facility] ICD-10-CM: C85.90 ICD-9-CM: 202.80  11/12/2018 - Present DM (diabetes mellitus) ([facility] ICD-10-CM: E11.9 ICD-9-CM: 250.00  11/10/2018 - Present RESOLVED: Epiglottitis ICD-10-CM: J05.10 ICD-9-CM: 464.30  2/6/2020 - 7/29/2020 RESOLVED: HAP (hospital-acquired pneumonia) ICD-10-CM: J18.9, Y95 
ICD-9-CM: 506  3/24/2019 - 7/29/2020 RESOLVED: Septic shock (HCC) ICD-10-CM: A41.9, R65.21 ICD-9-CM: 038.9, 785.52, 995.92  3/22/2019 - 7/29/2020 RESOLVED: Hypotension ICD-10-CM: I95.9 ICD-9-CM: 458.9  12/15/2018 - 7/29/2020 RESOLVED: C. difficile diarrhea ICD-10-CM: A04.72 
ICD-9-CM: 008.45  11/13/2018 - 7/29/2020 RESOLVED: Bandemia ICD-10-CM: [id number] ICD-9-CM: 288.66  11/10/2018 - 11/12/2018 RESOLVED: Lactic acidosis ICD-10-CM: E87.2 ICD-9-CM: 276.2  11/10/2018 - 11/12/2018 RESOLVED: History of Clostridium difficile colitis ICD-10-CM: Z86.19 ICD-9-CM: V12.79  11/10/2018 - 7/29/2020 RESOLVED: Premature atrial complexes ICD-10-CM: I49.1 ICD-9-CM: 427.61  11/10/2018 - 7/29/2020 Current Discharge Medication List  
  
CONTINUE these medications which have NOT CHANGED Details  
acetaminophen (Tylenol Arthritis Pain) 650 mg TbER Take 650 mg by mouth three (3) times daily. omeprazole (PRILOSEC) 20 mg capsule Take 20 mg by mouth daily. mirabegron ER (MYRBETRIQ) 50 mg ER tablet Take 50 mg by mouth nightly. STOP taking these medications  
  
 insulin NPH/insulin regular (NOVOLIN 70/30, HUMULIN 70/30) 100 unit/mL (70-30) injection Comments:  
Reason for Stopping:   
   
 metoprolol tartrate (LOPRESSOR) 50 mg tablet Comments:  
Reason for Stopping:   
   
 predniSONE (DELTASONE) 20 mg tablet Comments:  
Reason for Stopping: OTHER Comments:  
Reason for Stopping:   
   
 methocarbamoL (Robaxin-750) 750 mg tablet Comments:  
Reason for Stopping:   
   
 dulaglutide (TRULICITY SC) Comments:  
Reason for Stopping:   
   
 aspirin 81 mg chewable tablet Comments:  
Reason for Stopping:   
   
 ferrous sulfate 325 mg (65 mg iron) tablet Comments:  
Reason for Stopping:   
   
 b complex vitamins (B COMPLEX 1) tablet Comments:  
Reason for Stopping:   
   
 lutein 20 mg cap Comments:  
Reason for Stopping:   
   
 cholecalciferol (VITAMIN D3) 1,000 unit tablet Comments:  
Reason for Stopping:   
   
  
 
  
Diet:  Regular diet. Activity:  As tolerated Disposition: Home or Self Care Discharge instructions to patient/family Please seek medical attention for any new or worsening symptoms particularly fever, chest pain, shortness of breath, abdominal pain, nausea, vomiting Follow up plans/appointments Follow-up Information Follow up With Specialties Details Why Contact Info Jordan Berumen MD Hematology and Oncology, Internal Medicine, Hematology, Oncology Go on 8/31/2020 appt at 2:30 pm  [address] [place] [address] [phone] [address] [place] [address] 
[phone] Ana Trinidad MD Pediatric Medicine, Family Medicine   [address] [facility] 
[phone] Miguelina Kothari MD 
Family Medicine Resident For Billing Chief Complaint Patient presents with  Back Pain Hospital Problems  Date Reviewed: 4/4/2020 Codes Class Noted POA Urinary frequency ICD-10-CM: R35.0 ICD-9-CM: 788.41  7/29/2020 Unknown GERD (gastroesophageal reflux disease) ICD-10-CM: K21.9 ICD-9-CM: 530.81  7/29/2020 Unknown Back pain ICD-10-CM: M54.9 ICD-9-CM: 724.5  7/28/2020 Unknown * (Principal) Hip pain ICD-10-CM: M25.559 ICD-9-CM: 719.45  7/28/2020 Unknown Essential hypertension ICD-10-CM: I10 
ICD-9-CM: 401.9  3/24/2019 Yes Acute kidney injury ([facility] ICD-10-CM: N17.9 ICD-9-CM: 584.9  3/24/2019 Yes Chronic anemia ICD-10-CM: D64.9 ICD-9-CM: 285.9  3/24/2019 Yes Dyslipidemia ICD-10-CM: E78.5 ICD-9-CM: 272.4  3/24/2019 Yes Atrial fibrillation ([facility] ICD-10-CM: I48.91 
ICD-9-CM: 427.31  3/22/2019 Yes Non Hodgkin's lymphoma ([facility] ICD-10-CM: C85.90 ICD-9-CM: 202.80  11/12/2018 Yes DM (diabetes mellitus) ([facility] ICD-10-CM: E11.9 ICD-9-CM: 250.00  11/10/2018 Yes [address] [facility] Residency Attending Addendum: I saw and evaluated the patient on the day of the encounter with Dr. Rustam Moore, performing the levi elements of the service. I discussed the findings, assessment and plan with the resident and agree with the resident's findings and plan as documented in the resident's note. Deanna Gregory MD, FAAFP, CAQSM, RMSK

## 2020-07-31 NOTE — ROUTINE PROCESS
Bedside shift change report given to [place], RN (oncoming nurse) by Rene Armas RN (offgoing nurse). Report included the following information SBAR, Kardex and MAR.

## 2020-10-19 NOTE — PROGRESS NOTES
Conjuntivae and eyelids appear normal, Sclerae : White without injection [facility] VISIT NOTE    1350  Pt arrived at [facility] ambulatory and in no distress for port flush/labs. Assessment completed, pt w/o complaints. .    Right chest port accessed with . 75 in coyne with no difficulty. Positive blood return noted and labs drawn. Recent Results (from the past 12 hour(s))   METABOLIC PANEL, COMPREHENSIVE    Collection Time: 03/19/19  1:58 PM   Result Value Ref Range    Sodium 138 136 - 145 mmol/L    Potassium 4.5 3.5 - 5.1 mmol/L    Chloride 103 97 - 108 mmol/L    CO2 29 21 - 32 mmol/L    Anion gap 6 5 - 15 mmol/L    Glucose 264 (H) 65 - 100 mg/dL    BUN 25 (H) 6 - 20 MG/DL    Creatinine 0.96 0.70 - 1.30 MG/DL    BUN/Creatinine ratio 26 (H) 12 - 20      GFR est AA >60 >60 ml/min/1.73m2    GFR est non-AA >60 >60 ml/min/1.73m2    Calcium 9.2 8.5 - 10.1 MG/DL    Bilirubin, total 0.2 0.2 - 1.0 MG/DL    ALT (SGPT) 15 12 - 78 U/L    AST (SGOT) 7 (L) 15 - 37 U/L    Alk. phosphatase 86 45 - 117 U/L    Protein, total 6.8 6.4 - 8.2 g/dL    Albumin 2.9 (L) 3.5 - 5.0 g/dL    Globulin 3.9 2.0 - 4.0 g/dL    A-G Ratio 0.7 (L) 1.1 - 2.2     CBC WITH AUTOMATED DIFF    Collection Time: 03/19/19  1:58 PM   Result Value Ref Range    WBC 13.2 (H) 4.1 - 11.1 K/uL    RBC 3.74 (L) 4.10 - 5.70 M/uL    HGB 9.6 (L) 12.1 - 17.0 g/dL    HCT 31.2 (L) 36.6 - 50.3 %    MCV 83.4 80.0 - 99.0 FL    MCH 25.7 (L) 26.0 - 34.0 PG    MCHC 30.8 30.0 - 36.5 g/dL    RDW 17.3 (H) 11.5 - 14.5 %    PLATELET 117 124 - 307 K/uL    MPV 11.6 8.9 - 12.9 FL    NRBC 0.0 0  WBC    ABSOLUTE NRBC 0.00 0.00 - 0.01 K/uL    NEUTROPHILS 74 32 - 75 %    LYMPHOCYTES 11 (L) 12 - 49 %    MONOCYTES 10 5 - 13 %    EOSINOPHILS 4 0 - 7 %    BASOPHILS 0 0 - 1 %    IMMATURE GRANULOCYTES 1 (H) 0.0 - 0.5 %    ABS. NEUTROPHILS 9.7 (H) 1.8 - 8.0 K/UL    ABS. LYMPHOCYTES 1.5 0.8 - 3.5 K/UL    ABS. MONOCYTES 1.3 (H) 0.0 - 1.0 K/UL    ABS. EOSINOPHILS 0.5 (H) 0.0 - 0.4 K/UL    ABS. BASOPHILS 0.0 0.0 - 0.1 K/UL    ABS. IMM.  GRANS. 0.1 (H) 0.00 - 0.04 K/UL    DF AUTOMATED       Patient Vitals for the past 12 hrs:   Temp Pulse Resp BP   03/19/19 1350 97.6 °F (36.4 °C) 87 18 138/63     Tolerated treatment well, no adverse reaction noted. Port de-accessed and flushed per protocol. Positive blood return noted. 1400  D/C'd from [facility] ambulatory and in no distress accompanied by daughter.  Next appointment is 5/15/19 at 230pm

## 2020-12-24 NOTE — TELEPHONE ENCOUNTER
----- Message from Herb Deluca sent at 12/24/2020 10:03 AM EST -----  Regarding: DR Michelle/telephone  Caller's first and last name: [facility]  Reason for call: Death  Callback required yes/no and why: n/a  Best contact number(s): n/a  Details to clarify the request: Reji Barros is calling to inform of Pt's death on 12/24/20 @ 6:46 am.

## 2022-08-22 NOTE — PROGRESS NOTES
Physical Therapy Note:  Chart reviewed and spoke with team at [address] [place] [address]. Patient requiring pressor support for hypotension and not yet ready to engage in therapy services. Will hold and follow-up next tx day for PT ha.  Thank you. Additional Safety/Bands: No
